# Patient Record
Sex: FEMALE | Race: WHITE | Employment: OTHER | URBAN - METROPOLITAN AREA
[De-identification: names, ages, dates, MRNs, and addresses within clinical notes are randomized per-mention and may not be internally consistent; named-entity substitution may affect disease eponyms.]

---

## 2017-02-22 ENCOUNTER — HOSPITAL ENCOUNTER (OUTPATIENT)
Dept: GENERAL RADIOLOGY | Age: 72
Discharge: HOME | End: 2017-02-22
Payer: MEDICARE

## 2017-02-22 DIAGNOSIS — R31.9 HEMATURIA: ICD-10-CM

## 2017-02-22 DIAGNOSIS — C67.9 MALIGNANT NEOPLASM OF OTHER SPECIFIED SITES OF BLADDER: ICD-10-CM

## 2017-02-22 PROCEDURE — 71020 XR CHEST PA LAT: CPT

## 2017-03-26 ENCOUNTER — HOSPITAL ENCOUNTER (EMERGENCY)
Age: 72
Discharge: HOME | End: 2017-03-26
Attending: EMERGENCY MEDICINE | Admitting: EMERGENCY MEDICINE
Payer: MEDICARE

## 2017-03-26 VITALS
TEMPERATURE: 97.5 F | RESPIRATION RATE: 18 BRPM | DIASTOLIC BLOOD PRESSURE: 70 MMHG | SYSTOLIC BLOOD PRESSURE: 137 MMHG | OXYGEN SATURATION: 97 % | HEART RATE: 95 BPM

## 2017-03-26 DIAGNOSIS — R51.9 NONINTRACTABLE HEADACHE, UNSPECIFIED CHRONICITY PATTERN, UNSPECIFIED HEADACHE TYPE: ICD-10-CM

## 2017-03-26 DIAGNOSIS — J06.9 ACUTE UPPER RESPIRATORY INFECTION: Primary | ICD-10-CM

## 2017-03-26 PROCEDURE — 74011250636 HC RX REV CODE- 250/636: Performed by: EMERGENCY MEDICINE

## 2017-03-26 PROCEDURE — 99282 EMERGENCY DEPT VISIT SF MDM: CPT

## 2017-03-26 PROCEDURE — 96374 THER/PROPH/DIAG INJ IV PUSH: CPT

## 2017-03-26 PROCEDURE — 74011000250 HC RX REV CODE- 250: Performed by: EMERGENCY MEDICINE

## 2017-03-26 PROCEDURE — 96375 TX/PRO/DX INJ NEW DRUG ADDON: CPT

## 2017-03-26 PROCEDURE — 96361 HYDRATE IV INFUSION ADD-ON: CPT

## 2017-03-26 RX ORDER — KETOROLAC TROMETHAMINE 30 MG/ML
15 INJECTION, SOLUTION INTRAMUSCULAR; INTRAVENOUS
Status: COMPLETED | OUTPATIENT
Start: 2017-03-26 | End: 2017-03-26

## 2017-03-26 RX ORDER — IBUPROFEN 600 MG/1
600 TABLET ORAL
Qty: 20 TAB | Refills: 0 | Status: SHIPPED | OUTPATIENT
Start: 2017-03-26 | End: 2018-04-18

## 2017-03-26 RX ORDER — LOSARTAN POTASSIUM AND HYDROCHLOROTHIAZIDE 12.5; 5 MG/1; MG/1
1 TABLET ORAL EVERY EVENING
COMMUNITY
End: 2019-09-18 | Stop reason: SDUPTHER

## 2017-03-26 RX ORDER — PROCHLORPERAZINE MALEATE 10 MG
10 TABLET ORAL
Qty: 12 TAB | Refills: 0 | Status: SHIPPED | OUTPATIENT
Start: 2017-03-26 | End: 2017-04-02

## 2017-03-26 RX ORDER — TRAMADOL HYDROCHLORIDE 50 MG/1
50 TABLET ORAL
COMMUNITY
End: 2018-04-18

## 2017-03-26 RX ORDER — DIPHENHYDRAMINE HYDROCHLORIDE 50 MG/ML
12.5 INJECTION, SOLUTION INTRAMUSCULAR; INTRAVENOUS
Status: COMPLETED | OUTPATIENT
Start: 2017-03-26 | End: 2017-03-26

## 2017-03-26 RX ADMIN — SODIUM CHLORIDE 1000 ML: 900 INJECTION, SOLUTION INTRAVENOUS at 09:55

## 2017-03-26 RX ADMIN — DIPHENHYDRAMINE HYDROCHLORIDE 12.5 MG: 50 INJECTION, SOLUTION INTRAMUSCULAR; INTRAVENOUS at 09:56

## 2017-03-26 RX ADMIN — KETOROLAC TROMETHAMINE 15 MG: 30 INJECTION, SOLUTION INTRAMUSCULAR at 10:00

## 2017-03-26 RX ADMIN — SODIUM CHLORIDE 5 MG: 9 INJECTION INTRAMUSCULAR; INTRAVENOUS; SUBCUTANEOUS at 10:03

## 2017-03-26 NOTE — ED PROVIDER NOTES
HPI Comments: 73-year-old white female presents to the emergency department with cough, muscle aches, headache. Patient states she's had a cough and nasal congestion for the last 24 hours. She was having muscle aches in her shoulders yesterday. She also is having nasal congestion and a mild cough yesterday. Starting last night patient was having a headache. The headache is mostly frontal. The headache is dull in nature. No radiation of the headache. Patient states her head is hurting this morning. The headache is mostly frontal. The headache does not radiate. Nothing makes the headache better or worse. Patient denies photophobia or phonophobia. Patient had a temperature last night of 100.4°F. Pt has aches in her shoulders bilaterally this morning. No fever this morning. Patient denies any previous history of migraines. She reports getting headaches periodically. She took Tylenol 650 mg about 3 hours ago. This helped somewhat with the headache. Patient denies weakness or numbness in arms or legs. No chest pain or shortness of breath. No tobacco or alcohol use. The history is provided by the patient. Past Medical History:   Diagnosis Date    Arthritis     Cancer [facility])     bladder    Endocrine disease     hyperthyroid    Gastrointestinal disorder     Hypertension     Other ill-defined conditions(799.89)     hyperthyroid       Past Surgical History:   Procedure Laterality Date    HX GYN      HX UROLOGICAL      stoma since 78         History reviewed. No pertinent family history. Social History     Social History    Marital status:      Spouse name: N/A    Number of children: N/A    Years of education: N/A     Occupational History    Not on file.      Social History Main Topics    Smoking status: Former Smoker     Quit date: 7/3/1999    Smokeless tobacco: Never Used    Alcohol use No    Drug use: No    Sexual activity: Not on file     Other Topics Concern    Not on file     Social History Narrative         ALLERGIES: Sulfa (sulfonamide antibiotics)    Review of Systems   Constitutional: Negative for fever. HENT: Positive for congestion. Negative for facial swelling and nosebleeds. Eyes: Negative for pain. Respiratory: Positive for cough. Negative for chest tightness and shortness of breath. Cardiovascular: Negative for chest pain and leg swelling. Gastrointestinal: Negative for abdominal pain and vomiting. Endocrine: Negative for polyuria. Genitourinary: Negative for difficulty urinating and flank pain. Musculoskeletal: Positive for myalgias. Negative for arthralgias and back pain. Skin: Negative for color change. Allergic/Immunologic: Negative for immunocompromised state. Neurological: Positive for headaches. Negative for dizziness, speech difficulty, weakness and numbness. Hematological: Does not bruise/bleed easily. Psychiatric/Behavioral: Negative for agitation. All other systems reviewed and are negative. Vitals:    03/26/17 0920   BP: 147/67   Pulse: 95   Resp: 18   Temp: 97.5 °F (36.4 °C)   SpO2: 95%            Physical Exam   Constitutional: She is oriented to person, place, and time. She appears well-developed and well-nourished. HENT:   Head: Normocephalic and atraumatic. Right Ear: External ear normal.   Left Ear: External ear normal.   Nose: Nose normal.   Mouth/Throat: Oropharynx is clear and moist.   Eyes: EOM are normal. Pupils are equal, round, and reactive to light. No scleral icterus. Neck: Normal range of motion. Neck supple. No JVD present. No tracheal deviation present. No thyromegaly present. Cardiovascular: Normal rate, regular rhythm, normal heart sounds and intact distal pulses. Exam reveals no friction rub. No murmur heard. Pulmonary/Chest: Effort normal and breath sounds normal. No stridor. No respiratory distress. She has no wheezes. She has no rales. She exhibits no tenderness. Abdominal: Soft.  Bowel sounds are normal. She exhibits no distension. There is no tenderness. There is no rebound and no guarding. Musculoskeletal: Normal range of motion. She exhibits no edema or tenderness. Lymphadenopathy:     She has no cervical adenopathy. Neurological: She is alert and oriented to person, place, and time. She has normal reflexes. No cranial nerve deficit. Coordination normal.   Cranial nerves 2-12 are intact. Strength 5/5 and normal in biceps, triceps and hand  bilaterally. Strength 5/5 in plantar and dorsi flexion of feet bilaterally. Normal sensation in arms and legs bilaterally to light touch. Normal finger to nose bilaterally. Skin: Skin is warm and dry. No rash noted. No erythema. Psychiatric: She has a normal mood and affect. Her behavior is normal. Judgment and thought content normal.   Nursing note and vitals reviewed. MDM  Number of Diagnoses or Management Options  Diagnosis management comments: Patient looks well and nontoxic. Neurologic exam is normal. We'll give her IV fluids and headache medications. We'll reassess when testing is back. Amount and/or Complexity of Data Reviewed  Decide to obtain previous medical records or to obtain history from someone other than the patient: yes  Obtain history from someone other than the patient: yes  Review and summarize past medical records: yes  Independent visualization of images, tracings, or specimens: yes    Risk of Complications, Morbidity, and/or Mortality  Presenting problems: moderate  Diagnostic procedures: moderate  Management options: moderate      ED Course       Procedures    10:56 AM  Pt reports she's feeling better. Headache is gone. Will start her on Motrin and Compazine over the next few days. PCP follow up recommended    Good return precautions given to patient. Close follow up with PCP recommended. Patient and/or family voices understanding of this plan.  Discharge instructions were explained by me and all concerns were addressed.

## 2017-03-26 NOTE — DISCHARGE INSTRUCTIONS
Headache: Care Instructions  Your Care Instructions    Headaches have many possible causes. Most headaches aren't a sign of a more serious problem, and they will get better on their own. Home treatment may help you feel better faster. The doctor has checked you carefully, but problems can develop later. If you notice any problems or new symptoms, get medical treatment right away. Follow-up care is a key part of your treatment and safety. Be sure to make and go to all appointments, and call your doctor if you are having problems. It's also a good idea to know your test results and keep a list of the medicines you take. How can you care for yourself at home? · Do not drive if you have taken a prescription pain medicine. · Rest in a quiet, dark room until your headache is gone. Close your eyes and try to relax or go to sleep. Don't watch TV or read. · Put a cold, moist cloth or cold pack on the painful area for 10 to 20 minutes at a time. Put a thin cloth between the cold pack and your skin. · Use a warm, moist towel or a heating pad set on low to relax tight shoulder and neck muscles. · Have someone gently massage your neck and shoulders. · Take pain medicines exactly as directed. ¨ If the doctor gave you a prescription medicine for pain, take it as prescribed. ¨ If you are not taking a prescription pain medicine, ask your doctor if you can take an over-the-counter medicine. · Be careful not to take pain medicine more often than the instructions allow, because you may get worse or more frequent headaches when the medicine wears off. · Do not ignore new symptoms that occur with a headache, such as a fever, weakness or numbness, vision changes, or confusion. These may be signs of a more serious problem. To prevent headaches  · Keep a headache diary so you can figure out what triggers your headaches. Avoiding triggers may help you prevent headaches.  Record when each headache began, how long it lasted, and what the pain was like (throbbing, aching, stabbing, or dull). Write down any other symptoms you had with the headache, such as nausea, flashing lights or dark spots, or sensitivity to bright light or loud noise. Note if the headache occurred near your period. List anything that might have triggered the headache, such as certain foods (chocolate, cheese, wine) or odors, smoke, bright light, stress, or lack of sleep. · Find healthy ways to deal with stress. Headaches are most common during or right after stressful times. Take time to relax before and after you do something that has caused a headache in the past.  · Try to keep your muscles relaxed by keeping good posture. Check your jaw, face, neck, and shoulder muscles for tension, and try relaxing them. When sitting at a desk, change positions often, and stretch for 30 seconds each hour. · Get plenty of sleep and exercise. · Eat regularly and well. Long periods without food can trigger a headache. · Treat yourself to a massage. Some people find that regular massages are very helpful in relieving tension. · Limit caffeine by not drinking too much coffee, tea, or soda. But don't quit caffeine suddenly, because that can also give you headaches. · Reduce eyestrain from computers by blinking frequently and looking away from the computer screen every so often. Make sure you have proper eyewear and that your monitor is set up properly, about an arm's length away. · Seek help if you have depression or anxiety. Your headaches may be linked to these conditions. Treatment can both prevent headaches and help with symptoms of anxiety or depression. When should you call for help? Call 911 anytime you think you may need emergency care. For example, call if:  · You have signs of a stroke. These may include:  ¨ Sudden numbness, paralysis, or weakness in your face, arm, or leg, especially on only one side of your body. ¨ Sudden vision changes.   ¨ Sudden trouble speaking. ¨ Sudden confusion or trouble understanding simple statements. ¨ Sudden problems with walking or balance. ¨ A sudden, severe headache that is different from past headaches. Call your doctor now or seek immediate medical care if:  · You have a new or worse headache. · Your headache gets much worse. Where can you learn more? Go to http://kris-lópez.info/. Enter M271 in the search box to learn more about \"Headache: Care Instructions. \"  Current as of: February 19, 2016  Content Version: 11.1  © 8606-4416 [employer]. Care instructions adapted under license by [employer] (which disclaims liability or warranty for this information). If you have questions about a medical condition or this instruction, always ask your healthcare professional. [address] any warranty or liability for your use of this information. Upper Respiratory Infection (Cold): Care Instructions  Your Care Instructions    An upper respiratory infection, or URI, is an infection of the nose, sinuses, or throat. URIs are spread by coughs, sneezes, and direct contact. The common cold is the most frequent kind of URI. The flu and sinus infections are other kinds of URIs. Almost all URIs are caused by viruses. Antibiotics won't cure them. But you can treat most infections with home care. This may include drinking lots of fluids and taking over-the-counter pain medicine. You will probably feel better in 4 to 10 days. The doctor has checked you carefully, but problems can develop later. If you notice any problems or new symptoms, get medical treatment right away. Follow-up care is a key part of your treatment and safety. Be sure to make and go to all appointments, and call your doctor if you are having problems. It's also a good idea to know your test results and keep a list of the medicines you take. How can you care for yourself at home?   · To prevent dehydration, drink plenty of fluids, enough so that your urine is light yellow or clear like water. Choose water and other caffeine-free clear liquids until you feel better. If you have kidney, heart, or liver disease and have to limit fluids, talk with your doctor before you increase the amount of fluids you drink. · Take an over-the-counter pain medicine, such as acetaminophen (Tylenol), ibuprofen (Advil, Motrin), or naproxen (Aleve). Read and follow all instructions on the label. · Before you use cough and cold medicines, check the label. These medicines may not be safe for young children or for people with certain health problems. · Be careful when taking over-the-counter cold or flu medicines and Tylenol at the same time. Many of these medicines have acetaminophen, which is Tylenol. Read the labels to make sure that you are not taking more than the recommended dose. Too much acetaminophen (Tylenol) can be harmful. · Get plenty of rest.  · Do not smoke or allow others to smoke around you. If you need help quitting, talk to your doctor about stop-smoking programs and medicines. These can increase your chances of quitting for good. When should you call for help? Call 911 anytime you think you may need emergency care. For example, call if:  · You have severe trouble breathing. Call your doctor now or seek immediate medical care if:  · You seem to be getting much sicker. · You have new or worse trouble breathing. · You have a new or higher fever. · You have a new rash. Watch closely for changes in your health, and be sure to contact your doctor if:  · You have a new symptom, such as a sore throat, an earache, or sinus pain. · You cough more deeply or more often, especially if you notice more mucus or a change in the color of your mucus. · You do not get better as expected. Where can you learn more? Go to http://kris-lópez.info/.   Enter M007 in the search box to learn more about \"Upper Respiratory Infection (Cold): Care Instructions. \"  Current as of: June 30, 2016  Content Version: 11.1  © 2384-0863 [employer]. Care instructions adapted under license by [employer] (which disclaims liability or warranty for this information). If you have questions about a medical condition or this instruction, always ask your healthcare professional. [address] any warranty or liability for your use of this information.

## 2017-03-26 NOTE — ED TRIAGE NOTES
Pt ambulates to treatment area she states that yesterday she began with a cough in the morning and then last night her entire head began to hurt. She had a low grade fever of 100.4 so this morning she took some Tylenol that helped with the fever and a little with the headache.   She feels nauseated intermittently but has not vomited

## 2017-08-16 ENCOUNTER — APPOINTMENT (OUTPATIENT)
Dept: CT IMAGING | Age: 72
End: 2017-08-16
Attending: PHYSICIAN ASSISTANT
Payer: MEDICARE

## 2017-08-16 ENCOUNTER — HOSPITAL ENCOUNTER (EMERGENCY)
Age: 72
Discharge: HOME | End: 2017-08-17
Attending: EMERGENCY MEDICINE
Payer: MEDICARE

## 2017-08-16 DIAGNOSIS — N39.0 URINARY TRACT INFECTION WITH HEMATURIA, SITE UNSPECIFIED: Primary | ICD-10-CM

## 2017-08-16 DIAGNOSIS — R31.9 URINARY TRACT INFECTION WITH HEMATURIA, SITE UNSPECIFIED: Primary | ICD-10-CM

## 2017-08-16 LAB
ALBUMIN SERPL-MCNC: 3.7 G/DL (ref 3.5–5)
ALBUMIN/GLOB SERPL: 0.9 {RATIO} (ref 1.1–2.2)
ALP SERPL-CCNC: 67 U/L (ref 45–117)
ALT SERPL-CCNC: 23 U/L (ref 12–78)
ANION GAP SERPL CALC-SCNC: 6 MMOL/L (ref 5–15)
APPEARANCE UR: ABNORMAL
AST SERPL-CCNC: 25 U/L (ref 15–37)
BACTERIA URNS QL MICRO: ABNORMAL /HPF
BASOPHILS # BLD: 0 K/UL (ref 0–0.1)
BASOPHILS NFR BLD: 0 % (ref 0–1)
BILIRUB SERPL-MCNC: 0.5 MG/DL (ref 0.2–1)
BILIRUB UR QL: NEGATIVE
BUN SERPL-MCNC: 23 MG/DL (ref 6–20)
BUN/CREAT SERPL: 18 (ref 12–20)
CALCIUM SERPL-MCNC: 8.5 MG/DL (ref 8.5–10.1)
CHLORIDE SERPL-SCNC: 104 MMOL/L (ref 97–108)
CO2 SERPL-SCNC: 28 MMOL/L (ref 21–32)
COLOR UR: ABNORMAL
CREAT SERPL-MCNC: 1.29 MG/DL (ref 0.55–1.02)
EOSINOPHIL # BLD: 0 K/UL (ref 0–0.4)
EOSINOPHIL NFR BLD: 2 % (ref 0–7)
EPITH CASTS URNS QL MICRO: ABNORMAL /LPF
ERYTHROCYTE [DISTWIDTH] IN BLOOD BY AUTOMATED COUNT: 13.7 % (ref 11.5–14.5)
GLOBULIN SER CALC-MCNC: 3.9 G/DL (ref 2–4)
GLUCOSE SERPL-MCNC: 103 MG/DL (ref 65–100)
GLUCOSE UR STRIP.AUTO-MCNC: NEGATIVE MG/DL
HCT VFR BLD AUTO: 31 % (ref 35–47)
HGB BLD-MCNC: 10.2 G/DL (ref 11.5–16)
HGB UR QL STRIP: ABNORMAL
INR PPP: 1.1 (ref 0.9–1.1)
KETONES UR QL STRIP.AUTO: NEGATIVE MG/DL
LEUKOCYTE ESTERASE UR QL STRIP.AUTO: ABNORMAL
LYMPHOCYTES # BLD: 0.9 K/UL (ref 0.8–3.5)
LYMPHOCYTES NFR BLD: 39 % (ref 12–49)
MCH RBC QN AUTO: 30 PG (ref 26–34)
MCHC RBC AUTO-ENTMCNC: 32.9 G/DL (ref 30–36.5)
MCV RBC AUTO: 91.2 FL (ref 80–99)
MONOCYTES # BLD: 0.1 K/UL (ref 0–1)
MONOCYTES NFR BLD: 6 % (ref 5–13)
NEUTS SEG # BLD: 1.2 K/UL (ref 1.8–8)
NEUTS SEG NFR BLD: 53 % (ref 32–75)
NITRITE UR QL STRIP.AUTO: POSITIVE
PH UR STRIP: 7 [PH] (ref 5–8)
PLATELET # BLD AUTO: 99 K/UL (ref 150–400)
POTASSIUM SERPL-SCNC: 3.5 MMOL/L (ref 3.5–5.1)
PROT SERPL-MCNC: 7.6 G/DL (ref 6.4–8.2)
PROT UR STRIP-MCNC: >300 MG/DL
PROTHROMBIN TIME: 11.3 SEC (ref 9–11.1)
RBC # BLD AUTO: 3.4 M/UL (ref 3.8–5.2)
RBC #/AREA URNS HPF: >100 /HPF (ref 0–5)
SODIUM SERPL-SCNC: 138 MMOL/L (ref 136–145)
SP GR UR REFRACTOMETRY: 1.02 (ref 1–1.03)
UROBILINOGEN UR QL STRIP.AUTO: 0.2 EU/DL (ref 0.2–1)
WBC # BLD AUTO: 2.3 K/UL (ref 3.6–11)
WBC URNS QL MICRO: ABNORMAL /HPF (ref 0–4)

## 2017-08-16 PROCEDURE — 36415 COLL VENOUS BLD VENIPUNCTURE: CPT | Performed by: PHYSICIAN ASSISTANT

## 2017-08-16 PROCEDURE — 87086 URINE CULTURE/COLONY COUNT: CPT | Performed by: PHYSICIAN ASSISTANT

## 2017-08-16 PROCEDURE — 85025 COMPLETE CBC W/AUTO DIFF WBC: CPT | Performed by: PHYSICIAN ASSISTANT

## 2017-08-16 PROCEDURE — 74176 CT ABD & PELVIS W/O CONTRAST: CPT

## 2017-08-16 PROCEDURE — 81001 URINALYSIS AUTO W/SCOPE: CPT | Performed by: PHYSICIAN ASSISTANT

## 2017-08-16 PROCEDURE — 99284 EMERGENCY DEPT VISIT MOD MDM: CPT

## 2017-08-16 PROCEDURE — 87186 SC STD MICRODIL/AGAR DIL: CPT | Performed by: PHYSICIAN ASSISTANT

## 2017-08-16 PROCEDURE — 87077 CULTURE AEROBIC IDENTIFY: CPT | Performed by: PHYSICIAN ASSISTANT

## 2017-08-16 PROCEDURE — 80053 COMPREHEN METABOLIC PANEL: CPT | Performed by: PHYSICIAN ASSISTANT

## 2017-08-16 PROCEDURE — 85610 PROTHROMBIN TIME: CPT | Performed by: PHYSICIAN ASSISTANT

## 2017-08-16 PROCEDURE — 96365 THER/PROPH/DIAG IV INF INIT: CPT

## 2017-08-17 VITALS
SYSTOLIC BLOOD PRESSURE: 149 MMHG | OXYGEN SATURATION: 100 % | HEART RATE: 87 BPM | HEIGHT: 65 IN | RESPIRATION RATE: 18 BRPM | DIASTOLIC BLOOD PRESSURE: 67 MMHG | TEMPERATURE: 98.4 F | BODY MASS INDEX: 34.64 KG/M2 | WEIGHT: 207.89 LBS

## 2017-08-17 PROCEDURE — 74011250636 HC RX REV CODE- 250/636: Performed by: PHYSICIAN ASSISTANT

## 2017-08-17 PROCEDURE — 74011000258 HC RX REV CODE- 258: Performed by: PHYSICIAN ASSISTANT

## 2017-08-17 RX ORDER — CEPHALEXIN 500 MG/1
500 CAPSULE ORAL 3 TIMES DAILY
Qty: 21 CAP | Refills: 0 | Status: SHIPPED | OUTPATIENT
Start: 2017-08-17 | End: 2017-08-17

## 2017-08-17 RX ORDER — CEFUROXIME AXETIL 500 MG/1
500 TABLET ORAL 2 TIMES DAILY
Qty: 18 TAB | Refills: 0 | Status: SHIPPED | OUTPATIENT
Start: 2017-08-17 | End: 2017-08-26

## 2017-08-17 RX ADMIN — CEFTRIAXONE SODIUM 1 G: 1 INJECTION, POWDER, FOR SOLUTION INTRAMUSCULAR; INTRAVENOUS at 00:50

## 2017-08-17 NOTE — ED PROVIDER NOTES
HPI Comments: Tsering Mireles is a 74 y.o. female with PMH significant for urostomy due to bladder CA / cystectomy in 1999 presents to emergency room ambulatory c/o hematuria from ostomy site which began this evening at 9pm. She also notes generalized \"not feeling well\" the past few days with associated chills, but denies fever, diarrhea, vomiting, bowel changes. Hx of hematuria x2 other times in our system, 2013 and 2011 and most recent 2 urine cx's grew e.coli which was pansensitive. She denies being admitted in the past for hematuria / UTI's. Also c/o dull BL low back pain and has hx of kidney stones \"years ago\". PCP: Arsh Box MD  Urology: Dr. Majo Lemons    Surgical hx- stoma since 1999, hysterectomy, cystectomy  Social hx- former smoker, no ETOH    The patient has no other complaints at this time. Patient is a 74 y.o. female presenting with hematuria. The history is provided by the patient. Blood in Urine    Associated symptoms include hematuria and back pain (BL). Pertinent negatives include no chills, no nausea, no vomiting, no frequency, no flank pain and no abdominal pain. Past Medical History:   Diagnosis Date    Arthritis     Cancer [facility])     bladder    Endocrine disease     hyperthyroid    Gastrointestinal disorder     Hypertension     Other ill-defined conditions(799.89)     hyperthyroid       Past Surgical History:   Procedure Laterality Date    HX GYN      HX UROLOGICAL      stoma since 78         No family history on file. Social History     Social History    Marital status:      Spouse name: N/A    Number of children: N/A    Years of education: N/A     Occupational History    Not on file.      Social History Main Topics    Smoking status: Former Smoker     Quit date: 7/3/1999    Smokeless tobacco: Never Used    Alcohol use No    Drug use: No    Sexual activity: Not on file     Other Topics Concern    Not on file     Social History Narrative ALLERGIES: Sulfa (sulfonamide antibiotics)    Review of Systems   Constitutional: Negative. Negative for activity change, chills, fatigue and unexpected weight change. Respiratory: Negative for cough, chest tightness, shortness of breath and wheezing. Cardiovascular: Negative. Negative for chest pain and palpitations. Gastrointestinal: Negative. Negative for abdominal pain, diarrhea, nausea and vomiting. Genitourinary: Positive for hematuria. Negative for dysuria, flank pain and frequency. Musculoskeletal: Positive for back pain (BL). Negative for arthralgias, neck pain and neck stiffness. Skin: Negative. Negative for color change and rash. Neurological: Negative. Negative for dizziness, numbness and headaches. Psychiatric/Behavioral: Negative. Negative for confusion. All other systems reviewed and are negative. Vitals:    08/16/17 2221   BP: 180/79   Pulse: 87   Resp: 18   Temp: 98.4 °F (36.9 °C)   SpO2: 97%   Weight: 94.3 kg (207 lb 14.3 oz)   Height: 5' 5\" (1.651 m)            Physical Exam   Constitutional: She is oriented to person, place, and time. She appears well-developed and well-nourished. She is active. Non-toxic appearance. No distress. HENT:   Head: Normocephalic and atraumatic. Eyes: Conjunctivae are normal. Pupils are equal, round, and reactive to light. Right eye exhibits no discharge. Left eye exhibits no discharge. Neck: Normal range of motion and full passive range of motion without pain. Neck supple. No tracheal tenderness present. Cardiovascular: Normal rate, regular rhythm, normal heart sounds, intact distal pulses and normal pulses. Exam reveals no gallop and no friction rub. No murmur heard. Pulmonary/Chest: Effort normal and breath sounds normal. No respiratory distress. She has no wheezes. She has no rales. She exhibits no tenderness. Abdominal: Soft. Bowel sounds are normal. She exhibits no distension. There is no tenderness.  There is no rebound and no guarding. Urostomy to RLQ with dark red urine; no clots. Mild TTP around ostomy without peritoneal signs; large midline surgical scar with soft reducible ventral wall hernia to the left of the midline surgical scar near umbilicus. Musculoskeletal: Normal range of motion. She exhibits no edema or tenderness. Neurological: She is alert and oriented to person, place, and time. She has normal strength. No cranial nerve deficit or sensory deficit. Coordination normal.   Skin: Skin is warm, dry and intact. No abrasion and no rash noted. She is not diaphoretic. No erythema. Psychiatric: She has a normal mood and affect. Her speech is normal and behavior is normal. Cognition and memory are normal.   Nursing note and vitals reviewed. MDM  Number of Diagnoses or Management Options  Urinary tract infection with hematuria, site unspecified:   Diagnosis management comments:   Ddx: UTI, kidney stone, electrolyte abnormality       Amount and/or Complexity of Data Reviewed  Clinical lab tests: ordered and reviewed  Review and summarize past medical records: yes    Patient Progress  Patient progress: stable    ED Course       Procedures      I discussed patient's PMH, exam findings as well as careplan with the ER attending who agrees with care plan. Dr. Jet Horn also saw and examined patient and agrees with care plan. Also recommends stone study with CT abd/pelvis w/o contrast.  Jenna Willis PA-C         12:16 AM  I have reviewed the additional findings with the patient and encouraged them to follow-up with a primary care provider for appropriate outpatient evaluation and treatment. I have encouraged them to see the official results in [facility]\" or to retrieve the specifics of their results from medical records. The patient states that they understand that there were additional findings and that they agree to follow-up as recommended.     Discussed lab/imaging results with Dr. Jet Horn who recommends to give a dose of Rocephin 1 g IV now and d/c with Keflex and urology f/u in 1-2 days. Patient requests \"something other than Keflex\" just stating it caused diarrhea she believes, \"years ago\". Discussed with Dr. Dillan Conley who recommends Ceftin BID. Leigh Zelaya PA-C      DISCHARGE NOTE:  12:16 AM  The patient's results have been reviewed with them and/or available family. Patient and/or family verbally conveyed their understanding and agreement of the patient's signs, symptoms, diagnosis, treatment and prognosis and additionally agree to follow up as recommended in the discharge instructions or to return to the Emergency Room should their condition change prior to their follow-up appointment. The patient/family verbally agrees with the care-plan and verbally conveys that all of their questions have been answered. The discharge instructions have also been provided to the patient and/or family with some educational information regarding the patient's diagnosis as well a list of reasons why the patient would want to return to the ER prior to their follow-up appointment, should their condition change. Plan:  1. F/U with urology in 1-2 days  2. Rx Ceftin ; Rocephin given in ER  3.  Return precautions discussed and advised to return to ER if worse

## 2017-08-17 NOTE — DISCHARGE INSTRUCTIONS
We hope that we have addressed all of your medical concerns. The examination and treatment you received in the Emergency Department were for an emergent problem and were not intended as complete care. It is important that you follow up with your healthcare provider(s) for ongoing care. If your symptoms worsen or do not improve as expected, and you are unable to reach your usual health care provider(s), you should return to the Emergency Department. Today's healthcare is undergoing tremendous change, and patient satisfaction surveys are one of the many tools to assess the quality of medical care. You may receive a survey from the [employer] regarding your experience in the Emergency Department. I hope that your experience has been completely positive, particularly the medical care that I provided. As such, please participate in the survey; anything less than excellent does not meet my expectations or intentions. [facility] and [address] [place] [address] participate in nationally recognized quality of care measures. If your blood pressure is greater than 120/80, as reported below, we urge that you seek medical care to address the potential of high blood pressure, commonly known as hypertension. Hypertension can be hereditary or can be caused by certain medical conditions, pain, stress, or \"white coat syndrome. \"       Please make an appointment with your health care provider(s) for follow up of your Emergency Department visit. VITALS:   Patient Vitals for the past 8 hrs:   Temp Pulse Resp BP SpO2   08/16/17 2221 98.4 °F (36.9 °C) 87 18 180/79 97 %          Thank you for allowing us to provide you with medical care today. We realize that you have many choices for your emergency care needs. Please choose us in the future for any continued health care needs.       Regards,           Juan Souza PA-C    [facility], [facility]. Office: [phone]            Recent Results (from the past 24 hour(s))   CBC WITH AUTOMATED DIFF    Collection Time: 08/16/17 10:59 PM   Result Value Ref Range    WBC 2.3 (L) 3.6 - 11.0 K/uL    RBC 3.40 (L) 3.80 - 5.20 M/uL    HGB 10.2 (L) 11.5 - 16.0 g/dL    HCT 31.0 (L) 35.0 - 47.0 %    MCV 91.2 80.0 - 99.0 FL    MCH 30.0 26.0 - 34.0 PG    MCHC 32.9 30.0 - 36.5 g/dL    RDW 13.7 11.5 - 14.5 %    PLATELET 99 (L) 375 - 400 K/uL    NEUTROPHILS 53 32 - 75 %    LYMPHOCYTES 39 12 - 49 %    MONOCYTES 6 5 - 13 %    EOSINOPHILS 2 0 - 7 %    BASOPHILS 0 0 - 1 %    ABS. NEUTROPHILS 1.2 (L) 1.8 - 8.0 K/UL    ABS. LYMPHOCYTES 0.9 0.8 - 3.5 K/UL    ABS. MONOCYTES 0.1 0.0 - 1.0 K/UL    ABS. EOSINOPHILS 0.0 0.0 - 0.4 K/UL    ABS. BASOPHILS 0.0 0.0 - 0.1 K/UL   METABOLIC PANEL, COMPREHENSIVE    Collection Time: 08/16/17 10:59 PM   Result Value Ref Range    Sodium 138 136 - 145 mmol/L    Potassium 3.5 3.5 - 5.1 mmol/L    Chloride 104 97 - 108 mmol/L    CO2 28 21 - 32 mmol/L    Anion gap 6 5 - 15 mmol/L    Glucose 103 (H) 65 - 100 mg/dL    BUN 23 (H) 6 - 20 MG/DL    Creatinine 1.29 (H) 0.55 - 1.02 MG/DL    BUN/Creatinine ratio 18 12 - 20      GFR est AA 49 (L) >60 ml/min/1.73m2    GFR est non-AA 40 (L) >60 ml/min/1.73m2    Calcium 8.5 8.5 - 10.1 MG/DL    Bilirubin, total 0.5 0.2 - 1.0 MG/DL    ALT (SGPT) 23 12 - 78 U/L    AST (SGOT) 25 15 - 37 U/L    Alk.  phosphatase 67 45 - 117 U/L    Protein, total 7.6 6.4 - 8.2 g/dL    Albumin 3.7 3.5 - 5.0 g/dL    Globulin 3.9 2.0 - 4.0 g/dL    A-G Ratio 0.9 (L) 1.1 - 2.2     URINALYSIS W/MICROSCOPIC    Collection Time: 08/16/17 10:59 PM   Result Value Ref Range    Color RED      Appearance OPAQUE (A) CLEAR      Specific gravity 1.020 1.003 - 1.030      pH (UA) 7.0 5.0 - 8.0      Protein >300 (A) NEG mg/dL    Glucose NEGATIVE  NEG mg/dL    Ketone NEGATIVE  NEG mg/dL    Bilirubin NEGATIVE  NEG      Blood LARGE (A) NEG      Urobilinogen 0.2 0.2 - 1.0 EU/dL    Nitrites POSITIVE (A) NEG Leukocyte Esterase TRACE (A) NEG      WBC 5-10 0 - 4 /hpf    RBC >100 (H) 0 - 5 /hpf    Epithelial cells FEW FEW /lpf    Bacteria 1+ (A) NEG /hpf   PROTHROMBIN TIME + INR    Collection Time: 08/16/17 10:59 PM   Result Value Ref Range    INR 1.1 0.9 - 1.1      Prothrombin time 11.3 (H) 9.0 - 11.1 sec       Ct Abd Pelv Wo Cont    Result Date: 8/16/2017  EXAM:  CT ABD PELV WO CONT INDICATION: hematuria, flank pain, hx of kidney stones; has urostomy COMPARISON: 2013 CONTRAST:  None. TECHNIQUE: Thin axial images were obtained through the abdomen and pelvis. Coronal and sagittal reconstructions were generated. Oral contrast was not administered. CT dose reduction was achieved through use of a standardized protocol tailored for this examination and automatic exposure control for dose modulation. The absence of intravenous contrast material reduces the sensitivity for evaluation of the solid parenchymal organs of the abdomen. FINDINGS: Liver is nodular compatible with cirrhosis. The spleen is normal in size. The gallbladder is not distended. The a pancreas does appear unremarkable. Ostomy seen in the right lower quadrant. There is a small supraumbilical abdominal wall hernia containing nondistended colon. There is no bowel wall thickening or obstruction. Prior cystectomy. There is no free air or free fluid. No definite adenopathy in the abdomen or pelvis. Large left renal cyst unchanged. There is no intrarenal calcification or obstruction. IMPRESSION: No acute findings. Prior surgery. Blood in the Urine: Care Instructions  Your Care Instructions  Blood in the urine, or hematuria, may make the urine look red, brown, or pink. There may be blood every time you urinate or just from time to time. You cannot always see blood in the urine, but it will show up in a urine test.  Blood in the urine may be serious. It should always be checked by a doctor.  Your doctor may recommend more tests, including an X-ray, a CT scan, or a cystoscopy (which lets a doctor look inside the urethra and bladder). Blood in the urine can be a sign of another problem. Common causes are bladder infections and kidney stones. An injury to your groin or your genital area can also cause bleeding in the urinary tract. Very hard exercise--such as running a marathon--can cause blood in the urine. Blood in the urine can also be a sign of kidney disease or cancer in the bladder or kidney. Many cases of blood in the urine are caused by a harmless condition that runs in families. This is called benign familial hematuria. It does not need any treatment. Sometimes your urine may look red or brown even though it does not contain blood. For example, not getting enough fluids (dehydration), taking certain medicines, or having a liver problem can change the color of your urine. Eating foods such as beets, rhubarb, or blackberries or foods with red food coloring can make your urine look red or pink. Follow-up care is a key part of your treatment and safety. Be sure to make and go to all appointments, and call your doctor if you are having problems. It's also a good idea to know your test results and keep a list of the medicines you take. When should you call for help? Call your doctor now or seek immediate medical care if:  · You have symptoms of a urinary infection. For example:  ¨ You have pus in your urine. ¨ You have pain in your back just below your rib cage. This is called flank pain. ¨ You have a fever, chills, or body aches. ¨ It hurts to urinate. ¨ You have groin or belly pain. · You have more blood in your urine. Watch closely for changes in your health, and be sure to contact your doctor if:  · You have new urination problems. · You do not get better as expected. Where can you learn more? Go to http://kris-lópez.info/. Enter O001 in the search box to learn more about \"Blood in the Urine: Care Instructions. \"  Current as of: March 20, 2017  Content Version: 11.3  © 6694-6721 [employer]. Care instructions adapted under license by [employer] (which disclaims liability or warranty for this information). If you have questions about a medical condition or this instruction, always ask your healthcare professional. [address] any warranty or liability for your use of this information. Urinary Tract Infection in Women: Care Instructions  Your Care Instructions    A urinary tract infection, or UTI, is a general term for an infection anywhere between the kidneys and the urethra (where urine comes out). Most UTIs are bladder infections. They often cause pain or burning when you urinate. UTIs are caused by bacteria and can be cured with antibiotics. Be sure to complete your treatment so that the infection goes away. Follow-up care is a key part of your treatment and safety. Be sure to make and go to all appointments, and call your doctor if you are having problems. It's also a good idea to know your test results and keep a list of the medicines you take. How can you care for yourself at home? · Take your antibiotics as directed. Do not stop taking them just because you feel better. You need to take the full course of antibiotics. · Drink extra water and other fluids for the next day or two. This may help wash out the bacteria that are causing the infection. (If you have kidney, heart, or liver disease and have to limit fluids, talk with your doctor before you increase your fluid intake.)  · Avoid drinks that are carbonated or have caffeine. They can irritate the bladder. · Urinate often. Try to empty your bladder each time. · To relieve pain, take a hot bath or lay a heating pad set on low over your lower belly or genital area. Never go to sleep with a heating pad in place. To prevent UTIs  · Drink plenty of water each day.  This helps you urinate often, which clears bacteria from your system. (If you have kidney, heart, or liver disease and have to limit fluids, talk with your doctor before you increase your fluid intake.)  · Urinate when you need to. · Urinate right after you have sex. · Change sanitary pads often. · Avoid douches, bubble baths, feminine hygiene sprays, and other feminine hygiene products that have deodorants. · After going to the bathroom, wipe from front to back. When should you call for help? Call your doctor now or seek immediate medical care if:  · Symptoms such as fever, chills, nausea, or vomiting get worse or appear for the first time. · You have new pain in your back just below your rib cage. This is called flank pain. · There is new blood or pus in your urine. · You have any problems with your antibiotic medicine. Watch closely for changes in your health, and be sure to contact your doctor if:  · You are not getting better after taking an antibiotic for 2 days. · Your symptoms go away but then come back. Where can you learn more? Go to http://kris-lópez.info/. Enter C687 in the search box to learn more about \"Urinary Tract Infection in Women: Care Instructions. \"  Current as of: November 28, 2016  Content Version: 11.3  © 5710-8918 [employer]. Care instructions adapted under license by [employer] (which disclaims liability or warranty for this information). If you have questions about a medical condition or this instruction, always ask your healthcare professional. [place] [address] any warranty or liability for your use of this information.

## 2017-08-19 LAB
BACTERIA SPEC CULT: ABNORMAL
BACTERIA SPEC CULT: ABNORMAL
CC UR VC: ABNORMAL
SERVICE CMNT-IMP: ABNORMAL

## 2018-04-18 ENCOUNTER — HOSPITAL ENCOUNTER (INPATIENT)
Age: 73
LOS: 4 days | Discharge: HOME HEALTH | DRG: 872 | End: 2018-04-22
Attending: EMERGENCY MEDICINE | Admitting: INTERNAL MEDICINE
Payer: MEDICARE

## 2018-04-18 ENCOUNTER — APPOINTMENT (OUTPATIENT)
Dept: GENERAL RADIOLOGY | Age: 73
DRG: 872 | End: 2018-04-18
Attending: INTERNAL MEDICINE
Payer: MEDICARE

## 2018-04-18 DIAGNOSIS — E53.8 VITAMIN B12 DEFICIENCY: ICD-10-CM

## 2018-04-18 DIAGNOSIS — N30.01 ACUTE CYSTITIS WITH HEMATURIA: ICD-10-CM

## 2018-04-18 DIAGNOSIS — D61.818 PANCYTOPENIA (HCC): ICD-10-CM

## 2018-04-18 DIAGNOSIS — A41.9 SEPSIS, DUE TO UNSPECIFIED ORGANISM: Primary | ICD-10-CM

## 2018-04-18 DIAGNOSIS — D63.8 ANEMIA OF CHRONIC DISEASE: ICD-10-CM

## 2018-04-18 DIAGNOSIS — E03.9 ACQUIRED HYPOTHYROIDISM: ICD-10-CM

## 2018-04-18 DIAGNOSIS — C67.9 MALIGNANT NEOPLASM OF URINARY BLADDER, UNSPECIFIED SITE (HCC): ICD-10-CM

## 2018-04-18 DIAGNOSIS — N28.9 ACUTE RENAL INSUFFICIENCY: ICD-10-CM

## 2018-04-18 PROBLEM — N17.9 ARF (ACUTE RENAL FAILURE) (HCC): Status: ACTIVE | Noted: 2018-04-18

## 2018-04-18 PROBLEM — E87.20 LACTIC ACIDOSIS: Status: ACTIVE | Noted: 2018-04-18

## 2018-04-18 PROBLEM — E87.1 HYPONATREMIA: Status: ACTIVE | Noted: 2018-04-18

## 2018-04-18 PROBLEM — D64.9 ANEMIA: Status: ACTIVE | Noted: 2018-04-18

## 2018-04-18 PROBLEM — I10 HTN (HYPERTENSION): Status: ACTIVE | Noted: 2018-04-18

## 2018-04-18 PROBLEM — E87.6 HYPOKALEMIA: Status: ACTIVE | Noted: 2018-04-18

## 2018-04-18 PROBLEM — K21.9 GERD (GASTROESOPHAGEAL REFLUX DISEASE): Status: ACTIVE | Noted: 2018-04-18

## 2018-04-18 PROBLEM — R73.9 HYPERGLYCEMIA: Status: ACTIVE | Noted: 2018-04-18

## 2018-04-18 LAB
ALBUMIN SERPL-MCNC: 3.5 G/DL (ref 3.5–5)
ALBUMIN/GLOB SERPL: 0.8 {RATIO} (ref 1.1–2.2)
ALP SERPL-CCNC: 72 U/L (ref 45–117)
ALT SERPL-CCNC: 23 U/L (ref 12–78)
AMORPH CRY URNS QL MICRO: ABNORMAL
ANION GAP SERPL CALC-SCNC: 17 MMOL/L (ref 5–15)
APPEARANCE UR: ABNORMAL
AST SERPL-CCNC: 27 U/L (ref 15–37)
ATRIAL RATE: 99 BPM
B PERT DNA SPEC QL NAA+PROBE: NOT DETECTED
BACTERIA URNS QL MICRO: ABNORMAL /HPF
BASOPHILS # BLD: 0 K/UL (ref 0–0.1)
BASOPHILS NFR BLD: 0 % (ref 0–1)
BILIRUB SERPL-MCNC: 0.8 MG/DL (ref 0.2–1)
BILIRUB UR QL: NEGATIVE
BUN SERPL-MCNC: 34 MG/DL (ref 6–20)
BUN/CREAT SERPL: 17 (ref 12–20)
C PNEUM DNA SPEC QL NAA+PROBE: NOT DETECTED
CALCIUM SERPL-MCNC: 9.1 MG/DL (ref 8.5–10.1)
CALCULATED P AXIS, ECG09: 54 DEGREES
CALCULATED R AXIS, ECG10: 3 DEGREES
CALCULATED T AXIS, ECG11: 24 DEGREES
CHLORIDE SERPL-SCNC: 98 MMOL/L (ref 97–108)
CO2 SERPL-SCNC: 18 MMOL/L (ref 21–32)
COLOR UR: ABNORMAL
CREAT SERPL-MCNC: 1.97 MG/DL (ref 0.55–1.02)
DIAGNOSIS, 93000: NORMAL
DIFFERENTIAL METHOD BLD: ABNORMAL
EOSINOPHIL # BLD: 0 K/UL (ref 0–0.4)
EOSINOPHIL NFR BLD: 0 % (ref 0–7)
EPITH CASTS URNS QL MICRO: ABNORMAL /LPF
ERYTHROCYTE [DISTWIDTH] IN BLOOD BY AUTOMATED COUNT: 13.3 % (ref 11.5–14.5)
EST. AVERAGE GLUCOSE BLD GHB EST-MCNC: 100 MG/DL
FLUAV H1 2009 PAND RNA SPEC QL NAA+PROBE: NOT DETECTED
FLUAV H1 RNA SPEC QL NAA+PROBE: NOT DETECTED
FLUAV H3 RNA SPEC QL NAA+PROBE: NOT DETECTED
FLUAV SUBTYP SPEC NAA+PROBE: NOT DETECTED
FLUBV RNA SPEC QL NAA+PROBE: NOT DETECTED
GLOBULIN SER CALC-MCNC: 4.5 G/DL (ref 2–4)
GLUCOSE SERPL-MCNC: 155 MG/DL (ref 65–100)
GLUCOSE UR STRIP.AUTO-MCNC: NEGATIVE MG/DL
HADV DNA SPEC QL NAA+PROBE: NOT DETECTED
HBA1C MFR BLD: 5.1 % (ref 4.2–6.3)
HCOV 229E RNA SPEC QL NAA+PROBE: NOT DETECTED
HCOV HKU1 RNA SPEC QL NAA+PROBE: NOT DETECTED
HCOV NL63 RNA SPEC QL NAA+PROBE: NOT DETECTED
HCOV OC43 RNA SPEC QL NAA+PROBE: NOT DETECTED
HCT VFR BLD AUTO: 28.7 % (ref 35–47)
HGB BLD-MCNC: 9.4 G/DL (ref 11.5–16)
HGB UR QL STRIP: ABNORMAL
HMPV RNA SPEC QL NAA+PROBE: NOT DETECTED
HPIV1 RNA SPEC QL NAA+PROBE: NOT DETECTED
HPIV2 RNA SPEC QL NAA+PROBE: NOT DETECTED
HPIV3 RNA SPEC QL NAA+PROBE: NOT DETECTED
HPIV4 RNA SPEC QL NAA+PROBE: NOT DETECTED
IMM GRANULOCYTES # BLD: 0.1 K/UL (ref 0–0.04)
IMM GRANULOCYTES NFR BLD AUTO: 1 % (ref 0–0.5)
KETONES UR QL STRIP.AUTO: NEGATIVE MG/DL
LACTATE SERPL-SCNC: 1.5 MMOL/L (ref 0.4–2)
LACTATE SERPL-SCNC: 1.9 MMOL/L (ref 0.4–2)
LACTATE SERPL-SCNC: 2.3 MMOL/L (ref 0.4–2)
LACTATE SERPL-SCNC: 4.5 MMOL/L (ref 0.4–2)
LEUKOCYTE ESTERASE UR QL STRIP.AUTO: ABNORMAL
LYMPHOCYTES # BLD: 0.2 K/UL (ref 0.8–3.5)
LYMPHOCYTES NFR BLD: 2 % (ref 12–49)
M PNEUMO DNA SPEC QL NAA+PROBE: NOT DETECTED
MCH RBC QN AUTO: 29.3 PG (ref 26–34)
MCHC RBC AUTO-ENTMCNC: 32.8 G/DL (ref 30–36.5)
MCV RBC AUTO: 89.4 FL (ref 80–99)
MONOCYTES # BLD: 0.4 K/UL (ref 0–1)
MONOCYTES NFR BLD: 4 % (ref 5–13)
NEUTS SEG # BLD: 8.9 K/UL (ref 1.8–8)
NEUTS SEG NFR BLD: 93 % (ref 32–75)
NITRITE UR QL STRIP.AUTO: NEGATIVE
NRBC # BLD: 0 K/UL (ref 0–0.01)
NRBC BLD-RTO: 0 PER 100 WBC
P-R INTERVAL, ECG05: 134 MS
PH UR STRIP: 8.5 [PH] (ref 5–8)
PLATELET # BLD AUTO: 150 K/UL (ref 150–400)
PMV BLD AUTO: 11.9 FL (ref 8.9–12.9)
POTASSIUM SERPL-SCNC: 4.2 MMOL/L (ref 3.5–5.1)
PROT SERPL-MCNC: 8 G/DL (ref 6.4–8.2)
PROT UR STRIP-MCNC: 100 MG/DL
Q-T INTERVAL, ECG07: 384 MS
QRS DURATION, ECG06: 92 MS
QTC CALCULATION (BEZET), ECG08: 492 MS
RBC # BLD AUTO: 3.21 M/UL (ref 3.8–5.2)
RBC #/AREA URNS HPF: ABNORMAL /HPF (ref 0–5)
RBC MORPH BLD: ABNORMAL
RSV RNA SPEC QL NAA+PROBE: NOT DETECTED
RV+EV RNA SPEC QL NAA+PROBE: NOT DETECTED
SODIUM SERPL-SCNC: 133 MMOL/L (ref 136–145)
SP GR UR REFRACTOMETRY: 1.02 (ref 1–1.03)
TRI-PHOS CRY URNS QL MICRO: ABNORMAL
UROBILINOGEN UR QL STRIP.AUTO: 0.2 EU/DL (ref 0.2–1)
VENTRICULAR RATE, ECG03: 99 BPM
WBC # BLD AUTO: 9.6 K/UL (ref 3.6–11)
WBC MORPH BLD: ABNORMAL
WBC URNS QL MICRO: ABNORMAL /HPF (ref 0–4)

## 2018-04-18 PROCEDURE — 36415 COLL VENOUS BLD VENIPUNCTURE: CPT | Performed by: EMERGENCY MEDICINE

## 2018-04-18 PROCEDURE — 87040 BLOOD CULTURE FOR BACTERIA: CPT | Performed by: EMERGENCY MEDICINE

## 2018-04-18 PROCEDURE — 99285 EMERGENCY DEPT VISIT HI MDM: CPT

## 2018-04-18 PROCEDURE — 80053 COMPREHEN METABOLIC PANEL: CPT | Performed by: EMERGENCY MEDICINE

## 2018-04-18 PROCEDURE — 87086 URINE CULTURE/COLONY COUNT: CPT | Performed by: EMERGENCY MEDICINE

## 2018-04-18 PROCEDURE — 93005 ELECTROCARDIOGRAM TRACING: CPT

## 2018-04-18 PROCEDURE — 85025 COMPLETE CBC W/AUTO DIFF WBC: CPT | Performed by: EMERGENCY MEDICINE

## 2018-04-18 PROCEDURE — 74011250636 HC RX REV CODE- 250/636: Performed by: INTERNAL MEDICINE

## 2018-04-18 PROCEDURE — 81001 URINALYSIS AUTO W/SCOPE: CPT | Performed by: EMERGENCY MEDICINE

## 2018-04-18 PROCEDURE — 83605 ASSAY OF LACTIC ACID: CPT | Performed by: EMERGENCY MEDICINE

## 2018-04-18 PROCEDURE — 74011000258 HC RX REV CODE- 258: Performed by: EMERGENCY MEDICINE

## 2018-04-18 PROCEDURE — 83036 HEMOGLOBIN GLYCOSYLATED A1C: CPT | Performed by: INTERNAL MEDICINE

## 2018-04-18 PROCEDURE — 74011250637 HC RX REV CODE- 250/637: Performed by: INTERNAL MEDICINE

## 2018-04-18 PROCEDURE — 77030010545

## 2018-04-18 PROCEDURE — 87633 RESP VIRUS 12-25 TARGETS: CPT | Performed by: INTERNAL MEDICINE

## 2018-04-18 PROCEDURE — 96375 TX/PRO/DX INJ NEW DRUG ADDON: CPT

## 2018-04-18 PROCEDURE — 65660000000 HC RM CCU STEPDOWN

## 2018-04-18 PROCEDURE — 83605 ASSAY OF LACTIC ACID: CPT | Performed by: INTERNAL MEDICINE

## 2018-04-18 PROCEDURE — 96365 THER/PROPH/DIAG IV INF INIT: CPT

## 2018-04-18 PROCEDURE — 74011000258 HC RX REV CODE- 258: Performed by: INTERNAL MEDICINE

## 2018-04-18 PROCEDURE — 74011250636 HC RX REV CODE- 250/636: Performed by: EMERGENCY MEDICINE

## 2018-04-18 PROCEDURE — 71045 X-RAY EXAM CHEST 1 VIEW: CPT

## 2018-04-18 RX ORDER — LEVOTHYROXINE SODIUM 75 UG/1
75 TABLET ORAL
Status: DISCONTINUED | OUTPATIENT
Start: 2018-04-19 | End: 2018-04-22 | Stop reason: HOSPADM

## 2018-04-18 RX ORDER — SODIUM CHLORIDE 0.9 % (FLUSH) 0.9 %
5-10 SYRINGE (ML) INJECTION EVERY 8 HOURS
Status: DISCONTINUED | OUTPATIENT
Start: 2018-04-18 | End: 2018-04-22 | Stop reason: HOSPADM

## 2018-04-18 RX ORDER — PANTOPRAZOLE SODIUM 40 MG/1
40 TABLET, DELAYED RELEASE ORAL
Status: DISCONTINUED | OUTPATIENT
Start: 2018-04-19 | End: 2018-04-22 | Stop reason: HOSPADM

## 2018-04-18 RX ORDER — LEVOTHYROXINE SODIUM 75 UG/1
75 TABLET ORAL
COMMUNITY
End: 2019-09-18 | Stop reason: SDUPTHER

## 2018-04-18 RX ORDER — MORPHINE SULFATE 4 MG/ML
2 INJECTION INTRAVENOUS
Status: DISCONTINUED | OUTPATIENT
Start: 2018-04-18 | End: 2018-04-22 | Stop reason: HOSPADM

## 2018-04-18 RX ORDER — HYDROCODONE BITARTRATE AND ACETAMINOPHEN 5; 325 MG/1; MG/1
1 TABLET ORAL
COMMUNITY
End: 2019-01-18 | Stop reason: ALTCHOICE

## 2018-04-18 RX ORDER — ACETAMINOPHEN 325 MG/1
650 TABLET ORAL
Status: DISCONTINUED | OUTPATIENT
Start: 2018-04-18 | End: 2018-04-22 | Stop reason: HOSPADM

## 2018-04-18 RX ORDER — KETOROLAC TROMETHAMINE 30 MG/ML
15 INJECTION, SOLUTION INTRAMUSCULAR; INTRAVENOUS ONCE
Status: COMPLETED | OUTPATIENT
Start: 2018-04-18 | End: 2018-04-18

## 2018-04-18 RX ORDER — HEPARIN SODIUM 5000 [USP'U]/ML
5000 INJECTION, SOLUTION INTRAVENOUS; SUBCUTANEOUS EVERY 8 HOURS
Status: DISCONTINUED | OUTPATIENT
Start: 2018-04-18 | End: 2018-04-19

## 2018-04-18 RX ORDER — OMEPRAZOLE 20 MG/1
20 CAPSULE, DELAYED RELEASE ORAL DAILY
Status: DISCONTINUED | OUTPATIENT
Start: 2018-04-18 | End: 2018-04-18 | Stop reason: CLARIF

## 2018-04-18 RX ORDER — SULFAMETHOXAZOLE AND TRIMETHOPRIM 800; 160 MG/1; MG/1
1 TABLET ORAL 2 TIMES DAILY
COMMUNITY
End: 2018-04-22

## 2018-04-18 RX ORDER — SODIUM CHLORIDE 0.9 % (FLUSH) 0.9 %
5-10 SYRINGE (ML) INJECTION AS NEEDED
Status: DISCONTINUED | OUTPATIENT
Start: 2018-04-18 | End: 2018-04-22 | Stop reason: HOSPADM

## 2018-04-18 RX ORDER — ONDANSETRON 2 MG/ML
4 INJECTION INTRAMUSCULAR; INTRAVENOUS ONCE
Status: COMPLETED | OUTPATIENT
Start: 2018-04-18 | End: 2018-04-18

## 2018-04-18 RX ORDER — HYDROCODONE BITARTRATE AND ACETAMINOPHEN 5; 325 MG/1; MG/1
1 TABLET ORAL
Status: DISCONTINUED | OUTPATIENT
Start: 2018-04-18 | End: 2018-04-22 | Stop reason: HOSPADM

## 2018-04-18 RX ORDER — SODIUM CHLORIDE 9 MG/ML
75 INJECTION, SOLUTION INTRAVENOUS CONTINUOUS
Status: DISCONTINUED | OUTPATIENT
Start: 2018-04-18 | End: 2018-04-21

## 2018-04-18 RX ADMIN — CEFTRIAXONE SODIUM 1 G: 1 INJECTION, POWDER, FOR SOLUTION INTRAMUSCULAR; INTRAVENOUS at 06:53

## 2018-04-18 RX ADMIN — ACETAMINOPHEN 650 MG: 325 TABLET ORAL at 14:01

## 2018-04-18 RX ADMIN — PIPERACILLIN SODIUM AND TAZOBACTAM SODIUM 3.38 G: 3; .375 INJECTION, POWDER, LYOPHILIZED, FOR SOLUTION INTRAVENOUS at 16:40

## 2018-04-18 RX ADMIN — SODIUM CHLORIDE 1000 ML: 900 INJECTION, SOLUTION INTRAVENOUS at 06:42

## 2018-04-18 RX ADMIN — Medication 10 ML: at 21:59

## 2018-04-18 RX ADMIN — HEPARIN SODIUM 5000 UNITS: 5000 INJECTION, SOLUTION INTRAVENOUS; SUBCUTANEOUS at 18:56

## 2018-04-18 RX ADMIN — PIPERACILLIN SODIUM AND TAZOBACTAM SODIUM 3.38 G: 3; .375 INJECTION, POWDER, LYOPHILIZED, FOR SOLUTION INTRAVENOUS at 23:19

## 2018-04-18 RX ADMIN — ACETAMINOPHEN 650 MG: 325 TABLET ORAL at 20:29

## 2018-04-18 RX ADMIN — HYDROCODONE BITARTRATE AND ACETAMINOPHEN 1 TABLET: 5; 325 TABLET ORAL at 23:19

## 2018-04-18 RX ADMIN — SODIUM CHLORIDE 125 ML/HR: 900 INJECTION, SOLUTION INTRAVENOUS at 18:22

## 2018-04-18 RX ADMIN — HEPARIN SODIUM 5000 UNITS: 5000 INJECTION, SOLUTION INTRAVENOUS; SUBCUTANEOUS at 10:19

## 2018-04-18 RX ADMIN — Medication 10 ML: at 09:21

## 2018-04-18 RX ADMIN — SODIUM CHLORIDE 1000 ML: 900 INJECTION, SOLUTION INTRAVENOUS at 07:25

## 2018-04-18 RX ADMIN — SODIUM CHLORIDE 100 ML/HR: 900 INJECTION, SOLUTION INTRAVENOUS at 09:21

## 2018-04-18 RX ADMIN — SODIUM CHLORIDE 1000 ML: 900 INJECTION, SOLUTION INTRAVENOUS at 07:47

## 2018-04-18 RX ADMIN — ONDANSETRON 4 MG: 2 INJECTION INTRAMUSCULAR; INTRAVENOUS at 06:36

## 2018-04-18 RX ADMIN — HYDROCODONE BITARTRATE AND ACETAMINOPHEN 1 TABLET: 5; 325 TABLET ORAL at 15:49

## 2018-04-18 RX ADMIN — KETOROLAC TROMETHAMINE 15 MG: 30 INJECTION, SOLUTION INTRAMUSCULAR at 06:38

## 2018-04-18 RX ADMIN — Medication 10 ML: at 14:01

## 2018-04-18 RX ADMIN — MORPHINE SULFATE 2 MG: 4 INJECTION INTRAVENOUS at 14:01

## 2018-04-18 NOTE — H&P
[address]  [phone]    Admission History and Physical      NAME:  Erickson Gomez   :   1945   MRN:  [id number]     PCP:  Teresita Ndiaye MD     Date/Time:  2018         Subjective:     CHIEF COMPLAINT: nausea, vomiting and fever     HISTORY OF PRESENT ILLNESS:     Ms. Pauly Du is a 74 y.o.  female who is admitted with sepsis. Ms. Pauly Du with PMH of hypothyroidism, bladder cancer, GERD, HTN presented to ER c/o nausea, vomiting and fever. Recently, pt was seen by her urologist for UTI and initially started on macrobid and later changed to bactrim. Pt stated that she took only one dose of bactrim and started to vomit. She has recurrent UTI. Past Medical History:   Diagnosis Date    Arthritis     Cancer [facility])     bladder    Endocrine disease     hyperthyroid    Gastrointestinal disorder     Hypertension     Other ill-defined conditions     hyperthyroid        Past Surgical History:   Procedure Laterality Date    HX GYN      HX UROLOGICAL      stoma since 78       Social History   Substance Use Topics    Smoking status: Former Smoker     Quit date: 7/3/1999    Smokeless tobacco: Never Used    Alcohol use No        No family history on file. father: heart disease     Allergies   Allergen Reactions    Ciprofloxacin Other (comments)     Joint aches    Diclofenac Nausea Only    Sulfa (Sulfonamide Antibiotics) Nausea Only        Prior to Admission medications    Medication Sig Start Date End Date Taking? Authorizing Provider   levothyroxine (SYNTHROID) 75 mcg tablet Take 75 mcg by mouth Daily (before breakfast). Yes Historical Provider   HYDROcodone-acetaminophen (NORCO) 5-325 mg per tablet Take 1 Tab by mouth every six (6) hours as needed for Pain. Yes Historical Provider   trimethoprim-sulfamethoxazole (BACTRIM DS) 160-800 mg per tablet Take 1 Tab by mouth two (2) times a day.    Yes Historical Provider   losartan-hydroCHLOROthiazide (HYZAAR) 50-12.5 mg per tablet Take 1 Tab by mouth every evening. Yes Phys Other, MD   omeprazole (PRILOSEC) 20 mg capsule Take 20 mg by mouth daily. Yes Phys Other, MD         Review of Systems:  (bold if positive, if negative)    Gen:  fever,Eyes:  ENT:  CVS:  Pulm:  GI:    :    MS:  Skin:  Psych:  Endo:    Hem:  Renal:    Neuro:            Objective:      VITALS:    Vital signs reviewed; most recent are:    Visit Vitals    /48 (BP 1 Location: Left arm, BP Patient Position: At rest)    Pulse 97    Temp 98.6 °F (37 °C)    Resp 14    Ht 5' 5\" (1.651 m)    Wt 94.8 kg (209 lb)    SpO2 96%    BMI 34.78 kg/m2     SpO2 Readings from Last 6 Encounters:   04/18/18 96%   08/17/17 100%   03/26/17 97%   03/17/13 98%   03/16/13 97%   01/03/12 97%        No intake or output data in the 24 hours ending 04/18/18 0809         Exam:     Physical Exam:    Gen:  Well-developed, well-nourished, in no acute distress  HEENT:  Pink conjunctivae, PERRL, hearing intact to voice, moist mucous membranes  Neck:  Supple, without masses, thyroid non-tender  Resp:  No accessory muscle use, clear breath sounds without wheezes rales or rhonchi  Card:  No murmurs, normal S1, S2 without thrills, bruits or peripheral edema  Abd:  Soft, non-tender, non-distended, normoactive bowel sounds are present, no palpable organomegaly and no detectable hernias.  supapubic catheter in place   Lymph:  No cervical or inguinal adenopathy  Musc:  No cyanosis or clubbing  Skin:  No rashes or ulcers, skin turgor is good  Neuro:  Cranial nerves are grossly intact, no focal motor weakness, follows commands appropriately  Psych:  Good insight, oriented to person, place and time, alert       Labs:    Recent Labs      04/18/18   0620   WBC  9.6   HGB  9.4*   HCT  28.7*   PLT  150     Recent Labs      04/18/18   0620   NA  133*   K  4.2   CL  98   CO2  18*   GLU  155*   BUN  34*   CREA  1.97*   CA  9.1   ALB  3.5 TBILI  0.8   SGOT  27   ALT  23     Lab Results   Component Value Date/Time    Glucose (POC) 129 (H) 01/03/2012 10:01 AM     No results for input(s): PH, PCO2, PO2, HCO3, FIO2 in the last 72 hours. No results for input(s): INR in the last 72 hours. No lab exists for component: INREXT    Telemetry reviewed:   normal sinus rhythm       Assessment/Plan:    1. Sepsis ([facility] (4/18/2018). Likely sources is UTI. Check CXR, respiratory panel, BC. Sepsis protocol was initiated in ER. continue ceftriaxone BID and follow cultures. Urology consult. 2.  Complicated UTI. S/p suprapubic catheter. Pt was ABx prior to this admission. 3.   HTN (hypertension) (4/18/2018). Hold home BP meds due to sepsis. 4.  Dehydration/ Hyponatremia (4/18/2018), mild. likely secondary to vomiting and poor PO intake. Continue IVF     5. ARF (acute renal failure) ([facility] (4/18/2018). Secondary to vol,umed depletion due to vomiting and AIN due to bactrim. Continue IVF and if worsening will consult nephrology     6. Hyperglycemia (4/18/2018). No Hx of DM. Check A1C    7. Lactic acidosis (4/18/2018). Likely secondary to dehydration/ sepsis. Continue to monitor serial lactic acid level. Continue IVF and ABx. 8.  Anemia (4/18/2018), mild. Monitor     9. Acquired hypothyroidism (4/18/2018). Continue synthroid     10. GERD (gastroesophageal reflux disease) (4/18/2018). continue PPI    11. [facility] (4/18/2018). S/p suprapubic catheter.  urology consult          Previous medical records reviewed     Risk of deterioration: high      Total time spent with patient: [address] discussed with: Patient, Family, Nursing Staff and >50% of time spent in counseling and coordination of care    Discussed:  Care Plan    Prophylaxis:  Hep SQ    Probable Disposition:  Home w/Family           ___________________________________________________    Attending Physician: Roro Lord MD

## 2018-04-18 NOTE — ED NOTES
Bedside and Verbal shift change report given to Keaton Oshea (oncoming nurse) by Rosalie Oates RN (offgoing nurse). Report included the following information SBAR, Kardex, ED Summary, Procedure Summary, Intake/Output, MAR, Recent Results and Med Rec Status.

## 2018-04-18 NOTE — PROGRESS NOTES
[id number] TRANSFER - IN REPORT:    Verbal report received from Adilene Rees (name) on Fabby Mcgee  being received from ED (unit) for routine progression of care      Report consisted of patients Situation, Background, Assessment and   Recommendations(SBAR). Information from the following report(s) SBAR, ED Summary, Intake/Output, MAR, Accordion, Recent Results and Cardiac Rhythm NSR was reviewed with the receiving nurse. Opportunity for questions and clarification was provided. Assessment completed upon patients arrival to unit and care assumed. 1305 Pt rang call bell c/o chills, temp checked, oral 99.6. Will continue to monitor. 1347 Temp rechecked as pt still having chills despite extra blankets, oral 100.2. Pt c/o back pain 9/10 and HR elevated, sinus tach, 110-120. Called Dr. Dandre Gaines and orders obtained for Tylenol and Morphine, both given. 1436 Temp rechecked, oral 100.8. Family at bedside, will continue to monitor. 1531 Temp rechecked, oral 101.4, pt rates back pain 7/10. Called Dr. Dandre Gaines and obtained orders for antibiotic changes and IV fluid increased rate. Increased IVF and gave Norco (already ordered). Family at bedside. 1 Pt states she believes she has broken fever because \"I sweated so much,\" pain rated 4/10. Temp oral 100.0. Zosyn running. Family at bedside. 1745 Temp 99.8 oral. Family at bedside. 1900 Bedside and Verbal shift change report given to Machelle Vinson (oncoming nurse) by Abraham Rodriges (offgoing nurse). Report included the following information SBAR, Intake/Output, MAR, Recent Results and Cardiac Rhythm NSR, ST <120 when febrile.

## 2018-04-18 NOTE — CONSULTS
Urology Consult    Subjective:     Date of Consultation:  April 18, 2018    Referring Physician: Noemy Sharpe    Reason for Consultation:  UTI    History of Present Illness:   73 yo F h/o rad cystectomy & HYS with ileal conduit diversion in 1999 for UCa. Recently seen in office for foul smelling urine concerning for UTI. Was started on macrobid and switched to Bactrim but had n/v after taking PO, also with fever. Went to ED, appeared hypovolemic with tachycardia, WBC elevated, admitted for IV fluid and abx. Urine cultures from our office on 4/11/18 showed:    #1 ORGANISM: Gram Negative Organism - Confluent CFU    Organism:  #1  ______________________________________________________________________________  Augmentin:   I  Bactrim:   S  Cefdinir:   R  Ceftin:   R  Ciprofloxacin:  S  Doxycycline:  R  Gentamicin:   S  Levaquin:   S  Macrobid:   R  Monurol:   R  Rocephin:   R  Trimethoprim:  S    Past Medical History:   Diagnosis Date    Arthritis     Cancer ([address]     bladder    Endocrine disease     hyperthyroid    Gastrointestinal disorder     Hypertension     Other ill-defined conditions(799.89)     hyperthyroid      Past Surgical History:   Procedure Laterality Date    HX GYN      HX UROLOGICAL      stoma since 78      Family History   Problem Relation Age of Onset    Heart Disease Father       Social History   Substance Use Topics    Smoking status: Former Smoker     Quit date: 7/3/1999    Smokeless tobacco: Never Used    Alcohol use No     Allergies   Allergen Reactions    Ciprofloxacin Other (comments)     Joint aches    Diclofenac Nausea Only    Sulfa (Sulfonamide Antibiotics) Nausea Only      Prior to Admission medications    Medication Sig Start Date End Date Taking? Authorizing Provider   levothyroxine (SYNTHROID) 75 mcg tablet Take 75 mcg by mouth Daily (before breakfast).    Yes Historical Provider   HYDROcodone-acetaminophen (NORCO) 5-325 mg per tablet Take 1 Tab by mouth every six (6) hours as needed for Pain. Yes Historical Provider   trimethoprim-sulfamethoxazole (BACTRIM DS) 160-800 mg per tablet Take 1 Tab by mouth two (2) times a day. Yes Historical Provider   losartan-hydroCHLOROthiazide (HYZAAR) 50-12.5 mg per tablet Take 1 Tab by mouth every evening. Yes Kerri Reeves MD   omeprazole (PRILOSEC) 20 mg capsule Take 20 mg by mouth daily. Yes Kerri Reeves MD         Review of Systems:  A comprehensive review of systems was negative except for that written in the HPI.     Objective:     Patient Vitals for the past 8 hrs:   BP Temp Pulse Resp SpO2 Height Weight   18 1323 - 99.7 °F (37.6 °C) - - - - -   18 1305 - 99.6 °F (37.6 °C) - - - - -   18 1109 102/51 98.5 °F (36.9 °C) 98 18 - - -   18 0910 - - 96 - - - -   18 0854 95/53 98.4 °F (36.9 °C) 100 18 97 % - -   18 0834 110/47 - 100 19 94 % - -   18 0749 108/48 98.6 °F (37 °C) 97 14 96 % - -   18 0748 108/48 - 93 23 97 % - -   18 0730 106/45 - 100 18 96 % - -   18 0715 98/40 - (!) 103 12 97 % - -   18 0711 - - - - 96 % - -   18 0609 95/53 (!) 100.9 °F (38.3 °C) (!) 119 18 96 % 5' 5\" (1.651 m) 94.8 kg (209 lb)     Temp (24hrs), Av.3 °F (37.4 °C), Min:98.4 °F (36.9 °C), Max:100.9 °F (38.3 °C)      Intake and Output:        Physical Exam:  A&O, NAD  NCAT, EOMI  Sym chest rise  Mild tach to 100, reg rhythm  abd nondistended  Normal affect  HARTLEY    Recent Results (from the past 12 hour(s))   METABOLIC PANEL, COMPREHENSIVE    Collection Time: 18  6:20 AM   Result Value Ref Range    Sodium 133 (L) 136 - 145 mmol/L    Potassium 4.2 3.5 - 5.1 mmol/L    Chloride 98 97 - 108 mmol/L    CO2 18 (L) 21 - 32 mmol/L    Anion gap 17 (H) 5 - 15 mmol/L    Glucose 155 (H) 65 - 100 mg/dL    BUN 34 (H) 6 - 20 MG/DL    Creatinine 1.97 (H) 0.55 - 1.02 MG/DL    BUN/Creatinine ratio 17 12 - 20      GFR est AA 30 (L) >60 ml/min/1.73m2    GFR est non-AA 25 (L) >60 ml/min/1.73m2    Calcium 9.1 8.5 - 10.1 MG/DL    Bilirubin, total 0.8 0.2 - 1.0 MG/DL    ALT (SGPT) 23 12 - 78 U/L    AST (SGOT) 27 15 - 37 U/L    Alk. phosphatase 72 45 - 117 U/L    Protein, total 8.0 6.4 - 8.2 g/dL    Albumin 3.5 3.5 - 5.0 g/dL    Globulin 4.5 (H) 2.0 - 4.0 g/dL    A-G Ratio 0.8 (L) 1.1 - 2.2     CBC WITH AUTOMATED DIFF    Collection Time: 04/18/18  6:20 AM   Result Value Ref Range    WBC 9.6 3.6 - 11.0 K/uL    RBC 3.21 (L) 3.80 - 5.20 M/uL    HGB 9.4 (L) 11.5 - 16.0 g/dL    HCT 28.7 (L) 35.0 - 47.0 %    MCV 89.4 80.0 - 99.0 FL    MCH 29.3 26.0 - 34.0 PG    MCHC 32.8 30.0 - 36.5 g/dL    RDW 13.3 11.5 - 14.5 %    PLATELET 636 672 - 698 K/uL    MPV 11.9 8.9 - 12.9 FL    NRBC 0.0 0  WBC    ABSOLUTE NRBC 0.00 0.00 - 0.01 K/uL    NEUTROPHILS 93 (H) 32 - 75 %    LYMPHOCYTES 2 (L) 12 - 49 %    MONOCYTES 4 (L) 5 - 13 %    EOSINOPHILS 0 0 - 7 %    BASOPHILS 0 0 - 1 %    IMMATURE GRANULOCYTES 1 (H) 0.0 - 0.5 %    ABS. NEUTROPHILS 8.9 (H) 1.8 - 8.0 K/UL    ABS. LYMPHOCYTES 0.2 (L) 0.8 - 3.5 K/UL    ABS. MONOCYTES 0.4 0.0 - 1.0 K/UL    ABS. EOSINOPHILS 0.0 0.0 - 0.4 K/UL    ABS. BASOPHILS 0.0 0.0 - 0.1 K/UL    ABS. IMM.  GRANS. 0.1 (H) 0.00 - 0.04 K/UL    DF AUTOMATED      RBC COMMENTS ANISOCYTOSIS  1+        WBC COMMENTS TOXIC GRANULATION     LACTIC ACID    Collection Time: 04/18/18  6:20 AM   Result Value Ref Range    Lactic acid 4.5 (HH) 0.4 - 2.0 MMOL/L   HEMOGLOBIN A1C WITH EAG    Collection Time: 04/18/18  6:20 AM   Result Value Ref Range    Hemoglobin A1c 5.1 4.2 - 6.3 %    Est. average glucose 100 mg/dL   URINALYSIS W/ RFLX MICROSCOPIC    Collection Time: 04/18/18  6:58 AM   Result Value Ref Range    Color DARK YELLOW      Appearance CLOUDY (A) CLEAR      Specific gravity 1.020 1.003 - 1.030      pH (UA) 8.5 (H) 5.0 - 8.0      Protein 100 (A) NEG mg/dL    Glucose NEGATIVE  NEG mg/dL    Ketone NEGATIVE  NEG mg/dL    Bilirubin NEGATIVE  NEG      Blood SMALL (A) NEG      Urobilinogen 0.2 0.2 - 1.0 EU/dL    Nitrites NEGATIVE  NEG Leukocyte Esterase TRACE (A) NEG      WBC 5-10 0 - 4 /hpf    RBC 0-5 0 - 5 /hpf    Epithelial cells FEW FEW /lpf    Bacteria 3+ (A) NEG /hpf    Amorphous Crystals 1+ (A) NEG    Triple Phosphate crystals 1+ (A) NEG   LACTIC ACID    Collection Time: 04/18/18  8:06 AM   Result Value Ref Range    Lactic acid 1.9 0.4 - 2.0 MMOL/L   RESPIRATORY PANEL,PCR,NASOPHARYNGEAL    Collection Time: 04/18/18  8:06 AM   Result Value Ref Range    Adenovirus NOT DETECTED NOTD      Coronavirus 229E NOT DETECTED NOTD      Coronavirus HKU1 NOT DETECTED NOTD      Coronavirus CVNL63 NOT DETECTED NOTD      Coronavirus OC43 NOT DETECTED NOTD      Metapneumovirus NOT DETECTED NOTD      Rhinovirus and Enterovirus NOT DETECTED NOTD      Influenza A NOT DETECTED NOTD      Influenza A, subtype H1 NOT DETECTED NOTD      Influenza A, subtype H3 NOT DETECTED NOTD      INFLUENZA A H1N1 PCR NOT DETECTED NOTD      Influenza B NOT DETECTED NOTD      Parainfluenza 1 NOT DETECTED NOTD      Parainfluenza 2 NOT DETECTED NOTD      Parainfluenza 3 NOT DETECTED NOTD      Parainfluenza virus 4 NOT DETECTED NOTD      RSV by PCR NOT DETECTED NOTD      Bordetella pertussis - PCR NOT DETECTED NOTD      Chlamydophila pneumoniae DNA, QL, PCR NOT DETECTED NOTD      Mycoplasma pneumoniae DNA, QL, PCR NOT DETECTED NOTD     EKG, 12 LEAD, INITIAL    Collection Time: 04/18/18  8:11 AM   Result Value Ref Range    Ventricular Rate 99 BPM    Atrial Rate 99 BPM    P-R Interval 134 ms    QRS Duration 92 ms    Q-T Interval 384 ms    QTC Calculation (Bezet) 492 ms    Calculated P Axis 54 degrees    Calculated R Axis 3 degrees    Calculated T Axis 24 degrees    Diagnosis       Normal sinus rhythm  Prolonged QT  Abnormal ECG  When compared with ECG of 17-MAR-2013 19:43,  No significant change was found  Confirmed by Gloria Fabian MD, CHANCE ([id number]) on 4/18/2018 12:44:17 PM     LACTIC ACID    Collection Time: 04/18/18 10:58 AM   Result Value Ref Range    Lactic acid 2.3 (HH) 0.4 - 2.0 MMOL/L         Assessment:     Active Problems:    Sepsis ([facility] (4/18/2018)      HTN (hypertension) (4/18/2018)      Hyponatremia (4/18/2018)      ARF (acute renal failure) ([facility] (4/18/2018)      Hyperglycemia (4/18/2018)      Lactic acidosis (4/18/2018)      Anemia (4/18/2018)      Acquired hypothyroidism (4/18/2018)      GERD (gastroesophageal reflux disease) (4/18/2018)      [facility] (4/18/2018)            Plan:     Cont hydration, would d/w ID about current antibiotic selection as Zosyn is currently running and based on office culture listed above, this may not cover. Fawn Muse would be possible option but would defer to them or nephrology 2/2 compromised renal function. Quinolones may also be option (she had not wanted to take these 2/2 fear of tendonitis). Tailor based on cultures taken on admission. No acute  intervention needed. Will schedule outpatient f/u,  sign off for now, contact with questions.     Signed By: Carleen Kemp MD                         April 18, 2018

## 2018-04-18 NOTE — ED TRIAGE NOTES
Pt c/o nausea, vomiting since taking Bactrim at 9:30 yesterday morning for UTI. Pt had hip replacement 3/16. Pt states she has had fever of 102 earlier this morning.

## 2018-04-18 NOTE — ED NOTES
Verbal report given to Chemo Arciniega (name) on [facility] . Report consisted of patient's Situation, Background, Assessment and Recommendations (SBAR)    Information from the following report(s)  SBAR, ED Summary, MAR and Recent Results was reviewed with the receiving nurse. Opportunity for questions and clarification was provided.     Last Filed Values:  Temp: (!) 100.9 °F (38.3 °C) (04/18/18 0609)  Pulse (Heart Rate): (!) 103 (04/18/18 0715)  Resp Rate: 12 (04/18/18 0715)  O2 Sat (%): 97 % (04/18/18 0715)  BP: 98/40 (04/18/18 0715)  MAP (Monitor): (!) 55 (04/18/18 0715)  MAP (Calculated): 67 (04/18/18 0609)  Level of Consciousness: Alert (04/18/18 0609)      Lab Results   Component Value Date/Time    WBC 9.6 04/18/2018 06:20 AM    Lactic acid 4.5 (HH) 04/18/2018 06:20 AM       Repeat LA:  Time Due 1020    Blood Cultures Drawn:  yes    Fluid Resuscitation:  Total needed 2850, Status infusing    All Antibiotics Started:  yes    VS x 2 post-fluid resuscitation:   no    Vasopressor Infusion:  no    Provider Reassessment needed and notified:  no     Additional Interventions/Comments:

## 2018-04-18 NOTE — ED PROVIDER NOTES
HPI Comments: 57-year-old female with past mental history significant for bladder cancer status post urostomy, hypertension, recent left hip replacement presents with complaints of one day fever and foul-smelling urine. Patient reports diagnosed with urinary tract infection one week ago but started Bactrim yesterday only after not wanting to take Levaquin after reading medication insert. Patient with history of sulfa allergy. Started vomiting after taking Bactrim. Reports fever Tmax 102 earlier today. Denies chest pain, shortness of breath. Complains of extreme thirst. Also reports left hip started hurting today. Former smoker  Denies drug and alcohol use  Primary care physician-Van  Urologist-Brown   Orthopedist    The history is provided by the patient. Past Medical History:   Diagnosis Date    Arthritis     Cancer [facility])     bladder    Endocrine disease     hyperthyroid    Gastrointestinal disorder     Hypertension     Other ill-defined conditions     hyperthyroid       Past Surgical History:   Procedure Laterality Date    HX GYN      HX UROLOGICAL      stoma since 78         No family history on file. Social History     Social History    Marital status:      Spouse name: N/A    Number of children: N/A    Years of education: N/A     Occupational History    Not on file. Social History Main Topics    Smoking status: Former Smoker     Quit date: 7/3/1999    Smokeless tobacco: Never Used    Alcohol use No    Drug use: No    Sexual activity: Not on file     Other Topics Concern    Not on file     Social History Narrative         ALLERGIES: Sulfa (sulfonamide antibiotics)    Review of Systems   Constitutional: Positive for activity change, chills, fatigue and fever. HENT: Negative for congestion, nosebleeds and rhinorrhea. Eyes: Negative for pain and redness. Respiratory: Negative for cough and shortness of breath.     Cardiovascular: Negative for chest pain and palpitations. Gastrointestinal: Negative for abdominal pain, nausea and vomiting. Genitourinary: Positive for hematuria. Negative for dysuria, frequency, vaginal bleeding and vaginal pain. Musculoskeletal: Positive for myalgias. Skin: Negative for rash and wound. Neurological: Negative for seizures, syncope and weakness. Hematological: Does not bruise/bleed easily. Psychiatric/Behavioral: Negative for agitation, confusion, dysphoric mood and suicidal ideas. The patient is not nervous/anxious. Vitals:    04/18/18 0609   BP: 95/53   Pulse: (!) 119   Resp: 18   Temp: (!) 100.9 °F (38.3 °C)   SpO2: 96%   Weight: 94.8 kg (209 lb)   Height: 5' 5\" (1.651 m)            Physical Exam   Constitutional: She is oriented to person, place, and time. She appears well-developed and well-nourished. HENT:   Head: Normocephalic and atraumatic. Dry mm   Eyes: EOM are normal. Pupils are equal, round, and reactive to light. Neck: Normal range of motion. Neck supple. No tracheal deviation present. Cardiovascular: Normal rate, regular rhythm, normal heart sounds and intact distal pulses. Pulmonary/Chest: Effort normal and breath sounds normal. No stridor. No respiratory distress. She has no wheezes. She has no rales. She exhibits no tenderness. Abdominal: Soft. Bowel sounds are normal. She exhibits no distension. There is no tenderness. There is no rebound. Urostomy draining dark yellow urine   Musculoskeletal: Normal range of motion. She exhibits no edema or tenderness. Left hip incision c/d/i, well healing   Neurological: She is alert and oriented to person, place, and time. No cranial nerve deficit. Skin: Skin is warm and dry. No rash noted. No pallor. Psychiatric: She has a normal mood and affect. Nursing note and vitals reviewed.        MDM  Number of Diagnoses or Management Options  Acute cystitis with hematuria:   Acute renal insufficiency:   Sepsis, due to unspecified organism [facility]): Diagnosis management comments:   75-year-old female presents with fever for failed outpatient treatment of urinary tract infection. Patient has urostomy secondary to bladder cancer 19 years ago. Patient with history of multiple urinary tract infections. Patient is febrile, dry mucous membranes, tachycardic. Plan-fever control, IV fluid hydration, CBC/CMP/UA/lactate/blood culture/urine cultures, admit for IV antibiotics. Labs remarkable for lactate 4.5, creatinine 1.97       Amount and/or Complexity of Data Reviewed  Clinical lab tests: ordered and reviewed  Tests in the radiology section of CPT®: ordered and reviewed    Critical Care  Total time providing critical care: 30-74 minutes (Total critical care time spent exclusive of procedures:  70  )    Patient Progress  Patient progress: stable        ED Course       Procedures    7:21 AM  Carlin Severe, MD spoke with Dr. Ashley Benavides, Consult for Hospitalist. Discussed available diagnostic tests and clinical findings. He/She is in agreement with care plans as outlined. He/she will see and admit patient.

## 2018-04-18 NOTE — PROGRESS NOTES
[facility]: MED RECONCILIATION    Comments/Recommendations:      Patient is awake, alert, oriented, and knowledgeable about home medications   Pharmacy student reviewed prescription refill history available with Rx[employer]   Patient reports having allergies to Ciprofloxacin (joint ache), and Diclofenac (Nausea)   Patient's daughter was present to help with med rec   Patient's daughter reports patient was initially prescribed Metronidazole 250mg (no doses reported taken) by Dr. Milagros Schaffer at [facility]. Dr. Milagros Schaffer called and told patient not to take Metronidazole and prescribed Levofloxacin 500mg. Patient read package insert and did not want to take due to the side effects mentioned. Was then prescribed Bactrim DS and first and only dose reported was on 4/17.  Patient reports having hip surgery on 3/16 at [place] [address]. Dr. Ken Samuel prescribed Xarelto 10mg for a one month and was told to stop after.  Losartan-HCTZ is renally cleared. Pt CrCl 28.1 mL/min. Consider removing HCTZ but leaving Losartan    Bactrim DS is renally cleared. Pt CrCl 28.1 mL/min. Consider administering 50% of recommended dose    Medications added:     · Hydrocodone-APAP 5-325mg tablets  · Bactrim DS tablets    Medications removed:    · Ibuprofen 600mg tablets  · Tramadol 50mg tablets    Medications adjusted:    · None      Allergies: Sulfa (sulfonamide antibiotics)    Prior to Admission Medications:     Prior to Admission Medications   Prescriptions Last Dose Informant Patient Reported? Taking? HYDROcodone-acetaminophen (NORCO) 5-325 mg per tablet 4/17/2018 at PM Self Yes Yes   Sig: Take 1 Tab by mouth every six (6) hours as needed for Pain.   levothyroxine (SYNTHROID) 75 mcg tablet 4/17/2018 at AM Self Yes Yes   Sig: Take 75 mcg by mouth Daily (before breakfast). losartan-hydroCHLOROthiazide (HYZAAR) 50-12.5 mg per tablet 4/17/2018 at PM Self Yes Yes   Sig: Take 1 Tab by mouth every evening.    omeprazole (PRILOSEC) 20 mg capsule 4/17/2018 at AM Self Yes Yes   Sig: Take 20 mg by mouth daily. trimethoprim-sulfamethoxazole (BACTRIM DS) 160-800 mg per tablet 4/17/2018 at AM Self Yes Yes   Sig: Take 1 Tab by mouth two (2) times a day.       Facility-Administered Medications: None      Thank you,    [address] Boucher Student

## 2018-04-18 NOTE — Clinical Note
Status[de-identified] Inpatient [101] Type of Bed: Stepdown [17] Inpatient Hospitalization Certified Necessary for the Following Reasons: 3. Patient receiving treatment that can only be provided in an inpatient setting (further clarification in H&P documentation) Admitting [facility]: [facility] [[id number]] Admitting Physician: Ivett Hayward Attending Physician: Ivett Hayward Estimated Length of Stay: 5-7 Midnights Discharge Plan[de-identified] Home with Office Follow-up

## 2018-04-18 NOTE — IP AVS SNAPSHOT
[address] [place] [address] 
[phone] Patient: Georgia Mota MRN: [id number] PXF:[id number] A check dhaval indicates which time of day the medication should be taken. My Medications START taking these medications Instructions Each Dose to Equal  
 Morning Noon Evening Bedtime L. acidoph & paracasei- S therm- Bifido 8 billion cell Cap cap Commonly known as:  ALEXEI-Q/RISAQUAD Your last dose was: Your next dose is: Take 1 Cap by mouth daily. 1 Cap  
    
   
   
   
  
 levoFLOXacin 500 mg tablet Commonly known as:  Allisonfernando Medina Your last dose was: Your next dose is: Take 1 Tab by mouth daily. 500 mg CONTINUE taking these medications Instructions Each Dose to Equal  
 Morning Noon Evening Bedtime HYDROcodone-acetaminophen 5-325 mg per tablet Commonly known as:  Miriam Warner Your last dose was: Your next dose is: Take 1 Tab by mouth every six (6) hours as needed for Pain. 1 Tab  
    
   
   
   
  
 levothyroxine 75 mcg tablet Commonly known as:  SYNTHROID Your last dose was: Your next dose is: Take 75 mcg by mouth Daily (before breakfast). 75 mcg  
    
   
   
   
  
 losartan-hydroCHLOROthiazide 50-12.5 mg per tablet Commonly known as:  HYZAAR Your last dose was: Your next dose is: Take 1 Tab by mouth every evening. 1 Tab PriLOSEC 20 mg capsule Generic drug:  omeprazole Your last dose was: Your next dose is: Take 20 mg by mouth daily. 20 mg  
    
   
   
   
  
  
STOP taking these medications BACTRIM -800 mg per tablet Generic drug:  trimethoprim-sulfamethoxazole Where to Get Your Medications Information on where to get these meds will be given to you by the nurse or doctor. ! Ask your nurse or doctor about these medications L. acidoph & paracasei- S therm- Bifido 8 billion cell Cap cap  
 levoFLOXacin 500 mg tablet

## 2018-04-18 NOTE — PROGRESS NOTES
4/18/2018  10:52 AM  EMR reviewed, CM met w/ Pt and DTR [place] (C) [phone] to begin d/c planning. Charted demographics verified, Pt lives w/ DTR in 1 story home w/ 4 entry steps, Pt is alone during daytime when DTR works. PTA independent w/  ADL's  But had Lt hip replacement 3/16 and has required some assistance since which her DTR provides. Pt drives, however her DTR transports to [facility] and will transport at d/c. PCP Rachel Kline MD; Rx prefers [facility]. Pt has not had HH, currrently attending outpatient PT at [facility] in [place]. Current DME: RW, SP cane and CPaP through [address]. Current plan is d/c to home when stable w/ family assistance. CM discussed [facility] referral, she agrees referral sent in [employer]. If no d/c services indicated consider H2H/Sepsis visit. CM will follow and assist for d/c needs.   Mia Emery

## 2018-04-18 NOTE — PROGRESS NOTES
Nutrition Assessment:    RECOMMENDATIONS/INTERVENTION(S):   Continue Regular diet while appetite is low -    Monitor PO intakes, BG, weight. Add Ensure Enlive for Strawberry flavor- change to Glucerna if BG < 160 mg/dL. ASSESSMENT:   4/18: 74 yr old female admitted for n/v, fever- sepsis. MST noted. Pt states she was 210 lbs prior to hip surgery ~1 month ago. Currently 209 lbs. Poor appetite since surgery. Pt currently on Regular diet. Monitor BG. Nauseated, no vomiting. Last BM 4/16. Pt agreeable to Ensure- strawberry flavor only. Monitor BG for need to change to Glucerna or Ensure HP. GFR 25. Na 133. Intakes 25%. Same as at home- eating poorly for last month. No edema noted. Surgical wound from hip replacement. SUBJECTIVE/OBJECTIVE:   Diet Order: Regular  % Eaten:  Patient Vitals for the past 168 hrs:   % Diet Eaten   04/18/18 0930 25 %     Pertinent Medications: [x] Reviewed    Past Medical History:   Diagnosis Date    Arthritis     Cancer ([facility] [address]     bladder    Endocrine disease     hyperthyroid    Gastrointestinal disorder     Hypertension     Other ill-defined conditions(799.89)     hyperthyroid        Chemistries:  Lab Results   Component Value Date/Time    Sodium 133 (L) 04/18/2018 06:20 AM    Potassium 4.2 04/18/2018 06:20 AM    Chloride 98 04/18/2018 06:20 AM    CO2 18 (L) 04/18/2018 06:20 AM    Anion gap 17 (H) 04/18/2018 06:20 AM    Glucose 155 (H) 04/18/2018 06:20 AM    BUN 34 (H) 04/18/2018 06:20 AM    Creatinine 1.97 (H) 04/18/2018 06:20 AM    BUN/Creatinine ratio 17 04/18/2018 06:20 AM    GFR est AA 30 (L) 04/18/2018 06:20 AM    GFR est non-AA 25 (L) 04/18/2018 06:20 AM    Calcium 9.1 04/18/2018 06:20 AM    AST (SGOT) 27 04/18/2018 06:20 AM    Alk.  phosphatase 72 04/18/2018 06:20 AM    Protein, total 8.0 04/18/2018 06:20 AM    Albumin 3.5 04/18/2018 06:20 AM    Globulin 4.5 (H) 04/18/2018 06:20 AM    A-G Ratio 0.8 (L) 04/18/2018 06:20 AM    ALT (SGPT) 23 04/18/2018 06:20 AM Anthropometrics: Height: 5' 5\" (165.1 cm) Weight: 94.8 kg (209 lb)    IBW (%IBW):   ( ) UBW (%UBW):   (  %)    BMI: Body mass index is 34.78 kg/(m^2). This BMI is indicative of:     [] Underweight    [] Normal    [] Overweight    [x]  Obesity    []  Extreme Obesity (BMI>40)    Estimated Nutrition Needs (Based on): 1877 Kcals/day (RKU(5993A9.1)) , 95 g (104g/day(1.0-1.1g/kg)) Protein  Carbohydrate: At Least 130 g/day  Fluids: 1900 mL/day (1mL/kg rounded to 50 mL)    Last BM: 4/16   [x]Active     []Hyperactive  []Hypoactive       [] Absent   BS  Skin:    [x] Intact   [] Incision  [] Breakdown   [] DTI   [] Tears/Excoriation/Abrasion  []Edema [] Other:    Wt Readings from Last 30 Encounters:   04/18/18 94.8 kg (209 lb)   08/16/17 94.3 kg (207 lb 14.3 oz)   03/17/13 99.8 kg (220 lb)   03/15/13 99.8 kg (220 lb)   01/03/12 102.1 kg (225 lb)      NUTRITION DIAGNOSES:   Problem:  Inadequate energy intake     Etiology: related to decreased ability to consume sufficient energy     Signs/Symptoms: as evidenced by PO poor PTA, low appetite,       NUTRITION INTERVENTIONS:  Meals/Snacks: General/healthful diet   Supplements: Commercial supplement              GOAL:   Pt will consume >50% of meals and ONS within 3-5 days    Cultural, Orthodoxy, or Ethnic Dietary Needs: None     LEARNING NEEDS (Diet, Food/Nutrient-Drug Interaction):    [x] None Identified   [] Identified and Education Provided/Documented   [] Identified and Pt declined/was not appropriate      [x] Interdisciplinary Care Plan Reviewed/Documented    [x] Discharge Needs:    TBD   [] No Nutrition Related Discharge Needs    NUTRITION RISK:   Pt Is At Nutrition Risk  [x]     No Nutrition Risk Identified  []       PT SEEN FOR:    []  MD Consult: []Calorie Count      []Diabetic Diet Education        []Diet Education     []Electrolyte Management     []General Nutrition Management and Supplements     []Management of Tube Feeding     []TPN Recommendations    [x]  RN Referral:  [x]MST score >=2     []Enteral/Parenteral Nutrition PTA     []Pregnant: Gestational DM or Multigestation                 [] Pressure Ulcer      []  Low BMI      []  Length of Stay       [] Dysphagia Diet     [] Ventilator      [] Follow-Up        Previous Recommendations:   [] Implemented          [] Not Implemented          [x] Not Applicable    Previous Goal:   [] Met              [] Progressing Towards Goal              [] Not Progressing Towards Goal   [x] Not Applicable              Tejas Patiño, [address] [place] [address]  Pager: [phone]  Office: [phone]

## 2018-04-18 NOTE — IP AVS SNAPSHOT
[address] [place] [address] 
[phone] Patient: Keny Hill MRN: [id number] EDW:[birth year]10 About your hospitalization You were admitted on:  April 18, 2018 You last received care in the:  [facility] 5M1 MED SURG 1 You were discharged on:  April 22, 2018 Why you were hospitalized Your primary diagnosis was:  Not on File Your diagnoses also included:  Sepsis (Hcc), Htn (Hypertension), Hyponatremia, Arf (Acute Renal Failure) (Hcc), Hyperglycemia, Lactic Acidosis, Anemia, Acquired Hypothyroidism, Gerd (Gastroesophageal Reflux Disease), Bladder Cancer (Hcc), Uti (Urinary Tract Infection) Follow-up Information Follow up With Details Comments Contact Info Shelby Miller MD Go on 5/4/2018 appt at 1 pm; follow up with hematolgist  [address] [place] [address], [facility] [address] [place] [address] 
[phone] Lyndsey Monk MD   [address] 
[facility] [address] [place] [address] 
[phone] [facility] 227  One time nursing visit at home for hospital follow up.  [address] [place] [address] 
[phone] Your Scheduled Appointments Friday May 04, 2018  1:00 PM EDT New Patient with Shelby Miller MD  
[facility] at [address] [place] [address], [address] [place] [address]  
[phone] Discharge Orders None A check dhaval indicates which time of day the medication should be taken. My Medications START taking these medications Instructions Each Dose to Equal  
 Morning Noon Evening Bedtime L. acidoph & paracasei- S therm- Bifido 8 billion cell Cap cap Commonly known as:  ALEXEI-Q/RISAQUAD Your last dose was: Your next dose is: Take 1 Cap by mouth daily. 1 Cap  
    
   
   
   
  
 levoFLOXacin 500 mg tablet Commonly known as:  Matthew Barreto  
   
 Your last dose was: Your next dose is: Take 1 Tab by mouth daily. 500 mg CONTINUE taking these medications Instructions Each Dose to Equal  
 Morning Noon Evening Bedtime HYDROcodone-acetaminophen 5-325 mg per tablet Commonly known as:  Marcin Thomas Your last dose was: Your next dose is: Take 1 Tab by mouth every six (6) hours as needed for Pain. 1 Tab  
    
   
   
   
  
 levothyroxine 75 mcg tablet Commonly known as:  SYNTHROID Your last dose was: Your next dose is: Take 75 mcg by mouth Daily (before breakfast). 75 mcg  
    
   
   
   
  
 losartan-hydroCHLOROthiazide 50-12.5 mg per tablet Commonly known as:  HYZAAR Your last dose was: Your next dose is: Take 1 Tab by mouth every evening. 1 Tab PriLOSEC 20 mg capsule Generic drug:  omeprazole Your last dose was: Your next dose is: Take 20 mg by mouth daily. 20 mg  
    
   
   
   
  
  
STOP taking these medications BACTRIM -800 mg per tablet Generic drug:  trimethoprim-sulfamethoxazole Where to Get Your Medications Information on where to get these meds will be given to you by the nurse or doctor. ! Ask your nurse or doctor about these medications L. acidoph & paracasei- S therm- Bifido 8 billion cell Cap cap  
 levoFLOXacin 500 mg tablet Opioid Education Prescription Opioids: What You Need to Know: 
 
Prescription opioids can be used to help relieve moderate-to-severe pain and are often prescribed following a surgery or injury, or for certain health conditions. These medications can be an important part of treatment but also come with serious risks. Opioids are strong pain medicines.  Examples include hydrocodone, oxycodone, fentanyl, and morphine. Heroin is an example of an illegal opioid. It is important to work with your health care provider to make sure you are getting the safest, most effective care. WHAT ARE THE RISKS AND SIDE EFFECTS OF OPIOID USE? Prescription opioids carry serious risks of addiction and overdose, especially with prolonged use. An opioid overdose, often marked by slow breathing, can cause sudden death. The use of prescription opioids can have a number of side effects as well, even when taken as directed. · Tolerance-meaning you might need to take more of a medication for the same pain relief · Physical dependence-meaning you have symptoms of withdrawal when the medication is stopped. Withdrawal symptoms can include nausea, sweating, chills, diarrhea, stomach cramps, and muscle aches. Withdrawal can last up to several weeks, depending on which drug you took and how long you took it. · Increased sensitivity to pain · Constipation · Nausea, vomiting, and dry mouth · Sleepiness and dizziness · Confusion · Depression · Low levels of testosterone that can result in lower sex drive, energy, and strength · Itching and sweating RISKS ARE GREATER WITH:      
· History of drug misuse, substance use disorder, or overdose · Mental health conditions (such as depression or anxiety) · Sleep apnea · Older age (72 years or older) · Pregnancy Avoid alcohol while taking prescription opioids. Also, unless specifically advised by your health care provider, medications to avoid include: · Benzodiazepines (such as Xanax or Valium) · Muscle relaxants (such as Soma or Flexeril) · Hypnotics (such as Ambien or Lunesta) · Other prescription opioids KNOW YOUR OPTIONS Talk to your health care provider about ways to manage your pain that don't involve prescription opioids. Some of these options may actually work better and have fewer risks and side effects. Options may include: · Pain relievers such as acetaminophen, ibuprofen, and naproxen · Some medications that are also used for depression or seizures · Physical therapy and exercise · Counseling to help patients learn how to cope better with triggers of pain and stress. · Application of heat or cold compress · Massage therapy · Relaxation techniques Be Informed Make sure you know the name of your medication, how much and how often to take it, and its potential risks & side effects. IF YOU ARE PRESCRIBED OPIOIDS FOR PAIN: 
· Never take opioids in greater amounts or more often than prescribed. Remember the goal is not to be pain-free but to manage your pain at a tolerable level. · Follow up with your primary care provider to: · Work together to create a plan on how to manage your pain. · Talk about ways to help manage your pain that don't involve prescription opioids. · Talk about any and all concerns and side effects. · Help prevent misuse and abuse. · Never sell or share prescription opioids · Help prevent misuse and abuse. · Store prescription opioids in a secure place and out of reach of others (this may include visitors, children, friends, and family). · Safely dispose of unused/unwanted prescription opioids: Find your community drug take-back program or your pharmacy mail-back program, or flush them down the toilet, following guidance from the Food and Drug Administration (www.fda.gov/Drugs/ResourcesForYou). · Visit www.cdc.gov/drugoverdose to learn about the risks of opioid abuse and overdose. · If you believe you may be struggling with addiction, tell your health care provider and ask for guidance or call [address] [place], [place] [address] at 3-871-600-WDBD. Discharge Instructions ACUTE DIAGNOSES: 
Sepsis ([facility] [address] Sepsis ([facility] [address] CHRONIC MEDICAL DIAGNOSES: 
Problem List as of 4/22/2018  Date Reviewed: 4/18/2018 Codes Class Noted - Resolved UTI (urinary tract infection) ICD-10-CM: N39.0 ICD-9-CM: 599.0  4/22/2018 - Present HTN (hypertension) ICD-10-CM: I10 
ICD-9-CM: 401.9  4/18/2018 - Present Hypokalemia ICD-10-CM: E87.6 ICD-9-CM: 276.8  4/18/2018 - Present Anemia ICD-10-CM: D64.9 ICD-9-CM: 285.9  4/18/2018 - Present Acquired hypothyroidism ICD-10-CM: E03.9 ICD-9-CM: 244.9  4/18/2018 - Present GERD (gastroesophageal reflux disease) ICD-10-CM: K21.9 ICD-9-CM: 530.81  4/18/2018 - Present Bladder cancer [facility]) ICD-10-CM: C67.9 ICD-9-CM: 188.9  4/18/2018 - Present RESOLVED: Sepsis ([address] ICD-10-CM: A41.9 ICD-9-CM: 038.9, 995.91  4/18/2018 - 4/22/2018 RESOLVED: Hyponatremia ICD-10-CM: E87.1 ICD-9-CM: 276.1  4/18/2018 - 4/22/2018 RESOLVED: ARF (acute renal failure) (HCC) ICD-10-CM: N17.9 ICD-9-CM: 584.9  4/18/2018 - 4/22/2018 RESOLVED: Hyperglycemia ICD-10-CM: R73.9 ICD-9-CM: 790.29  4/18/2018 - 4/22/2018 RESOLVED: Lactic acidosis ICD-10-CM: E87.2 ICD-9-CM: 276.2  4/18/2018 - 4/22/2018 DISCHARGE MEDICATIONS:  
  
 
 
· It is important that you take the medication exactly as they are prescribed. · Keep your medication in the bottles provided by the pharmacist and keep a list of the medication names, dosages, and times to be taken in your wallet. · Do not take other medications without consulting your doctor. DIET:  Regular Diet ACTIVITY: Activity as tolerated ADDITIONAL INFORMATION: If you experience any of the following symptoms then please call your primary care physician or return to the emergency room if you cannot get hold of your doctor: Fever, chills, nausea, vomiting, diarrhea, change in mentation, falling, bleeding, shortness of breath. FOLLOW UP CARE: 
Dr. Tamara Kelsey MD  you are to call and set up an appointment to see them in 2 weeks. Follow-up with urology in 2 weeks Information obtained by : 
I understand that if any problems occur once I am at home I am to contact my physician. I understand and acknowledge receipt of the instructions indicated above. Physician's or R.N.'s Signature                                                                  Date/Time Patient or Representative Signature                                                          Date/Time [employer]inkerc[employer] Announcement We are excited to announce that we are making your provider's discharge notes available to you in Tinkercad. You will see these notes when they are completed and signed by the physician that discharged you from your recent hospital stay. If you have any questions or concerns about any information you see in Tinkercad, please call the Health Information Department where you were seen or reach out to your Primary Care Provider for more information about your plan of care. Introducing [facility] SERVICES! [employer] introduces [employer]inkercad patient portal. Now you can access parts of your medical record, email your doctor's office, and request medication refills online. 1. In your internet browser, go to [email or web address] 2. Click on the First Time User? Click Here link in the Sign In box. You will see the New Member Sign Up page. 3. Enter your Tinkercad Access Code exactly as it appears below. You will not need to use this code after youve completed the sign-up process. If you do not sign up before the expiration date, you must request a new code. · Tinkercad Access Code: [email or web address] Expires: 7/17/2018  6:05 AM 
 
 4. Enter the last four digits of your Social Security Number (xxxx) and Date of Birth (mm/dd/yyyy) as indicated and click Submit. You will be taken to the next sign-up page. 5. Create a Open Me ID. This will be your Open Me login ID and cannot be changed, so think of one that is secure and easy to remember. 6. Create a Open Me password. You can change your password at any time. 7. Enter your Password Reset Question and Answer. This can be used at a later time if you forget your password. 8. Enter your e-mail address. You will receive e-mail notification when new information is available in [address] 9. Click Sign Up. You can now view and download portions of your medical record. 10. Click the Download Summary menu link to download a portable copy of your medical information. If you have questions, please visit the Frequently Asked Questions section of the Open Me website. Remember, Open Me is NOT to be used for urgent needs. For medical emergencies, dial 911. Now available from your iPhone and Android! Introducing Arsalan Banks As a [facility] patient, I wanted to make you aware of our electronic visit tool called Araslan Banks. [facility] 24/7 allows you to connect within minutes with a medical provider 24 hours a day, seven days a week via a mobile device or tablet or logging into a secure website from your computer. You can access Arsalan Enriquejaceyfin from anywhere in the [place]. A virtual visit might be right for you when you have a simple condition and feel like you just dont want to get out of bed, or cant get away from work for an appointment, when your regular [facility] provider is not available (evenings, weekends or holidays), or when youre out of town and need minor care.   Electronic visits cost only $49 and if the Arsalan Enriquerashad provider determines a prescription is needed to treat your condition, one can be electronically transmitted to a nearby pharmacy*. Please take a moment to enroll today if you have not already done so. The enrollment process is free and takes just a few minutes. To enroll, please download the [employer] 24/7 vishnu to your tablet or phone, or visit [email or web address] to enroll on your computer. And, as an [address] [place] [address] patient with a [employer] account, the results of your visits will be scanned into your electronic medical record and your primary care provider will be able to view the scanned results. We urge you to continue to see your regular [employer] provider for your ongoing medical care. And while your primary care provider may not be the one available when you seek a Bahu virtual visit, the peace of mind you get from getting a real diagnosis real time can be priceless. For more information on Bahu, view our Frequently Asked Questions (FAQs) at [email or web address]. Sincerely, 
 
Dayami Albert MD 
Chief Medical Officer [address] [place] [address] *:  certain medications cannot be prescribed via Bahu Unresulted Labs-Please follow up with your PCP about these lab tests Order Current Status CULTURE, URINE In process CULTURE, BLOOD, PAIRED Preliminary result Providers Seen During Your Hospitalization Provider Specialty Primary office phone Lyubov Baltazar MD Emergency Medicine [phone] Milla Villagomez MD Hospitalist [phone] Your Primary Care Physician (PCP) Primary Care Physician Office Phone Office Fax Eda Moralez, [address] Mckenzie Escoto Jr [facility] [phone] You are allergic to the following Allergen Reactions Bactrim (Sulfamethoprim) Nausea and Vomiting Ciprofloxacin Other (comments) Joint aches Diclofenac Nausea Only Sulfa (Sulfonamide Antibiotics) Nausea Only Recent Documentation Height Weight BMI OB Status Smoking Status 1.651 m 94 kg 34.49 kg/m2 Hysterectomy Former Smoker Emergency Contacts Name Discharge Info Relation Home Work Mobile Ida Gonzalez DISCHARGE CAREGIVER [3] Child [2]   [phone] Patient Belongings The following personal items are in your possession at time of discharge: 
  Dental Appliances: Uppers, Lowers, With patient  Visual Aid: Glasses, With patient      Home Medications: None   Jewelry: None  Clothing: At bedside    Other Valuables: None Please provide this summary of care documentation to your next provider. Signatures-by signing, you are acknowledging that this After Visit Summary has been reviewed with you and you have received a copy. Patient Signature:  ____________________________________________________________ Date:  ____________________________________________________________  
  
Larri Hazard Provider Signature:  ____________________________________________________________ Date:  ____________________________________________________________

## 2018-04-18 NOTE — PROGRESS NOTES
Primary Nurse Braden Prince RN and Onur Irwin RN performed a dual skin assessment on this patient Impairment noted- see wound doc flow sheet  Vidal score is 20

## 2018-04-18 NOTE — ED NOTES
Verbal report given to Duke Rod on Kamilla Brigido being transferred to  room 323 for routine progression of care    Report consisted of patient's Situation, Background, Assessment and Recommendations (SBAR)    Information from the following report(s)  SBAR, Kardex, ED Summary, Procedure Summary, Intake/Output, MAR, Recent Results and Med Rec Status was reviewed with the receiving nurse. Opportunity for questions and clarification was provided. Patient transported with:  Monitor  Registered Nurse    Last Filed Values:  Temp: 98.6 °F (37 °C) (04/18/18 0749)  Pulse (Heart Rate): 97 (04/18/18 0749)  Resp Rate: 14 (04/18/18 0749)  O2 Sat (%): 96 % (04/18/18 0749)  BP: 108/48 (04/18/18 0749)  MAP (Monitor): (!) 61 (04/18/18 0748)  MAP (Calculated): 68 (04/18/18 0749)  Level of Consciousness: Alert (04/18/18 0749)      Lab Results   Component Value Date/Time    WBC 9.6 04/18/2018 06:20 AM    Lactic acid 4.5 (HH) 04/18/2018 06:20 AM       Repeat LA:  Time Due 10:20    Blood Cultures Drawn:  yes    Fluid Resuscitation:  Total needed 2850, Status infusing    All Antibiotics Started:  yes, Dose Due none    VS x 2 post-fluid resuscitation:   no    Vasopressor Infusion:  no none at this time. Provider Reassessment needed and notified:  yes , Due ASAP    Additional Interventions/Comments:  None at this time.

## 2018-04-19 LAB
ANION GAP SERPL CALC-SCNC: 13 MMOL/L (ref 5–15)
BUN SERPL-MCNC: 29 MG/DL (ref 6–20)
BUN/CREAT SERPL: 18 (ref 12–20)
CALCIUM SERPL-MCNC: 7.6 MG/DL (ref 8.5–10.1)
CHLORIDE SERPL-SCNC: 106 MMOL/L (ref 97–108)
CO2 SERPL-SCNC: 18 MMOL/L (ref 21–32)
CREAT SERPL-MCNC: 1.6 MG/DL (ref 0.55–1.02)
CREAT UR-MCNC: 61.07 MG/DL
ERYTHROCYTE [DISTWIDTH] IN BLOOD BY AUTOMATED COUNT: 13.2 % (ref 11.5–14.5)
GLUCOSE SERPL-MCNC: 106 MG/DL (ref 65–100)
HCT VFR BLD AUTO: 24 % (ref 35–47)
HGB BLD-MCNC: 7.7 G/DL (ref 11.5–16)
LACTATE SERPL-SCNC: 1.1 MMOL/L (ref 0.4–2)
MCH RBC QN AUTO: 29.8 PG (ref 26–34)
MCHC RBC AUTO-ENTMCNC: 32.1 G/DL (ref 30–36.5)
MCV RBC AUTO: 93 FL (ref 80–99)
NRBC # BLD: 0 K/UL (ref 0–0.01)
NRBC BLD-RTO: 0 PER 100 WBC
PLATELET # BLD AUTO: 74 K/UL (ref 150–400)
PMV BLD AUTO: 11.5 FL (ref 8.9–12.9)
POTASSIUM SERPL-SCNC: 4 MMOL/L (ref 3.5–5.1)
PROT UR-MCNC: 47 MG/DL (ref 0–11.9)
RBC # BLD AUTO: 2.58 M/UL (ref 3.8–5.2)
SODIUM SERPL-SCNC: 137 MMOL/L (ref 136–145)
WBC # BLD AUTO: 2.9 K/UL (ref 3.6–11)

## 2018-04-19 PROCEDURE — 80048 BASIC METABOLIC PNL TOTAL CA: CPT | Performed by: INTERNAL MEDICINE

## 2018-04-19 PROCEDURE — 97161 PT EVAL LOW COMPLEX 20 MIN: CPT

## 2018-04-19 PROCEDURE — 97530 THERAPEUTIC ACTIVITIES: CPT

## 2018-04-19 PROCEDURE — 84156 ASSAY OF PROTEIN URINE: CPT | Performed by: INTERNAL MEDICINE

## 2018-04-19 PROCEDURE — 83605 ASSAY OF LACTIC ACID: CPT | Performed by: INTERNAL MEDICINE

## 2018-04-19 PROCEDURE — 82570 ASSAY OF URINE CREATININE: CPT | Performed by: INTERNAL MEDICINE

## 2018-04-19 PROCEDURE — 77030032490 HC SLV COMPR SCD KNE COVD -B

## 2018-04-19 PROCEDURE — 74011250637 HC RX REV CODE- 250/637: Performed by: INTERNAL MEDICINE

## 2018-04-19 PROCEDURE — 74011250636 HC RX REV CODE- 250/636: Performed by: INTERNAL MEDICINE

## 2018-04-19 PROCEDURE — 65660000000 HC RM CCU STEPDOWN

## 2018-04-19 PROCEDURE — 74011250637 HC RX REV CODE- 250/637: Performed by: NURSE PRACTITIONER

## 2018-04-19 PROCEDURE — 85027 COMPLETE CBC AUTOMATED: CPT | Performed by: INTERNAL MEDICINE

## 2018-04-19 PROCEDURE — 36415 COLL VENOUS BLD VENIPUNCTURE: CPT | Performed by: INTERNAL MEDICINE

## 2018-04-19 RX ORDER — SODIUM BICARBONATE 650 MG/1
1300 TABLET ORAL 2 TIMES DAILY
Status: DISCONTINUED | OUTPATIENT
Start: 2018-04-19 | End: 2018-04-22 | Stop reason: HOSPADM

## 2018-04-19 RX ORDER — POLYETHYLENE GLYCOL 3350 17 G/17G
17 POWDER, FOR SOLUTION ORAL
Status: DISCONTINUED | OUTPATIENT
Start: 2018-04-19 | End: 2018-04-22 | Stop reason: HOSPADM

## 2018-04-19 RX ORDER — ONDANSETRON 2 MG/ML
4 INJECTION INTRAMUSCULAR; INTRAVENOUS
Status: DISCONTINUED | OUTPATIENT
Start: 2018-04-19 | End: 2018-04-22 | Stop reason: HOSPADM

## 2018-04-19 RX ORDER — LEVOFLOXACIN 5 MG/ML
750 INJECTION, SOLUTION INTRAVENOUS
Status: DISCONTINUED | OUTPATIENT
Start: 2018-04-19 | End: 2018-04-21

## 2018-04-19 RX ORDER — AMOXICILLIN 250 MG
1 CAPSULE ORAL
Status: DISCONTINUED | OUTPATIENT
Start: 2018-04-19 | End: 2018-04-22 | Stop reason: HOSPADM

## 2018-04-19 RX ADMIN — ACETAMINOPHEN 650 MG: 325 TABLET ORAL at 03:41

## 2018-04-19 RX ADMIN — HYDROCODONE BITARTRATE AND ACETAMINOPHEN 1 TABLET: 5; 325 TABLET ORAL at 16:59

## 2018-04-19 RX ADMIN — STANDARDIZED SENNA CONCENTRATE AND DOCUSATE SODIUM 1 TABLET: 8.6; 5 TABLET, FILM COATED ORAL at 21:29

## 2018-04-19 RX ADMIN — ACETAMINOPHEN 650 MG: 325 TABLET ORAL at 23:26

## 2018-04-19 RX ADMIN — Medication 10 ML: at 05:32

## 2018-04-19 RX ADMIN — Medication 10 ML: at 15:24

## 2018-04-19 RX ADMIN — SODIUM CHLORIDE 100 ML/HR: 900 INJECTION, SOLUTION INTRAVENOUS at 12:39

## 2018-04-19 RX ADMIN — LEVOTHYROXINE SODIUM 75 MCG: 75 TABLET ORAL at 08:03

## 2018-04-19 RX ADMIN — SODIUM BICARBONATE 1300 MG: 650 TABLET ORAL at 18:21

## 2018-04-19 RX ADMIN — LEVOFLOXACIN 750 MG: 5 INJECTION, SOLUTION INTRAVENOUS at 08:05

## 2018-04-19 RX ADMIN — SODIUM CHLORIDE 125 ML/HR: 900 INJECTION, SOLUTION INTRAVENOUS at 02:08

## 2018-04-19 RX ADMIN — PANTOPRAZOLE SODIUM 40 MG: 40 TABLET, DELAYED RELEASE ORAL at 08:04

## 2018-04-19 RX ADMIN — ONDANSETRON 4 MG: 2 INJECTION INTRAMUSCULAR; INTRAVENOUS at 10:37

## 2018-04-19 RX ADMIN — SODIUM CHLORIDE 100 ML/HR: 900 INJECTION, SOLUTION INTRAVENOUS at 21:31

## 2018-04-19 RX ADMIN — ONDANSETRON 4 MG: 2 INJECTION INTRAMUSCULAR; INTRAVENOUS at 15:23

## 2018-04-19 RX ADMIN — Medication 10 ML: at 21:28

## 2018-04-19 RX ADMIN — HEPARIN SODIUM 5000 UNITS: 5000 INJECTION, SOLUTION INTRAVENOUS; SUBCUTANEOUS at 02:08

## 2018-04-19 RX ADMIN — SODIUM BICARBONATE 1300 MG: 650 TABLET ORAL at 12:17

## 2018-04-19 NOTE — CONSULTS
[address]  MR#: [id number]  : 1945  ACCOUNT #: [de-identified]   DATE OF SERVICE: 2018    REASON FOR CONSULTATION:  Acute kidney injury. Thank you for allowing me to see patient, a 15-year-old white female who was admitted with a sepsis-like syndrome. She also had an acute kidney injury with an increase in serum creatinine from 1.29 to 1.97. She has a history of a radical cystectomy done in . She has an ileal conduit with a right ileostomy. She had recently been treated for a urinary tract infection. She was started on Bactrim and Macrobid. Patient is ALLERGIC TO BACTRIM, but took the medication regardless. She had somewhat of a reaction and became worse and was subsequently admitted to the hospital.  She was given IV fluids. Her serum creatinine started to drop very nicely. She was also a bit hypotensive. However, she is treated with Hyzaar as an outpatient. PAST MEDICAL HISTORY:  Remarkable for hypertension, hypothyroidism and recent urinary tract infection, of course the bladder cancer as mentioned before. MEDICATIONS:  Prior to admission were Synthroid, Norco, Bactrim-DS 1 dose, Hyzaar and Prilosec. PAST SURGICAL HISTORY:  She is status post a GYN procedure and of course the bladder removal and development of ileal conduit. FAMILY HISTORY:  Negative for kidney disease. SOCIAL HISTORY:  She used to smoke, stopped in . No alcohol use. ALLERGIES:  SULFA/BACTRIM, DICLOFENAC AND CIPRO. REVIEW OF SYSTEMS:  Now negative for shortness of breath or chest pain. She had a left hip replacement done in March and she has left hip pain, but she had been walking prior to admission. She just notices prior to admission she was getting weaker and weaker. Appetite was poor. No nausea, vomiting. No abdominal pain. No blood in the stool. No blood in the urine.   There was perhaps maybe a decrease in urinary output. Patient did not volunteer, but her granddaughter said that she has had lower blood pressures over the last several days prior to admission. There is no lower extremity edema. The remainder of the review of systems is negative. CURRENT IN-HOUSE MEDICATIONS:  Include Levaquin 750 mg every 48 hours, levothyroxine 75 mcg daily, Protonix 40 mg a day. She is getting normal saline 100 mL an hour. PHYSICAL EXAMINATION:  GENERAL:  She is a well-developed, well-nourished 75-year-old white female in no acute distress. VITAL SIGNS:  Blood pressure is 124/53 with a pulse of 91, O2 sat of 96% on room air. HEENT:  Shows normocephalic, atraumatic cranium. Conjunctivae and sclerae are clear. Extraocular muscles are intact. NECK:  Supple. There is no JVD. LUNGS:  Clear. HEART:  Shows a regular rhythm without an S3 gallop. There is no rub. ABDOMEN:  Soft. She has a right ileostomy in place. It is holding clear urine. The belly is soft. There is some fullness in the left infraumbilical area, but no pain associated with palpation. EXTREMITIES:  Show no edema. LABORATORY DATA:  Show a sodium of 133, potassium 4.2, chloride of 98, CO2 of 18, BUN and creatinine are 34 and 1.97. These are labs done yesterday. Labs done today show a drop in the BUN and creatinine to 29 and 1.60. Sodium is 137, potassium 4.0, chloride 106, CO2 of 18. Lactic acid is 1.1. On admission, she had a lactic acid of 4.5. Hematocrit 24% with a hemoglobin of 7.7 and a white count of 2.9, platelet count was down to 74,000. She had significant left shift on admission. Blood cultures on admission are negative. Urine cultures are also showing no growth so far, but note is made she was treated with antibiotics prior to admission. Chest x-ray on admission showed no acute abnormality. CT scan of the abdomen and pelvis on admission showed no acute findings. Spleen was normal.  Liver is nodular, compatible with cirrhosis. Pancreas is unremarkable. Ostomy is seen in the right lower quadrant. A small supraumbilical abdominal wall hernia containing nondistended colon. No bowel wall thickening or obstruction, no free air. Prior cystectomy is noted. Large left renal cyst.    ASSESSMENT:  1. Acute kidney injury related to sepsis syndrome. 2.  Sepsis syndrome on admission, which has resolved. 3.  Bladder cancer with cystectomy done in 1999.  4.  Hypotension related to her sepsis syndrome, but also which may be related to volume depletion. 5.  Metabolic acidosis. PLAN:    1. She is getting normal saline now. Her creatinine is coming down. I expect it to go back to her baseline. 2.  She may have some underlying chronic kidney disease and there may be a degree of reflux nephropathy. 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 However, what is also important is that she has an ileal conduit, she will be prone to volume depletion with chloride loss. She will also be prone to metabolic acidosis. She may benefit from having chronic sodium bicarbonate administration. She also may be helped by having no salt restriction, which is counterintuitive in patients with hypertension. 3.  Sodium bicarbonate. 4.  Continue IV fluids. 5.  I do not think any further workup is needed except for a urine protein-creatinine ratio. Thank you for allowing me to see this patient.       Alden Hancock MD       [facility] [place] [facility] / Cherri Suazo  D: 04/19/2018 11:42     T: 04/19/2018 12:38  JOB #: [id number]

## 2018-04-19 NOTE — PROGRESS NOTES
[address] [address] Pharmacy Dosing Services: Antimicrobial Stewardship Daily Doc    Consult for antibiotic dosing of Levofloxacin by Dr. Shay Urena  Indication:  UTI  Day of Therapy 1    Non-Kinetic Antimicrobial Dosing Regimen:   Current Regimen:  Levofloxacin 750mg IV q 48 hrs      Other Antimicrobial   (not dosed by pharmacist) None   Cultures 4/18: Blood- NGTD- pending  4/18: Urine- pending    No detection of viruses   Serum Creatinine Lab Results   Component Value Date/Time    Creatinine 1.60 (H) 04/19/2018 01:20 AM    Creatinine (POC) 1.0 01/03/2012 10:01 AM         Creatinine Clearance Estimated Creatinine Clearance: 34.6 mL/min (based on Cr of 1.6). Temp Temp: 98.9 °F (37.2 °C)       WBC Lab Results   Component Value Date/Time    WBC 2.9 (L) 04/19/2018 01:20 AM        H/H Lab Results   Component Value Date/Time    HGB 7.7 (L) 04/19/2018 01:20 AM        Platelets    Lab Results   Component Value Date/Time    PLATELET 74 (L) 55/07/4524 01:20 AM            Thank you,  Rm Latham, Pharm. D.

## 2018-04-19 NOTE — PROGRESS NOTES
Problem: Falls - Risk of  Goal: *Absence of Falls  Document Lucretia Fall Risk and appropriate interventions in the flowsheet.    Outcome: Progressing Towards Goal  Fall Risk Interventions:  Mobility Interventions: Assess mobility with egress test, Bed/chair exit alarm, Communicate number of staff needed for ambulation/transfer, OT consult for ADLs, Patient to call before getting OOB, PT Consult for mobility concerns, PT Consult for assist device competence, Strengthening exercises (ROM-active/passive), Utilize walker, cane, or other assitive device         Medication Interventions: Assess postural VS orthostatic hypotension, Bed/chair exit alarm, Evaluate medications/consider consulting pharmacy, Patient to call before getting OOB, Teach patient to arise slowly    Elimination Interventions: Bed/chair exit alarm, Call light in reach, Elevated toilet seat, Patient to call for help with toileting needs, Toilet paper/wipes in reach, Toileting schedule/hourly rounds    History of Falls Interventions: Bed/chair exit alarm, Consult care management for discharge planning, Door open when patient unattended, Evaluate medications/consider consulting pharmacy, Investigate reason for fall, Room close to nurse's station

## 2018-04-19 NOTE — CONSULTS
[facility] [place] at [address], [address] Tish Diallo Shy: [phone]  F: [phone]      Reason for Visit:   Claudy Mcgrath is a 74 y.o. female who is seen in consultation at the request of Dr. Lora Hoang  for evaluation of pancytopenia. Ariela Mooney History of Present Illness:   Ms. Lon Barbosa is a 73 y/o female with HTN, remote h/o bladder cancer who presented with c/o nausea and vomiting since taking Bactrim for UTI as well as fever 102, found to be pancytopenic. She has a h/o of bladder cancer (1999) and underwent radical cystectomy with ileal conduit. She was evaluated in the urology office for UTI and was started on macrobid, later switched to Bactrim. This medication seemed to cause n/v. Upon questioning, Ms. Lon Barbosa denies any previous h/o anemia except when pregnant - at which time she used to take iron. She notes occasional blood in her urine but otherwise denies any other signs of bleeding. Since admission, she has continued to spike fevers, but last fever late night on 4/18. She denies abdom cramping, but states she is passing gas, feels upper abdominal discomfort and still feels nauseated. She is sipping on gingerale and water. Granddaughters at bedside.      Past Medical History:   Diagnosis Date    Arthritis     Cancer [facility])     bladder    Endocrine disease     hyperthyroid    Gastrointestinal disorder     Hypertension     Other ill-defined conditions(819.89)     hyperthyroid      Past Surgical History:   Procedure Laterality Date    HX GYN      HX UROLOGICAL      stoma since 78      Social History   Substance Use Topics    Smoking status: Former Smoker     Quit date: 7/3/1999    Smokeless tobacco: Never Used    Alcohol use No      Family History   Problem Relation Age of Onset    Heart Disease Father      Current Facility-Administered Medications   Medication Dose Route Frequency    levoFLOXacin (LEVAQUIN) 750 mg in D5W IVPB  750 mg IntraVENous Q48H    0.9% sodium chloride infusion  100 mL/hr IntraVENous CONTINUOUS    HYDROcodone-acetaminophen (NORCO) 5-325 mg per tablet 1 Tab  1 Tab Oral Q6H PRN    levothyroxine (SYNTHROID) tablet 75 mcg  75 mcg Oral ACB    sodium chloride (NS) flush 5-10 mL  5-10 mL IntraVENous Q8H    sodium chloride (NS) flush 5-10 mL  5-10 mL IntraVENous PRN    pantoprazole (PROTONIX) tablet 40 mg  40 mg Oral ACB    acetaminophen (TYLENOL) tablet 650 mg  650 mg Oral Q6H PRN    morphine 2 mg  2 mg IntraVENous Q4H PRN      Allergies   Allergen Reactions    Ciprofloxacin Other (comments)     Joint aches    Diclofenac Nausea Only    Sulfa (Sulfonamide Antibiotics) Nausea Only        Review of Systems: A complete review of systems was obtained, negative except as described above. Physical Exam:     Visit Vitals    /63 (BP 1 Location: Right arm, BP Patient Position: At rest)    Pulse 89    Temp 98.5 °F (36.9 °C)    Resp 22    Ht 5' 5\" (1.651 m)    Wt 95.1 kg (209 lb 10.5 oz)    SpO2 97%    BMI 34.89 kg/m2     ECOG PS: 2  General: No distress  Eyes: PERRLA, anicteric sclerae  HENT: Atraumatic with normal appearance of ears and nose; OP clear  Neck: Supple; no thyromegaly   Lymphatic: No cervical, supraclavicular, or axillary adenopathy  Respiratory: CTAB, normal respiratory effort  CV: Normal rate, regular rhythm, no murmurs, no peripheral edema  GI: Soft, nontender, nondistended, no masses, no hepatomegaly, no splenomegaly. Low pitched bowel sounds.  urostomy noted  MS: . Digits without clubbing or cyanosis. Skin: No rashes, ecchymoses, or petechiae. Normal temperature, turgor, and texture. Neuro/Psych: Alert, oriented, appropriate affect, normal judgment/insight      Results:     Lab Results   Component Value Date/Time    WBC 2.9 (L) 04/19/2018 01:20 AM    HGB 7.7 (L) 04/19/2018 01:20 AM    HCT 24.0 (L) 04/19/2018 01:20 AM    PLATELET 74 (L) 17/02/8890 01:20 AM    MCV 93.0 04/19/2018 01:20 AM    ABS. NEUTROPHILS 8.9 (H) 04/18/2018 06:20 AM    Hemoglobin (POC) 11.9 01/03/2012 10:01 AM    Hematocrit (POC) 35 01/03/2012 10:01 AM     Lab Results   Component Value Date/Time    Sodium 137 04/19/2018 01:20 AM    Potassium 4.0 04/19/2018 01:20 AM    Chloride 106 04/19/2018 01:20 AM    CO2 18 (L) 04/19/2018 01:20 AM    Glucose 106 (H) 04/19/2018 01:20 AM    BUN 29 (H) 04/19/2018 01:20 AM    Creatinine 1.60 (H) 04/19/2018 01:20 AM    GFR est AA 38 (L) 04/19/2018 01:20 AM    GFR est non-AA 31 (L) 04/19/2018 01:20 AM    Calcium 7.6 (L) 04/19/2018 01:20 AM    Sodium (POC) 143 01/03/2012 10:01 AM    Potassium (POC) 4.1 01/03/2012 10:01 AM    Chloride (POC) 105 01/03/2012 10:01 AM    Glucose (POC) 129 (H) 01/03/2012 10:01 AM    BUN (POC) 11 01/03/2012 10:01 AM    Creatinine (POC) 1.0 01/03/2012 10:01 AM    Calcium, ionized (POC) 1.14 01/03/2012 10:01 AM     Lab Results   Component Value Date/Time    Bilirubin, total 0.8 04/18/2018 06:20 AM    ALT (SGPT) 23 04/18/2018 06:20 AM    AST (SGOT) 27 04/18/2018 06:20 AM    Alk. phosphatase 72 04/18/2018 06:20 AM    Protein, total 8.0 04/18/2018 06:20 AM    Albumin 3.5 04/18/2018 06:20 AM    Globulin 4.5 (H) 04/18/2018 06:20 AM     Lab Results   Component Value Date/Time    Lipase 243 03/15/2013 10:30 PM     Lab Results   Component Value Date/Time    INR 1.1 08/16/2017 10:59 PM    aPTT 25.6 03/15/2013 10:30 PM     4/18/2018 XR CHEST  IMPRESSION: No acute abnormality    8/2017 abd/pelvis CT:     FINDINGS:   Liver is nodular compatible with cirrhosis. The spleen is normal in size. The  gallbladder is not distended. The a pancreas does appear unremarkable. Ostomy  seen in the right lower quadrant. There is a small supraumbilical abdominal wall  hernia containing nondistended colon. There is no bowel wall thickening or  obstruction. Prior cystectomy. There is no free air or free fluid. No definite  adenopathy in the abdomen or pelvis. Large left renal cyst unchanged.  There is  no intrarenal calcification or obstruction. IMPRESSION: No acute findings. Prior surgery. Assessment and Recommendations:   75 y/o female with hx of HTN, hypothyroidism, GERD, bladder cancer s/p urostomy,  L hip replacement (3/16) and recent dx of UTI admitted with nausea, vomiting and fever. 1. Pancytopenia: Likely 2/2 infection and sepsis. Review of prior counts show fluctuation and lows in the past as well. Will investigate further with iron profile, ferritin, retic, VitB12, folate. The drop in PLT count from 150 to 74 could be from recent medications or from the infection. She also has possible cirrhosis based on CT scan in 8/2017, but no risk factors for cirrhosis (no diabetes or alcohol use). -- Iron profile, ferritin, retic, B12, folate  -- Check TSH  -- Continue to monitor with daily CBC.  -- She will likely need to follow up as outpatient once acute infection has been treated to ensure counts are improving. 2. UTI / Sepsis: h/o of recurrent UTIs. CXR without acute findings. Blood and urine cultures negative thus far. Respiratory viral panel negative. Took a dose of Bactrim, but is allergic to this. Now on Levaquin. Lactate improving. Last fever late night on 4/18.   -- On Levaquin    3. ARF: Secondary to dehydration from vomiting.   -- Continue on IVFs   -- Nephrology consulted    5. Hx of bladder cancer (1999): S/p ileal conduit/ urostomy; Follows with Dr Magdalena Montiel as out patient  -- Urology consulted and following    6. S/p Left hip replacement   -- PT/ OT to eval    Patient seen in conjunction with Layo Lockhart NP.     Signed By: Bradley Loco MD

## 2018-04-19 NOTE — CONSULTS
[facility]NEY    Consult  note dictated, # [id number] . See Transcription tab if not in Consult section.     LEATHA resolving  S/P radical cystectomy 1999 with ileal conduit  Metabolic acidosis    Plan:    Continue IVF but can start to titrate down  NaHCO3 to regimen (cont on d/c)  On d/c hold with HCTZ unless there is edema or resistant hypertension    Carin Estrada MD  [phone]

## 2018-04-19 NOTE — PROGRESS NOTES
[facility] [address]  [address] [facility], [facility], [address] [facility]  [phone]      Medical Progress Note      NAME: Sal Kline   :  1945  MRM:  [id number]    Date/Time: 2018  7:11 AM       Assessment and Plan:   1. Sepsis ([address] (2018). Likely sources is UTI. check BC. Sepsis protocol was initiated in ER. continue ABx and consult ID. Urology consult appreciated     2. Complicated UTI. S/p suprapubic catheter. UC from urology office showed gram negative organism which is multi drug resistant. Pt was resistant to take levaquin due to fear of tendonitis, but now she agrees. Will switch to levaquin and consult ID. Follow UC here.      3. HTN (hypertension) (2018). Hold home BP meds due to sepsis.      4.  Dehydration/ Hyponatremia (2018), mild. likely secondary to vomiting and poor PO intake. Continue IVF      5. ARF (acute renal failure) ([address] (2018). Secondary to volume depletion due to vomiting and ? AIN due to ABx. Continue IVF and consult nephrology      6. Hyperglycemia (2018). No Hx of DM. Check A1C     7.  Lactic acidosis (2018). Likely secondary to dehydration/ sepsis. Resolved. Continue IVF and ABx.      8. Acquired hypothyroidism (2018). Continue synthroid      9. GERD (gastroesophageal reflux disease) (2018). continue PPI     10. Bladder cancer ([address] (2018). S/p suprapubic catheter. urology consult     11. Pancytopenia. This chronic back to . According to pt she doesn't know about it and not seen by hematology. Will consult here           Subjective:     Chief Complaint:  Follow up of pt who was admitted with sepsis due to UTI. Feels feverish, nausea    ROS:  (bold if positive, if negative)    Fever/chills  Tolerating PT  Tolerating Diet        Objective:     Last 24hrs VS reviewed since prior progress note.  Most recent are:    Visit Vitals    /58 (BP 1 Location: Right arm, BP Patient Position: At rest;Hip tilt, left)    Pulse 90    Temp 98.9 °F (37.2 °C)    Resp 24    Ht 5' 5\" (1.651 m)    Wt 95.1 kg (209 lb 10.5 oz)    SpO2 95%    BMI 34.89 kg/m2     SpO2 Readings from Last 6 Encounters:   04/19/18 95%   08/17/17 100%   03/26/17 97%   03/17/13 98%   03/16/13 97%   01/03/12 97%          Intake/Output Summary (Last 24 hours) at 04/19/18 0711  Last data filed at 04/19/18 0541   Gross per 24 hour   Intake          3483.67 ml   Output             1500 ml   Net          1983.67 ml        Physical Exam:    Gen:  Well-developed, well-nourished, in no acute distress  HEENT:  Pink conjunctivae, PERRL, hearing intact to voice, moist mucous membranes  Neck:  Supple, without masses, thyroid non-tender  Resp:  No accessory muscle use, clear breath sounds without wheezes rales or rhonchi  Card:  No murmurs, normal S1, S2 without thrills, bruits or peripheral edema  Abd:  Soft, non-tender, non-distended, normoactive bowel sounds are present, no palpable organomegaly and no detectable hernias. Supra pubic catheter in place.    Lymph:  No cervical or inguinal adenopathy  Musc:  No cyanosis or clubbing  Skin:  No rashes or ulcers, skin turgor is good  Neuro:  Cranial nerves are grossly intact, no focal motor weakness, follows commands appropriately  Psych:  Good insight, oriented to person, place and time, alert  __________________________________________________________________  Medications Reviewed: (see below)  Medications:     Current Facility-Administered Medications   Medication Dose Route Frequency    0.9% sodium chloride infusion  125 mL/hr IntraVENous CONTINUOUS    HYDROcodone-acetaminophen (NORCO) 5-325 mg per tablet 1 Tab  1 Tab Oral Q6H PRN    levothyroxine (SYNTHROID) tablet 75 mcg  75 mcg Oral ACB    sodium chloride (NS) flush 5-10 mL  5-10 mL IntraVENous Q8H    sodium chloride (NS) flush 5-10 mL  5-10 mL IntraVENous PRN    heparin (porcine) injection 5,000 Units  5,000 Units SubCUTAneous Q8H    pantoprazole (PROTONIX) tablet 40 mg  40 mg Oral ACB    acetaminophen (TYLENOL) tablet 650 mg  650 mg Oral Q6H PRN    morphine 2 mg  2 mg IntraVENous Q4H PRN    piperacillin-tazobactam (ZOSYN) 3.375 g in 0.9% sodium chloride (MBP/ADV) 100 mL  3.375 g IntraVENous Q8H        Lab Data Reviewed: (see below)  Lab Review:     Recent Labs      04/19/18   0120  04/18/18   0620   WBC  2.9*  9.6   HGB  7.7*  9.4*   HCT  24.0*  28.7*   PLT  74*  150     Recent Labs      04/19/18   0120  04/18/18   0620   NA  137  133*   K  4.0  4.2   CL  106  98   CO2  18*  18*   GLU  106*  155*   BUN  29*  34*   CREA  1.60*  1.97*   CA  7.6*  9.1   ALB   --   3.5   TBILI   --   0.8   SGOT   --   27   ALT   --   23     Lab Results   Component Value Date/Time    Glucose (POC) 129 (H) 01/03/2012 10:01 AM     No results for input(s): PH, PCO2, PO2, HCO3, FIO2 in the last 72 hours. No results for input(s): INR in the last 72 hours.     No lab exists for component: INREXT  All Micro Results     Procedure Component Value Units Date/Time    RESPIRATORY PANEL,PCR,NASOPHARYNGEAL [[id number]] Collected:  04/18/18 0806    Order Status:  Completed Specimen:  Nasopharyngeal Updated:  04/18/18 1247     Adenovirus NOT DETECTED        Coronavirus 229E NOT DETECTED        Coronavirus HKU1 NOT DETECTED        Coronavirus CVNL63 NOT DETECTED        Coronavirus OC43 NOT DETECTED        Metapneumovirus NOT DETECTED        Rhinovirus and Enterovirus NOT DETECTED        Influenza A NOT DETECTED        Influenza A, subtype H1 NOT DETECTED        Influenza A, subtype H3 NOT DETECTED        INFLUENZA A H1N1 PCR NOT DETECTED        Influenza B NOT DETECTED        Parainfluenza 1 NOT DETECTED        Parainfluenza 2 NOT DETECTED        Parainfluenza 3 NOT DETECTED        Parainfluenza virus 4 NOT DETECTED        RSV by PCR NOT DETECTED        Bordetella pertussis - PCR NOT DETECTED        Chlamydophila pneumoniae DNA, QL, PCR NOT DETECTED        Mycoplasma pneumoniae DNA, QL, PCR NOT DETECTED       CULTURE, URINE [[id number]] Collected:  04/18/18 0658    Order Status:  Completed Specimen:  Urine from Clean catch Updated:  04/18/18 1037    CULTURE, BLOOD, PAIRED [[id number]] Collected:  04/18/18 0620    Order Status:  Completed Specimen:  Blood Updated:  04/18/18 0716          I have reviewed notes of prior 24hr. Other pertinent lab:       Total time spent with patient: [address] discussed with: Patient, Nursing Staff and >50% of time spent in counseling and coordination of care    Discussed:  Care Plan    Prophylaxis:  SCD's    Disposition:  Home w/Family           ___________________________________________________    Attending Physician: Khalif Nolan MD

## 2018-04-19 NOTE — ROUTINE PROCESS
Bedside and Verbal shift change report given to Bozena Vigil RN/BELLA Rhoades (oncoming nurse) by Suri Elizalde RN (offgoing nurse). Report included the following information SBAR, Kardex, Intake/Output, MAR, Accordion, Recent Results, Cardiac Rhythm SR/ST and Alarm Parameters .

## 2018-04-19 NOTE — PROGRESS NOTES
Bedside and Verbal shift change report given to True Ma (oncoming nurse) by Manpreet Meneses (offgoing nurse). Report included the following information SBAR, Intake/Output, Recent Results and Med Rec Status. 1025: PT reports feeling nauseated. Called Dr. Santy Barakat about med orders. Bedside and Verbal shift change report given to Alfredo Campbell (oncoming nurse) by [address] (offgoing nurse). Report included the following information SBAR, Intake/Output and Accordion.

## 2018-04-19 NOTE — CONSULTS
[address] [facility]  MR#: [id number]  : 1945  ACCOUNT #: [de-identified]   DATE OF SERVICE: 2018    REQUESTING PHYSICIAN:  Dr. Lisa Salazar:  Urinary tract infection. HISTORY OF PRESENT ILLNESS:  This 75-year-old female with history of hypothyroidism, hypertension and an ileal conduit due to remote history of bladder cancer who was admitted with the aforementioned complaint. The patient was seen by her urologist last week and diagnosed with a urinary tract infection. She was started on Bactrim, but she was only able to tolerate 1 dose due to nausea and vomiting. She was given a prescription for Macrobid, which she did not fill due to progressive nausea, vomiting and fever. In the emergency department, she is found to be tachycardic with an elevated white count. She was admitted for further evaluation and treatment. She was started on ceftriaxone, but continued to spike fevers. Urine cultures returned from the urologist's office, which revealed gram-negative munir resistant to ceftriaxone. Antibiotics have been changed to levofloxacin. The Infectious Disease Service has been asked to assist with antibiotic management. PAST MEDICAL HISTORY:  Bladder cancer requiring ileal conduit, hypothyroidism, hypertension. SOCIAL HISTORY:  No alcohol, tobacco or illicit drug use. ALLERGIES:  CIPROFLOXACIN CAUSES joint aches. SULFA CAUSES nausea. FAMILY HISTORY:  Father had heart disease. OUTPATIENT MEDICATIONS:  Please see the body of chart for details. Patient did take a single dose of Bactrim prior to admission. REVIEW OF SYSTEMS:  As per HPI. Remainder of 12 system review of systems unremarkable. LABORATORY DATA:  White blood cell count is 2900, platelet count 37,800. Creatinine was 1.97 at the time of admission, it was 1.6 today. Urine culture is pending.     PHYSICAL EXAMINATION:  VITAL SIGNS:  Temperature 98 degrees Fahrenheit, maximum temperature is 101.4, heart rate 91 beats per minute, blood pressure 124/53, oxygen saturation 96% on room air. GENERAL:  Alert, no acute distress. HEENT:  Normocephalic, atraumatic. Mucous membranes moist.  HEART:  Regular rate and rhythm. No murmurs, gallops or rubs. PULMONARY:  Clear to auscultation anteriorly. ABDOMEN:  Soft, nontender, nondistended. :  There is an ileal conduit in place. Urine is straw colored. No erythema or pain around the ileal conduit site. EXTREMITIES:  No clubbing, cyanosis or edema. SKIN:  No rashes. ASSESSMENT AND PLAN:  1.  Gram-negative munir. Urinary tract infection in the setting of ileal conduit. Urine culture taken in the outpatient setting by Urology reveals an unidentified gram-negative munir, which is resistant to cephalosporin antibiotics, as well as Macrobid and Monurol. It is sensitive to trimethoprim sulfamethoxazole and quinolone class of antibiotics. The patient was reluctant to take levofloxacin due to concern of tendinitis. This was discussed at length with the patient by multiple providers and she is now agreeable to a short course of levofloxacin. We will plan a total 7 day course of therapy. If she continues to spike fevers, she will need CT scan of the abdomen and pelvis. 2.  Acute kidney injury. This is improving. Continue to follow. 3.  History of ciprofloxacin and Bactrim intolerance. 4.  Pancytopenia. This is chronic. Thank you for allowing me to participate in the care of this patient.       DO ROHINI Fischer / MANN  D: 04/19/2018 12:03     T: 04/19/2018 12:42  JOB #: [id number]

## 2018-04-19 NOTE — PROGRESS NOTES
Problem: Mobility Impaired (Adult and Pediatric)  Goal: *Acute Goals and Plan of Care (Insert Text)  Physical Therapy Goals  Initiated 4/19/2018  1. Patient will move from supine to sit and sit to supine  in bed with supervision/set-up within 7 day(s). 2.  Patient will transfer from bed to chair and chair to bed with supervision/set-up using the least restrictive device within 7 day(s). 3.  Patient will perform sit to stand with modified independence within 7 day(s). 4.  Patient will ambulate with supervision/set-up for 150 feet with the least restrictive device within 7 day(s). 5.  Patient will ascend/descend 4 stairs with 1 handrail(s) with supervision/set-up within 7 day(s). physical Therapy EVALUATION  Patient: Stephany Reyez (74 y.o. female)  Date: 4/19/2018  Primary Diagnosis: Sepsis ([facility] [address]  Sepsis ([facility] [address]        Precautions: Fall       ASSESSMENT :  Based on the objective data described below, the patient presents with Pain limiting function, decreased activity tolerance, generalized weakness and presents at an increased risk for falls. She was admitted to the acute setting in the presence of sepsis from a UTI and had a recent hospitalization at another facility for a L ANKUR mid march of this year. Pt states that her initial symptom of current illness was pain in the L hip, this has remained a persistent problem over the past week, limiting her mobility. Today, she required Min to Mod assist for bed mobility but CGA only for transfers and short distance ambulation with rolling walker. Overall participation in therapy evaluation was limited by L hip pain. Prior to onset of symptoms, pt states that she was ambulating at mod I level with rolling walker vs cane and ascending/descending stairs into and out of her home with supervision only. She has been attending OP PT and progressing well up until this week.  Acute PT will continue to follow patient during her acute stay to progress mobility as able with hope that mobility status and pain control will improve as medical status improves and that she will able to return home with family and resume OP PT, however, at this time, pt is not safe to discharge home from a mobility standpoint and will need additional therapy prior. .    Patient will benefit from skilled intervention to address the above impairments. Patients rehabilitation potential is considered to be Good  Factors which may influence rehabilitation potential include:   [x]         None noted  []         Mental ability/status  []         Medical condition  []         Home/family situation and support systems  []         Safety awareness  []         Pain tolerance/management  []         Other:      PLAN :  Recommendations and Planned Interventions:  [x]           Bed Mobility Training             []    Neuromuscular Re-Education  [x]           Transfer Training                   []    Orthotic/Prosthetic Training  [x]           Gait Training                         []    Modalities  [x]           Therapeutic Exercises           []    Edema Management/Control  [x]           Therapeutic Activities            []    Patient and Family Training/Education  []           Other (comment):    Frequency/Duration: Patient will be followed by physical therapy  5 times a week to address goals. Discharge Recommendations: Rehab vs HH PT vs OP PT pending progress   Further Equipment Recommendations for Discharge: All DME needs met     SUBJECTIVE:   Patient stated This pain, what is causing this new pain? Del Board    OBJECTIVE DATA SUMMARY:   HISTORY:    Past Medical History:   Diagnosis Date    Arthritis     Cancer ([facility] [address]     bladder    Endocrine disease     hyperthyroid    Gastrointestinal disorder     Hypertension     Other ill-defined conditions(799.89)     hyperthyroid     Past Surgical History:   Procedure Laterality Date    HX GYN      HX UROLOGICAL      stoma since 78     Prior Level of Function/Home Situation: See above  Personal factors and/or comorbidities impacting plan of care:     Home Situation  Home Environment: Private residence  # Steps to Enter: 3  Rails to Enter: Yes  One/Two Story Residence: One story  Living Alone: No  Support Systems: Child(felipe), Family member(s)  Patient Expects to be Discharged to[de-identified] Private residence  Current DME Used/Available at Home: Cane, straight, CPAP, Walker, rolling    EXAMINATION/PRESENTATION/DECISION MAKING:   Critical Behavior:  Neurologic State: Alert  Orientation Level: Oriented X4  Cognition: Appropriate decision making, Appropriate safety awareness     Hearing:   Auditory  Auditory Impairment: Hard of hearing, bilateral  Skin:  Intact, Stoma on abdomen  Edema: None noted  Range Of Motion:      L hip ROM limited in all planes by pain                    Strength:     Left hip strength decreased, otherwise WFL                    Tone & Sensation:       Intact light touch sensation to BLE                                Functional Mobility:  Bed Mobility:     Supine to Sit: Moderate assistance        Transfers:  Sit to Stand: Contact guard assistance  Stand to Sit: Contact guard assistance  Stand Pivot Transfers: Contact guard assistance                    Balance:   Sitting: Intact  Standing: Impaired  Standing - Static: Fair  Standing - Dynamic : Fair  Ambulation/Gait Training:  Distance (ft): 8 Feet (ft)  Assistive Device: Walker, rolling  Ambulation - Level of Assistance: Contact guard assistance     Gait Description (WDL): Exceptions to WDL           Base of Support: Widened  Stance: Left decreased  Speed/Kenzie: Slow                                       Functional Measure:  Barthel Index:    Bathin  Bladder: 0  Bowels: 10  Groomin  Dressin  Feeding: 10  Mobility: 0  Stairs: 5  Toilet Use: 5  Transfer (Bed to Chair and Back): 10  Total: 55       Barthel and G-code impairment scale:  Percentage of impairment CH  0% CI  1-19% CJ  20-39% CK  40-59% CL  60-79% CM  80-99% CN  100%   Barthel Score 0-100 100 99-80 79-60 59-40 20-39 1-19   0   Barthel Score 0-20 20 17-19 13-16 9-12 5-8 1-4 0      The Barthel ADL Index: Guidelines  1. The index should be used as a record of what a patient does, not as a record of what a patient could do. 2. The main aim is to establish degree of independence from any help, physical or verbal, however minor and for whatever reason. 3. The need for supervision renders the patient not independent. 4. A patient's performance should be established using the best available evidence. Asking the patient, friends/relatives and nurses are the usual sources, but direct observation and common sense are also important. However direct testing is not needed. 5. Usually the patient's performance over the preceding 24-48 hours is important, but occasionally longer periods will be relevant. 6. Middle categories imply that the patient supplies over 50 per cent of the effort. 7. Use of aids to be independent is allowed. Alejandra Appl., Barthel, D.W. (1437). Functional evaluation: the Barthel Index. [address] [facility] (14)2. Yara Deleon kelly DIDIER Dodge, Annia Aguillon., Keri Valdez., [place], [address] [place] [address] (1999). Measuring the change indisability after inpatient rehabilitation; comparison of the responsiveness of the Barthel Index and Functional [place] Measure. Journal of Neurology, Neurosurgery, and Psychiatry, 66(4), 327-455. Leonor Alcantar, N.J.A, EDWIN Valero, & April Dyer MRAHEL. (2004.) Assessment of post-stroke quality of life in cost-effectiveness studies: The usefulness of the Barthel Index and the EuroQoL-5D. Quality of Life Research, 13, 242-06       G codes: In compliance with CMSs Claims Based Outcome Reporting, the following G-code set was chosen for this patient based on their primary functional limitation being treated:     The outcome measure chosen to determine the severity of the functional limitation was the Bathel with a score of 55/100 which was correlated with the impairment scale. ? Mobility - Walking and Moving Around:     - CURRENT STATUS: CK - 40%-59% impaired, limited or restricted    - GOAL STATUS: CJ - 20%-39% impaired, limited or restricted    - D/C STATUS:  ---------------To be determined---------------      Physical Therapy Evaluation Charge Determination   History Examination Presentation Decision-Making   MEDIUM  Complexity : 1-2 comorbidities / personal factors will impact the outcome/ POC  LOW Complexity : 1-2 Standardized tests and measures addressing body structure, function, activity limitation and / or participation in recreation  LOW Complexity : Stable, uncomplicated  LOW Complexity : FOTO score of       Based on the above components, the patient evaluation is determined to be of the following complexity level: LOW     Pain:  Pain Scale 1: Numeric (0 - 10)  Pain Intensity 1: 0  Pain Location 1: Back  Pain Orientation 1: Lower  Pain Description 1: Aching  Pain Intervention(s) 1: Medication (see MAR); Repositioned  Activity Tolerance: Tolerated session fair, limited by pain and fatigue with minimal activity  Please refer to the flowsheet for vital signs taken during this treatment. After treatment:   [x]         Patient left in no apparent distress sitting up in chair  []         Patient left in no apparent distress in bed  [x]         Call bell left within reach  [x]         Nursing notified  []         Caregiver present  [x]         Bed alarm activated    COMMUNICATION/EDUCATION:   The patients plan of care was discussed with: Registered Nurse. [x]         Fall prevention education was provided and the patient/caregiver indicated understanding. []         Patient/family have participated as able in goal setting and plan of care. []         Patient/family agree to work toward stated goals and plan of care.   []         Patient understands intent and goals of therapy, but is neutral about his/her participation. []         Patient is unable to participate in goal setting and plan of care.     Thank you for this referral.  Lesia Alberts PT, DPT   Time Calculation: 33 mins

## 2018-04-20 LAB
ANION GAP SERPL CALC-SCNC: 7 MMOL/L (ref 5–15)
BACTERIA SPEC CULT: ABNORMAL
BACTERIA SPEC CULT: ABNORMAL
BASOPHILS # BLD: 0 K/UL (ref 0–0.1)
BASOPHILS NFR BLD: 0 % (ref 0–1)
BUN SERPL-MCNC: 18 MG/DL (ref 6–20)
BUN/CREAT SERPL: 16 (ref 12–20)
CALCIUM SERPL-MCNC: 8.3 MG/DL (ref 8.5–10.1)
CHLORIDE SERPL-SCNC: 108 MMOL/L (ref 97–108)
CO2 SERPL-SCNC: 23 MMOL/L (ref 21–32)
CREAT SERPL-MCNC: 1.15 MG/DL (ref 0.55–1.02)
CREAT UR-MCNC: 46.4 MG/DL
DIFFERENTIAL METHOD BLD: ABNORMAL
EOSINOPHIL # BLD: 0.1 K/UL (ref 0–0.4)
EOSINOPHIL NFR BLD: 4 % (ref 0–7)
ERYTHROCYTE [DISTWIDTH] IN BLOOD BY AUTOMATED COUNT: 13.4 % (ref 11.5–14.5)
FERRITIN SERPL-MCNC: 198 NG/ML (ref 8–252)
FOLATE SERPL-MCNC: 7 NG/ML (ref 5–21)
GLUCOSE SERPL-MCNC: 93 MG/DL (ref 65–100)
HCT VFR BLD AUTO: 24.4 % (ref 35–47)
HGB BLD-MCNC: 7.7 G/DL (ref 11.5–16)
IMM GRANULOCYTES # BLD: 0 K/UL (ref 0–0.04)
IMM GRANULOCYTES NFR BLD AUTO: 1 % (ref 0–0.5)
IRON SATN MFR SERPL: 13 % (ref 20–50)
IRON SERPL-MCNC: 24 UG/DL (ref 35–150)
LYMPHOCYTES # BLD: 0.3 K/UL (ref 0.8–3.5)
LYMPHOCYTES NFR BLD: 18 % (ref 12–49)
MCH RBC QN AUTO: 29.5 PG (ref 26–34)
MCHC RBC AUTO-ENTMCNC: 31.6 G/DL (ref 30–36.5)
MCV RBC AUTO: 93.5 FL (ref 80–99)
MONOCYTES # BLD: 0.1 K/UL (ref 0–1)
MONOCYTES NFR BLD: 6 % (ref 5–13)
NEUTS SEG # BLD: 1.3 K/UL (ref 1.8–8)
NEUTS SEG NFR BLD: 72 % (ref 32–75)
NRBC # BLD: 0 K/UL (ref 0–0.01)
NRBC BLD-RTO: 0 PER 100 WBC
PLATELET # BLD AUTO: 89 K/UL (ref 150–400)
PMV BLD AUTO: 11.5 FL (ref 8.9–12.9)
POTASSIUM SERPL-SCNC: 3.8 MMOL/L (ref 3.5–5.1)
PROT UR-MCNC: 32 MG/DL (ref 0–11.9)
PROT/CREAT UR-RTO: 0.7
RBC # BLD AUTO: 2.61 M/UL (ref 3.8–5.2)
RETICS # AUTO: 0.02 M/UL (ref 0.02–0.08)
RETICS/RBC NFR AUTO: 0.8 % (ref 0.7–2.1)
SERVICE CMNT-IMP: ABNORMAL
SODIUM SERPL-SCNC: 138 MMOL/L (ref 136–145)
TIBC SERPL-MCNC: 180 UG/DL (ref 250–450)
TSH SERPL DL<=0.05 MIU/L-ACNC: 1.28 UIU/ML (ref 0.36–3.74)
VIT B12 SERPL-MCNC: 131 PG/ML (ref 193–986)
WBC # BLD AUTO: 1.8 K/UL (ref 3.6–11)

## 2018-04-20 PROCEDURE — 36415 COLL VENOUS BLD VENIPUNCTURE: CPT | Performed by: INTERNAL MEDICINE

## 2018-04-20 PROCEDURE — 84443 ASSAY THYROID STIM HORMONE: CPT | Performed by: INTERNAL MEDICINE

## 2018-04-20 PROCEDURE — 85027 COMPLETE CBC AUTOMATED: CPT | Performed by: NURSE PRACTITIONER

## 2018-04-20 PROCEDURE — 74011250637 HC RX REV CODE- 250/637: Performed by: INTERNAL MEDICINE

## 2018-04-20 PROCEDURE — 74011250636 HC RX REV CODE- 250/636: Performed by: NURSE PRACTITIONER

## 2018-04-20 PROCEDURE — 85045 AUTOMATED RETICULOCYTE COUNT: CPT | Performed by: NURSE PRACTITIONER

## 2018-04-20 PROCEDURE — 65660000000 HC RM CCU STEPDOWN

## 2018-04-20 PROCEDURE — 82607 VITAMIN B-12: CPT | Performed by: NURSE PRACTITIONER

## 2018-04-20 PROCEDURE — 80048 BASIC METABOLIC PNL TOTAL CA: CPT | Performed by: INTERNAL MEDICINE

## 2018-04-20 PROCEDURE — 74011250636 HC RX REV CODE- 250/636: Performed by: INTERNAL MEDICINE

## 2018-04-20 PROCEDURE — 82746 ASSAY OF FOLIC ACID SERUM: CPT | Performed by: NURSE PRACTITIONER

## 2018-04-20 PROCEDURE — 83540 ASSAY OF IRON: CPT | Performed by: NURSE PRACTITIONER

## 2018-04-20 PROCEDURE — 82728 ASSAY OF FERRITIN: CPT | Performed by: NURSE PRACTITIONER

## 2018-04-20 PROCEDURE — 77030038269 HC DRN EXT URIN PURWCK BARD -A

## 2018-04-20 PROCEDURE — 84156 ASSAY OF PROTEIN URINE: CPT | Performed by: INTERNAL MEDICINE

## 2018-04-20 RX ORDER — CYANOCOBALAMIN 1000 UG/ML
1000 INJECTION, SOLUTION INTRAMUSCULAR; SUBCUTANEOUS
Status: DISCONTINUED | OUTPATIENT
Start: 2018-05-03 | End: 2018-04-22 | Stop reason: HOSPADM

## 2018-04-20 RX ORDER — CYANOCOBALAMIN 1000 UG/ML
1000 INJECTION, SOLUTION INTRAMUSCULAR; SUBCUTANEOUS DAILY
Status: DISCONTINUED | OUTPATIENT
Start: 2018-04-20 | End: 2018-04-22 | Stop reason: HOSPADM

## 2018-04-20 RX ORDER — CYANOCOBALAMIN 1000 UG/ML
1000 INJECTION, SOLUTION INTRAMUSCULAR; SUBCUTANEOUS
Status: DISCONTINUED | OUTPATIENT
Start: 2018-06-21 | End: 2018-04-22 | Stop reason: HOSPADM

## 2018-04-20 RX ADMIN — Medication 10 ML: at 20:54

## 2018-04-20 RX ADMIN — SODIUM BICARBONATE 1300 MG: 650 TABLET ORAL at 17:07

## 2018-04-20 RX ADMIN — SODIUM CHLORIDE 75 ML/HR: 900 INJECTION, SOLUTION INTRAVENOUS at 07:28

## 2018-04-20 RX ADMIN — SODIUM CHLORIDE 75 ML/HR: 900 INJECTION, SOLUTION INTRAVENOUS at 20:52

## 2018-04-20 RX ADMIN — Medication 10 ML: at 05:52

## 2018-04-20 RX ADMIN — CYANOCOBALAMIN 1000 MCG: 1000 INJECTION, SOLUTION INTRAMUSCULAR at 09:00

## 2018-04-20 RX ADMIN — LEVOTHYROXINE SODIUM 75 MCG: 75 TABLET ORAL at 06:35

## 2018-04-20 RX ADMIN — Medication 10 ML: at 17:09

## 2018-04-20 RX ADMIN — PANTOPRAZOLE SODIUM 40 MG: 40 TABLET, DELAYED RELEASE ORAL at 06:35

## 2018-04-20 RX ADMIN — SODIUM BICARBONATE 1300 MG: 650 TABLET ORAL at 09:00

## 2018-04-20 NOTE — PROGRESS NOTES
Bedside and Verbal shift change report given to Beau Hernandez (oncoming nurse) by Malcom Johnson (offgoing nurse). Report included the following information SBAR, Intake/Output, Accordion, Recent Results and Cardiac Rhythm NSR.

## 2018-04-20 NOTE — PROGRESS NOTES
Jos[facility]qu[facility] [address], [place], [address] [facility]  [phone]      Medical Progress Note      NAME: Michelle Schaffer   :  1945  MRM:  [id number]    Date/Time: 2018  7:11 AM       Assessment and Plan:   1. Sepsis ([address] (2018). Likely sources is UTI. check BC. Sepsis protocol was initiated in ER. continue ABx and consult ID. Urology consult appreciated     2. Complicated UTI. S/p suprapubic catheter. UC from urology office showed gram negative organism which is multi drug resistant. On levaquin. ID evaluation appreciated. Follow UC here.      3. HTN (hypertension) (2018). Hold home BP meds due to sepsis.      4.  Dehydration/ Hyponatremia (2018), mild. likely secondary to vomiting and poor PO intake. Better.      5. ARF (acute renal failure) ([facility] [address] (2018). Secondary to volume depletion due to vomiting. Improving with IVF. Nephrology evaluation appreciated     6. Hyperglycemia (2018). No Hx of DM.       7.  Lactic acidosis (2018). Likely secondary to dehydration/ sepsis. Resolved. Continue IVF and ABx.      8. Acquired hypothyroidism (2018). Continue synthroid. TSH is normal.       9. GERD (gastroesophageal reflux disease) (2018). continue PPI     10. Bladder cancer ([facility] [address] (2018). S/p suprapubic catheter. urology consult     11. Pancytopenia. This is chronic back to . Aprecaited hematology evaluation. Checking iron panel. Needs outpatinet FU. Subjective:     Chief Complaint:  Follow up of pt who was admitted with sepsis due to UTI. Feels better today     ROS:  (bold if positive, if negative)    Fever/chills  Tolerating PT  Tolerating Diet        Objective:     Last 24hrs VS reviewed since prior progress note.  Most recent are:    Visit Vitals    /69 (BP 1 Location: Right arm, BP Patient Position: At rest)    Pulse 81    Temp 98 °F (36.7 °C)    Resp 20    Ht 5' 5\" (1.651 m)    Wt 95.5 kg (210 lb 9.6 oz)    SpO2 96%    BMI 35.05 kg/m2     SpO2 Readings from Last 6 Encounters:   04/20/18 96%   08/17/17 100%   03/26/17 97%   03/17/13 98%   03/16/13 97%   01/03/12 97%            Intake/Output Summary (Last 24 hours) at 04/20/18 0700  Last data filed at 04/20/18 0630   Gross per 24 hour   Intake             3447 ml   Output             2677 ml   Net              770 ml        Physical Exam:    Gen:  Well-developed, well-nourished, in no acute distress  HEENT:  Pink conjunctivae, PERRL, hearing intact to voice, moist mucous membranes  Neck:  Supple, without masses, thyroid non-tender  Resp:  No accessory muscle use, clear breath sounds without wheezes rales or rhonchi  Card:  No murmurs, normal S1, S2 without thrills, bruits or peripheral edema  Abd:  Soft, non-tender, non-distended, normoactive bowel sounds are present, no palpable organomegaly and no detectable hernias. Supra pubic catheter in place.    Lymph:  No cervical or inguinal adenopathy  Musc:  No cyanosis or clubbing  Skin:  No rashes or ulcers, skin turgor is good  Neuro:  Cranial nerves are grossly intact, no focal motor weakness, follows commands appropriately  Psych:  Good insight, oriented to person, place and time, alert  __________________________________________________________________  Medications Reviewed: (see below)  Medications:     Current Facility-Administered Medications   Medication Dose Route Frequency    levoFLOXacin (LEVAQUIN) 750 mg in D5W IVPB  750 mg IntraVENous Q48H    ondansetron (ZOFRAN) injection 4 mg  4 mg IntraVENous Q4H PRN    sodium bicarbonate tablet 1,300 mg  1,300 mg Oral BID    senna-docusate (PERICOLACE) 8.6-50 mg per tablet 1 Tab  1 Tab Oral QHS    polyethylene glycol (MIRALAX) packet 17 g  17 g Oral DAILY PRN    0.9% sodium chloride infusion  100 mL/hr IntraVENous CONTINUOUS    HYDROcodone-acetaminophen (NORCO) 5-325 mg per tablet 1 Tab  1 Tab Oral Q6H PRN    levothyroxine (SYNTHROID) tablet 75 mcg  75 mcg Oral ACB    sodium chloride (NS) flush 5-10 mL  5-10 mL IntraVENous Q8H    sodium chloride (NS) flush 5-10 mL  5-10 mL IntraVENous PRN    pantoprazole (PROTONIX) tablet 40 mg  40 mg Oral ACB    acetaminophen (TYLENOL) tablet 650 mg  650 mg Oral Q6H PRN    morphine 2 mg  2 mg IntraVENous Q4H PRN        Lab Data Reviewed: (see below)  Lab Review:     Recent Labs      04/20/18 0242 04/19/18   0120 04/18/18   0620   WBC  1.8*  2.9*  9.6   HGB  7.7*  7.7*  9.4*   HCT  24.4*  24.0*  28.7*   PLT  89*  74*  150     Recent Labs      04/20/18 0242 04/19/18   0120 04/18/18   0620   NA  138  137  133*   K  3.8  4.0  4.2   CL  108  106  98   CO2  23  18*  18*   GLU  93  106*  155*   BUN  18  29*  34*   CREA  1.15*  1.60*  1.97*   CA  8.3*  7.6*  9.1   ALB   --    --   3.5   TBILI   --    --   0.8   SGOT   --    --   27   ALT   --    --   23     Lab Results   Component Value Date/Time    Glucose (POC) 129 (H) 01/03/2012 10:01 AM     No results for input(s): PH, PCO2, PO2, HCO3, FIO2 in the last 72 hours. No results for input(s): INR in the last 72 hours.     No lab exists for component: Tere Muta  All Micro Results     Procedure Component Value Units Date/Time    CULTURE, URINE [[id number]] Collected:  04/18/18 0658    Order Status:  Completed Specimen:  Urine from Clean catch Updated:  04/19/18 1020     Special Requests: NO SPECIAL REQUESTS        Culture result:         CULTURE IN PROGRESS,FURTHER UPDATES TO FOLLOW    CULTURE, BLOOD, PAIRED [[id number]] Collected:  04/18/18 7221    Order Status:  Completed Specimen:  Blood Updated:  04/19/18 0714     Special Requests: NO SPECIAL REQUESTS        Culture result: NO GROWTH AFTER 23 HOURS       RESPIRATORY PANEL,PCR,NASOPHARYNGEAL [[id number]] Collected:  04/18/18 0806    Order Status:  Completed Specimen:  Nasopharyngeal Updated:  04/18/18 1247     Adenovirus NOT DETECTED        Coronavirus 229E NOT DETECTED        Coronavirus HKU1 NOT DETECTED Coronavirus CVNL63 NOT DETECTED        Coronavirus OC43 NOT DETECTED        Metapneumovirus NOT DETECTED        Rhinovirus and Enterovirus NOT DETECTED        Influenza A NOT DETECTED        Influenza A, subtype H1 NOT DETECTED        Influenza A, subtype H3 NOT DETECTED        INFLUENZA A H1N1 PCR NOT DETECTED        Influenza B NOT DETECTED        Parainfluenza 1 NOT DETECTED        Parainfluenza 2 NOT DETECTED        Parainfluenza 3 NOT DETECTED        Parainfluenza virus 4 NOT DETECTED        RSV by PCR NOT DETECTED        Bordetella pertussis - PCR NOT DETECTED        Chlamydophila pneumoniae DNA, QL, PCR NOT DETECTED        Mycoplasma pneumoniae DNA, QL, PCR NOT DETECTED             I have reviewed notes of prior 24hr. Other pertinent lab:       Total time spent with patient: [address] discussed with: Patient, Nursing Staff and >50% of time spent in counseling and coordination of care    Discussed:  Care Plan    Prophylaxis:  SCD's    Disposition:  Home w/Family           ___________________________________________________    Attending Physician: Akbar Avila MD

## 2018-04-20 NOTE — PROGRESS NOTES
Renal Progress Note    NAME:  Marino Shock   :   1945   MRN:   [id number]     Date/Time:  2018 3:05 PM      Assessment:   1. Acute Kidney Injury --> resolving well  2. Sepsis Syndrome       Plan:   1. Her GFR has improved. 2. Not much to add at this juncture. Will notify the RNA team (Dr. Minal mckinney) . She did not mention this to Dr. Guillermo Newell yesterday. She stated she \" just forgot \". Subjective:           Had nausea overnight. This was addressed. There was no history of vomiting. There was no history of chest pain.      Review of Systems:  Y  N       Y  N  []         []          Fever/chills                                               []         []          Chest Pain  []         []          Cough                                                       []         []          Diarrhea   []         []          Sputum                                                     []         []          Constipation  []         []          SOB/ALEXANDER                                                []         []          Nausea/Vomit  []         []          Abd Pain                                                    []         []          Tolerating PT  []         []          Dysuria                                                      []         []          Tolerating Diet     []        Unable to obtain  ROS due to  []        mental status change  []        sedated   []        intubated    Medications reviewed:  Current Facility-Administered Medications   Medication Dose Route Frequency    cyanocobalamin (VITAMIN B12) injection 1,000 mcg  1,000 mcg IntraMUSCular DAILY    [START ON 2018] cyanocobalamin (VITAMIN B12) injection 1,000 mcg  1,000 mcg IntraMUSCular Q30D    [START ON 5/3/2018] cyanocobalamin (VITAMIN B12) injection 1,000 mcg  1,000 mcg IntraMUSCular Q7D    levoFLOXacin (LEVAQUIN) 750 mg in D5W IVPB  750 mg IntraVENous Q48H    ondansetron (ZOFRAN) injection 4 mg  4 mg IntraVENous Q4H PRN    sodium bicarbonate tablet 1,300 mg  1,300 mg Oral BID    senna-docusate (PERICOLACE) 8.6-50 mg per tablet 1 Tab  1 Tab Oral QHS    polyethylene glycol (MIRALAX) packet 17 g  17 g Oral DAILY PRN    0.9% sodium chloride infusion  75 mL/hr IntraVENous CONTINUOUS    HYDROcodone-acetaminophen (NORCO) 5-325 mg per tablet 1 Tab  1 Tab Oral Q6H PRN    levothyroxine (SYNTHROID) tablet 75 mcg  75 mcg Oral ACB    sodium chloride (NS) flush 5-10 mL  5-10 mL IntraVENous Q8H    sodium chloride (NS) flush 5-10 mL  5-10 mL IntraVENous PRN    pantoprazole (PROTONIX) tablet 40 mg  40 mg Oral ACB    acetaminophen (TYLENOL) tablet 650 mg  650 mg Oral Q6H PRN    morphine 2 mg  2 mg IntraVENous Q4H PRN        Objective:   Vitals:  Visit Vitals    /61 (BP 1 Location: Right arm, BP Patient Position: Sitting)    Pulse 97    Temp 97.9 °F (36.6 °C)    Resp 18    Ht 5' 5\" (1.651 m)    Wt 95.5 kg (210 lb 9.6 oz)    SpO2 97%    BMI 35.05 kg/m2     Temp (24hrs), Av.3 °F (36.8 °C), Min:97.6 °F (36.4 °C), Max:99.3 °F (37.4 °C)      O2 Device: Room air    Last 24hr Input/Output:    Intake/Output Summary (Last 24 hours) at 18 1505  Last data filed at 18 0900   Gross per 24 hour   Intake             3087 ml   Output             2152 ml   Net              935 ml        PHYSICAL EXAM:        Seen in Room 323 this afternoon. General:    Comfortable. Head:   Normocephalic. Eyes:   No icterus. Lungs:   Clear to auscultation , No Wheezes , rhonchi or rales. Heart:   No S3  Gallop. Abdomen: Bowel sounds - present . Extremities: SCDs      Psych:  Not anxious or agitated. Neurologic: Alert and oriented .             []        Telemetry Reviewed     []        NSR []        PAC/PVCs   []        Afib  []        Paced   []        NSVT   []        Heredia []        NGT  []        Intubated on vent    Lab Data Reviewed:    Recent Results (from the past 24 hour(s))   METABOLIC PANEL, BASIC    Collection Time: 04/20/18  2:42 AM   Result Value Ref Range    Sodium 138 136 - 145 mmol/L    Potassium 3.8 3.5 - 5.1 mmol/L    Chloride 108 97 - 108 mmol/L    CO2 23 21 - 32 mmol/L    Anion gap 7 5 - 15 mmol/L    Glucose 93 65 - 100 mg/dL    BUN 18 6 - 20 MG/DL    Creatinine 1.15 (H) 0.55 - 1.02 MG/DL    BUN/Creatinine ratio 16 12 - 20      GFR est AA 56 (L) >60 ml/min/1.73m2    GFR est non-AA 46 (L) >60 ml/min/1.73m2    Calcium 8.3 (L) 8.5 - 10.1 MG/DL   FERRITIN    Collection Time: 04/20/18  2:42 AM   Result Value Ref Range    Ferritin 198 8 - 252 NG/ML   IRON PROFILE    Collection Time: 04/20/18  2:42 AM   Result Value Ref Range    Iron 24 (L) 35 - 150 ug/dL    TIBC 180 (L) 250 - 450 ug/dL    Iron % saturation 13 (L) 20 - 50 %   VITAMIN B12    Collection Time: 04/20/18  2:42 AM   Result Value Ref Range    Vitamin B12 131 (L) 193 - 986 pg/mL   FOLATE    Collection Time: 04/20/18  2:42 AM   Result Value Ref Range    Folate 7.0 5.0 - 21.0 ng/mL   RETICULOCYTE COUNT    Collection Time: 04/20/18  2:42 AM   Result Value Ref Range    Reticulocyte count 0.8 0.7 - 2.1 %    Absolute Retic Cnt. 0.0198 0.0164 - 0.0776 M/ul   TSH 3RD GENERATION    Collection Time: 04/20/18  2:42 AM   Result Value Ref Range    TSH 1.28 0.36 - 3.74 uIU/mL   CBC W/O DIFF    Collection Time: 04/20/18  2:42 AM   Result Value Ref Range    WBC 1.8 (L) 3.6 - 11.0 K/uL    RBC 2.61 (L) 3.80 - 5.20 M/uL    HGB 7.7 (L) 11.5 - 16.0 g/dL    HCT 24.4 (L) 35.0 - 47.0 %    MCV 93.5 80.0 - 99.0 FL    MCH 29.5 26.0 - 34.0 PG    MCHC 31.6 30.0 - 36.5 g/dL    RDW 13.4 11.5 - 14.5 %    PLATELET 89 (L) 603 - 400 K/uL    MPV 11.5 8.9 - 12.9 FL    NRBC 0.0 0  WBC    ABSOLUTE NRBC 0.00 0.00 - 0.01 K/uL   DIFFERENTIAL, AUTO    Collection Time: 04/20/18  2:42 AM   Result Value Ref Range    NEUTROPHILS 72 32 - 75 %    LYMPHOCYTES 18 12 - 49 %    MONOCYTES 6 5 - 13 %    EOSINOPHILS 4 0 - 7 %    BASOPHILS 0 0 - 1 %    IMMATURE GRANULOCYTES 1 (H) 0.0 - 0.5 %    ABS. NEUTROPHILS 1.3 (L) 1.8 - 8.0 K/UL    ABS. LYMPHOCYTES 0.3 (L) 0.8 - 3.5 K/UL    ABS. MONOCYTES 0.1 0.0 - 1.0 K/UL    ABS. EOSINOPHILS 0.1 0.0 - 0.4 K/UL    ABS. BASOPHILS 0.0 0.0 - 0.1 K/UL    ABS. IMM. GRANS. 0.0 0.00 - 0.04 K/UL    DF AUTOMATED     PROTEIN/CREATININE RATIO, URINE    Collection Time: 04/20/18  9:35 AM   Result Value Ref Range    Protein, urine random 32 (H) 0.0 - 11.9 mg/dL    Creatinine, urine 46.40 mg/dL    Protein/Creat.  urine Ratio 0.7         Total time spent with patient:  []        15   []        25   []        35   []         __ minutes    []        Critical Care Provided        Care Plan discussed with:            [x]        Patient   []        Family    []        Care Manager   []        Consultant/Specialist :      []          >50% of visit spent in counseling and coordination of care   (Discussed []        CODE status,  []        Care Plan, []        D/C Planning)    ___________________________________________________    Attending Physician: Aurea Gregory MD

## 2018-04-20 NOTE — ROUTINE PROCESS
Bedside and Verbal shift change report given to Joy Sheppard RN (oncoming nurse) by Elsy Durant RN (offgoing nurse). Report included the following information SBAR, Kardex, Intake/Output, MAR, Accordion, Recent Results, Cardiac Rhythm SR and Alarm Parameters .

## 2018-04-20 NOTE — PROGRESS NOTES
[facility] [place] at [place] [address] [address] includes the [facility] [address] [facility]  [place] Resides: [phone]  F: [phone]      Reason for Visit:   Sheeba Rayo is a 74 y.o. female who is seen in consultation at the request of Dr. Karen Panchal  for evaluation of pancytopenia. Janis Bess History of Present Illness:   Ms. Anton Pozo is a 75 y/o female with HTN, remote h/o bladder cancer who presented with c/o nausea and vomiting since taking Bactrim for UTI as well as fever 102, found to be pancytopenic. She has a h/o of bladder cancer (1999) and underwent radical cystectomy with ileal conduit. She was evaluated in the urology office for UTI and was started on macrobid, later switched to Bactrim. This medication seemed to cause n/v. Upon questioning, Ms. Anton Pozo denies any previous h/o anemia except when pregnant - at which time she used to take iron. She notes occasional blood in her urine but otherwise denies any other signs of bleeding. Since admission, she has continued to spike fevers, but last fever late night on 4/18. She denies abdom cramping, but states she is passing gas, feels upper abdominal discomfort and still feels nauseated. She is sipping on gingerale and water. Granddaughters at bedside. Interval History:   States feeling better this am; able to eat breakfast. Feels like she might be able to have a BM this am; requesting some warm prune juice. Denies any N/V. States unaware of ever being told she Vit B12 def; has had a decrease in her energy since prior to her hip surgery. No family at bedside.        Current Facility-Administered Medications   Medication Dose Route Frequency    cyanocobalamin (VITAMIN B12) injection 1,000 mcg  1,000 mcg IntraMUSCular DAILY    [START ON 6/21/2018] cyanocobalamin (VITAMIN B12) injection 1,000 mcg  1,000 mcg IntraMUSCular Q30D    [START ON 5/3/2018] cyanocobalamin (VITAMIN B12) injection 1,000 mcg  1,000 mcg IntraMUSCular Q7D    levoFLOXacin (LEVAQUIN) 750 mg in D5W IVPB  750 mg IntraVENous Q48H    ondansetron (ZOFRAN) injection 4 mg  4 mg IntraVENous Q4H PRN    sodium bicarbonate tablet 1,300 mg  1,300 mg Oral BID    senna-docusate (PERICOLACE) 8.6-50 mg per tablet 1 Tab  1 Tab Oral QHS    polyethylene glycol (MIRALAX) packet 17 g  17 g Oral DAILY PRN    0.9% sodium chloride infusion  75 mL/hr IntraVENous CONTINUOUS    HYDROcodone-acetaminophen (NORCO) 5-325 mg per tablet 1 Tab  1 Tab Oral Q6H PRN    levothyroxine (SYNTHROID) tablet 75 mcg  75 mcg Oral ACB    sodium chloride (NS) flush 5-10 mL  5-10 mL IntraVENous Q8H    sodium chloride (NS) flush 5-10 mL  5-10 mL IntraVENous PRN    pantoprazole (PROTONIX) tablet 40 mg  40 mg Oral ACB    acetaminophen (TYLENOL) tablet 650 mg  650 mg Oral Q6H PRN    morphine 2 mg  2 mg IntraVENous Q4H PRN      Allergies   Allergen Reactions    Ciprofloxacin Other (comments)     Joint aches    Diclofenac Nausea Only    Sulfa (Sulfonamide Antibiotics) Nausea Only        Review of Systems: A complete review of systems was obtained, negative except as described above. Physical Exam:     Visit Vitals    BP (!) 144/96 (BP 1 Location: Right arm, BP Patient Position: At rest)    Pulse 83    Temp 97.9 °F (36.6 °C)    Resp 20    Ht 5' 5\" (1.651 m)    Wt 95.5 kg (210 lb 9.6 oz)    SpO2 98%    BMI 35.05 kg/m2     ECOG PS: 2  General: No distress  Eyes: PERRLA, anicteric sclerae  HENT: Atraumatic with normal appearance of ears and nose; OP clear  Neck: Supple; no thyromegaly   Respiratory: CTAB, normal respiratory effort  CV: Normal rate, regular rhythm, no murmurs, no peripheral edema  GI: Soft, nontender, nondistended, no masses, no hepatomegaly, no splenomegaly. Low pitched bowel sounds.  urostomy noted  MS: . Digits without clubbing or cyanosis. Skin: No rashes, ecchymoses, or petechiae. Normal temperature, turgor, and texture.   Neuro/Psych: Alert, oriented, appropriate affect, normal judgment/insight      Results:     Lab Results   Component Value Date/Time    WBC 1.8 (L) 04/20/2018 02:42 AM    HGB 7.7 (L) 04/20/2018 02:42 AM    HCT 24.4 (L) 04/20/2018 02:42 AM    PLATELET 89 (L) 22/65/4008 02:42 AM    MCV 93.5 04/20/2018 02:42 AM    ABS. NEUTROPHILS 1.3 (L) 04/20/2018 02:42 AM    Hemoglobin (POC) 11.9 01/03/2012 10:01 AM    Hematocrit (POC) 35 01/03/2012 10:01 AM     Lab Results   Component Value Date/Time    Sodium 138 04/20/2018 02:42 AM    Potassium 3.8 04/20/2018 02:42 AM    Chloride 108 04/20/2018 02:42 AM    CO2 23 04/20/2018 02:42 AM    Glucose 93 04/20/2018 02:42 AM    BUN 18 04/20/2018 02:42 AM    Creatinine 1.15 (H) 04/20/2018 02:42 AM    GFR est AA 56 (L) 04/20/2018 02:42 AM    GFR est non-AA 46 (L) 04/20/2018 02:42 AM    Calcium 8.3 (L) 04/20/2018 02:42 AM    Sodium (POC) 143 01/03/2012 10:01 AM    Potassium (POC) 4.1 01/03/2012 10:01 AM    Chloride (POC) 105 01/03/2012 10:01 AM    Glucose (POC) 129 (H) 01/03/2012 10:01 AM    BUN (POC) 11 01/03/2012 10:01 AM    Creatinine (POC) 1.0 01/03/2012 10:01 AM    Calcium, ionized (POC) 1.14 01/03/2012 10:01 AM     Lab Results   Component Value Date/Time    Bilirubin, total 0.8 04/18/2018 06:20 AM    ALT (SGPT) 23 04/18/2018 06:20 AM    AST (SGOT) 27 04/18/2018 06:20 AM    Alk.  phosphatase 72 04/18/2018 06:20 AM    Protein, total 8.0 04/18/2018 06:20 AM    Albumin 3.5 04/18/2018 06:20 AM    Globulin 4.5 (H) 04/18/2018 06:20 AM     Lab Results   Component Value Date/Time    Reticulocyte count 0.8 04/20/2018 02:42 AM    Iron % saturation 13 (L) 04/20/2018 02:42 AM    TIBC 180 (L) 04/20/2018 02:42 AM    Ferritin 198 04/20/2018 02:42 AM    Vitamin B12 131 (L) 04/20/2018 02:42 AM    Folate 7.0 04/20/2018 02:42 AM    TSH 1.28 04/20/2018 02:42 AM    Lipase 243 03/15/2013 10:30 PM     Lab Results   Component Value Date/Time    INR 1.1 08/16/2017 10:59 PM    aPTT 25.6 03/15/2013 10:30 PM     4/18/2018 XR CHEST  IMPRESSION: No acute abnormality    8/2017 abd/pelvis CT:     FINDINGS:   Liver is nodular compatible with cirrhosis. The spleen is normal in size. The  gallbladder is not distended. The a pancreas does appear unremarkable. Ostomy  seen in the right lower quadrant. There is a small supraumbilical abdominal wall  hernia containing nondistended colon. There is no bowel wall thickening or  obstruction. Prior cystectomy. There is no free air or free fluid. No definite  adenopathy in the abdomen or pelvis. Large left renal cyst unchanged. There is  no intrarenal calcification or obstruction. IMPRESSION: No acute findings. Prior surgery. Assessment and Recommendations:   75 y/o female with hx of HTN, hypothyroidism, GERD, bladder cancer s/p urostomy,  L hip replacement (3/16) and recent dx of UTI admitted with nausea, vomiting and fever. 1. Pancytopenia: Likely 2/2 infection and sepsis. Review of prior counts show fluctuation and lows in the past as well. Lab workup here reveals Vitamin B12 def and anemia of chronic disease. Retic count shows an inappropriate response which can be related to Vit B12 def. TSH normal.  The drop in PLT count from 150 to 74 could be from recent medications or from the infection. She also has possible cirrhosis based on CT scan in 8/2017, but no risk factors for cirrhosis (no diabetes or alcohol use). -- Will initiate Vit B 12 injections for repletion. (Daily for 7 days, weekly for 4 weeks, then monthly). -- Continue to monitor with daily CBC.  -- She will need follow up as outpatient once acute infection has been treated to ensure counts are improving. We will sign off, but please do not hesitate to call with questions/concerns. Follow up in 2 weeks - appt will be scheduled by us. 2. UTI / Sepsis: h/o of recurrent UTIs. CXR without acute findings. Blood culture negative so far and urine cultures pending. Respiratory viral panel negative. Took a dose of Bactrim, but is allergic to this.  Now on Levaquin. Lactate improving. Last fever late night on 4/18.   -- On Levaquin    3. ARF: Secondary to dehydration from vomiting. Improved this am  -- Continue on IVFs   -- Nephrology consulted    5. Hx of bladder cancer (1999): S/p ileal conduit/ urostomy; Follows with Dr Chaitanya Orozco as out patient  -- Urology consulted and following    6. S/p Left hip replacement   -- PT following    Patient seen in conjunction with Manjula Miller NP.     Signed By: Shelby Miller MD

## 2018-04-20 NOTE — PROGRESS NOTES
Physical Therapy    4660 Two attempts to work with patient. First attempt pt declined, reporting she had been up to chair for several hours and recently returned to bed and had a visitor. Second attempt 1.5 hours later, pt declined stating \"not feeling up to it\" PT will follow up next treatment day. Abel Zelaya Mantle

## 2018-04-20 NOTE — PROGRESS NOTES
SHIFT CHANGE:  7:35 AM Report received from Magaly Singer RN. SBAR, Kardex, Procedure Summary, Intake/Output, MAR, Recent Results, Med Rec Status and Cardiac Rhythm NSR were discussed.       SHIFT SUMMARY:        END OF SHIFT REPORT:

## 2018-04-21 LAB
ANION GAP SERPL CALC-SCNC: 11 MMOL/L (ref 5–15)
APPEARANCE UR: CLEAR
BACTERIA URNS QL MICRO: NEGATIVE /HPF
BASOPHILS # BLD: 0 K/UL (ref 0–0.1)
BASOPHILS NFR BLD: 1 % (ref 0–1)
BILIRUB UR QL: NEGATIVE
BUN SERPL-MCNC: 14 MG/DL (ref 6–20)
BUN/CREAT SERPL: 14 (ref 12–20)
CALCIUM SERPL-MCNC: 8.5 MG/DL (ref 8.5–10.1)
CHLORIDE SERPL-SCNC: 109 MMOL/L (ref 97–108)
CO2 SERPL-SCNC: 21 MMOL/L (ref 21–32)
COLOR UR: ABNORMAL
CREAT SERPL-MCNC: 0.99 MG/DL (ref 0.55–1.02)
DIFFERENTIAL METHOD BLD: ABNORMAL
EOSINOPHIL # BLD: 0 K/UL (ref 0–0.4)
EOSINOPHIL NFR BLD: 2 % (ref 0–7)
EPITH CASTS URNS QL MICRO: ABNORMAL /LPF
ERYTHROCYTE [DISTWIDTH] IN BLOOD BY AUTOMATED COUNT: 13.2 % (ref 11.5–14.5)
GLUCOSE BLD STRIP.AUTO-MCNC: 118 MG/DL (ref 65–100)
GLUCOSE SERPL-MCNC: 109 MG/DL (ref 65–100)
GLUCOSE UR STRIP.AUTO-MCNC: NEGATIVE MG/DL
HCT VFR BLD AUTO: 24.4 % (ref 35–47)
HGB BLD-MCNC: 7.7 G/DL (ref 11.5–16)
HGB UR QL STRIP: ABNORMAL
IMM GRANULOCYTES # BLD: 0 K/UL
IMM GRANULOCYTES NFR BLD AUTO: 0 %
KETONES UR QL STRIP.AUTO: NEGATIVE MG/DL
LEUKOCYTE ESTERASE UR QL STRIP.AUTO: NEGATIVE
LYMPHOCYTES # BLD: 0.8 K/UL (ref 0.8–3.5)
LYMPHOCYTES NFR BLD: 44 % (ref 12–49)
MCH RBC QN AUTO: 29.1 PG (ref 26–34)
MCHC RBC AUTO-ENTMCNC: 31.6 G/DL (ref 30–36.5)
MCV RBC AUTO: 92.1 FL (ref 80–99)
MONOCYTES # BLD: 0.1 K/UL (ref 0–1)
MONOCYTES NFR BLD: 3 % (ref 5–13)
NEUTS SEG # BLD: 0.9 K/UL (ref 1.8–8)
NEUTS SEG NFR BLD: 50 % (ref 32–75)
NITRITE UR QL STRIP.AUTO: NEGATIVE
NRBC # BLD: 0 K/UL (ref 0–0.01)
NRBC BLD-RTO: 0 PER 100 WBC
PH UR STRIP: 6.5 [PH] (ref 5–8)
PLATELET # BLD AUTO: 96 K/UL (ref 150–400)
PMV BLD AUTO: 10.9 FL (ref 8.9–12.9)
POTASSIUM SERPL-SCNC: 3.5 MMOL/L (ref 3.5–5.1)
PROT UR STRIP-MCNC: ABNORMAL MG/DL
RBC # BLD AUTO: 2.65 M/UL (ref 3.8–5.2)
RBC #/AREA URNS HPF: ABNORMAL /HPF (ref 0–5)
RBC MORPH BLD: ABNORMAL
SERVICE CMNT-IMP: ABNORMAL
SODIUM SERPL-SCNC: 141 MMOL/L (ref 136–145)
SP GR UR REFRACTOMETRY: 1.02 (ref 1–1.03)
UA: UC IF INDICATED,UAUC: ABNORMAL
UROBILINOGEN UR QL STRIP.AUTO: 0.2 EU/DL (ref 0.2–1)
WBC # BLD AUTO: 1.8 K/UL (ref 3.6–11)
WBC URNS QL MICRO: ABNORMAL /HPF (ref 0–4)

## 2018-04-21 PROCEDURE — 85025 COMPLETE CBC W/AUTO DIFF WBC: CPT | Performed by: INTERNAL MEDICINE

## 2018-04-21 PROCEDURE — 74011250637 HC RX REV CODE- 250/637: Performed by: INTERNAL MEDICINE

## 2018-04-21 PROCEDURE — 74011250636 HC RX REV CODE- 250/636: Performed by: INTERNAL MEDICINE

## 2018-04-21 PROCEDURE — 65270000029 HC RM PRIVATE

## 2018-04-21 PROCEDURE — 87086 URINE CULTURE/COLONY COUNT: CPT | Performed by: INTERNAL MEDICINE

## 2018-04-21 PROCEDURE — 81001 URINALYSIS AUTO W/SCOPE: CPT | Performed by: INTERNAL MEDICINE

## 2018-04-21 PROCEDURE — 36415 COLL VENOUS BLD VENIPUNCTURE: CPT | Performed by: INTERNAL MEDICINE

## 2018-04-21 PROCEDURE — 82962 GLUCOSE BLOOD TEST: CPT

## 2018-04-21 PROCEDURE — 74011250636 HC RX REV CODE- 250/636: Performed by: NURSE PRACTITIONER

## 2018-04-21 PROCEDURE — 80048 BASIC METABOLIC PNL TOTAL CA: CPT | Performed by: INTERNAL MEDICINE

## 2018-04-21 RX ORDER — LEVOFLOXACIN 5 MG/ML
750 INJECTION, SOLUTION INTRAVENOUS EVERY 24 HOURS
Status: DISCONTINUED | OUTPATIENT
Start: 2018-04-22 | End: 2018-04-22 | Stop reason: HOSPADM

## 2018-04-21 RX ADMIN — SODIUM BICARBONATE 1300 MG: 650 TABLET ORAL at 08:56

## 2018-04-21 RX ADMIN — Medication 10 ML: at 15:18

## 2018-04-21 RX ADMIN — SODIUM BICARBONATE 1300 MG: 650 TABLET ORAL at 18:21

## 2018-04-21 RX ADMIN — Medication 1 CAPSULE: at 08:56

## 2018-04-21 RX ADMIN — LEVOTHYROXINE SODIUM 75 MCG: 75 TABLET ORAL at 10:39

## 2018-04-21 RX ADMIN — LEVOFLOXACIN 750 MG: 5 INJECTION, SOLUTION INTRAVENOUS at 08:57

## 2018-04-21 RX ADMIN — CYANOCOBALAMIN 1000 MCG: 1000 INJECTION, SOLUTION INTRAMUSCULAR at 08:57

## 2018-04-21 RX ADMIN — Medication 10 ML: at 04:15

## 2018-04-21 RX ADMIN — Medication 10 ML: at 21:50

## 2018-04-21 RX ADMIN — PANTOPRAZOLE SODIUM 40 MG: 40 TABLET, DELAYED RELEASE ORAL at 08:57

## 2018-04-21 NOTE — PROGRESS NOTES
TRANSFER - IN REPORT:    Verbal report received from Maxime Velásquez (name) on Sarah Burns  being received from [facility] (unit) for routine progression of care      Report consisted of patients Situation, Background, Assessment and   Recommendations(SBAR). Information from the following report(s) SBAR, Intake/Output, MAR, Accordion and Recent Results was reviewed with the receiving nurse. Opportunity for questions and clarification was provided. Assessment completed upon patients arrival to unit and care assumed.          Primary Nurse Rachael Alarcon and Micha Clarke RN performed a dual skin assessment on this patient Impairment noted- see wound doc flow sheet  Vidal score is 19

## 2018-04-21 NOTE — PROGRESS NOTES
Bedside and Verbal shift change report given to Jane (oncoming nurse) by Vedia Apley (offgoing nurse). Report included the following information SBAR, Kardex, ED Summary, MAR, Accordion and Recent Results.

## 2018-04-21 NOTE — PROGRESS NOTES
[facility] 79  [address] [facility], [address] [place] [address]  [phone]      Medical Progress Note      NAME: Georgia Mota   :  1945  MRM:  [id number]    Date/Time: 2018  7:11 AM       Assessment and Plan:   1. Sepsis ([address] (2018). Likely sources is UTI. BC is negative so far. Continue ABx. ID and urology consult appreciated     2. Complicated UTI. S/p suprapubic catheter. UC from urology office showed gram negative organism which is multi drug resistant. On levaquin per UC. ID evaluation appreciated. UC here is contaminated.      3. HTN (hypertension) (2018). Hold home BP meds due to sepsis.      4.  Dehydration/ Hyponatremia (2018), mild. likely secondary to vomiting and poor PO intake. Better.      5. ARF (acute renal failure) ([address] (2018). Secondary to volume depletion due to vomiting. Resolved. Nephrology evaluation appreciated     6. Hyperglycemia (2018). No Hx of DM.       7.  Lactic acidosis (2018). Likely secondary to dehydration/ sepsis. Resolved. Continue ABx.      8. Acquired hypothyroidism (2018). Continue synthroid. TSH is normal.       9. GERD (gastroesophageal reflux disease) (2018). continue PPI     10. Bladder cancer ([address] (2018). S/p suprapubic catheter. urology consult     11. Pancytopenia. This is chronic back to 2009. Aprecaited hematology evaluation. Needs outpatinet FU. 12.  Vit B12 deficiency. Getting B12.     13.  Diarrhea. Not febrile. Start probiotics. Subjective:     Chief Complaint:  Follow up of pt who was admitted with sepsis due to UTI. Diarrhea     ROS:  (bold if positive, if negative)    Fever/chills  Tolerating PT  Tolerating Diet        Objective:     Last 24hrs VS reviewed since prior progress note.  Most recent are:    Visit Vitals    /70    Pulse 84    Temp 98.6 °F (37 °C)    Resp 27    Ht 5' 5\" (1.651 m)    Wt 94.5 kg (208 lb 6.4 oz)    SpO2 96%    BMI 34.68 kg/m2     SpO2 Readings from Last 6 Encounters:   04/21/18 96%   08/17/17 100%   03/26/17 97%   03/17/13 98%   03/16/13 97%   01/03/12 97%            Intake/Output Summary (Last 24 hours) at 04/21/18 6500  Last data filed at 04/21/18 0400   Gross per 24 hour   Intake             2295 ml   Output             2300 ml   Net               -5 ml        Physical Exam:    Gen:  Well-developed, well-nourished, in no acute distress  HEENT:  Pink conjunctivae, PERRL, hearing intact to voice, moist mucous membranes  Neck:  Supple, without masses, thyroid non-tender  Resp:  No accessory muscle use, clear breath sounds without wheezes rales or rhonchi  Card:  No murmurs, normal S1, S2 without thrills, bruits or peripheral edema  Abd:  Soft, non-tender, non-distended, normoactive bowel sounds are present, no palpable organomegaly and no detectable hernias. Supra pubic catheter in place.    Lymph:  No cervical or inguinal adenopathy  Musc:  No cyanosis or clubbing  Skin:  No rashes or ulcers, skin turgor is good  Neuro:  Cranial nerves are grossly intact, no focal motor weakness, follows commands appropriately  Psych:  Good insight, oriented to person, place and time, alert  __________________________________________________________________  Medications Reviewed: (see below)  Medications:     Current Facility-Administered Medications   Medication Dose Route Frequency    cyanocobalamin (VITAMIN B12) injection 1,000 mcg  1,000 mcg IntraMUSCular DAILY    [START ON 6/21/2018] cyanocobalamin (VITAMIN B12) injection 1,000 mcg  1,000 mcg IntraMUSCular Q30D    [START ON 5/3/2018] cyanocobalamin (VITAMIN B12) injection 1,000 mcg  1,000 mcg IntraMUSCular Q7D    levoFLOXacin (LEVAQUIN) 750 mg in D5W IVPB  750 mg IntraVENous Q48H    ondansetron (ZOFRAN) injection 4 mg  4 mg IntraVENous Q4H PRN    sodium bicarbonate tablet 1,300 mg  1,300 mg Oral BID    senna-docusate (PERICOLACE) 8.6-50 mg per tablet 1 Tab  1 Tab Oral QHS    polyethylene glycol (MIRALAX) packet 17 g  17 g Oral DAILY PRN    0.9% sodium chloride infusion  75 mL/hr IntraVENous CONTINUOUS    HYDROcodone-acetaminophen (NORCO) 5-325 mg per tablet 1 Tab  1 Tab Oral Q6H PRN    levothyroxine (SYNTHROID) tablet 75 mcg  75 mcg Oral ACB    sodium chloride (NS) flush 5-10 mL  5-10 mL IntraVENous Q8H    sodium chloride (NS) flush 5-10 mL  5-10 mL IntraVENous PRN    pantoprazole (PROTONIX) tablet 40 mg  40 mg Oral ACB    acetaminophen (TYLENOL) tablet 650 mg  650 mg Oral Q6H PRN    morphine 2 mg  2 mg IntraVENous Q4H PRN        Lab Data Reviewed: (see below)  Lab Review:     Recent Labs      04/21/18 0407 04/20/18 0242 04/19/18   0120   WBC  1.8*  1.8*  2.9*   HGB  7.7*  7.7*  7.7*   HCT  24.4*  24.4*  24.0*   PLT  96*  89*  74*     Recent Labs      04/21/18 0407 04/20/18   0242  04/19/18   0120   NA  141  138  137   K  3.5  3.8  4.0   CL  109*  108  106   CO2  21  23  18*   GLU  109*  93  106*   BUN  14  18  29*   CREA  0.99  1.15*  1.60*   CA  8.5  8.3*  7.6*     Lab Results   Component Value Date/Time    Glucose (POC) 129 (H) 01/03/2012 10:01 AM     No results for input(s): PH, PCO2, PO2, HCO3, FIO2 in the last 72 hours. No results for input(s): INR in the last 72 hours. No lab exists for component: INREXT, INREXT  All Micro Results     Procedure Component Value Units Date/Time    CULTURE, URINE [[id number]]  (Abnormal) Collected:  04/18/18 0658    Order Status:  Completed Specimen:  Urine from Clean catch Updated:  04/20/18 1344     Special Requests: NO SPECIAL REQUESTS        Culture result:         >2 ORGANISMS - CONTAMINATED SPECIMEN.  SUGGEST RECOLLECTION      INCLUDING AT LEAST 3  GRAM NEGATIVE RODS AND GRAM POSITIVE COCCI (A)    CULTURE, BLOOD, PAIRED [[id number]] Collected:  04/18/18 0620    Order Status:  Completed Specimen:  Blood Updated:  04/20/18 0714     Special Requests: NO SPECIAL REQUESTS        Culture result: NO GROWTH 2 DAYS       RESPIRATORY Renan Jauregui [[id number]] Collected:  04/18/18 0806    Order Status:  Completed Specimen:  Nasopharyngeal Updated:  04/18/18 1247     Adenovirus NOT DETECTED        Coronavirus 229E NOT DETECTED        Coronavirus HKU1 NOT DETECTED        Coronavirus CVNL63 NOT DETECTED        Coronavirus OC43 NOT DETECTED        Metapneumovirus NOT DETECTED        Rhinovirus and Enterovirus NOT DETECTED        Influenza A NOT DETECTED        Influenza A, subtype H1 NOT DETECTED        Influenza A, subtype H3 NOT DETECTED        INFLUENZA A H1N1 PCR NOT DETECTED        Influenza B NOT DETECTED        Parainfluenza 1 NOT DETECTED        Parainfluenza 2 NOT DETECTED        Parainfluenza 3 NOT DETECTED        Parainfluenza virus 4 NOT DETECTED        RSV by PCR NOT DETECTED        Bordetella pertussis - PCR NOT DETECTED        Chlamydophila pneumoniae DNA, QL, PCR NOT DETECTED        Mycoplasma pneumoniae DNA, QL, PCR NOT DETECTED             I have reviewed notes of prior 24hr. Other pertinent lab:       Total time spent with patient: [address] discussed with: Patient, Nursing Staff and >50% of time spent in counseling and coordination of care    Discussed:  Care Plan    Prophylaxis:  SCD's    Disposition:  Home w/Family           ___________________________________________________    Attending Physician: Zay Lopez MD

## 2018-04-21 NOTE — PROGRESS NOTES
Bedside and Verbal shift change report given to Zechariah Kidd (oncoming nurse) by Hoda Calloway (offgoing nurse). Report included the following information SBAR, Kardex, ED Summary, Intake/Output, MAR, Accordion, Recent Results, Med Rec Status and Cardiac Rhythm NSR.

## 2018-04-21 NOTE — PROGRESS NOTES
Will defer further Renal Care to the [facility] Team (Dr. Heena Bullard , Dr. Joanna Arango, et al ).

## 2018-04-21 NOTE — PROGRESS NOTES
SHIFT CHANGE:  7:56 AM Report received from Rani Bingham RN. SBAR were discussed. SHIFT SUMMARY:  8:45 AM  Patient up to the bathroom with one loose stool. Assisted to the chair. Patient uses a walker with ambulation. END OF SHIFT REPORT:  3:36 PM TRANSFER - OUT REPORT:    Verbal report given to BELLA Castro(name) on Tessie Brass  being transferred to 5th floor(unit) for routine progression of care       Report consisted of patients Situation, Background, Assessment and   Recommendations(SBAR). Information from the following report(s) SBAR, Kardex, Procedure Summary, Intake/Output, MAR, Recent Results, Med Rec Status and Cardiac Rhythm NSR was reviewed with the receiving nurse. Lines:   Peripheral IV 04/18/18 Left Hand (Active)   Site Assessment Clean, dry, & intact 4/21/2018  7:37 AM   Phlebitis Assessment 0 4/21/2018  7:37 AM   Infiltration Assessment 0 4/21/2018  7:37 AM   Dressing Status Clean, dry, & intact 4/21/2018  7:37 AM   Dressing Type Tape;Transparent 4/21/2018  7:37 AM   Hub Color/Line Status Pink; Infusing 4/21/2018  7:37 AM   Action Taken Open ports on tubing capped 4/21/2018  7:37 AM   Alcohol Cap Used Yes 4/21/2018  7:37 AM       Peripheral IV 04/18/18 Right Antecubital (Active)   Site Assessment Clean, dry, & intact 4/21/2018  7:37 AM   Phlebitis Assessment 0 4/21/2018  7:37 AM   Infiltration Assessment 0 4/21/2018  7:37 AM   Dressing Status Clean, dry, & intact 4/21/2018  7:37 AM   Dressing Type Tape;Transparent 4/21/2018  7:37 AM   Hub Color/Line Status Pink;Capped 4/21/2018  7:37 AM   Action Taken Open ports on tubing capped 4/21/2018  7:37 AM   Alcohol Cap Used Yes 4/21/2018  7:37 AM        Opportunity for questions and clarification was provided.       Patient transported with:   [employer]

## 2018-04-22 ENCOUNTER — HOME HEALTH ADMISSION (OUTPATIENT)
Dept: HOME HEALTH SERVICES | Facility: HOME HEALTH | Age: 73
End: 2018-04-22

## 2018-04-22 VITALS
OXYGEN SATURATION: 98 % | WEIGHT: 207.23 LBS | SYSTOLIC BLOOD PRESSURE: 150 MMHG | HEIGHT: 65 IN | DIASTOLIC BLOOD PRESSURE: 81 MMHG | TEMPERATURE: 98.1 F | RESPIRATION RATE: 18 BRPM | BODY MASS INDEX: 34.53 KG/M2 | HEART RATE: 83 BPM

## 2018-04-22 PROBLEM — N39.0 UTI (URINARY TRACT INFECTION): Status: ACTIVE | Noted: 2018-04-22

## 2018-04-22 PROBLEM — E87.1 HYPONATREMIA: Status: RESOLVED | Noted: 2018-04-18 | Resolved: 2018-04-22

## 2018-04-22 PROBLEM — E87.20 LACTIC ACIDOSIS: Status: RESOLVED | Noted: 2018-04-18 | Resolved: 2018-04-22

## 2018-04-22 PROBLEM — A41.9 SEPSIS (HCC): Status: RESOLVED | Noted: 2018-04-18 | Resolved: 2018-04-22

## 2018-04-22 PROBLEM — N17.9 ARF (ACUTE RENAL FAILURE) (HCC): Status: RESOLVED | Noted: 2018-04-18 | Resolved: 2018-04-22

## 2018-04-22 PROBLEM — R73.9 HYPERGLYCEMIA: Status: RESOLVED | Noted: 2018-04-18 | Resolved: 2018-04-22

## 2018-04-22 LAB
BACTERIA SPEC CULT: NORMAL
BASOPHILS # BLD: 0 K/UL (ref 0–0.1)
BASOPHILS NFR BLD: 1 % (ref 0–1)
CC UR VC: NORMAL
DIFFERENTIAL METHOD BLD: ABNORMAL
EOSINOPHIL # BLD: 0.1 K/UL (ref 0–0.4)
EOSINOPHIL NFR BLD: 5 % (ref 0–7)
ERYTHROCYTE [DISTWIDTH] IN BLOOD BY AUTOMATED COUNT: 13.2 % (ref 11.5–14.5)
GLUCOSE BLD STRIP.AUTO-MCNC: 104 MG/DL (ref 65–100)
HCT VFR BLD AUTO: 25.7 % (ref 35–47)
HGB BLD-MCNC: 8.3 G/DL (ref 11.5–16)
IMM GRANULOCYTES # BLD: 0 K/UL (ref 0–0.04)
IMM GRANULOCYTES NFR BLD AUTO: 0 % (ref 0–0.5)
LYMPHOCYTES # BLD: 0.7 K/UL (ref 0.8–3.5)
LYMPHOCYTES NFR BLD: 35 % (ref 12–49)
MCH RBC QN AUTO: 29.4 PG (ref 26–34)
MCHC RBC AUTO-ENTMCNC: 32.3 G/DL (ref 30–36.5)
MCV RBC AUTO: 91.1 FL (ref 80–99)
MONOCYTES # BLD: 0.1 K/UL (ref 0–1)
MONOCYTES NFR BLD: 7 % (ref 5–13)
NEUTS SEG # BLD: 1.2 K/UL (ref 1.8–8)
NEUTS SEG NFR BLD: 52 % (ref 32–75)
NRBC # BLD: 0 K/UL (ref 0–0.01)
NRBC BLD-RTO: 0 PER 100 WBC
PLATELET # BLD AUTO: 104 K/UL (ref 150–400)
PMV BLD AUTO: 11 FL (ref 8.9–12.9)
RBC # BLD AUTO: 2.82 M/UL (ref 3.8–5.2)
RBC MORPH BLD: ABNORMAL
SERVICE CMNT-IMP: ABNORMAL
SERVICE CMNT-IMP: NORMAL
WBC # BLD AUTO: 2.1 K/UL (ref 3.6–11)

## 2018-04-22 PROCEDURE — 74011250637 HC RX REV CODE- 250/637: Performed by: INTERNAL MEDICINE

## 2018-04-22 PROCEDURE — 85025 COMPLETE CBC W/AUTO DIFF WBC: CPT | Performed by: INTERNAL MEDICINE

## 2018-04-22 PROCEDURE — 74011250636 HC RX REV CODE- 250/636: Performed by: NURSE PRACTITIONER

## 2018-04-22 PROCEDURE — 82962 GLUCOSE BLOOD TEST: CPT

## 2018-04-22 PROCEDURE — 36415 COLL VENOUS BLD VENIPUNCTURE: CPT | Performed by: INTERNAL MEDICINE

## 2018-04-22 PROCEDURE — 74011250636 HC RX REV CODE- 250/636: Performed by: INTERNAL MEDICINE

## 2018-04-22 RX ORDER — CYANOCOBALAMIN 1000 UG/ML
1000 INJECTION, SOLUTION INTRAMUSCULAR; SUBCUTANEOUS DAILY
Qty: 1 VIAL | Refills: 0 | Status: SHIPPED | OUTPATIENT
Start: 2018-04-23 | End: 2018-04-26

## 2018-04-22 RX ORDER — CYANOCOBALAMIN 1000 UG/ML
1000 INJECTION, SOLUTION INTRAMUSCULAR; SUBCUTANEOUS
Qty: 1 VIAL | Refills: 0 | Status: SHIPPED
Start: 2018-05-03 | End: 2018-04-22

## 2018-04-22 RX ORDER — LEVOFLOXACIN 500 MG/1
500 TABLET, FILM COATED ORAL DAILY
Qty: 5 TAB | Refills: 0 | Status: SHIPPED | OUTPATIENT
Start: 2018-04-22 | End: 2018-05-04

## 2018-04-22 RX ORDER — CYANOCOBALAMIN 1000 UG/ML
1000 INJECTION, SOLUTION INTRAMUSCULAR; SUBCUTANEOUS
Qty: 1 VIAL | Refills: 0 | Status: SHIPPED | OUTPATIENT
Start: 2018-05-03 | End: 2018-05-25

## 2018-04-22 RX ADMIN — PANTOPRAZOLE SODIUM 40 MG: 40 TABLET, DELAYED RELEASE ORAL at 06:35

## 2018-04-22 RX ADMIN — LEVOFLOXACIN 750 MG: 5 INJECTION, SOLUTION INTRAVENOUS at 09:19

## 2018-04-22 RX ADMIN — SODIUM BICARBONATE 1300 MG: 650 TABLET ORAL at 09:19

## 2018-04-22 RX ADMIN — CYANOCOBALAMIN 1000 MCG: 1000 INJECTION, SOLUTION INTRAMUSCULAR at 09:19

## 2018-04-22 RX ADMIN — Medication 1 CAPSULE: at 09:19

## 2018-04-22 RX ADMIN — LEVOTHYROXINE SODIUM 75 MCG: 75 TABLET ORAL at 06:35

## 2018-04-22 NOTE — ROUTINE PROCESS
Bedside and Verbal shift change report given to BELLA Hamlin (oncoming nurse) by BELLA Lara (offgoing nurse). Report included the following information SBAR, Kardex, ED Summary, Intake/Output, MAR, Accordion, Recent Results and Med Rec Status.

## 2018-04-22 NOTE — DISCHARGE INSTRUCTIONS
ACUTE DIAGNOSES:  Sepsis ([facility]  Sepsis [facility])    CHRONIC MEDICAL DIAGNOSES:  Problem List as of 4/22/2018  Date Reviewed: 4/18/2018          Codes Class Noted - Resolved    UTI (urinary tract infection) ICD-10-CM: N39.0  ICD-9-CM: 599.0  4/22/2018 - Present        HTN (hypertension) ICD-10-CM: I10  ICD-9-CM: 401.9  4/18/2018 - Present        Hypokalemia ICD-10-CM: E87.6  ICD-9-CM: 276.8  4/18/2018 - Present        Anemia ICD-10-CM: D64.9  ICD-9-CM: 285.9  4/18/2018 - Present        Acquired hypothyroidism ICD-10-CM: E03.9  ICD-9-CM: 244.9  4/18/2018 - Present        GERD (gastroesophageal reflux disease) ICD-10-CM: K21.9  ICD-9-CM: 530.81  4/18/2018 - Present        Bladder cancer ([facility] ICD-10-CM: C67.9  ICD-9-CM: 188.9  4/18/2018 - Present        RESOLVED: Sepsis ([facility] ICD-10-CM: A41.9  ICD-9-CM: 038.9, 995.91  4/18/2018 - 4/22/2018        RESOLVED: Hyponatremia ICD-10-CM: E87.1  ICD-9-CM: 276.1  4/18/2018 - 4/22/2018        RESOLVED: ARF (acute renal failure) ([facility] ICD-10-CM: N17.9  ICD-9-CM: 584.9  4/18/2018 - 4/22/2018        RESOLVED: Hyperglycemia ICD-10-CM: R73.9  ICD-9-CM: 790.29  4/18/2018 - 4/22/2018        RESOLVED: Lactic acidosis ICD-10-CM: E57.2  ICD-9-CM: 276.2  4/18/2018 - 4/22/2018              DISCHARGE MEDICATIONS:          · It is important that you take the medication exactly as they are prescribed. · Keep your medication in the bottles provided by the pharmacist and keep a list of the medication names, dosages, and times to be taken in your wallet. · Do not take other medications without consulting your doctor. DIET:  Regular Diet    ACTIVITY: Activity as tolerated    ADDITIONAL INFORMATION: If you experience any of the following symptoms then please call your primary care physician or return to the emergency room if you cannot get hold of your doctor: Fever, chills, nausea, vomiting, diarrhea, change in mentation, falling, bleeding, shortness of breath.     FOLLOW UP CARE:  Dr. Fabián Pike Terrance Monzon MD  you are to call and set up an appointment to see them in 2 weeks. Follow-up with urology in 2 weeks    Follow-up with hematology in 4 weeks       Information obtained by :  I understand that if any problems occur once I am at home I am to contact my physician. I understand and acknowledge receipt of the instructions indicated above.                                                                                                                                            Physician's or R.N.'s Signature                                                                  Date/Time                                                                                                                                              Patient or Representative Signature                                                          Date/Time

## 2018-04-22 NOTE — PROGRESS NOTES
Home Care Face to Face Encounter    Patients Name: Milton White    YOB: 1945    Primary Diagnosis: sepsis    Date of Face to Face:   04/22/18                                  Face to Face Encounter findings are related to primary reason for home care:   yes. 1. I certify that the patient needs intermittent care as follows: skilled nursing care:  skilled observation/assessment, patient education and brice catheter care    2. I certify that this patient is homebound, that is: 1) patient requires the use of a cane device, special transportation, or assistance of another to leave the home; or 2) patient's condition makes leaving the home medically contraindicated; and 3) patient has a normal inability to leave the home and leaving the home requires considerable and taxing effort. Patient may leave the home for infrequent and short duration for medical reasons, and occasional absences for non-medical reasons. Homebound status is due to the following functional limitations: Patient's ambulation limited secondary to severe pain and requires the use of an assistive device and the assistance of a caregiver for safe completion. Patient with strength and ROM deficits limiting ambulation endurance requiring the use of an assistive device and the assistance of a caregiver. Patient deemed temporarily homebound secondary to increased risk for infection when leaving home and going out into the community. 3. I certify that this patient is under my care and that I, or a nurse practitioner or [phone], or clinical nurse specialist, or certified nurse midwife, working with me, had a Face-to-Face Encounter that meets the physician Face-to-Face Encounter requirements.   The following are the clinical findings from the [address] [place] [address] encounter that support the need for skilled services and is a summary of the encounter:      See discharge summary      Inga Klein MD  4/22/2018

## 2018-04-22 NOTE — PROGRESS NOTES
Problem: Falls - Risk of  Goal: *Absence of Falls  Document Lucretia Fall Risk and appropriate interventions in the flowsheet.    Outcome: Progressing Towards Goal  Fall Risk Interventions:  Mobility Interventions: Bed/chair exit alarm, Patient to call before getting OOB, Utilize walker, cane, or other assitive device, Utilize gait belt for transfers/ambulation         Medication Interventions: Bed/chair exit alarm, Patient to call before getting OOB, Teach patient to arise slowly, Utilize gait belt for transfers/ambulation    Elimination Interventions: Bed/chair exit alarm, Call light in reach, Patient to call for help with toileting needs    History of Falls Interventions: Door open when patient unattended, Room close to nurse's station

## 2018-04-22 NOTE — ROUTINE PROCESS
Discharge instructions reviewed with patient, patient received a copy and signed our paper copy that was put into the chart. IVs removed. Patient given prescriptions and wheeled down by volunteer with no additional questions.

## 2018-04-22 NOTE — PROGRESS NOTES
4/22/2018 9:35 AM Called and spoke with  At [facility] [place], notify of referral and pt discharging home today. [address] visit accepted. 4/22/2018 9:20 AM Order for [address] requested from Dr. Sekou Paul for sepsis. Planning for pt to discharge home today, [facility] was sent [address] referral. No further discharge needs identified.  Jason Steinberg, BSW

## 2018-04-22 NOTE — DISCHARGE SUMMARY
Hospitalist Discharge Summary     Patient ID:    Milton White  [id number]  74 y.o.  1945    Admit date: 4/18/2018    Discharge date and time: 4/22/2018    Admission Diagnoses: Sepsis ([facility]  Sepsis ([facility]    Chronic Diagnoses:    Problem List as of 4/22/2018  Date Reviewed: 4/18/2018          Codes Class Noted - Resolved    UTI (urinary tract infection) ICD-10-CM: N39.0  ICD-9-CM: 599.0  4/22/2018 - Present        HTN (hypertension) ICD-10-CM: I10  ICD-9-CM: 401.9  4/18/2018 - Present        Hypokalemia ICD-10-CM: E87.6  ICD-9-CM: 276.8  4/18/2018 - Present        Anemia ICD-10-CM: D64.9  ICD-9-CM: 285.9  4/18/2018 - Present        Acquired hypothyroidism ICD-10-CM: E03.9  ICD-9-CM: 244.9  4/18/2018 - Present        GERD (gastroesophageal reflux disease) ICD-10-CM: K21.9  ICD-9-CM: 530.81  4/18/2018 - Present        Bladder cancer ([facility] ICD-10-CM: C67.9  ICD-9-CM: 188.9  4/18/2018 - Present        RESOLVED: Sepsis ([facility] ICD-10-CM: A41.9  ICD-9-CM: 038.9, 995.91  4/18/2018 - 4/22/2018        RESOLVED: Hyponatremia ICD-10-CM: E87.1  ICD-9-CM: 276.1  4/18/2018 - 4/22/2018        RESOLVED: ARF (acute renal failure) ([facility] ICD-10-CM: N17.9  ICD-9-CM: 584.9  4/18/2018 - 4/22/2018        RESOLVED: Hyperglycemia ICD-10-CM: R73.9  ICD-9-CM: 790.29  4/18/2018 - 4/22/2018        RESOLVED: Lactic acidosis ICD-10-CM: E87.2  ICD-9-CM: 276.2  4/18/2018 - 4/22/2018              Discharge Medications:   Current Discharge Medication List      START taking these medications    Details   L. acidoph & paracasei- S therm- Bifido (ALEXEI-Q/RISAQUAD) 8 billion cell cap cap Take 1 Cap by mouth daily. Qty: 7 Cap, Refills: 0      levoFLOXacin (LEVAQUIN) 500 mg tablet Take 1 Tab by mouth daily. Qty: 5 Tab, Refills: 0         CONTINUE these medications which have NOT CHANGED    Details   levothyroxine (SYNTHROID) 75 mcg tablet Take 75 mcg by mouth Daily (before breakfast).       HYDROcodone-acetaminophen (NORCO) 5-325 mg per tablet Take 1 Tab by mouth every six (6) hours as needed for Pain.      losartan-hydroCHLOROthiazide (HYZAAR) 50-12.5 mg per tablet Take 1 Tab by mouth every evening. omeprazole (PRILOSEC) 20 mg capsule Take 20 mg by mouth daily. STOP taking these medications       trimethoprim-sulfamethoxazole (BACTRIM DS) 160-800 mg per tablet Comments:   Reason for Stopping: Follow up Care:    1. Bart Hurley MD in 1-2 weeks  2. Diet:  Cardiac Diet    Disposition:  Home. Advanced Directive:    Discharge Exam:  See today's note. CONSULTATIONS: ID    Significant Diagnostic Studies:   Recent Labs      04/22/18   0345  04/21/18   0407   WBC  2.1*  1.8*   HGB  8.3*  7.7*   HCT  25.7*  24.4*   PLT  104*  96*     Recent Labs      04/21/18   0407  04/20/18   0242   NA  141  138   K  3.5  3.8   CL  109*  108   CO2  21  23   BUN  14  18   CREA  0.99  1.15*   GLU  109*  93   CA  8.5  8.3*     No results for input(s): SGOT, GPT, ALT, AP, TBIL, TBILI, TP, ALB, GLOB, GGT, AML, LPSE in the last 72 hours. No lab exists for component: AMYP, HLPSE  No results for input(s): INR, PTP, APTT in the last 72 hours. No lab exists for component: INREXT   Recent Labs      04/20/18   0242   TIBC  180*   PSAT  13*   FERR  198      No results for input(s): PH, PCO2, PO2 in the last 72 hours. No results for input(s): CPK, CKMB in the last 72 hours. No lab exists for component: TROPONINI  Lab Results   Component Value Date/Time    Glucose (POC) 118 (H) 04/21/2018 09:39 PM    Glucose (POC) 129 (H) 01/03/2012 10:01 AM             HOSPITAL COURSE & TREATMENT RENDERED:   1. Sepsis ([address] (4/18/2018). Likely sources is UTI. BC is negative so far. Continue ABx. ID and urology consult appreciated      2.  Complicated UTI. S/p suprapubic catheter. UC from urology office showed gram negative organism which is multi drug resistant. On levaquin per UC. ID evaluation appreciated. UC here is contaminated.  Will continue levaquin for 5 more days      3.   HTN (hypertension) (4/18/2018). Hold home BP meds due to sepsis.       4.  Dehydration/ Hyponatremia (4/18/2018), mild. likely secondary to vomiting and poor PO intake. Better.       5.  ARF (acute renal failure) ([facility] (4/18/2018). Secondary to volume depletion due to vomiting. Resolved. Nephrology evaluation appreciated      6.  Hyperglycemia (4/18/2018). No Hx of DM.        7.  Lactic acidosis (4/18/2018). Likely secondary to dehydration/ sepsis. Resolved. Continue ABx.       8.  Acquired hypothyroidism (4/18/2018). Continue synthroid. TSH is normal.        9.  GERD (gastroesophageal reflux disease) (4/18/2018). continue PPI      10.   Bladder cancer ([facility] (4/18/2018). S/p suprapubic catheter. urology consult      11. Pancytopenia. This is chronic back to 2009. Aprecaited hematology evaluation. Needs outpatinet FU.       12. Vit B12 deficiency. Getting B12.      13. Diarrhea. Not febrile. Start probiotics.  Resolved     Discharged in improved condition           Signed:  Lydia Coker MD  4/22/2018  8:33 AM

## 2018-04-22 NOTE — PROGRESS NOTES
[facility]  [address] [facility], [address] [place] [address]  [phone]      Medical Progress Note      NAME: Sesar Hill   :  1945  MRM:  [id number]    Date/Time: 2018  7:11 AM       Assessment and Plan:   1. Sepsis ([address] (2018). Likely sources is UTI. BC is negative so far. Continue ABx. ID and urology consult appreciated     2. Complicated UTI. S/p suprapubic catheter. UC from urology office showed gram negative organism which is multi drug resistant. On levaquin per UC. ID evaluation appreciated. UC here is contaminated.      3. HTN (hypertension) (2018). Hold home BP meds due to sepsis.      4.  Dehydration/ Hyponatremia (2018), mild. likely secondary to vomiting and poor PO intake. Better.      5. ARF (acute renal failure) ([address] (2018). Secondary to volume depletion due to vomiting. Resolved. Nephrology evaluation appreciated     6. Hyperglycemia (2018). No Hx of DM.       7.  Lactic acidosis (2018). Likely secondary to dehydration/ sepsis. Resolved. Continue ABx.      8. Acquired hypothyroidism (2018). Continue synthroid. TSH is normal.       9. GERD (gastroesophageal reflux disease) (2018). continue PPI     10. Bladder cancer ([address] (2018). S/p suprapubic catheter. urology consult     11. Pancytopenia. This is chronic back to 2009. Aprecaited hematology evaluation. Needs outpatinet FU. 12.  Vit B12 deficiency. Getting B12.     13.  Diarrhea. Not febrile. Start probiotics. Resolved           Subjective:     Chief Complaint:  Follow up of pt who was admitted with sepsis due to UTI. Diarrhea     ROS:  (bold if positive, if negative)    Fever/chills  Tolerating PT  Tolerating Diet        Objective:     Last 24hrs VS reviewed since prior progress note.  Most recent are:    Visit Vitals    /77 (BP 1 Location: Right arm, BP Patient Position: At rest)    Pulse 79    Temp 98 °F (36.7 °C)    Resp 20    Ht 5' 5\" (1.651 m)    Wt 94 kg (207 lb 3.7 oz)    SpO2 97%    BMI 34.49 kg/m2     SpO2 Readings from Last 6 Encounters:   04/22/18 97%   08/17/17 100%   03/26/17 97%   03/17/13 98%   03/16/13 97%   01/03/12 97%            Intake/Output Summary (Last 24 hours) at 04/22/18 0824  Last data filed at 04/21/18 0915   Gross per 24 hour   Intake              240 ml   Output              525 ml   Net             -285 ml        Physical Exam:    Gen:  Well-developed, well-nourished, in no acute distress  HEENT:  Pink conjunctivae, PERRL, hearing intact to voice, moist mucous membranes  Neck:  Supple, without masses, thyroid non-tender  Resp:  No accessory muscle use, clear breath sounds without wheezes rales or rhonchi  Card:  No murmurs, normal S1, S2 without thrills, bruits or peripheral edema  Abd:  Soft, non-tender, non-distended, normoactive bowel sounds are present, no palpable organomegaly and no detectable hernias. Supra pubic catheter in place.    Lymph:  No cervical or inguinal adenopathy  Musc:  No cyanosis or clubbing  Skin:  No rashes or ulcers, skin turgor is good  Neuro:  Cranial nerves are grossly intact, no focal motor weakness, follows commands appropriately  Psych:  Good insight, oriented to person, place and time, alert  __________________________________________________________________  Medications Reviewed: (see below)  Medications:     Current Facility-Administered Medications   Medication Dose Route Frequency    lactobac ac& pc-s.therm-b.anim (ALEXEI Q/RISAQUAD)  1 Cap Oral DAILY    levoFLOXacin (LEVAQUIN) 750 mg in D5W IVPB  750 mg IntraVENous Q24H    cyanocobalamin (VITAMIN B12) injection 1,000 mcg  1,000 mcg IntraMUSCular DAILY    [START ON 6/21/2018] cyanocobalamin (VITAMIN B12) injection 1,000 mcg  1,000 mcg IntraMUSCular Q30D    [START ON 5/3/2018] cyanocobalamin (VITAMIN B12) injection 1,000 mcg  1,000 mcg IntraMUSCular Q7D    ondansetron (ZOFRAN) injection 4 mg  4 mg IntraVENous Q4H PRN    sodium bicarbonate tablet 1,300 mg  1,300 mg Oral BID    senna-docusate (PERICOLACE) 8.6-50 mg per tablet 1 Tab  1 Tab Oral QHS    polyethylene glycol (MIRALAX) packet 17 g  17 g Oral DAILY PRN    HYDROcodone-acetaminophen (NORCO) 5-325 mg per tablet 1 Tab  1 Tab Oral Q6H PRN    levothyroxine (SYNTHROID) tablet 75 mcg  75 mcg Oral ACB    sodium chloride (NS) flush 5-10 mL  5-10 mL IntraVENous Q8H    sodium chloride (NS) flush 5-10 mL  5-10 mL IntraVENous PRN    pantoprazole (PROTONIX) tablet 40 mg  40 mg Oral ACB    acetaminophen (TYLENOL) tablet 650 mg  650 mg Oral Q6H PRN    morphine 2 mg  2 mg IntraVENous Q4H PRN        Lab Data Reviewed: (see below)  Lab Review:     Recent Labs      04/22/18   0345  04/21/18   0407  04/20/18   0242   WBC  2.1*  1.8*  1.8*   HGB  8.3*  7.7*  7.7*   HCT  25.7*  24.4*  24.4*   PLT  104*  96*  89*     Recent Labs      04/21/18   0407  04/20/18   0242   NA  141  138   K  3.5  3.8   CL  109*  108   CO2  21  23   GLU  109*  93   BUN  14  18   CREA  0.99  1.15*   CA  8.5  8.3*     Lab Results   Component Value Date/Time    Glucose (POC) 118 (H) 04/21/2018 09:39 PM    Glucose (POC) 129 (H) 01/03/2012 10:01 AM     No results for input(s): PH, PCO2, PO2, HCO3, FIO2 in the last 72 hours. No results for input(s): INR in the last 72 hours.     No lab exists for component: Bryna Canavan  All Micro Results     Procedure Component Value Units Date/Time    CULTURE, BLOOD, PAIRED [[id number]] Collected:  04/18/18 6961    Order Status:  Completed Specimen:  Blood Updated:  04/22/18 0766     Special Requests: NO SPECIAL REQUESTS        Culture result: NO GROWTH 4 DAYS       CULTURE, URINE [[id number]] Collected:  04/21/18 1042    Order Status:  Completed Updated:  04/21/18 1541    CULTURE, URINE [[id number]]  (Abnormal) Collected:  04/18/18 0658    Order Status:  Completed Specimen:  Urine from Clean catch Updated:  04/20/18 1348     Special Requests: NO SPECIAL REQUESTS        Culture result:         >2 ORGANISMS - CONTAMINATED SPECIMEN. SUGGEST RECOLLECTION      INCLUDING AT LEAST 3  GRAM NEGATIVE RODS AND GRAM POSITIVE COCCI (A)    RESPIRATORY PANEL,PCR,NASOPHARYNGEAL [[id number]] Collected:  04/18/18 0806    Order Status:  Completed Specimen:  Nasopharyngeal Updated:  04/18/18 1247     Adenovirus NOT DETECTED        Coronavirus 229E NOT DETECTED        Coronavirus HKU1 NOT DETECTED        Coronavirus CVNL63 NOT DETECTED        Coronavirus OC43 NOT DETECTED        Metapneumovirus NOT DETECTED        Rhinovirus and Enterovirus NOT DETECTED        Influenza A NOT DETECTED        Influenza A, subtype H1 NOT DETECTED        Influenza A, subtype H3 NOT DETECTED        INFLUENZA A H1N1 PCR NOT DETECTED        Influenza B NOT DETECTED        Parainfluenza 1 NOT DETECTED        Parainfluenza 2 NOT DETECTED        Parainfluenza 3 NOT DETECTED        Parainfluenza virus 4 NOT DETECTED        RSV by PCR NOT DETECTED        Bordetella pertussis - PCR NOT DETECTED        Chlamydophila pneumoniae DNA, QL, PCR NOT DETECTED        Mycoplasma pneumoniae DNA, QL, PCR NOT DETECTED             I have reviewed notes of prior 24hr. Other pertinent lab:       Total time spent with patient: [address] discussed with: Patient, Nursing Staff and >50% of time spent in counseling and coordination of care    Discussed:  Care Plan    Prophylaxis:  SCD's    Disposition:  Home w/Family           ___________________________________________________    Attending Physician: Anny Mari MD

## 2018-04-23 LAB
BACTERIA SPEC CULT: NORMAL
SERVICE CMNT-IMP: NORMAL

## 2018-04-24 ENCOUNTER — HOME CARE VISIT (OUTPATIENT)
Dept: SCHEDULING | Facility: HOME HEALTH | Age: 73
End: 2018-04-24

## 2018-04-24 PROCEDURE — G0299 HHS/HOSPICE OF RN EA 15 MIN: HCPCS

## 2018-05-04 ENCOUNTER — HOSPITAL ENCOUNTER (OUTPATIENT)
Dept: INFUSION THERAPY | Age: 73
Discharge: HOME | End: 2018-05-04
Payer: MEDICARE

## 2018-05-04 ENCOUNTER — OFFICE VISIT (OUTPATIENT)
Dept: ONCOLOGY | Age: 73
End: 2018-05-04

## 2018-05-04 VITALS
RESPIRATION RATE: 18 BRPM | HEART RATE: 99 BPM | SYSTOLIC BLOOD PRESSURE: 100 MMHG | OXYGEN SATURATION: 99 % | TEMPERATURE: 97.6 F | DIASTOLIC BLOOD PRESSURE: 44 MMHG

## 2018-05-04 VITALS
HEART RATE: 99 BPM | DIASTOLIC BLOOD PRESSURE: 44 MMHG | TEMPERATURE: 97.6 F | SYSTOLIC BLOOD PRESSURE: 100 MMHG | BODY MASS INDEX: 33.02 KG/M2 | WEIGHT: 198.2 LBS | RESPIRATION RATE: 18 BRPM | OXYGEN SATURATION: 99 % | HEIGHT: 65 IN

## 2018-05-04 DIAGNOSIS — D51.8 VITAMIN B12 DEFICIENCY (DIETARY) ANEMIA: ICD-10-CM

## 2018-05-04 DIAGNOSIS — D61.818 PANCYTOPENIA (HCC): Primary | ICD-10-CM

## 2018-05-04 DIAGNOSIS — Z96.642 STATUS POST LEFT HIP REPLACEMENT: ICD-10-CM

## 2018-05-04 LAB
BASOPHILS # BLD: 0 K/UL (ref 0–0.1)
BASOPHILS NFR BLD: 0 % (ref 0–1)
DIFFERENTIAL METHOD BLD: ABNORMAL
EOSINOPHIL # BLD: 0 K/UL (ref 0–0.4)
EOSINOPHIL NFR BLD: 1 % (ref 0–7)
ERYTHROCYTE [DISTWIDTH] IN BLOOD BY AUTOMATED COUNT: 13.7 % (ref 11.5–14.5)
HCT VFR BLD AUTO: 31.2 % (ref 35–47)
HGB BLD-MCNC: 10 G/DL (ref 11.5–16)
IMM GRANULOCYTES # BLD: 0 K/UL (ref 0–0.04)
IMM GRANULOCYTES NFR BLD AUTO: 0 % (ref 0–0.5)
LYMPHOCYTES # BLD: 0.9 K/UL (ref 0.8–3.5)
LYMPHOCYTES NFR BLD: 18 % (ref 12–49)
MCH RBC QN AUTO: 29 PG (ref 26–34)
MCHC RBC AUTO-ENTMCNC: 32.1 G/DL (ref 30–36.5)
MCV RBC AUTO: 90.4 FL (ref 80–99)
MONOCYTES # BLD: 0.4 K/UL (ref 0–1)
MONOCYTES NFR BLD: 8 % (ref 5–13)
NEUTS SEG # BLD: 3.6 K/UL (ref 1.8–8)
NEUTS SEG NFR BLD: 73 % (ref 32–75)
NRBC # BLD: 0 K/UL (ref 0–0.01)
NRBC BLD-RTO: 0 PER 100 WBC
PLATELET # BLD AUTO: 151 K/UL (ref 150–400)
PMV BLD AUTO: 10.7 FL (ref 8.9–12.9)
RBC # BLD AUTO: 3.45 M/UL (ref 3.8–5.2)
WBC # BLD AUTO: 5 K/UL (ref 3.6–11)

## 2018-05-04 PROCEDURE — 36415 COLL VENOUS BLD VENIPUNCTURE: CPT | Performed by: INTERNAL MEDICINE

## 2018-05-04 PROCEDURE — 85025 COMPLETE CBC W/AUTO DIFF WBC: CPT | Performed by: INTERNAL MEDICINE

## 2018-05-04 NOTE — PROGRESS NOTES
Your WBC and PLT counts have improved to the normal range, and your hemoglobin has improved from 8.3 to 10, which is great. I will see you again in 3 months.

## 2018-05-04 NOTE — PROGRESS NOTES
[address]  Medical Oncology at [facility]  [phone]    Hematology / Oncology Progress Note      History of Present Illness:   Ms. Michaelle Lockett is a 75 y/o female with HTN, remote h/o bladder cancer who comes in for follow up of pancytopenia. She was recently hospitalized after p/w nausea and vomiting since taking Bactrim for UTI as well as fever 102, found to be pancytopenic. She has a h/o of bladder cancer (1999) and underwent radical cystectomy with ileal conduit. She was evaluated in the urology office for UTI and was started on macrobid, later switched to Bactrim. This medication seemed to cause n/v. Upon questioning, Ms. Michaelle Lockett denies any previous h/o anemia except when pregnant - at which time she used to take iron. She notes occasional blood in her urine but otherwise denies any other signs of bleeding. She was switched to Levaquin, tolerated this well with eventual resolution of fevers. Workup of pancytopenia revealed low VitB12 level, and patient was started on VitB12 injections. She completed daily injections x 7 (in and out of the hospital), and she is currently on weekly injections. She states she is having significant pain in her left hip. She recently underwent left hip replacement in March 2018, and she is following closely with orthopedics.  She does state that her energy level has improved after discharge from the hospital.    Past Medical History:   Diagnosis Date    Arthritis     Cancer [facility])     bladder    Endocrine disease     hyperthyroid    Gastrointestinal disorder     Hypertension     Other ill-defined conditions(199.89)     hyperthyroid      Past Surgical History:   Procedure Laterality Date    HX GYN      HX UROLOGICAL      stoma since 78      Social History   Substance Use Topics    Smoking status: Former Smoker     Quit date: 7/3/1999    Smokeless tobacco: Never Used    Alcohol use No      Family History   Problem Relation Age of Onset    Heart Disease Father Current Outpatient Prescriptions   Medication Sig    L. acidoph & paracasei- S therm- Bifido (ALEXEI-Q/RISAQUAD) 8 billion cell cap cap Take 1 Cap by mouth daily.  cyanocobalamin (VITAMIN B12) 1,000 mcg/mL injection 1 mL by IntraMUSCular route every seven (7) days for 4 doses.  levothyroxine (SYNTHROID) 75 mcg tablet Take 75 mcg by mouth Daily (before breakfast).  losartan-hydroCHLOROthiazide (HYZAAR) 50-12.5 mg per tablet Take 1 Tab by mouth every evening.  omeprazole (PRILOSEC) 20 mg capsule Take 20 mg by mouth daily.  HYDROcodone-acetaminophen (NORCO) 5-325 mg per tablet Take 1 Tab by mouth every six (6) hours as needed for Pain. No current facility-administered medications for this visit. Allergies   Allergen Reactions    Bactrim [Sulfamethoprim] Nausea and Vomiting    Ciprofloxacin Other (comments)     Joint aches    Diclofenac Nausea Only    Sulfa (Sulfonamide Antibiotics) Nausea Only        Review of Systems: A complete review of systems was obtained, negative except as described above. Physical Exam:     Visit Vitals    /44    Pulse 99    Temp 97.6 °F (36.4 °C) (Oral)    Resp 18    Ht 5' 5\" (1.651 m)    Wt 198 lb 3.2 oz (89.9 kg)    SpO2 99%    BMI 32.98 kg/m2     ECOG PS: 2  General: Well developed, no acute distress, sitting in wheelchair, appears uncomfortable - 2/2 left hip pain  Eyes: PERRLA, EOMI, anicteric sclerae  HENT: Atraumatic, OP clear, TMs intact without erythema  Neck: Supple  Lymphatic: No cervical, supraclavicular, axillary or inguinal adenopathy  Respiratory: CTAB, normal respiratory effort  CV: Normal rate, regular rhythm, no murmurs, no peripheral edema  GI: Soft, nontender, nondistended, no masses, no hepatomegaly, no splenomegaly  MS: Digits without clubbing or cyanosis. Skin: No rashes, ecchymoses, or petechiae. Normal temperature, turgor, and texture. Neuro/Psych: Alert, oriented. 5/5 strength in all 4 extremities.  Appropriate affect, normal judgment/insight. Results:     Lab Results   Component Value Date/Time    WBC 5.0 2018 02:16 PM    HGB 10.0 (L) 2018 02:16 PM    HCT 31.2 (L) 2018 02:16 PM    PLATELET 542 9408 02:16 PM    MCV 90.4 2018 02:16 PM    ABS. NEUTROPHILS 3.6 2018 02:16 PM    Hemoglobin (POC) 11.9 2012 10:01 AM    Hematocrit (POC) 35 2012 10:01 AM     Lab Results   Component Value Date/Time    Sodium 141 2018 04:07 AM    Potassium 3.5 2018 04:07 AM    Chloride 109 (H) 2018 04:07 AM    CO2 21 2018 04:07 AM    Glucose 109 (H) 2018 04:07 AM    BUN 14 2018 04:07 AM    Creatinine 0.99 2018 04:07 AM    GFR est AA >60 2018 04:07 AM    GFR est non-AA 55 (L) 2018 04:07 AM    Calcium 8.5 2018 04:07 AM    Sodium (POC) 143 2012 10:01 AM    Potassium (POC) 4.1 2012 10:01 AM    Chloride (POC) 105 2012 10:01 AM    Glucose (POC) 104 (H) 2018 11:18 AM    BUN (POC) 11 2012 10:01 AM    Creatinine (POC) 1.0 2012 10:01 AM    Calcium, ionized (POC) 1.14 2012 10:01 AM     Lab Results   Component Value Date/Time    Bilirubin, total 0.8 2018 06:20 AM    ALT (SGPT) 23 2018 06:20 AM    AST (SGOT) 27 2018 06:20 AM    Alk. phosphatase 72 2018 06:20 AM    Protein, total 8.0 2018 06:20 AM    Albumin 3.5 2018 06:20 AM    Globulin 4.5 (H) 2018 06:20 AM     Lab Results   Component Value Date/Time    Iron 24 (L) 2018 02:42 AM    TIBC 180 (L) 2018 02:42 AM    Iron % saturation 13 (L) 2018 02:42 AM    Ferritin 198 2018 02:42 AM           Imagin2018 XR CHEST  IMPRESSION: No acute abnormality     2017 abd/pelvis CT:      FINDINGS:   Liver is nodular compatible with cirrhosis. The spleen is normal in size. The  gallbladder is not distended. The a pancreas does appear unremarkable. Ostomy  seen in the right lower quadrant.  There is a small supraumbilical abdominal wall  hernia containing nondistended colon. There is no bowel wall thickening or  obstruction. Prior cystectomy. There is no free air or free fluid. No definite  adenopathy in the abdomen or pelvis. Large left renal cyst unchanged. There is  no intrarenal calcification or obstruction. IMPRESSION: No acute findings. Prior surgery.     Assessment and Recommendations:   75 y/o female with hx of HTN, hypothyroidism, GERD, bladder cancer s/p urostomy,  L hip replacement (3/16) and recent dx of UTI admitted with nausea, vomiting and fever.     1. Pancytopenia: Likely 2/2 Vitamin B12 deficiency and exacerbated by recent infection/sepsis. Review of prior counts show fluctuation and lows in the past as well. Lab workup in hospital revealed Vitamin B12 def and anemia of chronic disease. Retic count showed an inappropriate response which can be related to Vit B12 def. TSH normal. The drop in PLT count from 150 to 74 could be from recent medications or from the infection. She also has possible cirrhosis based on CT scan in 8/2017, but no risk factors for cirrhosis (no diabetes or alcohol use). -- Continue Vit B12 injections for repletion, currently on weekly injections. (Daily for 7 days, weekly for 4 weeks, then monthly). -- Repeated CBC today. Since counts are improving, I recommend returning in 3 months for repeat CBC.      2. UTI / Sepsis: h/o of recurrent UTIs. Completed Levaquin     3. H/o LEATHA: Secondary to dehydration from vomiting. Improved with IVF hydration.     4. Hx of bladder cancer (1999): S/p ileal conduit/ urostomy; Follows with Dr Mamie Cuevas as out patient  -- Urology consulted and following     5. S/p Left hip replacement: With significant left hip pain currently. She is following closely with orthopedics and sees them again next week. -- f/u with orthopedics. I appreciate the opportunity to participate in Ms. Yudi Gonzalez's care.     Signed By: Sari Ovalles MD     May 4, 2018

## 2018-05-04 NOTE — PROGRESS NOTES
Patient arrived at 26 as a add on for routine lab work. Patient was alert, feeling well, and had no concerns for phlebotomist. Patient was drawn peripherally from [facility] with no complications. Used vitals from MD office.

## 2018-06-18 ENCOUNTER — HOSPITAL ENCOUNTER (OUTPATIENT)
Dept: NUCLEAR MEDICINE | Age: 73
Discharge: HOME | End: 2018-06-18
Attending: UROLOGY
Payer: MEDICARE

## 2018-06-18 DIAGNOSIS — N13.30 HYDRONEPHROSIS: ICD-10-CM

## 2018-06-18 PROCEDURE — 78708 K FLOW/FUNCT IMAGE W/DRUG: CPT

## 2018-06-18 PROCEDURE — 74011250636 HC RX REV CODE- 250/636: Performed by: UROLOGY

## 2018-06-18 RX ORDER — FUROSEMIDE 10 MG/ML
20 INJECTION INTRAMUSCULAR; INTRAVENOUS ONCE
Status: COMPLETED | OUTPATIENT
Start: 2018-06-18 | End: 2018-06-18

## 2018-06-18 RX ADMIN — FUROSEMIDE 20 MG: 10 INJECTION, SOLUTION INTRAVENOUS at 09:00

## 2018-08-01 ENCOUNTER — HOSPITAL ENCOUNTER (OUTPATIENT)
Dept: LAB | Age: 73
Discharge: HOME | End: 2018-08-01
Payer: MEDICARE

## 2018-08-01 PROCEDURE — 85025 COMPLETE CBC W/AUTO DIFF WBC: CPT

## 2018-08-01 PROCEDURE — 36415 COLL VENOUS BLD VENIPUNCTURE: CPT

## 2018-08-02 LAB
BASOPHILS # BLD AUTO: 0 X10E3/UL (ref 0–0.2)
BASOPHILS NFR BLD AUTO: 1 %
EOSINOPHIL # BLD AUTO: 0 X10E3/UL (ref 0–0.4)
EOSINOPHIL NFR BLD AUTO: 1 %
ERYTHROCYTE [DISTWIDTH] IN BLOOD BY AUTOMATED COUNT: 15.6 % (ref 12.3–15.4)
HCT VFR BLD AUTO: 30.3 % (ref 34–46.6)
HGB BLD-MCNC: 9.9 G/DL (ref 11.1–15.9)
IMM GRANULOCYTES # BLD: 0 X10E3/UL (ref 0–0.1)
IMM GRANULOCYTES NFR BLD: 0 %
LYMPHOCYTES # BLD AUTO: 0.8 X10E3/UL (ref 0.7–3.1)
LYMPHOCYTES NFR BLD AUTO: 27 %
MCH RBC QN AUTO: 28.6 PG (ref 26.6–33)
MCHC RBC AUTO-ENTMCNC: 32.7 G/DL (ref 31.5–35.7)
MCV RBC AUTO: 88 FL (ref 79–97)
MONOCYTES # BLD AUTO: 0.2 X10E3/UL (ref 0.1–0.9)
MONOCYTES NFR BLD AUTO: 8 %
NEUTROPHILS # BLD AUTO: 1.9 X10E3/UL (ref 1.4–7)
NEUTROPHILS NFR BLD AUTO: 63 %
PLATELET # BLD AUTO: 132 X10E3/UL (ref 150–379)
RBC # BLD AUTO: 3.46 X10E6/UL (ref 3.77–5.28)
WBC # BLD AUTO: 3.1 X10E3/UL (ref 3.4–10.8)

## 2018-08-03 ENCOUNTER — OFFICE VISIT (OUTPATIENT)
Dept: ONCOLOGY | Age: 73
End: 2018-08-03

## 2018-08-03 VITALS
TEMPERATURE: 97.6 F | HEART RATE: 91 BPM | SYSTOLIC BLOOD PRESSURE: 135 MMHG | BODY MASS INDEX: 32.82 KG/M2 | RESPIRATION RATE: 16 BRPM | HEIGHT: 65 IN | DIASTOLIC BLOOD PRESSURE: 60 MMHG | WEIGHT: 197 LBS | OXYGEN SATURATION: 98 %

## 2018-08-03 DIAGNOSIS — Z96.642 STATUS POST LEFT HIP REPLACEMENT: ICD-10-CM

## 2018-08-03 DIAGNOSIS — D51.8 VITAMIN B12 DEFICIENCY (DIETARY) ANEMIA: ICD-10-CM

## 2018-08-03 DIAGNOSIS — D61.818 PANCYTOPENIA (HCC): Primary | ICD-10-CM

## 2018-08-03 NOTE — PROGRESS NOTES
Chris Cage is a 74 y.o. female  No chief complaint on file. Visit Vitals    /60    Pulse 91    Temp 97.6 °F (36.4 °C) (Oral)    Resp 16    Ht 5' 5\" (1.651 m)    Wt 197 lb (89.4 kg)    SpO2 98%    BMI 32.78 kg/m2     1. Have you been to the ER, urgent care clinic since your last visit? Hospitalized since your last visit?no    2. Have you seen or consulted any other health care providers outside of the [address] [place] [address] since your last visit? Include any pap smears or colon screening.  No      Minerva Adame LPN

## 2018-08-03 NOTE — PROGRESS NOTES
[address]  Medical Oncology at [address] [place] [address]  [phone]    Hematology / Oncology Progress Note      History of Present Illness:   Ms. Olaf Barton is a 73 y/o female with HTN, remote h/o bladder cancer who comes in for follow up of pancytopenia. She was recently hospitalized after p/w nausea and vomiting since taking Bactrim for UTI as well as fever 102, found to be pancytopenic. She has a h/o of bladder cancer (1999) and underwent radical cystectomy with ileal conduit. She was evaluated in the urology office for UTI and was started on macrobid, later switched to Bactrim. This medication seemed to cause n/v. Upon questioning, Ms. Olaf Barton denies any previous h/o anemia except when pregnant - at which time she used to take iron. She notes occasional blood in her urine but otherwise denies any other signs of bleeding. She was switched to Levaquin, tolerated this well with eventual resolution of fevers. Workup of pancytopenia revealed low VitB12 level, and patient was started on VitB12 injections. She completed daily injections x 7 (in and out of the hospital), and she is currently on weekly injections. She states she is having significant pain in her left hip. She recently underwent left hip replacement in March 2018, and she is following closely with orthopedics. She does state that her energy level has improved after discharge from the hospital.    Patient states she was told she doesn't need the B12 injections and stopped taking the injections approx 1 month ago. She continues to feel fatigued.  She states she is seeing Urology as well for recurrent UTI, but never heard back about recent urine test.     Past Medical History:   Diagnosis Date    Arthritis     Cancer ([facility] [address]     bladder    Endocrine disease     hyperthyroid    Gastrointestinal disorder     GERD (gastroesophageal reflux disease)     Hypertension     Other ill-defined conditions(259.32)     hyperthyroid      Past Surgical History: Procedure Laterality Date    HX GYN      HX HIP REPLACEMENT Left     HX UROLOGICAL      stoma since 78      Social History   Substance Use Topics    Smoking status: Former Smoker     Packs/day: 1.00     Quit date: 7/3/1999    Smokeless tobacco: Never Used    Alcohol use No      Family History   Problem Relation Age of Onset    Heart Disease Father     Cancer Father      Lung     Current Outpatient Prescriptions   Medication Sig    L. acidoph & paracasei- S therm- Bifido (ALEXEI-Q/RISAQUAD) 8 billion cell cap cap Take 1 Cap by mouth daily.  levothyroxine (SYNTHROID) 75 mcg tablet Take 75 mcg by mouth Daily (before breakfast).  losartan-hydroCHLOROthiazide (HYZAAR) 50-12.5 mg per tablet Take 1 Tab by mouth every evening.  omeprazole (PRILOSEC) 20 mg capsule Take 20 mg by mouth daily.  HYDROcodone-acetaminophen (NORCO) 5-325 mg per tablet Take 1 Tab by mouth every six (6) hours as needed for Pain. No current facility-administered medications for this visit. Allergies   Allergen Reactions    Bactrim [Sulfamethoprim] Nausea and Vomiting    Ciprofloxacin Other (comments)     Joint aches    Diclofenac Nausea Only    Sulfa (Sulfonamide Antibiotics) Nausea Only        Review of Systems: A complete review of systems was obtained, negative except as described above. Physical Exam:     Visit Vitals    /60    Pulse 91    Temp 97.6 °F (36.4 °C) (Oral)    Resp 16    Ht 5' 5\" (1.651 m)    Wt 197 lb (89.4 kg)    SpO2 98%    BMI 32.78 kg/m2     ECOG PS: 2  General: Well developed, no acute distress, walking with a cane.    Eyes: PERRLA, EOMI, anicteric sclerae  HENT: Atraumatic, OP clear, TMs intact without erythema  Neck: Supple  Lymphatic: No cervical, supraclavicular, axillary or inguinal adenopathy  Respiratory: CTAB, normal respiratory effort  CV: Normal rate, regular rhythm, no murmurs, no peripheral edema  GI: Soft, nontender, nondistended, no masses, no hepatomegaly, no splenomegaly  MS: Digits without clubbing or cyanosis. Skin: No rashes, ecchymoses, or petechiae. Normal temperature, turgor, and texture. Neuro/Psych: Alert, oriented. 5/5 strength in all 4 extremities. Appropriate affect, normal judgment/insight. Results:     Lab Results   Component Value Date/Time    WBC 3.1 (L) 08/01/2018 08:54 AM    HGB 9.9 (L) 08/01/2018 08:54 AM    HCT 30.3 (L) 08/01/2018 08:54 AM    PLATELET 524 (L) 81/31/3063 08:54 AM    MCV 88 08/01/2018 08:54 AM    ABS. NEUTROPHILS 1.9 08/01/2018 08:54 AM    Hemoglobin (POC) 11.9 01/03/2012 10:01 AM    Hematocrit (POC) 35 01/03/2012 10:01 AM     Lab Results   Component Value Date/Time    Sodium 141 04/21/2018 04:07 AM    Potassium 3.5 04/21/2018 04:07 AM    Chloride 109 (H) 04/21/2018 04:07 AM    CO2 21 04/21/2018 04:07 AM    Glucose 109 (H) 04/21/2018 04:07 AM    BUN 14 04/21/2018 04:07 AM    Creatinine 0.99 04/21/2018 04:07 AM    GFR est AA >60 04/21/2018 04:07 AM    GFR est non-AA 55 (L) 04/21/2018 04:07 AM    Calcium 8.5 04/21/2018 04:07 AM    Sodium (POC) 143 01/03/2012 10:01 AM    Potassium (POC) 4.1 01/03/2012 10:01 AM    Chloride (POC) 105 01/03/2012 10:01 AM    Glucose (POC) 104 (H) 04/22/2018 11:18 AM    BUN (POC) 11 01/03/2012 10:01 AM    Creatinine (POC) 1.0 01/03/2012 10:01 AM    Calcium, ionized (POC) 1.14 01/03/2012 10:01 AM     Lab Results   Component Value Date/Time    Bilirubin, total 0.8 04/18/2018 06:20 AM    ALT (SGPT) 23 04/18/2018 06:20 AM    AST (SGOT) 27 04/18/2018 06:20 AM    Alk.  phosphatase 72 04/18/2018 06:20 AM    Protein, total 8.0 04/18/2018 06:20 AM    Albumin 3.5 04/18/2018 06:20 AM    Globulin 4.5 (H) 04/18/2018 06:20 AM     Lab Results   Component Value Date/Time    Iron 24 (L) 04/20/2018 02:42 AM    TIBC 180 (L) 04/20/2018 02:42 AM    Iron % saturation 13 (L) 04/20/2018 02:42 AM    Ferritin 198 04/20/2018 02:42 AM     Lab Results   Component Value Date/Time    Vitamin B12 131 (L) 04/20/2018 02:42 AM    Folate 7.0 2018 02:42 AM         Imagin2018 XR CHEST  IMPRESSION: No acute abnormality     2017 abd/pelvis CT:      FINDINGS:   Liver is nodular compatible with cirrhosis. The spleen is normal in size. The  gallbladder is not distended. The a pancreas does appear unremarkable. Ostomy  seen in the right lower quadrant. There is a small supraumbilical abdominal wall  hernia containing nondistended colon. There is no bowel wall thickening or  obstruction. Prior cystectomy. There is no free air or free fluid. No definite  adenopathy in the abdomen or pelvis. Large left renal cyst unchanged. There is  no intrarenal calcification or obstruction. IMPRESSION: No acute findings. Prior surgery.     Assessment and Recommendations:   73 y/o female with hx of HTN, hypothyroidism, GERD, bladder cancer s/p urostomy,  L hip replacement (3/16) and recent dx of UTI admitted with nausea, vomiting and fever.     1. Pancytopenia: Likely 2/2 Vitamin B12 deficiency and exacerbated by recent infection/sepsis. Review of prior counts show fluctuation and lows in the past as well. Lab workup in hospital revealed Vitamin B12 def and anemia of chronic disease. Retic count showed an inappropriate response which can be related to Vit B12 def. TSH normal. The drop in PLT count from 150 to 74 could be from recent medications or from the infection. She also has possible cirrhosis based on CT scan in 2017, but no risk factors for cirrhosis (no diabetes or alcohol use). -- Restart Vit B12 injections for repletion. Advised weekly x 4 weeks, then monthly. I recommend continuing VitB12 injections indefinitely. -- Return in 2 months with repeat CBC as well as CMP, SPEP, VitB12 level. If counts remain low despite repleting B12, will need to evaluate further with bone marrow biopsy.      2. Recurrent UTI: h/o of recurrent UTIs. Completed Levaquin in the past.     3. Fatigue: Likely related to moderate anemia.  Asked that she continue/restart her VitB12 injections.     4. Hx of bladder cancer (1999): S/p ileal conduit/ urostomy; Follows with Dr Roswell Bumpers as out patient  -- Urology consulted and following     5. S/p Left hip replacement: Surgery was in 3/2018. Improvement in left hip pain. She is following closely with orthopedics. I appreciate the opportunity to participate in Ms. Chey Gonzalez's care.     Signed By: Estelita Bashir MD     August 3, 2018

## 2018-08-03 NOTE — PATIENT INSTRUCTIONS
Thrombocytopenia: Care Instructions  Your Care Instructions    Thrombocytopenia is a low number of platelets in the blood. Platelets are the cells that help blood clot. If you don't have enough of them, your blood cannot clot well. So it is harder to stop bleeding. You may have low platelets because your bone marrow does not make them. Or your body's defenses (immune system) may destroy them. Having an enlarged spleen can also reduce the number of platelets in your blood. This is because they can get trapped in the enlarged spleen. Some diseases or medicines may also cause low platelets. But platelets may go back to normal levels if the disease is treated or the medicine is stopped. You may not need treatment if your problem is mild. If you do need treatment, you may have platelets added to your blood. Or you may get medicine to stop the loss of platelets or help your body make them. Follow-up care is a key part of your treatment and safety. Be sure to make and go to all appointments, and call your doctor if you are having problems. It's also a good idea to know your test results and keep a list of the medicines you take. How can you care for yourself at home? · Be safe with medicines. Take your medicines exactly as prescribed. Call your doctor if you think you are having a problem with your medicine. · Do not take aspirin or anti-inflammatory medicines unless your doctor says it is okay. Examples are ibuprofen (Advil, Motrin) and naproxen (Aleve). They may increase the risk of bleeding. · Avoid contact sports or activities that could cause you to fall. When should you call for help? Call 911 anytime you think you may need emergency care. For example, call if:    · You passed out (lost consciousness).     · You have signs of severe bleeding, which includes:  ¨ You have a severe headache that is different from past headaches. ¨ You vomit blood or what looks like coffee grounds.   ¨ Your stools are maroon or very bloody.    Call your doctor now or seek immediate medical care if:    · You are dizzy or lightheaded, or you feel like you may faint.     · You have abnormal bleeding, such as:  ¨ Your stools are black and look like tar, or they have streaks of blood. ¨ You have blood in your urine. ¨ You have joint pain. ¨ You have bruises or blood spots under your skin.    Watch closely for changes in your health, and be sure to contact your doctor if:    · You do not get better as expected. Where can you learn more? Go to http://kris-lópez.info/. Enter A818 in the search box to learn more about \"Thrombocytopenia: Care Instructions. \"  Current as of: October 9, 2017  Content Version: 11.7  © 3222-5995 [employer]. Care instructions adapted under license by [employer] (which disclaims liability or warranty for this information). If you have questions about a medical condition or this instruction, always ask your healthcare professional. [address] any warranty or liability for your use of this information.

## 2018-09-11 ENCOUNTER — HOSPITAL ENCOUNTER (OUTPATIENT)
Dept: LAB | Age: 73
Discharge: HOME | End: 2018-09-11
Payer: MEDICARE

## 2018-09-11 PROCEDURE — 82607 VITAMIN B-12: CPT

## 2018-09-11 PROCEDURE — 36415 COLL VENOUS BLD VENIPUNCTURE: CPT

## 2018-09-11 PROCEDURE — 85025 COMPLETE CBC W/AUTO DIFF WBC: CPT

## 2018-09-11 PROCEDURE — 80053 COMPREHEN METABOLIC PANEL: CPT

## 2018-09-11 PROCEDURE — 84165 PROTEIN E-PHORESIS SERUM: CPT

## 2018-09-13 LAB
ALBUMIN SERPL ELPH-MCNC: 4 G/DL (ref 2.9–4.4)
ALBUMIN SERPL-MCNC: 4.4 G/DL (ref 3.5–4.8)
ALBUMIN/GLOB SERPL: 1.2 {RATIO} (ref 0.7–1.7)
ALBUMIN/GLOB SERPL: 1.4 {RATIO} (ref 1.2–2.2)
ALP SERPL-CCNC: 61 IU/L (ref 39–117)
ALPHA1 GLOB SERPL ELPH-MCNC: 0.2 G/DL (ref 0–0.4)
ALPHA2 GLOB SERPL ELPH-MCNC: 0.6 G/DL (ref 0.4–1)
ALT SERPL-CCNC: 15 IU/L (ref 0–32)
AST SERPL-CCNC: 23 IU/L (ref 0–40)
B-GLOBULIN SERPL ELPH-MCNC: 1.1 G/DL (ref 0.7–1.3)
BASOPHILS # BLD AUTO: 0 X10E3/UL (ref 0–0.2)
BASOPHILS NFR BLD AUTO: 0 %
BILIRUB SERPL-MCNC: 0.4 MG/DL (ref 0–1.2)
BUN SERPL-MCNC: 25 MG/DL (ref 8–27)
BUN/CREAT SERPL: 22 (ref 12–28)
CALCIUM SERPL-MCNC: 10 MG/DL (ref 8.7–10.3)
CHLORIDE SERPL-SCNC: 106 MMOL/L (ref 96–106)
CO2 SERPL-SCNC: 23 MMOL/L (ref 20–29)
CREAT SERPL-MCNC: 1.15 MG/DL (ref 0.57–1)
EOSINOPHIL # BLD AUTO: 0.1 X10E3/UL (ref 0–0.4)
EOSINOPHIL NFR BLD AUTO: 2 %
ERYTHROCYTE [DISTWIDTH] IN BLOOD BY AUTOMATED COUNT: 14.6 % (ref 12.3–15.4)
GAMMA GLOB SERPL ELPH-MCNC: 1.6 G/DL (ref 0.4–1.8)
GLOBULIN SER CALC-MCNC: 3.1 G/DL (ref 1.5–4.5)
GLOBULIN SER-MCNC: 3.5 G/DL (ref 2.2–3.9)
GLUCOSE SERPL-MCNC: 102 MG/DL (ref 65–99)
HCT VFR BLD AUTO: 31.2 % (ref 34–46.6)
HGB BLD-MCNC: 10 G/DL (ref 11.1–15.9)
IGA SERPL-MCNC: 441 MG/DL (ref 64–422)
IGG SERPL-MCNC: 1528 MG/DL (ref 700–1600)
IGM SERPL-MCNC: 43 MG/DL (ref 26–217)
IMM GRANULOCYTES # BLD: 0 X10E3/UL (ref 0–0.1)
IMM GRANULOCYTES NFR BLD: 0 %
INTERPRETATION SERPL IEP-IMP: ABNORMAL
KAPPA LC FREE SER-MCNC: 33.9 MG/L (ref 3.3–19.4)
KAPPA LC FREE/LAMBDA FREE SER: 1.14 {RATIO} (ref 0.26–1.65)
LAMBDA LC FREE SERPL-MCNC: 29.7 MG/L (ref 5.7–26.3)
LYMPHOCYTES # BLD AUTO: 0.8 X10E3/UL (ref 0.7–3.1)
LYMPHOCYTES NFR BLD AUTO: 31 %
M PROTEIN SERPL ELPH-MCNC: ABNORMAL G/DL
MCH RBC QN AUTO: 28.8 PG (ref 26.6–33)
MCHC RBC AUTO-ENTMCNC: 32.1 G/DL (ref 31.5–35.7)
MCV RBC AUTO: 90 FL (ref 79–97)
MONOCYTES # BLD AUTO: 0.1 X10E3/UL (ref 0.1–0.9)
MONOCYTES NFR BLD AUTO: 6 %
NEUTROPHILS # BLD AUTO: 1.6 X10E3/UL (ref 1.4–7)
NEUTROPHILS NFR BLD AUTO: 61 %
PLATELET # BLD AUTO: 114 X10E3/UL (ref 150–379)
PLEASE NOTE:, 149534: ABNORMAL
POTASSIUM SERPL-SCNC: 4.2 MMOL/L (ref 3.5–5.2)
PROT SERPL-MCNC: 7.5 G/DL (ref 6–8.5)
RBC # BLD AUTO: 3.47 X10E6/UL (ref 3.77–5.28)
SODIUM SERPL-SCNC: 141 MMOL/L (ref 134–144)
VIT B12 SERPL-MCNC: 835 PG/ML (ref 232–1245)
WBC # BLD AUTO: 2.6 X10E3/UL (ref 3.4–10.8)

## 2018-09-27 ENCOUNTER — OFFICE VISIT (OUTPATIENT)
Dept: ONCOLOGY | Age: 73
End: 2018-09-27

## 2018-09-27 VITALS
OXYGEN SATURATION: 98 % | SYSTOLIC BLOOD PRESSURE: 125 MMHG | DIASTOLIC BLOOD PRESSURE: 56 MMHG | HEIGHT: 65 IN | HEART RATE: 86 BPM | BODY MASS INDEX: 32.72 KG/M2 | WEIGHT: 196.4 LBS | TEMPERATURE: 96.7 F

## 2018-09-27 DIAGNOSIS — Z85.51 HISTORY OF BLADDER CANCER: ICD-10-CM

## 2018-09-27 DIAGNOSIS — D61.818 PANCYTOPENIA (HCC): Primary | ICD-10-CM

## 2018-09-27 DIAGNOSIS — N39.0 RECURRENT UTI: ICD-10-CM

## 2018-09-27 DIAGNOSIS — E53.8 VITAMIN B12 DEFICIENCY: ICD-10-CM

## 2018-09-27 RX ORDER — SYRINGE, DISPOSABLE, 3 ML
1 SYRINGE, EMPTY DISPOSABLE MISCELLANEOUS
Qty: 12 SYRINGE | Refills: 3 | Status: SHIPPED | OUTPATIENT
Start: 2018-09-27 | End: 2018-10-01 | Stop reason: SDUPTHER

## 2018-09-27 RX ORDER — MELOXICAM 15 MG/1
15 TABLET ORAL
COMMUNITY
Start: 2017-11-05 | End: 2019-09-18 | Stop reason: SINTOL

## 2018-09-27 RX ORDER — CYANOCOBALAMIN 1000 UG/ML
INJECTION, SOLUTION INTRAMUSCULAR; SUBCUTANEOUS
Refills: 3 | COMMUNITY
Start: 2018-07-02 | End: 2018-10-18 | Stop reason: SDUPTHER

## 2018-09-27 RX ORDER — CYANOCOBALAMIN 1000 UG/ML
1000 INJECTION, SOLUTION INTRAMUSCULAR; SUBCUTANEOUS
Qty: 12 VIAL | Refills: 3 | Status: SHIPPED | OUTPATIENT
Start: 2018-09-27 | End: 2019-11-29 | Stop reason: SDUPTHER

## 2018-09-27 NOTE — PROGRESS NOTES
[employer]  Medical Oncology at [address] [place] [address]  [phone]    Hematology / Oncology Progress Note      History of Present Illness:   Ms. Leda Prieto is a 75 y/o female with HTN, remote h/o bladder cancer who comes in for follow up of pancytopenia. She was recently hospitalized after p/w nausea and vomiting since taking Bactrim for UTI as well as fever 102, found to be pancytopenic. She has a h/o of bladder cancer (1999) and underwent radical cystectomy with ileal conduit. She was evaluated in the urology office for UTI and was started on macrobid, later switched to Bactrim. This medication seemed to cause n/v. Upon questioning, Ms. Leda Prieto denies any previous h/o anemia except when pregnant - at which time she used to take iron. She notes occasional blood in her urine but otherwise denies any other signs of bleeding. She was switched to Levaquin, tolerated this well with eventual resolution of fevers. Workup of pancytopenia revealed low VitB12 level, and patient was started on VitB12 injections. She completed daily injections x 7 (in and out of the hospital), and she is currently on weekly injections. She states she is having significant pain in her left hip. She recently underwent left hip replacement in March 2018, and she is following closely with orthopedics. She does state that her energy level has improved after discharge from the hospital.    Patient had stopped the taking the B12 injections 2 months ago, then she restarted based on my instructions. She continues to feel fatigued despite restarting the B12 injections. She follows with Urology for recurrent UTI. She did have a 24 hr virus last month as well.     Past Medical History:   Diagnosis Date    Arthritis     Cancer [facility])     bladder    Endocrine disease     hyperthyroid    Gastrointestinal disorder     GERD (gastroesophageal reflux disease)     Hypertension     Other ill-defined conditions(459.95)     hyperthyroid      Past Surgical History:   Procedure Laterality Date    HX GYN      HX HIP REPLACEMENT Left     HX UROLOGICAL      stoma since 78      Social History   Substance Use Topics    Smoking status: Former Smoker     Packs/day: 1.00     Quit date: 7/3/1999    Smokeless tobacco: Never Used    Alcohol use No      Family History   Problem Relation Age of Onset    Heart Disease Father     Cancer Father      Lung     Current Outpatient Prescriptions   Medication Sig    cyanocobalamin (VITAMIN B12) 1,000 mcg/mL injection INJECT 1 ML ONCE WEEKLY    meloxicam (MOBIC) 15 mg tablet Take 15 mg by mouth.  L. acidoph & paracasei- S therm- Bifido (ALEXEI-Q/RISAQUAD) 8 billion cell cap cap Take 1 Cap by mouth daily.  levothyroxine (SYNTHROID) 75 mcg tablet Take 75 mcg by mouth Daily (before breakfast).  losartan-hydroCHLOROthiazide (HYZAAR) 50-12.5 mg per tablet Take 1 Tab by mouth every evening.  omeprazole (PRILOSEC) 20 mg capsule Take 20 mg by mouth daily.  HYDROcodone-acetaminophen (NORCO) 5-325 mg per tablet Take 1 Tab by mouth every six (6) hours as needed for Pain. No current facility-administered medications for this visit. Allergies   Allergen Reactions    Bactrim [Sulfamethoprim] Nausea and Vomiting    Ciprofloxacin Other (comments)     Joint aches    Diclofenac Nausea Only    Sulfa (Sulfonamide Antibiotics) Nausea Only        Review of Systems: A complete review of systems was obtained, negative except as described above. Physical Exam:     Visit Vitals    /56 (BP 1 Location: Left arm, BP Patient Position: Sitting)    Pulse 86    Temp 96.7 °F (35.9 °C) (Oral)    Ht 5' 5\" (1.651 m)    Wt 196 lb 6.4 oz (89.1 kg)    SpO2 98%    BMI 32.68 kg/m2     ECOG PS: 2  General: Well developed, no acute distress, walking with a cane.    Eyes: PERRLA, EOMI, anicteric sclerae  HENT: Atraumatic, OP clear, TMs intact without erythema  Neck: Supple  Lymphatic: No cervical, supraclavicular, axillary or inguinal adenopathy  Respiratory: CTAB, normal respiratory effort  CV: Normal rate, regular rhythm, no murmurs, no peripheral edema  GI: Soft, nontender, nondistended, no masses, no hepatomegaly, no splenomegaly  MS: Digits without clubbing or cyanosis. Skin: No rashes, ecchymoses, or petechiae. Normal temperature, turgor, and texture. Neuro/Psych: Alert, oriented. 5/5 strength in all 4 extremities. Appropriate affect, normal judgment/insight. Results:     Lab Results   Component Value Date/Time    WBC 2.6 (L) 09/11/2018 09:07 AM    HGB 10.0 (L) 09/11/2018 09:07 AM    HCT 31.2 (L) 09/11/2018 09:07 AM    PLATELET 240 (L) 30/11/2970 09:07 AM    MCV 90 09/11/2018 09:07 AM    ABS. NEUTROPHILS 1.6 09/11/2018 09:07 AM    Hemoglobin (POC) 11.9 01/03/2012 10:01 AM    Hematocrit (POC) 35 01/03/2012 10:01 AM     Lab Results   Component Value Date/Time    Sodium 141 09/11/2018 09:07 AM    Potassium 4.2 09/11/2018 09:07 AM    Chloride 106 09/11/2018 09:07 AM    CO2 23 09/11/2018 09:07 AM    Glucose 102 (H) 09/11/2018 09:07 AM    BUN 25 09/11/2018 09:07 AM    Creatinine 1.15 (H) 09/11/2018 09:07 AM    GFR est AA 54 (L) 09/11/2018 09:07 AM    GFR est non-AA 47 (L) 09/11/2018 09:07 AM    Calcium 10.0 09/11/2018 09:07 AM    Sodium (POC) 143 01/03/2012 10:01 AM    Potassium (POC) 4.1 01/03/2012 10:01 AM    Chloride (POC) 105 01/03/2012 10:01 AM    Glucose (POC) 104 (H) 04/22/2018 11:18 AM    BUN (POC) 11 01/03/2012 10:01 AM    Creatinine (POC) 1.0 01/03/2012 10:01 AM    Calcium, ionized (POC) 1.14 01/03/2012 10:01 AM     Lab Results   Component Value Date/Time    Bilirubin, total 0.4 09/11/2018 09:07 AM    ALT (SGPT) 15 09/11/2018 09:07 AM    AST (SGOT) 23 09/11/2018 09:07 AM    Alk.  phosphatase 61 09/11/2018 09:07 AM    Protein, total 7.5 09/11/2018 09:07 AM    Albumin 4.4 09/11/2018 09:07 AM    Globulin 4.5 (H) 04/18/2018 06:20 AM     Lab Results   Component Value Date/Time    Iron 24 (L) 04/20/2018 02:42 AM    TIBC 180 (L) 2018 02:42 AM    Iron % saturation 13 (L) 2018 02:42 AM    Ferritin 198 2018 02:42 AM     Lab Results   Component Value Date/Time    Vitamin B12 835 2018 09:07 AM    Folate 7.0 2018 02:42 AM         Imagin2018 XR CHEST  IMPRESSION: No acute abnormality     2017 abd/pelvis CT:      FINDINGS:   Liver is nodular compatible with cirrhosis. The spleen is normal in size. The  gallbladder is not distended. The a pancreas does appear unremarkable. Ostomy  seen in the right lower quadrant. There is a small supraumbilical abdominal wall  hernia containing nondistended colon. There is no bowel wall thickening or  obstruction. Prior cystectomy. There is no free air or free fluid. No definite  adenopathy in the abdomen or pelvis. Large left renal cyst unchanged. There is  no intrarenal calcification or obstruction. IMPRESSION: No acute findings. Prior surgery.     Assessment and Recommendations:   73 y/o female with hx of HTN, hypothyroidism, GERD, bladder cancer s/p urostomy,  L hip replacement (3/16) and hospital admission in 2018 for UTI admitted with nausea, vomiting and fever.     1. Pancytopenia: Likely 2/2 recent infection/sepsis, but worsening rather than improving. Review of prior counts show fluctuation and lows in the past as well. Lab workup in hospital revealed Vitamin B12 def and anemia of chronic disease. Retic count showed an inappropriate response which can be related to Vit B12 def. TSH normal. The drop in PLT count from 150 to 74 could be from recent medications or from the infection. She also has possible cirrhosis based on CT scan in 2017, but no risk factors for cirrhosis (no diabetes or alcohol use). -- Given the declining counts in all 3 cell lines, I recommend bone marrow biopsy  -- Return in 2-3 weeks to review results    2.  Vitamin B12 deficiency: Now with normal levels after starting on VitB12 injections approx 2018.  -- Continue Vitamin B12 IM 1000mcg monthly - sending script     3. Recurrent UTI: h/o of recurrent UTIs. Completed Levaquin in the past.     4. Fatigue: Likely related to moderate anemia. 5. Hx of bladder cancer (1999): S/p ileal conduit/ urostomy; Follows with Dr Michelle iMchele as out patient  -- Urology following.     5. S/p Left hip replacement: Surgery was in 3/2018. Improvement in left hip pain. She is following closely with orthopedics. I appreciate the opportunity to participate in Ms. Bk Gonzalez's care.     Signed By: Rian Cheney MD     September 27, 2018

## 2018-10-01 DIAGNOSIS — E53.8 VITAMIN B12 DEFICIENCY: ICD-10-CM

## 2018-10-01 RX ORDER — SYRINGE, DISPOSABLE, 3 ML
SYRINGE, EMPTY DISPOSABLE MISCELLANEOUS
Qty: 12 SYRINGE | Refills: 3 | Status: SHIPPED | OUTPATIENT
Start: 2018-10-01 | End: 2020-01-17 | Stop reason: SDUPTHER

## 2018-10-08 ENCOUNTER — HOSPITAL ENCOUNTER (OUTPATIENT)
Dept: CT IMAGING | Age: 73
Discharge: HOME | End: 2018-10-08
Attending: NURSE PRACTITIONER
Payer: MEDICARE

## 2018-10-08 VITALS
HEART RATE: 83 BPM | SYSTOLIC BLOOD PRESSURE: 146 MMHG | OXYGEN SATURATION: 99 % | RESPIRATION RATE: 13 BRPM | DIASTOLIC BLOOD PRESSURE: 83 MMHG

## 2018-10-08 DIAGNOSIS — D61.818 PANCYTOPENIA (HCC): ICD-10-CM

## 2018-10-08 LAB
BASOPHILS # BLD: 0 K/UL (ref 0–0.1)
BASOPHILS NFR BLD: 1 % (ref 0–1)
DIFFERENTIAL METHOD BLD: ABNORMAL
EOSINOPHIL # BLD: 0.1 K/UL (ref 0–0.4)
EOSINOPHIL NFR BLD: 3 % (ref 0–7)
ERYTHROCYTE [DISTWIDTH] IN BLOOD BY AUTOMATED COUNT: 13.4 % (ref 11.5–14.5)
HCT VFR BLD AUTO: 30.9 % (ref 35–47)
HGB BLD-MCNC: 10.2 G/DL (ref 11.5–16)
IMM GRANULOCYTES # BLD: 0 K/UL (ref 0–0.04)
IMM GRANULOCYTES NFR BLD AUTO: 0 % (ref 0–0.5)
LYMPHOCYTES # BLD: 0.8 K/UL (ref 0.8–3.5)
LYMPHOCYTES NFR BLD: 30 % (ref 12–49)
MCH RBC QN AUTO: 30.3 PG (ref 26–34)
MCHC RBC AUTO-ENTMCNC: 33 G/DL (ref 30–36.5)
MCV RBC AUTO: 91.7 FL (ref 80–99)
MONOCYTES # BLD: 0.2 K/UL (ref 0–1)
MONOCYTES NFR BLD: 7 % (ref 5–13)
NEUTS SEG # BLD: 1.5 K/UL (ref 1.8–8)
NEUTS SEG NFR BLD: 59 % (ref 32–75)
NRBC # BLD: 0 K/UL (ref 0–0.01)
NRBC BLD-RTO: 0 PER 100 WBC
PLATELET # BLD AUTO: 112 K/UL (ref 150–400)
PMV BLD AUTO: 11.4 FL (ref 8.9–12.9)
RBC # BLD AUTO: 3.37 M/UL (ref 3.8–5.2)
RBC MORPH BLD: ABNORMAL
WBC # BLD AUTO: 2.6 K/UL (ref 3.6–11)

## 2018-10-08 PROCEDURE — 88364 INSITU HYBRIDIZATION (FISH): CPT

## 2018-10-08 PROCEDURE — 88185 FLOWCYTOMETRY/TC ADD-ON: CPT | Performed by: NURSE PRACTITIONER

## 2018-10-08 PROCEDURE — 85025 COMPLETE CBC W/AUTO DIFF WBC: CPT | Performed by: STUDENT IN AN ORGANIZED HEALTH CARE EDUCATION/TRAINING PROGRAM

## 2018-10-08 PROCEDURE — 77030003666 HC NDL SPINAL BD -A

## 2018-10-08 PROCEDURE — 88374 M/PHMTRC ALYS ISHQUANT/SEMIQ: CPT | Performed by: NURSE PRACTITIONER

## 2018-10-08 PROCEDURE — 88305 TISSUE EXAM BY PATHOLOGIST: CPT | Performed by: NURSE PRACTITIONER

## 2018-10-08 PROCEDURE — 88360 TUMOR IMMUNOHISTOCHEM/MANUAL: CPT

## 2018-10-08 PROCEDURE — 36415 COLL VENOUS BLD VENIPUNCTURE: CPT | Performed by: STUDENT IN AN ORGANIZED HEALTH CARE EDUCATION/TRAINING PROGRAM

## 2018-10-08 PROCEDURE — 88237 TISSUE CULTURE BONE MARROW: CPT | Performed by: NURSE PRACTITIONER

## 2018-10-08 PROCEDURE — 88342 IMHCHEM/IMCYTCHM 1ST ANTB: CPT | Performed by: NURSE PRACTITIONER

## 2018-10-08 PROCEDURE — 74011250636 HC RX REV CODE- 250/636: Performed by: STUDENT IN AN ORGANIZED HEALTH CARE EDUCATION/TRAINING PROGRAM

## 2018-10-08 PROCEDURE — 88264 CHROMOSOME ANALYSIS 20-25: CPT | Performed by: NURSE PRACTITIONER

## 2018-10-08 PROCEDURE — 88313 SPECIAL STAINS GROUP 2: CPT | Performed by: NURSE PRACTITIONER

## 2018-10-08 PROCEDURE — 88311 DECALCIFY TISSUE: CPT | Performed by: NURSE PRACTITIONER

## 2018-10-08 PROCEDURE — 77030028872 HC BN BIOP NDL ON CNTRL TY TELE -C

## 2018-10-08 PROCEDURE — 88365 INSITU HYBRIDIZATION (FISH): CPT | Performed by: NURSE PRACTITIONER

## 2018-10-08 PROCEDURE — 38221 DX BONE MARROW BIOPSIES: CPT

## 2018-10-08 PROCEDURE — 88365 INSITU HYBRIDIZATION (FISH): CPT

## 2018-10-08 PROCEDURE — 88341 IMHCHEM/IMCYTCHM EA ADD ANTB: CPT | Performed by: NURSE PRACTITIONER

## 2018-10-08 PROCEDURE — 77030014115

## 2018-10-08 PROCEDURE — 74011250636 HC RX REV CODE- 250/636: Performed by: RADIOLOGY

## 2018-10-08 PROCEDURE — 88184 FLOWCYTOMETRY/ TC 1 MARKER: CPT | Performed by: NURSE PRACTITIONER

## 2018-10-08 PROCEDURE — 99152 MOD SED SAME PHYS/QHP 5/>YRS: CPT

## 2018-10-08 RX ORDER — LIDOCAINE HYDROCHLORIDE 10 MG/ML
10 INJECTION, SOLUTION EPIDURAL; INFILTRATION; INTRACAUDAL; PERINEURAL
Status: COMPLETED | OUTPATIENT
Start: 2018-10-08 | End: 2018-10-08

## 2018-10-08 RX ORDER — MIDAZOLAM HYDROCHLORIDE 1 MG/ML
5 INJECTION, SOLUTION INTRAMUSCULAR; INTRAVENOUS
Status: DISCONTINUED | OUTPATIENT
Start: 2018-10-08 | End: 2018-10-08

## 2018-10-08 RX ORDER — FENTANYL CITRATE 50 UG/ML
100 INJECTION, SOLUTION INTRAMUSCULAR; INTRAVENOUS
Status: DISCONTINUED | OUTPATIENT
Start: 2018-10-08 | End: 2018-10-08

## 2018-10-08 RX ADMIN — LIDOCAINE HYDROCHLORIDE 10 ML: 10 INJECTION, SOLUTION EPIDURAL; INFILTRATION; INTRACAUDAL; PERINEURAL at 11:38

## 2018-10-08 RX ADMIN — FENTANYL CITRATE 25 MCG: 50 INJECTION, SOLUTION INTRAMUSCULAR; INTRAVENOUS at 10:48

## 2018-10-08 RX ADMIN — MIDAZOLAM 0.5 MG: 1 INJECTION INTRAMUSCULAR; INTRAVENOUS at 10:45

## 2018-10-08 RX ADMIN — FENTANYL CITRATE 25 MCG: 50 INJECTION, SOLUTION INTRAMUSCULAR; INTRAVENOUS at 10:37

## 2018-10-08 RX ADMIN — MIDAZOLAM 0.5 MG: 1 INJECTION INTRAMUSCULAR; INTRAVENOUS at 10:48

## 2018-10-08 RX ADMIN — MIDAZOLAM 1 MG: 1 INJECTION INTRAMUSCULAR; INTRAVENOUS at 10:37

## 2018-10-08 RX ADMIN — MIDAZOLAM 1 MG: 1 INJECTION INTRAMUSCULAR; INTRAVENOUS at 10:42

## 2018-10-08 RX ADMIN — FENTANYL CITRATE 25 MCG: 50 INJECTION, SOLUTION INTRAMUSCULAR; INTRAVENOUS at 10:42

## 2018-10-08 NOTE — PROGRESS NOTES
0455 - Pt brought back to [facility] for scheduled CT guided bone marrow biopsy. Pt alert and oriented x4. She is accompanied by her daughter, Lucas Pang. 9071 - Pt consent signed and allergies reviewed  0915 - Discharge instructions discussed with Pt. Pt able to verbalize understanding. Opportunity for questions/clarification. 3116 - 22 G PIV started in the R [facility]. Labs drawn and sent per order. 0721 - VS taken (see doc flow), S1S2, Bilateral BS CTA  6455 - Pt's daughter brought back to [facility] to be present during discussion with MD  [facility], MD in [facility] discussing procedure and sedation plan with Pt and daughter. 1010 - Pt taken to CT for procedure. CT advising they will be ready in 5-10 min. Procedure started at 1042 hours and ended at 1054 hours. Pt received a total of 3 mg of Versed and 75 mcg of Fentanyl over the course of the procedure. Pt tolerated the procedure well.  1102 - Pt back in [facility] following procedure. VS being monitored. Pt drowsy but without complaint. 1106 - Pt [facility] called and requested to notify Pt's daughter that procedure is completed and she will be in [facility] recovering for about an hour. 1134 - Pt sleeping on and off.  1153 - Pt pain 0/10. Pt drinking coffee. 1218 - Reviewed discharge instructions with daughter. Daughter able to verbalize understanding and opportunity for questions/clarification. Pt assisted with dressing by RN and daughter. Pt able to ambulate to BR and empty urostomy bag. Pt signed d/c instructions and given copy of AVS (earlier). Pt iv removed and pressure dressing applied. Procedure site c/d/i. Pain 0/10 and no complaints. Pt taken in wheel chair to vehicle/daughter.

## 2018-10-08 NOTE — IP AVS SNAPSHOT
Avinash Monk 
 
 
 [address] [place] [address] 
[phone] Patient: Caitlyn Pérez MRN: [id number] MLK:[birth year] A check dhaval indicates which time of day the medication should be taken. My Medications ASK your doctor about these medications Instructions Each Dose to Equal  
 Morning Noon Evening Bedtime * cyanocobalamin 1,000 mcg/mL injection Commonly known as:  VITAMIN B12 Your last dose was: Your next dose is: INJECT 1 ML ONCE WEEKLY * cyanocobalamin 1,000 mcg/mL injection Commonly known as:  VITAMIN B12 Your last dose was: Your next dose is:    
   
   
 1 mL by IntraMUSCular route every thirty (30) days. 1000 mcg HYDROcodone-acetaminophen 5-325 mg per tablet Commonly known as:  Pau Krishnan Your last dose was: Your next dose is: Take 1 Tab by mouth every six (6) hours as needed for Pain. 1 Tab  
    
   
   
   
  
 L. acidoph & paracasei- S therm- Bifido 8 billion cell Cap cap Commonly known as:  ALEXEI-Q/RISAQUAD Your last dose was: Your next dose is: Take 1 Cap by mouth daily. 1 Cap  
    
   
   
   
  
 levothyroxine 75 mcg tablet Commonly known as:  SYNTHROID Your last dose was: Your next dose is: Take 75 mcg by mouth Daily (before breakfast). 75 mcg  
    
   
   
   
  
 losartan-hydroCHLOROthiazide 50-12.5 mg per tablet Commonly known as:  HYZAAR Your last dose was: Your next dose is: Take 1 Tab by mouth every evening. 1 Tab MOBIC 15 mg tablet Generic drug:  meloxicam  
   
Your last dose was: Your next dose is: Take 15 mg by mouth. 15 mg  
    
   
   
   
  
 PriLOSEC 20 mg capsule Generic drug:  omeprazole Your last dose was: Your next dose is: Take 20 mg by mouth daily. 20 mg Safety Needles 25 x 5/8 \" Ndle Your last dose was: Your next dose is:    
   
   
 Use 1 syringe every month for your Vitamin B injections Syringe (Disposable) 3 mL Syrg Your last dose was: Your next dose is:    
   
   
 Use 1 syringe every month for your Vitamin B injections * Notice: This list has 2 medication(s) that are the same as other medications prescribed for you. Read the directions carefully, and ask your doctor or other care provider to review them with you.

## 2018-10-08 NOTE — IP AVS SNAPSHOT
[address] [place] [address] 
[phone] Patient: Corbin Saucedo MRN: [id number]:[id number] About your hospitalization You were admitted on:  October 8, 2018 You last received care in the:  [facility] RAD CT You were discharged on:  October 8, 2018 Why you were hospitalized Your primary diagnosis was:  Not on File Follow-up Information None Your Scheduled Appointments Thursday October 18, 2018  9:15 AM EDT Any with Calli Balbuena, NP [address] Ch[facility]vez at [facility] 301 [facility], [address] [place] [address] 1007 [facility]  
[phone] Discharge Orders None A check dhaval indicates which time of day the medication should be taken. My Medications ASK your doctor about these medications Instructions Each Dose to Equal  
 Morning Noon Evening Bedtime * cyanocobalamin 1,000 mcg/mL injection Commonly known as:  VITAMIN B12 Your last dose was: Your next dose is: INJECT 1 ML ONCE WEEKLY * cyanocobalamin 1,000 mcg/mL injection Commonly known as:  VITAMIN B12 Your last dose was: Your next dose is:    
   
   
 1 mL by IntraMUSCular route every thirty (30) days. 1000 mcg HYDROcodone-acetaminophen 5-325 mg per tablet Commonly known as:  [place] Your last dose was: Your next dose is: Take 1 Tab by mouth every six (6) hours as needed for Pain. 1 Tab  
    
   
   
   
  
 L. acidoph & paracasei- S therm- Bifido 8 billion cell Cap cap Commonly known as:  ALEXEI-Q/RISAQUAD Your last dose was: Your next dose is: Take 1 Cap by mouth daily. 1 Cap  
    
   
   
   
  
 levothyroxine 75 mcg tablet Commonly known as:  SYNTHROID Your last dose was: Your next dose is: Take 75 mcg by mouth Daily (before breakfast). 75 mcg  
    
   
   
   
  
 losartan-hydroCHLOROthiazide 50-12.5 mg per tablet Commonly known as:  HYZAAR Your last dose was: Your next dose is: Take 1 Tab by mouth every evening. 1 Tab MOBIC 15 mg tablet Generic drug:  meloxicam  
   
Your last dose was: Your next dose is: Take 15 mg by mouth. 15 mg  
    
   
   
   
  
 PriLOSEC 20 mg capsule Generic drug:  omeprazole Your last dose was: Your next dose is: Take 20 mg by mouth daily. 20 mg Safety Needles 25 x 5/8 \" Ndle Your last dose was: Your next dose is:    
   
   
 Use 1 syringe every month for your Vitamin B injections Syringe (Disposable) 3 mL Syrg Your last dose was: Your next dose is:    
   
   
 Use 1 syringe every month for your Vitamin B injections * Notice: This list has 2 medication(s) that are the same as other medications prescribed for you. Read the directions carefully, and ask your doctor or other care provider to review them with you. Opioid Education Prescription Opioids: What You Need to Know: 
 
Prescription opioids can be used to help relieve moderate-to-severe pain and are often prescribed following a surgery or injury, or for certain health conditions. These medications can be an important part of treatment but also come with serious risks. Opioids are strong pain medicines. Examples include hydrocodone, oxycodone, fentanyl, and morphine. Heroin is an example of an illegal opioid. It is important to work with your health care provider to make sure you are getting the safest, most effective care. WHAT ARE THE RISKS AND SIDE EFFECTS OF OPIOID USE?  
Prescription opioids carry serious risks of addiction and overdose, especially with prolonged use. An opioid overdose, often marked by slow breathing, can cause sudden death. The use of prescription opioids can have a number of side effects as well, even when taken as directed. · Tolerance-meaning you might need to take more of a medication for the same pain relief · Physical dependence-meaning you have symptoms of withdrawal when the medication is stopped. Withdrawal symptoms can include nausea, sweating, chills, diarrhea, stomach cramps, and muscle aches. Withdrawal can last up to several weeks, depending on which drug you took and how long you took it. · Increased sensitivity to pain · Constipation · Nausea, vomiting, and dry mouth · Sleepiness and dizziness · Confusion · Depression · Low levels of testosterone that can result in lower sex drive, energy, and strength · Itching and sweating RISKS ARE GREATER WITH:      
· History of drug misuse, substance use disorder, or overdose · Mental health conditions (such as depression or anxiety) · Sleep apnea · Older age (72 years or older) · Pregnancy Avoid alcohol while taking prescription opioids. Also, unless specifically advised by your health care provider, medications to avoid include: · Benzodiazepines (such as Xanax or Valium) · Muscle relaxants (such as Soma or Flexeril) · Hypnotics (such as Ambien or Lunesta) · Other prescription opioids KNOW YOUR OPTIONS Talk to your health care provider about ways to manage your pain that don't involve prescription opioids. Some of these options may actually work better and have fewer risks and side effects. Consult your physician before adding or stopping any medications, treatments, or physical activity. Options may include: 
· Pain relievers such as acetaminophen, ibuprofen, and naproxen · Some medications that are also used for depression or seizures · Physical therapy and exercise · Counseling to help patients learn how to cope better with triggers of pain and stress. · Application of heat or cold compress · Massage therapy · Relaxation techniques Be Informed Make sure you know the name of your medication, how much and how often to take it, and its potential risks & side effects. IF YOU ARE PRESCRIBED OPIOIDS FOR PAIN: 
· Never take opioids in greater amounts or more often than prescribed. Remember the goal is not to be pain-free but to manage your pain at a tolerable level. · Follow up with your primary care provider to: · Work together to create a plan on how to manage your pain. · Talk about ways to help manage your pain that don't involve prescription opioids. · Talk about any and all concerns and side effects. · Help prevent misuse and abuse. · Never sell or share prescription opioids · Help prevent misuse and abuse. · Store prescription opioids in a secure place and out of reach of others (this may include visitors, children, friends, and family). · Safely dispose of unused/unwanted prescription opioids: Find your community drug take-back program or your pharmacy mail-back program, or flush them down the toilet, following guidance from the Food and Drug Administration (www.fda.gov/Drugs/ResourcesForYou). · Visit www.cdc.gov/drugoverdose to learn about the risks of opioid abuse and overdose. · If you believe you may be struggling with addiction, tell your health care provider and ask for guidance or call [address] [place] [address] at [phone]. Discharge Instructions Sedation for a Medical Procedure: After Your Visit  Instructions. Fentanyl - 75 mcg Versed - 3 mg Sedatives are used to relax you for a procedure. You may or may not be awake during the procedure. Common side effects of sedation medications include: Drowsiness, dizziness, euphoria, sleepiness or confusion. I 
            Unsteady gait. Loss of fine muscle control and delayed reaction time. Visual disturbances, difficulty focusing and blurred vision. Impaired memory recall. Follow-up care is a key component for your health and safety. Be sure to make and go to all medical appointments. Call your doctor if you are having problems. It's also a good idea to keep a list of your allergies, medicines with doses and test results on hand Home care following your sedation procedure: You may experience some of these side effects or you may not be aware of subtle changes in your behavior or reaction time. Because you received these medications, we are giving you the following instructions: Activity For your safety, you should not drive until the medicine wears off and you can think clearly and react easily. Do not drive for 24 hours. Rest when you feel tired. Getting enough sleep will help you recover. Diet You can eat your normal diet. If your stomach is upset, try bland, low-fat foods like plain rice, broiled chicken, toast, and yogurt. Drink plenty of fluids unless your doctor advised you to limit your fluids. Do not consume alcoholic beverages for 24 hours. Instructions Do not make important personal, business, or legal decisions for 24 hours. Move slowly and carefully, do not make sudden position changes. Be alert for dizziness or lightheadedness and move accordingly. Have a responsible person assist you. Do not drive for 24 hours. Do not operate equipment for 24 hours. [employer] mowers, power tools, kitchen appliances, etc.) Discharge medications: 
Resume prior to test medications as prescribed by your personal physician. If you have any questions or concerns call Radiology RN at [phone] After hours call Radiology on-call at [phone] Call 911 any time you think you may need emergency care. For example:  
             Call if you passed out (lost consciousness). The medicine is not wearing off and you cannot think clearly. Watch closely for changes in your health, and be sure to contact your doctor if  you have any problems. Where can you learn more? Go to [employer].be Enter V573 in the search box to learn more about \"Sedation for a Medical Procedure: After Your Visit. \"   
 
  
Bone Marrow Aspiration and Biopsy: What to Expect at Home Your Recovery The biopsy site may feel sore for several days. It can help to walk, take pain medicine, and put ice packs on the site. You will probably be able to return to work and your usual activities the day after the procedure. Your doctor or nurse will call you with the results of your test. 
This care sheet gives you a general idea about how long it will take for you to recover. But each person recovers at a different pace. Follow the steps below to get better as quickly as possible. How can you care for yourself at home? Activity 
  · Rest when you feel tired. Getting enough sleep will help you recover.  
  · You may drive when you are no longer taking pain pills and can quickly move your foot from the gas pedal to the brake. You must also be able to sit comfortably for a long period of time, even if you do not plan to go far. You might get caught in traffic.  
  · Most people are able to return to work the day after the procedure. Medicines 
  · Your doctor will tell you if and when you can restart your medicines. He or she will also give you instructions about taking any new medicines.  
  · If you take blood thinners, such as warfarin (Coumadin), clopidogrel (Plavix), or aspirin, be sure to talk to your doctor. He or she will tell you if and when to start taking those medicines again.  Make sure that you understand exactly what your doctor wants you to do.  
  · Be safe with medicines. Take pain medicines exactly as directed. ¨ If the doctor gave you a prescription medicine for pain, take it as prescribed. ¨ If you are not taking a prescription pain medicine, take an over-the-counter medicine such as acetaminophen (Tylenol), ibuprofen (Advil, Motrin), or naproxen (Aleve). Read and follow all instructions on the label. ¨ Do not take two or more pain medicines at the same time unless the doctor told you to. Many pain medicines have acetaminophen, which is Tylenol. Too much acetaminophen (Tylenol) can be harmful.  
  · If you think your pain medicine is making you sick to your stomach: 
¨ Take your medicine after meals (unless your doctor has told you not to). ¨ Ask your doctor for a different pain medicine.  
  · If your doctor prescribed antibiotics, take them as directed. Do not stop taking them just because you feel better.  
 Ice 
  · Put ice or a cold pack on the biopsy site for 10 to 20 minutes at a time. Put a thin cloth between the ice and your skin. Follow-up care is a key part of your treatment and safety. Be sure to make and go to all appointments, and call your doctor if you are having problems. It's also a good idea to know your test results and keep a list of the medicines you take. When should you call for help? Call 911 anytime you think you may need emergency care. For example, call if: 
  · You passed out (lost consciousness).  
 Call your doctor now or seek immediate medical care if: 
  · You have signs of infection, such as: 
¨ Increased pain, swelling, warmth, or redness. ¨ Red streaks leading from the biopsy site. ¨ Pus draining from the biopsy site. ¨ Swollen lymph nodes in your neck, armpits, or groin. ¨ A fever.  
 Watch closely for any changes in your health, and be sure to contact your doctor if: 
  · You are not getting better as expected. Where can you learn more? Go to http://kris-lópez.info/. Enter E148 in the search box to learn more about \"Bone Marrow Aspiration and Biopsy: What to Expect at Home. \" Current as of: September 10, 2017 Content Version: 11.8 © 8765-4739 Healthwise, Incorporated. Care instructions adapted under license by [place] (which disclaims liability or warranty for this information). If you have questions about a medical condition or this instruction, always ask your healthcare professional. [address] any warranty or liability for your use of this information. Introducing [facility]! [facility] introduces [employer] patient portal. Now you can access parts of your medical record, email your doctor's office, and request medication refills online. 1. In your internet browser, go to [email or web address] 2. Click on the First Time User? Click Here link in the Sign In box. You will see the New Member Sign Up page. 3. Enter your Signix Access Code exactly as it appears below. You will not need to use this code after youve completed the sign-up process. If you do not sign up before the expiration date, you must request a new code. · Signix Access Code: [email or web address] Expires: 11/1/2018 11:19 AM 
 
4. Enter the last four digits of your Social Security Number (xxxx) and Date of Birth (mm/dd/yyyy) as indicated and click Submit. You will be taken to the next sign-up page. 5. Create a Signix ID. This will be your Signix login ID and cannot be changed, so think of one that is secure and easy to remember. 6. Create a Signix password. You can change your password at any time. 7. Enter your Password Reset Question and Answer. This can be used at a later time if you forget your password. 8. Enter your e-mail address. You will receive e-mail notification when new information is available in [address] 9. Click Sign Up. You can now view and download portions of your medical record. 10. Click the Download Summary menu link to download a portable copy of your medical information. If you have questions, please visit the Frequently Asked Questions section of the [employer] website. Remember, [employer] is NOT to be used for urgent needs. For medical emergencies, dial 911. Now available from your iPhone and Android! Introducing Arsalan Banks As a [employer] patient, I wanted to make you aware of our electronic visit tool called Arsalan Banks. [employer] 24/7 allows you to connect within minutes with a medical provider 24 hours a day, seven days a week via a mobile device or tablet or logging into a secure website from your computer. You can access Arsalan Banks from anywhere in the [place]. A virtual visit might be right for you when you have a simple condition and feel like you just dont want to get out of bed, or cant get away from work for an appointment, when your regular [employer] provider is not available (evenings, weekends or holidays), or when youre out of town and need minor care. Electronic visits cost only $49 and if the [employer] 24/7 provider determines a prescription is needed to treat your condition, one can be electronically transmitted to a nearby pharmacy*. Please take a moment to enroll today if you have not already done so. The enrollment process is free and takes just a few minutes. To enroll, please download the [employer] 24/7 vishnu to your tablet or phone, or visit [email or web address] to enroll on your computer. And, as an [address] [place] [address] patient with a [employer] account, the results of your visits will be scanned into your electronic medical record and your primary care provider will be able to view the scanned results.    
We urge you to continue to see your regular [facility] [employer] [facility] [employer] provider for your ongoing medical care. And while your primary care provider may not be the one available when you seek a Arsalan Nunezjaceyfin virtual visit, the peace of mind you get from getting a real diagnosis real time can be priceless. For more information on Arsalan Nunezjaceyfin, view our Frequently Asked Questions (FAQs) at [email or web address]. Sincerely, 
 
Arvell Najjar, MD 
Chief Medical Officer [employer] *:  certain medications cannot be prescribed via Arsalan Nunezrashad Providers Seen During Your Hospitalization Provider Specialty Primary office phone Eva Rodriguez NP Nurse Practitioner [phone] Your Primary Care Physician (PCP) Primary Care Physician Office Phone Office Fax Jerad Wesley, [address] Mckenzie Escoto [facility] [phone] You are allergic to the following Allergen Reactions Bactrim (Sulfamethoprim) Nausea and Vomiting Ciprofloxacin Other (comments) Joint aches Diclofenac Nausea Only Sulfa (Sulfonamide Antibiotics) Nausea Only Recent Documentation OB Status Smoking Status Hysterectomy Former Smoker Emergency Contacts Name Discharge Info Relation Home Work Mobile Dominga Gonzaleznda DISCHARGE CAREGIVER [3] Child [2] [phone] Patient Belongings The following personal items are in your possession at time of discharge: 
  Dental Appliances: Lowers, Uppers Please provide this summary of care documentation to your next provider. Signatures-by signing, you are acknowledging that this After Visit Summary has been reviewed with you and you have received a copy. Patient Signature:  ____________________________________________________________ Date:  ____________________________________________________________  
  
Annia Bone Provider Signature:  ____________________________________________________________ Date:  ____________________________________________________________

## 2018-10-08 NOTE — DISCHARGE INSTRUCTIONS
Sedation for a Medical Procedure: After Your Visit  Instructions. Fentanyl - 75 mcg  Versed - 3 mg    Sedatives are used to relax you for a procedure. You may or may not be awake during the procedure. Common side effects of sedation medications include:                   Drowsiness, dizziness, euphoria, sleepiness or confusion. I              Unsteady gait. Loss of fine muscle control and delayed reaction time. Visual disturbances, difficulty focusing and blurred vision. Impaired memory recall. Follow-up care is a key component for your health and safety. Be sure to make and go to all medical appointments. Call your doctor if you are having problems. It's also a good idea to keep a list of your allergies, medicines with doses and test results on hand    Home care following your sedation procedure: You may experience some of these side effects or you may not be aware of subtle changes in your behavior or reaction time. Because you received these medications, we are giving you the following instructions: Activity   For your safety, you should not drive until the medicine wears off and you can think clearly and react easily. Do not drive for 24 hours. Rest when you feel tired. Getting enough sleep will help you recover. Diet    You can eat your normal diet. If your stomach is upset, try bland, low-fat foods like plain rice, broiled chicken, toast, and yogurt. Drink plenty of fluids unless your doctor advised you to limit your fluids. Do not consume alcoholic beverages for 24 hours. Instructions  Do not make important personal, business, or legal decisions for 24 hours. Move slowly and carefully, do not make sudden position changes. Be alert for dizziness or lightheadedness and move accordingly. Have a responsible person assist you. Do not drive for 24 hours. Do not operate equipment for 24 hours. [place], power tools, kitchen appliances, etc.)    Discharge medications:  Resume prior to test medications as prescribed by your personal physician. If you have any questions or concerns call Radiology RN at [phone]  After hours call Radiology on-call at [phone]    Call [phone] any time you think you may need emergency care. For example:                Call if you passed out (lost consciousness). The medicine is not wearing off and you cannot think clearly. Watch closely for changes in your health, and be sure to contact your doctor if  you have any problems. Where can you learn more? Go to [employer]   Enter G817 in the search box to learn more about \"Sedation for a Medical Procedure: After Your Visit. \"         Bone Marrow Aspiration and Biopsy: What to Expect at Home  Your Recovery  The biopsy site may feel sore for several days. It can help to walk, take pain medicine, and put ice packs on the site. You will probably be able to return to work and your usual activities the day after the procedure. Your doctor or nurse will call you with the results of your test.  This care sheet gives you a general idea about how long it will take for you to recover. But each person recovers at a different pace. Follow the steps below to get better as quickly as possible. How can you care for yourself at home? Activity    · Rest when you feel tired. Getting enough sleep will help you recover.     · You may drive when you are no longer taking pain pills and can quickly move your foot from the gas pedal to the brake. You must also be able to sit comfortably for a long period of time, even if you do not plan to go far. You might get caught in traffic.     · Most people are able to return to work the day after the procedure. Medicines    · Your doctor will tell you if and when you can restart your medicines.  He or she will also give you instructions about taking any new medicines.     · If you take blood thinners, such as warfarin (Coumadin), clopidogrel (Plavix), or aspirin, be sure to talk to your doctor. He or she will tell you if and when to start taking those medicines again. Make sure that you understand exactly what your doctor wants you to do.     · Be safe with medicines. Take pain medicines exactly as directed. ¨ If the doctor gave you a prescription medicine for pain, take it as prescribed. ¨ If you are not taking a prescription pain medicine, take an over-the-counter medicine such as acetaminophen (Tylenol), ibuprofen (Advil, Motrin), or naproxen (Aleve). Read and follow all instructions on the label. ¨ Do not take two or more pain medicines at the same time unless the doctor told you to. Many pain medicines have acetaminophen, which is Tylenol. Too much acetaminophen (Tylenol) can be harmful.     · If you think your pain medicine is making you sick to your stomach:  ¨ Take your medicine after meals (unless your doctor has told you not to). ¨ Ask your doctor for a different pain medicine.     · If your doctor prescribed antibiotics, take them as directed. Do not stop taking them just because you feel better.    Ice    · Put ice or a cold pack on the biopsy site for 10 to 20 minutes at a time. Put a thin cloth between the ice and your skin. Follow-up care is a key part of your treatment and safety. Be sure to make and go to all appointments, and call your doctor if you are having problems. It's also a good idea to know your test results and keep a list of the medicines you take. When should you call for help? Call 911 anytime you think you may need emergency care. For example, call if:    · You passed out (lost consciousness).    Call your doctor now or seek immediate medical care if:    · You have signs of infection, such as:  ¨ Increased pain, swelling, warmth, or redness. ¨ Red streaks leading from the biopsy site.   ¨ Pus draining from the biopsy site. ¨ Swollen lymph nodes in your neck, armpits, or groin. ¨ A fever.    Watch closely for any changes in your health, and be sure to contact your doctor if:    · You are not getting better as expected. Where can you learn more? Go to http://kris-lópez.info/. Enter E148 in the search box to learn more about \"Bone Marrow Aspiration and Biopsy: What to Expect at Home. \"  Current as of: September 10, 2017  Content Version: 11.8  © 4128-4934 [employer]. Care instructions adapted under license by [employer] (which disclaims liability or warranty for this information). If you have questions about a medical condition or this instruction, always ask your healthcare professional. [address] any warranty or liability for your use of this information.

## 2018-10-18 ENCOUNTER — OFFICE VISIT (OUTPATIENT)
Dept: ONCOLOGY | Age: 73
End: 2018-10-18

## 2018-10-18 VITALS
HEIGHT: 65 IN | OXYGEN SATURATION: 99 % | DIASTOLIC BLOOD PRESSURE: 73 MMHG | BODY MASS INDEX: 32.79 KG/M2 | HEART RATE: 87 BPM | WEIGHT: 196.8 LBS | TEMPERATURE: 96.7 F | SYSTOLIC BLOOD PRESSURE: 175 MMHG

## 2018-10-18 DIAGNOSIS — D61.818 PANCYTOPENIA (HCC): Primary | ICD-10-CM

## 2018-10-18 DIAGNOSIS — D51.8 VITAMIN B12 DEFICIENCY (DIETARY) ANEMIA: ICD-10-CM

## 2018-10-18 DIAGNOSIS — N39.0 RECURRENT UTI: ICD-10-CM

## 2018-10-18 DIAGNOSIS — D63.8 ANEMIA OF CHRONIC DISEASE: ICD-10-CM

## 2018-10-18 NOTE — PROGRESS NOTES
[facility]  Medical Oncology at [address]  [phone]    Hematology / Oncology Progress Note      History of Present Illness:   Ms. Rob Egan is a 73 y/o female with HTN, remote h/o bladder cancer who comes in for follow up of pancytopenia. She was recently hospitalized after p/w nausea and vomiting since taking Bactrim for UTI as well as fever 102, found to be pancytopenic. She has a h/o of bladder cancer (1999) and underwent radical cystectomy with ileal conduit. She was evaluated in the urology office for UTI and was started on macrobid, later switched to Bactrim. This medication seemed to cause n/v. Upon questioning, Ms. Rob Egan denies any previous h/o anemia except when pregnant - at which time she used to take iron. She notes occasional blood in her urine but otherwise denies any other signs of bleeding. She was switched to Levaquin, tolerated this well with eventual resolution of fevers. Workup of pancytopenia revealed low VitB12 level, and patient was started on VitB12 injections. She completed daily injections x 7 (in and out of the hospital), and she is currently on weekly injections. She states she is having significant pain in her left hip. She recently underwent left hip replacement in March 2018, and she is following closely with orthopedics. She does state that her energy level has improved after discharge from the hospital.   Patient had briefly stopped the taking the B12 injections, then she restarted based on my instructions. She continues to feel fatigued despite restarting the B12 injections. She follows with Urology for recurrent UTI. She comes in today for review of bone marrow biopsy results.     Past Medical History:   Diagnosis Date    Arthritis     Cancer [facility])     bladder    Endocrine disease     hyperthyroid    Gastrointestinal disorder     GERD (gastroesophageal reflux disease)     Hypertension     Other ill-defined conditions(028.88)     hyperthyroid      Past Surgical History:   Procedure Laterality Date    HX GYN      HX HIP REPLACEMENT Left     HX UROLOGICAL      stoma since 78      Social History     Tobacco Use    Smoking status: Former Smoker     Packs/day: 1.00     Last attempt to quit: 7/3/1999     Years since quittin.3    Smokeless tobacco: Never Used   Substance Use Topics    Alcohol use: No      Family History   Problem Relation Age of Onset    Heart Disease Father     Cancer Father         Lung     Current Outpatient Medications   Medication Sig    meloxicam (MOBIC) 15 mg tablet Take 15 mg by mouth.  cyanocobalamin (VITAMIN B12) 1,000 mcg/mL injection 1 mL by IntraMUSCular route every thirty (30) days.  L. acidoph & paracasei- S therm- Bifido (ALEXEI-Q/RISAQUAD) 8 billion cell cap cap Take 1 Cap by mouth daily.  levothyroxine (SYNTHROID) 75 mcg tablet Take 75 mcg by mouth Daily (before breakfast).  losartan-hydroCHLOROthiazide (HYZAAR) 50-12.5 mg per tablet Take 1 Tab by mouth every evening.  omeprazole (PRILOSEC) 20 mg capsule Take 20 mg by mouth daily.  Syringe, Disposable, 3 mL syrg Use 1 syringe every month for your Vitamin B injections    Safety Jackson 25 x 5/8 \" ndle Use 1 syringe every month for your Vitamin B injections    HYDROcodone-acetaminophen (NORCO) 5-325 mg per tablet Take 1 Tab by mouth every six (6) hours as needed for Pain. No current facility-administered medications for this visit. Allergies   Allergen Reactions    Bactrim [Sulfamethoprim] Nausea and Vomiting    Ciprofloxacin Other (comments)     Joint aches    Diclofenac Nausea Only    Sulfa (Sulfonamide Antibiotics) Nausea Only        Review of Systems: A complete review of systems was obtained, negative except as described above.     Physical Exam:     Visit Vitals  /73 (BP 1 Location: Left arm, BP Patient Position: Sitting)   Pulse 87   Temp 96.7 °F (35.9 °C) (Oral)   Ht 5' 5\" (1.651 m)   Wt 196 lb 12.8 oz (89.3 kg)   SpO2 99%   BMI 32.75 kg/m²     ECOG PS: 2  General: Well developed, no acute distress, walking with a cane. Eyes: PERRLA, EOMI, anicteric sclerae  HENT: Atraumatic, OP clear, TMs intact without erythema  Neck: Supple  Lymphatic: No cervical, supraclavicular, axillary or inguinal adenopathy  Respiratory: CTAB, normal respiratory effort  CV: Normal rate, regular rhythm, no murmurs, no peripheral edema  GI: Soft, nontender, nondistended, no masses, no hepatomegaly, no splenomegaly  MS: Digits without clubbing or cyanosis. Skin: No rashes, ecchymoses, or petechiae. Normal temperature, turgor, and texture. Neuro/Psych: Alert, oriented. 5/5 strength in all 4 extremities. Appropriate affect, normal judgment/insight. Results:     Lab Results   Component Value Date/Time    WBC 2.6 (L) 10/08/2018 09:40 AM    HGB 10.2 (L) 10/08/2018 09:40 AM    HCT 30.9 (L) 10/08/2018 09:40 AM    PLATELET 782 (L) 55/23/7813 09:40 AM    MCV 91.7 10/08/2018 09:40 AM    ABS.  NEUTROPHILS 1.5 (L) 10/08/2018 09:40 AM    Hemoglobin (POC) 11.9 01/03/2012 10:01 AM    Hematocrit (POC) 35 01/03/2012 10:01 AM     Lab Results   Component Value Date/Time    Sodium 141 09/11/2018 09:07 AM    Potassium 4.2 09/11/2018 09:07 AM    Chloride 106 09/11/2018 09:07 AM    CO2 23 09/11/2018 09:07 AM    Glucose 102 (H) 09/11/2018 09:07 AM    BUN 25 09/11/2018 09:07 AM    Creatinine 1.15 (H) 09/11/2018 09:07 AM    GFR est AA 54 (L) 09/11/2018 09:07 AM    GFR est non-AA 47 (L) 09/11/2018 09:07 AM    Calcium 10.0 09/11/2018 09:07 AM    Sodium (POC) 143 01/03/2012 10:01 AM    Potassium (POC) 4.1 01/03/2012 10:01 AM    Chloride (POC) 105 01/03/2012 10:01 AM    Glucose (POC) 104 (H) 04/22/2018 11:18 AM    BUN (POC) 11 01/03/2012 10:01 AM    Creatinine (POC) 1.0 01/03/2012 10:01 AM    Calcium, ionized (POC) 1.14 01/03/2012 10:01 AM     Lab Results   Component Value Date/Time    Bilirubin, total 0.4 09/11/2018 09:07 AM    ALT (SGPT) 15 09/11/2018 09:07 AM    AST (SGOT) 23 09/11/2018 09:07 AM    Alk. phosphatase 61 2018 09:07 AM    Protein, total 7.5 2018 09:07 AM    Albumin 4.4 2018 09:07 AM    Globulin 4.5 (H) 2018 06:20 AM     Lab Results   Component Value Date/Time    Iron 24 (L) 2018 02:42 AM    TIBC 180 (L) 2018 02:42 AM    Iron % saturation 13 (L) 2018 02:42 AM    Ferritin 198 2018 02:42 AM     Lab Results   Component Value Date/Time    Vitamin B12 835 2018 09:07 AM    Folate 7.0 2018 02:42 AM         Imagin2018 XR CHEST  IMPRESSION: No acute abnormality     2017 abd/pelvis CT:      FINDINGS:   Liver is nodular compatible with cirrhosis. The spleen is normal in size. The  gallbladder is not distended. The a pancreas does appear unremarkable. Ostomy  seen in the right lower quadrant. There is a small supraumbilical abdominal wall  hernia containing nondistended colon. There is no bowel wall thickening or  obstruction. Prior cystectomy. There is no free air or free fluid. No definite  adenopathy in the abdomen or pelvis. Large left renal cyst unchanged. There is  no intrarenal calcification or obstruction. IMPRESSION: No acute findings. Prior surgery.     Assessment and Recommendations:   75 y/o female with hx of HTN, hypothyroidism, GERD, bladder cancer s/p urostomy,  L hip replacement (3/16) and hospital admission in 2018 for UTI admitted with nausea, vomiting and fever.     1. Pancytopenia: Likely 2/2 recent infection and will improve in the near future. Review of prior counts show fluctuation and lows in the past as well. Lab workup in hospital revealed Vitamin B12 def and anemia of chronic disease. Retic count showed an inappropriate response which can be related to Vit B12 def. TSH normal. The drop in PLT count from 150 to 74 could be from recent medications or from the infection. She also has possible cirrhosis based on CT scan in 2017, but no risk factors for cirrhosis (no diabetes or alcohol use).  Given the declining counts in all 3 cell lines, I recommended bone marrow biopsy - results are normal (normocellular marrow with trilineage hematopoiesis). -- Recheck UA and urinalysis - will call pt with results  -- Return 3 months with repeat CBC. 2. Vitamin B12 deficiency: Now with normal levels after starting on VitB12 injections approx 8/2018.  -- Continue Vitamin B12 IM 1000mcg monthly - sent script at last visit.     3. Recurrent UTI: h/o of recurrent UTIs. Completed Levaquin in the past.     4. Fatigue: Likely related to moderate anemia. 5. Hx of bladder cancer (1999): S/p ileal conduit/ urostomy; Follows with Dr Hiren Lewis as out patient  -- Urology following.     5. S/p Left hip replacement: Surgery was in 3/2018. Improvement in left hip pain. She is following closely with orthopedics. I appreciate the opportunity to participate in Ms. Thomas Gonzalez's care.     Signed By: Sherwin Vides MD     October 18, 2018

## 2018-11-04 LAB
APPEARANCE UR: CLEAR
BACTERIA UR CULT: NORMAL
BILIRUB UR QL STRIP: NEGATIVE
COLOR UR: YELLOW
GLUCOSE UR QL: NEGATIVE
HGB UR QL STRIP: NEGATIVE
KETONES UR QL STRIP: NEGATIVE
LEUKOCYTE ESTERASE UR QL STRIP: NEGATIVE
MICRO URNS: NORMAL
NITRITE UR QL STRIP: NEGATIVE
PH UR STRIP: 6.5 [PH] (ref 5–7.5)
PROT UR QL STRIP: NEGATIVE
SP GR UR: 1.01 (ref 1–1.03)
UROBILINOGEN UR STRIP-MCNC: 0.2 MG/DL (ref 0.2–1)

## 2018-11-05 NOTE — PROGRESS NOTES
Your urine culture shows greater than 2 organisms recovered, none predominant. This usually means that it is either not an infection or has contaminants. No antibiotics needed at this time. But please make sure you follow up with Urology as needed. If new symptoms or cloudy urine develops, please call us and we can order a repeat urine culture at that time.

## 2018-11-06 NOTE — PROGRESS NOTES
Left message via home number requesting that patient call me back regarding lab results. Provided office phone number as well.

## 2018-11-08 NOTE — PROGRESS NOTES
Called patient. Informed her, per Dr. Alyssa Crowder, that her urine culture shows greater than 2 organisms recovered, none predominant. Explained that this usually means that it is either not an infection or has contaminants. Advised no antibiotics needed at this time and that patient should follow up with her urologist as needed. Also informed patient that should she develop any new symptoms or if her urine becomes cloudy, to please call the office and we can order a repeat urine culture at that time. Patient verbalized understanding. No additional questions or concerns at this time.

## 2019-01-11 LAB
BASOPHILS # BLD AUTO: 0 X10E3/UL (ref 0–0.2)
BASOPHILS NFR BLD AUTO: 1 %
EOSINOPHIL # BLD AUTO: 0.1 X10E3/UL (ref 0–0.4)
EOSINOPHIL NFR BLD AUTO: 2 %
ERYTHROCYTE [DISTWIDTH] IN BLOOD BY AUTOMATED COUNT: 14.3 % (ref 12.3–15.4)
FERRITIN SERPL-MCNC: 75 NG/ML (ref 15–150)
HCT VFR BLD AUTO: 32.3 % (ref 34–46.6)
HGB BLD-MCNC: 10.8 G/DL (ref 11.1–15.9)
IMM GRANULOCYTES # BLD AUTO: 0 X10E3/UL (ref 0–0.1)
IMM GRANULOCYTES NFR BLD AUTO: 0 %
IRON SATN MFR SERPL: 19 % (ref 15–55)
IRON SERPL-MCNC: 59 UG/DL (ref 27–139)
LYMPHOCYTES # BLD AUTO: 0.8 X10E3/UL (ref 0.7–3.1)
LYMPHOCYTES NFR BLD AUTO: 29 %
MCH RBC QN AUTO: 30.3 PG (ref 26.6–33)
MCHC RBC AUTO-ENTMCNC: 33.4 G/DL (ref 31.5–35.7)
MCV RBC AUTO: 91 FL (ref 79–97)
MONOCYTES # BLD AUTO: 0.2 X10E3/UL (ref 0.1–0.9)
MONOCYTES NFR BLD AUTO: 7 %
NEUTROPHILS # BLD AUTO: 1.8 X10E3/UL (ref 1.4–7)
NEUTROPHILS NFR BLD AUTO: 61 %
PLATELET # BLD AUTO: 118 X10E3/UL (ref 150–379)
RBC # BLD AUTO: 3.57 X10E6/UL (ref 3.77–5.28)
TIBC SERPL-MCNC: 314 UG/DL (ref 250–450)
UIBC SERPL-MCNC: 255 UG/DL (ref 118–369)
WBC # BLD AUTO: 2.9 X10E3/UL (ref 3.4–10.8)

## 2019-01-14 NOTE — PROGRESS NOTES
[address]  Medical Oncology at [address] [place] [address]  [phone]    Hematology / Oncology Progress Note      History of Present Illness:   Ms. Castillo Kirby is a 75 y/o female with HTN, remote h/o bladder cancer who comes in for follow up of pancytopenia. She was recently hospitalized after p/w nausea and vomiting since taking Bactrim for UTI as well as fever 102, found to be pancytopenic. She has a h/o of bladder cancer (1999) and underwent radical cystectomy with ileal conduit. She was evaluated in the urology office for UTI and was started on macrobid, later switched to Bactrim. This medication seemed to cause n/v. Upon questioning, Ms. Castillo Kirby denies any previous h/o anemia except when pregnant - at which time she used to take iron. She notes occasional blood in her urine but otherwise denies any other signs of bleeding. She was switched to Levaquin, tolerated this well with eventual resolution of fevers. Workup of pancytopenia revealed low VitB12 level, and patient was started on VitB12 injections. She completed daily injections x 7 (in and out of the hospital), and she is currently on weekly injections. She states she is having significant pain in her left hip. She recently underwent left hip replacement in March 2018, and she is following closely with orthopedics. She does state that her energy level has improved after discharge from the hospital.   Patient had briefly stopped the taking the B12 injections, then she restarted based on my instructions. She continues to feel fatigued despite restarting the B12 injections. She follows with Urology for recurrent UTI. She comes in today for review of bone marrow biopsy results. Today, patient comes in for follow up. No recent infections or antibiotics.  She states she was warned about use of Meloxicam affecting her kidneys, but she states it helps her arthritis a great deal. She asks whether this medication could be suppressing her blood counts as well.    Past Medical History:   Diagnosis Date    Arthritis     Cancer ([facility] [address]     bladder    Endocrine disease     hyperthyroid    Gastrointestinal disorder     GERD (gastroesophageal reflux disease)     Hypertension     Other ill-defined conditions(799.89)     hyperthyroid      Past Surgical History:   Procedure Laterality Date    HX GYN      HX HIP REPLACEMENT Left     HX UROLOGICAL      stoma since 78      Social History     Tobacco Use    Smoking status: Former Smoker     Packs/day: 1.00     Last attempt to quit: 7/3/1999     Years since quittin.5    Smokeless tobacco: Never Used   Substance Use Topics    Alcohol use: No      Family History   Problem Relation Age of Onset    Heart Disease Father     Cancer Father         Lung     Current Outpatient Medications   Medication Sig    Syringe, Disposable, 3 mL syrg Use 1 syringe every month for your Vitamin B injections    Safety Spearfish 25 x 5/8 \" ndle Use 1 syringe every month for your Vitamin B injections    meloxicam (MOBIC) 15 mg tablet Take 15 mg by mouth.  cyanocobalamin (VITAMIN B12) 1,000 mcg/mL injection 1 mL by IntraMUSCular route every thirty (30) days.  L. acidoph & paracasei- S therm- Bifido (ALEXEI-Q/RISAQUAD) 8 billion cell cap cap Take 1 Cap by mouth daily.  levothyroxine (SYNTHROID) 75 mcg tablet Take 75 mcg by mouth Daily (before breakfast).  HYDROcodone-acetaminophen (NORCO) 5-325 mg per tablet Take 1 Tab by mouth every six (6) hours as needed for Pain.  losartan-hydroCHLOROthiazide (HYZAAR) 50-12.5 mg per tablet Take 1 Tab by mouth every evening.  omeprazole (PRILOSEC) 20 mg capsule Take 20 mg by mouth daily. No current facility-administered medications for this visit.        Allergies   Allergen Reactions    Bactrim [Sulfamethoprim] Nausea and Vomiting    Ciprofloxacin Other (comments)     Joint aches    Diclofenac Nausea Only    Sulfa (Sulfonamide Antibiotics) Nausea Only        Review of Systems: A complete review of systems was obtained, negative except as described above. Physical Exam:     Visit Vitals  /65 (BP 1 Location: Left arm, BP Patient Position: Sitting)   Pulse 82   Temp 97.4 °F (36.3 °C) (Oral)   Ht 5' 5\" (1.651 m)   Wt 199 lb (90.3 kg)   SpO2 99%   BMI 33.12 kg/m²     ECOG PS: 2  General: Well developed, no acute distress, walking with a cane. Eyes: PERRLA, EOMI, anicteric sclerae  HENT: Atraumatic, OP clear, TMs intact without erythema  Neck: Supple  Lymphatic: No cervical, supraclavicular, axillary or inguinal adenopathy  Respiratory: CTAB, normal respiratory effort  CV: Normal rate, regular rhythm, no murmurs, no peripheral edema  GI: Soft, nontender, nondistended, no masses, no hepatomegaly, no splenomegaly  MS: Digits without clubbing or cyanosis. Skin: No rashes, ecchymoses, or petechiae. Normal temperature, turgor, and texture. Neuro/Psych: Alert, oriented. 5/5 strength in all 4 extremities. Appropriate affect, normal judgment/insight. Results:     Lab Results   Component Value Date/Time    WBC 2.9 (L) 01/10/2019 08:20 AM    HGB 10.8 (L) 01/10/2019 08:20 AM    HCT 32.3 (L) 01/10/2019 08:20 AM    PLATELET 782 (L) 73/02/7600 08:20 AM    MCV 91 01/10/2019 08:20 AM    ABS.  NEUTROPHILS 1.8 01/10/2019 08:20 AM    Hemoglobin (POC) 11.9 01/03/2012 10:01 AM    Hematocrit (POC) 35 01/03/2012 10:01 AM     Lab Results   Component Value Date/Time    Sodium 141 09/11/2018 09:07 AM    Potassium 4.2 09/11/2018 09:07 AM    Chloride 106 09/11/2018 09:07 AM    CO2 23 09/11/2018 09:07 AM    Glucose 102 (H) 09/11/2018 09:07 AM    BUN 25 09/11/2018 09:07 AM    Creatinine 1.15 (H) 09/11/2018 09:07 AM    GFR est AA 54 (L) 09/11/2018 09:07 AM    GFR est non-AA 47 (L) 09/11/2018 09:07 AM    Calcium 10.0 09/11/2018 09:07 AM    Sodium (POC) 143 01/03/2012 10:01 AM    Potassium (POC) 4.1 01/03/2012 10:01 AM    Chloride (POC) 105 01/03/2012 10:01 AM    Glucose (POC) 104 (H) 04/22/2018 11:18 AM    BUN (POC) 11 2012 10:01 AM    Creatinine (POC) 1.0 2012 10:01 AM    Calcium, ionized (POC) 1.14 2012 10:01 AM     Lab Results   Component Value Date/Time    Bilirubin, total 0.4 2018 09:07 AM    ALT (SGPT) 15 2018 09:07 AM    AST (SGOT) 23 2018 09:07 AM    Alk. phosphatase 61 2018 09:07 AM    Protein, total 7.5 2018 09:07 AM    Albumin 4.4 2018 09:07 AM    Globulin 4.5 (H) 2018 06:20 AM     Lab Results   Component Value Date/Time    Iron 59 01/10/2019 08:20 AM    TIBC 314 01/10/2019 08:20 AM    Iron % saturation 19 01/10/2019 08:20 AM    Ferritin 75 01/10/2019 08:20 AM     Lab Results   Component Value Date/Time    Vitamin B12 835 2018 09:07 AM    Folate 7.0 2018 02:42 AM         Imagin2018 XR CHEST  IMPRESSION: No acute abnormality     2017 abd/pelvis CT:      FINDINGS:   Liver is nodular compatible with cirrhosis. The spleen is normal in size. The  gallbladder is not distended. The a pancreas does appear unremarkable. Ostomy  seen in the right lower quadrant. There is a small supraumbilical abdominal wall  hernia containing nondistended colon. There is no bowel wall thickening or  obstruction. Prior cystectomy. There is no free air or free fluid. No definite  adenopathy in the abdomen or pelvis. Large left renal cyst unchanged. There is  no intrarenal calcification or obstruction. IMPRESSION: No acute findings. Prior surgery.     Assessment and Recommendations:   75 y/o female with hx of HTN, hypothyroidism, GERD, bladder cancer s/p urostomy,  L hip replacement (3/16) and hospital admission in 2018 for UTI admitted with nausea, vomiting and fever.     1. Pancytopenia: May be related to recurrent UTI. Review of prior counts show fluctuation and lows in the past as well. Lab workup in hospital revealed Vitamin B12 def and anemia of chronic disease. Retic count showed an inappropriate response which can be related to Vit B12 def.  TSH normal. The drop in PLT count from 150 to 74 could be from  medications or from the infection. She also has possible cirrhosis based on CT scan in 8/2017, but no risk factors for cirrhosis (no diabetes or alcohol use). Given the declining counts in all 3 cell lines, patient underwent bone marrow biopsy in 10/2018, and results were normal (normocellular marrow with trilineage hematopoiesis). -- I do not feel that Meloxicam is suppressing her blood counts. -- Return in 6 months with repeat CBC. 2. Vitamin B12 deficiency: Now with normal levels after starting on VitB12 injections approx 8/2018.  -- Continue Vitamin B12 IM 1000mcg monthly - sent script at last visit.     3. Recurrent UTI: h/o of recurrent UTIs. Completed Levaquin in the past.     4. Fatigue: Likely related to moderate anemia. 5. Hx of bladder cancer (1999): S/p ileal conduit/ urostomy; Follows with Dr Tomás Patricia as out patient  -- Urology following.     5. S/p Left hip replacement: Surgery was in 3/2018. Improvement in left hip pain. She is following closely with orthopedics. I appreciate the opportunity to participate in Ms. Radha Gonzalez's care.     Signed By: Stoney Baker MD     January 14, 2019

## 2019-01-18 ENCOUNTER — OFFICE VISIT (OUTPATIENT)
Dept: ONCOLOGY | Age: 74
End: 2019-01-18

## 2019-01-18 VITALS
HEART RATE: 82 BPM | WEIGHT: 199 LBS | BODY MASS INDEX: 33.15 KG/M2 | DIASTOLIC BLOOD PRESSURE: 65 MMHG | HEIGHT: 65 IN | SYSTOLIC BLOOD PRESSURE: 153 MMHG | TEMPERATURE: 97.4 F | OXYGEN SATURATION: 99 %

## 2019-01-18 DIAGNOSIS — D61.818 PANCYTOPENIA (HCC): Primary | ICD-10-CM

## 2019-02-24 ENCOUNTER — HOSPITAL ENCOUNTER (EMERGENCY)
Age: 74
Discharge: HOME | End: 2019-02-24
Attending: STUDENT IN AN ORGANIZED HEALTH CARE EDUCATION/TRAINING PROGRAM
Payer: MEDICARE

## 2019-02-24 ENCOUNTER — APPOINTMENT (OUTPATIENT)
Dept: GENERAL RADIOLOGY | Age: 74
End: 2019-02-24
Attending: STUDENT IN AN ORGANIZED HEALTH CARE EDUCATION/TRAINING PROGRAM
Payer: MEDICARE

## 2019-02-24 VITALS
SYSTOLIC BLOOD PRESSURE: 184 MMHG | RESPIRATION RATE: 14 BRPM | TEMPERATURE: 99 F | BODY MASS INDEX: 33.12 KG/M2 | HEART RATE: 99 BPM | DIASTOLIC BLOOD PRESSURE: 84 MMHG | OXYGEN SATURATION: 98 % | WEIGHT: 199 LBS

## 2019-02-24 DIAGNOSIS — J06.9 VIRAL UPPER RESPIRATORY TRACT INFECTION: Primary | ICD-10-CM

## 2019-02-24 PROCEDURE — 93005 ELECTROCARDIOGRAM TRACING: CPT

## 2019-02-24 PROCEDURE — 99282 EMERGENCY DEPT VISIT SF MDM: CPT

## 2019-02-24 PROCEDURE — 71046 X-RAY EXAM CHEST 2 VIEWS: CPT

## 2019-02-24 NOTE — DISCHARGE INSTRUCTIONS
Patient Education        Upper Respiratory Infection (Cold): Care Instructions  Your Care Instructions    An upper respiratory infection, or URI, is an infection of the nose, sinuses, or throat. URIs are spread by coughs, sneezes, and direct contact. The common cold is the most frequent kind of URI. The flu and sinus infections are other kinds of URIs. Almost all URIs are caused by viruses. Antibiotics won't cure them. But you can treat most infections with home care. This may include drinking lots of fluids and taking over-the-counter pain medicine. You will probably feel better in 4 to 10 days. The doctor has checked you carefully, but problems can develop later. If you notice any problems or new symptoms, get medical treatment right away. Follow-up care is a key part of your treatment and safety. Be sure to make and go to all appointments, and call your doctor if you are having problems. It's also a good idea to know your test results and keep a list of the medicines you take. How can you care for yourself at home? · To prevent dehydration, drink plenty of fluids, enough so that your urine is light yellow or clear like water. Choose water and other caffeine-free clear liquids until you feel better. If you have kidney, heart, or liver disease and have to limit fluids, talk with your doctor before you increase the amount of fluids you drink. · Take an over-the-counter pain medicine, such as acetaminophen (Tylenol), ibuprofen (Advil, Motrin), or naproxen (Aleve). Read and follow all instructions on the label. · Before you use cough and cold medicines, check the label. These medicines may not be safe for young children or for people with certain health problems. · Be careful when taking over-the-counter cold or flu medicines and Tylenol at the same time. Many of these medicines have acetaminophen, which is Tylenol. Read the labels to make sure that you are not taking more than the recommended dose.  Too much acetaminophen (Tylenol) can be harmful. · Get plenty of rest.  · Do not smoke or allow others to smoke around you. If you need help quitting, talk to your doctor about stop-smoking programs and medicines. These can increase your chances of quitting for good. When should you call for help? Call 911 anytime you think you may need emergency care. For example, call if:    · You have severe trouble breathing.    Call your doctor now or seek immediate medical care if:    · You seem to be getting much sicker.     · You have new or worse trouble breathing.     · You have a new or higher fever.     · You have a new rash.    Watch closely for changes in your health, and be sure to contact your doctor if:    · You have a new symptom, such as a sore throat, an earache, or sinus pain.     · You cough more deeply or more often, especially if you notice more mucus or a change in the color of your mucus.     · You do not get better as expected. Where can you learn more? Go to http://kris-lópez.info/. Enter W463 in the search box to learn more about \"Upper Respiratory Infection (Cold): Care Instructions. \"  Current as of: September 5, 2018  Content Version: 11.9  © 6172-1529 [employer]. Care instructions adapted under license by [employer] (which disclaims liability or warranty for this information). If you have questions about a medical condition or this instruction, always ask your healthcare professional. [place] [address] any warranty or liability for your use of this information.

## 2019-02-24 NOTE — ED PROVIDER NOTES
66 y.o. female with past medical history significant for gastrointestinal disorder, arthritis, hyperthyroid, bladder CA, HTN, GERD, who presents ambulatory to the ED, with chief complaint of sore throat and cough. Pt reports that she has been experiencing chest and back pain for ~1 week. She states that she also developed sore throat, cough and fever (\"99\") on 2/20/19. Pt rates her current throat pain as 4/10 in intensity. She states that she has not taken any medication for her symptoms, and has not seen her PCP because her insurance recently changed. Pt also reports that she received her flu shot in October 2018. There are no other acute medical concerns at this time. Social hx: Tobacco use (former smoker, quit date: 7/3/99); Negative for EtOH use; Negative for Illicit Drug use  PCP: Shabnam Novoa MD    Note written by Jimmy Toro, as dictated by Yasmin Garcia MD 3:22 PM      The history is provided by the patient and medical records. No  was used.         Past Medical History:   Diagnosis Date    Arthritis     Cancer [facility])     bladder    Endocrine disease     hyperthyroid    Gastrointestinal disorder     GERD (gastroesophageal reflux disease)     Hypertension     Other ill-defined conditions(799.89)     hyperthyroid       Past Surgical History:   Procedure Laterality Date    HX GYN      HX HIP REPLACEMENT Left     HX UROLOGICAL      stoma since 78         Family History:   Problem Relation Age of Onset    Heart Disease Father     Cancer Father         Lung       Social History     Socioeconomic History    Marital status:      Spouse name: Not on file    Number of children: Not on file    Years of education: Not on file    Highest education level: Not on file   Social Needs    Financial resource strain: Not on file    Food insecurity - worry: Not on file    Food insecurity - inability: Not on file    Transportation needs - medical: Not on file  Transportation needs - non-medical: Not on file   Occupational History    Not on file   Tobacco Use    Smoking status: Former Smoker     Packs/day: 1.00     Last attempt to quit: 7/3/1999     Years since quittin.6    Smokeless tobacco: Never Used   Substance and Sexual Activity    Alcohol use: No    Drug use: No    Sexual activity: No   Other Topics Concern    Not on file   Social History Narrative    Not on file         ALLERGIES: Bactrim [sulfamethoprim]; Ciprofloxacin; Diclofenac; and Sulfa (sulfonamide antibiotics)    Review of Systems   Constitutional: Positive for fever. Negative for activity change, diaphoresis and fatigue. HENT: Positive for sore throat. Negative for congestion. Eyes: Negative for photophobia and visual disturbance. Respiratory: Positive for cough. Negative for chest tightness and shortness of breath. Cardiovascular: Positive for chest pain. Negative for palpitations and leg swelling. Gastrointestinal: Negative for abdominal pain, blood in stool, constipation, diarrhea, nausea and vomiting. Genitourinary: Negative for difficulty urinating, dysuria, flank pain, frequency and hematuria. Musculoskeletal: Positive for back pain. Neurological: Negative for dizziness, syncope, numbness and headaches. All other systems reviewed and are negative. Vitals:    19 1522   BP: 184/84   Pulse: 99   Resp: 14   Temp: 99 °F (37.2 °C)   SpO2: 98%   Weight: 90.3 kg (199 lb)            Physical Exam   Constitutional: She is oriented to person, place, and time. She appears well-developed and well-nourished. HENT:   Head: Normocephalic and atraumatic. Mouth/Throat: Uvula is midline, oropharynx is clear and moist and mucous membranes are normal.   Eyes: EOM are normal. Pupils are equal, round, and reactive to light. Neck: Normal range of motion. Neck supple. Cardiovascular: Normal rate.    Pulmonary/Chest: Effort normal and breath sounds normal.   Abdominal: Soft. Bowel sounds are normal.   Neurological: She is alert and oriented to person, place, and time. Psychiatric: She has a normal mood and affect. Her behavior is normal.   Nursing note and vitals reviewed. MDM  Number of Diagnoses or Management Options  Viral upper respiratory tract infection:      Amount and/or Complexity of Data Reviewed  Tests in the radiology section of CPT®: reviewed and ordered    Risk of Complications, Morbidity, and/or Mortality  Presenting problems: low  Diagnostic procedures: low  Management options: low    Patient Progress  Patient progress: stable         Procedures     4:48 PM  Patient's results have been reviewed with them. Patient and/or family have verbally conveyed their understanding and agreement of the patient's signs, symptoms, diagnosis, treatment and prognosis and additionally agree to follow up as recommended or return to the Emergency Room should their condition change prior to follow-up. Discharge instructions have also been provided to the patient with some educational information regarding their diagnosis as well a list of reasons why they would want to return to the ER prior to their follow-up appointment should their condition change.

## 2019-02-25 LAB
ATRIAL RATE: 101 BPM
CALCULATED P AXIS, ECG09: 47 DEGREES
CALCULATED R AXIS, ECG10: -17 DEGREES
CALCULATED T AXIS, ECG11: 14 DEGREES
DIAGNOSIS, 93000: NORMAL
P-R INTERVAL, ECG05: 126 MS
Q-T INTERVAL, ECG07: 366 MS
QRS DURATION, ECG06: 90 MS
QTC CALCULATION (BEZET), ECG08: 474 MS
VENTRICULAR RATE, ECG03: 101 BPM

## 2019-03-07 ENCOUNTER — OFFICE VISIT (OUTPATIENT)
Dept: FAMILY MEDICINE CLINIC | Age: 74
End: 2019-03-07

## 2019-03-07 VITALS — WEIGHT: 199.4 LBS | RESPIRATION RATE: 20 BRPM | BODY MASS INDEX: 33.22 KG/M2 | HEIGHT: 65 IN

## 2019-03-07 DIAGNOSIS — E03.9 ACQUIRED HYPOTHYROIDISM: ICD-10-CM

## 2019-03-07 DIAGNOSIS — E87.6 HYPOKALEMIA: ICD-10-CM

## 2019-03-07 DIAGNOSIS — E55.9 VITAMIN D DEFICIENCY: ICD-10-CM

## 2019-03-07 DIAGNOSIS — Z13.220 SCREENING FOR HYPERLIPIDEMIA: ICD-10-CM

## 2019-03-07 DIAGNOSIS — Z76.89 ESTABLISHING CARE WITH NEW DOCTOR, ENCOUNTER FOR: Primary | ICD-10-CM

## 2019-03-07 NOTE — PROGRESS NOTES
CHIEF COMPLAINT:   Chief Complaint   Patient presents with    New Patient     from Dr Domenica Villegas ([facility])       HISTORY OF PRESENT ILLNESS:   66 y.o. female with past medical history significant for gastrointestinal disorder, arthritis, hyperthyroid, bladder CA, HTN, GERD,. She is here as a new patient. Mostly, it is time for her to have her TSH rechecked and she will also have fasting labs as well. PAST MEDICAL HISTORY:  Past Medical History:   Diagnosis Date    Arthritis     Cancer ([address]     bladder    Endocrine disease     hyperthyroid    Gastrointestinal disorder     GERD (gastroesophageal reflux disease)     Hypertension     Other ill-defined conditions(799.89)     hyperthyroid       PAST SURGICAL HISTORY:  Past Surgical History:   Procedure Laterality Date    HX GYN      HX HIP REPLACEMENT Left     HX UROLOGICAL      stoma since 78       MEDICATIONS:  Current Outpatient Medications   Medication Sig Dispense Refill    Syringe, Disposable, 3 mL syrg Use 1 syringe every month for your Vitamin B injections 12 Syringe 3    Safety Needles 25 x 5/8 \" ndle Use 1 syringe every month for your Vitamin B injections 12 Pen Needle 3    meloxicam (MOBIC) 15 mg tablet Take 15 mg by mouth.  cyanocobalamin (VITAMIN B12) 1,000 mcg/mL injection 1 mL by IntraMUSCular route every thirty (30) days. 12 Vial 3    levothyroxine (SYNTHROID) 75 mcg tablet Take 75 mcg by mouth Daily (before breakfast).  losartan-hydroCHLOROthiazide (HYZAAR) 50-12.5 mg per tablet Take 1 Tab by mouth every evening.  omeprazole (PRILOSEC) 20 mg capsule Take 20 mg by mouth daily.  L. acidoph & paracasei- S therm- Bifido (ALEXEI-Q/RISAQUAD) 8 billion cell cap cap Take 1 Cap by mouth daily.  7 Cap 0       ALLERGIES:  Allergies   Allergen Reactions    Bactrim [Sulfamethoprim] Nausea and Vomiting    Ciprofloxacin Other (comments)     Joint aches    Diclofenac Nausea Only    Sulfa (Sulfonamide Antibiotics) Nausea Only       SOCIAL HISTORY:   Social History     Socioeconomic History    Marital status:      Spouse name: Not on file    Number of children: 2    Years of education: Not on file    Highest education level: Not on file   Social Needs    Financial resource strain: Not on file    Food insecurity - worry: Not on file    Food insecurity - inability: Not on file   [employer] needs - medical: Not on file   [employer] needs - non-medical: Not on file   Occupational History    Retired - . Tobacco Use    Smoking status: Former Smoker     Packs/day: 1.00     Last attempt to quit: 7/3/1999     Years since quittin.6    Smokeless tobacco: Never Used   Substance and Sexual Activity    Alcohol use: No    Drug use: No    Sexual activity: No       FAMILY HISTORY:   Family History   Problem Relation Age of Onset    Heart Disease Father     Cancer Father         Lung   Mother is still living 80 and doing well. REVIEW OF SYSTEMS:  Review of Systems - Negative except for documented in the HPI. PHYSICAL EXAMINATION:  Visit Vitals  Resp 20   Ht 5' 5\" (1.651 m)   Wt 199 lb 6.4 oz (90.4 kg)   BMI 33.18 kg/m²     General Appearance:  Well developed, well nourished,alert and oriented x 3, and individual in no acute distress. Ears/Nose/Mouth/Throat:   Hearing grossly normal.         Neck: Supple. Chest:   Lungs clear to auscultation bilaterally. Cardiovascular:  Regular rate and rhythm, S1, S2 normal, no murmur. Abdomen:   Soft, non-tender, bowel sounds are active. Extremities: No edema bilaterally. Skin: Warm and dry. LABORATORY DATA:  Not available. IMPRESSION AND PLAN:   Diagnoses and all orders for this visit:    1. Establishing care with new doctor, encounter for    2. Acquired hypothyroidism  -     THYROID CASCADE PROFILE    3. Screening for hyperlipidemia  -     METABOLIC PANEL, COMPREHENSIVE  -     CBC WITH AUTOMATED DIFF  -     LIPID PANEL    4. Vitamin D deficiency  -     VITAMIN D, 25 HYDROXY    I have discussed the diagnosis with the patient and the intended treatment plan as seen in the above orders. The patient has received an after-visit summary and questions were answered concerning future plans. Asked to return should symptoms worsen or not improve with treatment. Any pending labs and studies will be relayed to patient when they become available. Pt verbalizes understanding of plan of care and denies further questions or concerns at this time. Follow-up Disposition:  Return if symptoms worsen or fail to improve. Shannan Dee MD    Patient Instructions        Well Visit, Over 72: Care Instructions  Your Care Instructions    Physical exams can help you stay healthy. Your doctor has checked your overall health and may have suggested ways to take good care of yourself. He or she also may have recommended tests. At home, you can help prevent illness with healthy eating, regular exercise, and other steps. Follow-up care is a key part of your treatment and safety. Be sure to make and go to all appointments, and call your doctor if you are having problems. It's also a good idea to know your test results and keep a list of the medicines you take. How can you care for yourself at home? · Reach and stay at a healthy weight. This will lower your risk for many problems, such as obesity, diabetes, heart disease, and high blood pressure. · Get at least 30 minutes of exercise on most days of the week. Walking is a good choice. You also may want to do other activities, such as running, swimming, cycling, or playing tennis or team sports. · Do not smoke. Smoking can make health problems worse. If you need help quitting, talk to your doctor about stop-smoking programs and medicines. These can increase your chances of quitting for good. · Protect your skin from too much sun.  When you're outdoors from 10 a.m. to 4 p.m., stay in the shade or cover up with clothing and a hat with a wide brim. Wear sunglasses that block UV rays. Even when it's cloudy, put broad-spectrum sunscreen (SPF 30 or higher) on any exposed skin. · See a dentist one or two times a year for checkups and to have your teeth cleaned. · Wear a seat belt in the car. · Limit alcohol to 2 drinks a day for men and 1 drink a day for women. Too much alcohol can cause health problems. Follow your doctor's advice about when to have certain tests. These tests can spot problems early. For men and women  · Cholesterol. Your doctor will tell you how often to have this done based on your overall health and other things that can increase your risk for heart attack and stroke. · Blood pressure. Have your blood pressure checked during a routine doctor visit. Your doctor will tell you how often to check your blood pressure based on your age, your blood pressure results, and other factors. · Diabetes. Ask your doctor whether you should have tests for diabetes. · Vision. Experts recommend that you have yearly exams for glaucoma and other age-related eye problems. · Hearing. Tell your doctor if you notice any change in your hearing. You can have tests to find out how well you hear. · Colon cancer tests. Keep having colon cancer tests as your doctor recommends. You can have one of several types of tests. · Heart attack and stroke risk. At least every 4 to 6 years, you should have your risk for heart attack and stroke assessed. Your doctor uses factors such as your age, blood pressure, cholesterol, and whether you smoke or have diabetes to show what your risk for a heart attack or stroke is over the next 10 years. · Osteoporosis. Talk to your doctor about whether you should have a bone density test to find out whether you have thinning bones. Also ask your doctor about whether you should take calcium and vitamin D supplements.   For women  · Pap test and pelvic exam. You may no longer need a Pap test. Talk with your doctor about whether to stop or continue to have Pap tests. · Breast exam and mammogram. Ask how often you should have a mammogram, which is an X-ray of your breasts. A mammogram can spot breast cancer before it can be felt and when it is easiest to treat. · Thyroid disease. Talk to your doctor about whether to have your thyroid checked as part of a regular physical exam. Women have an increased chance of a thyroid problem. For men  · Prostate exam. Talk to your doctor about whether you should have a blood test (called a PSA test) for prostate cancer. Experts disagree on whether men should have this test. Some experts recommend that you discuss the benefits and risks of the test with your doctor. · Abdominal aortic aneurysm. Ask your doctor whether you should have a test to check for an aneurysm. You may need a test if you ever smoked or if your parent, brother, sister, or child has had an aneurysm. When should you call for help? Watch closely for changes in your health, and be sure to contact your doctor if you have any problems or symptoms that concern you. Where can you learn more? Go to http://kris-lópez.info/. Enter D603 in the search box to learn more about \"Well Visit, Over 65: Care Instructions. \"  Current as of: March 28, 2018  Content Version: 11.9  © 0824-0835 [employer]. Care instructions adapted under license by [employer] (which disclaims liability or warranty for this information). If you have questions about a medical condition or this instruction, always ask your healthcare professional. [place] [address] any warranty or liability for your use of this information.

## 2019-03-07 NOTE — PATIENT INSTRUCTIONS

## 2019-03-20 ENCOUNTER — LAB ONLY (OUTPATIENT)
Dept: FAMILY MEDICINE CLINIC | Age: 74
End: 2019-03-20

## 2019-03-21 LAB
25(OH)D3+25(OH)D2 SERPL-MCNC: 19.2 NG/ML (ref 30–100)
ALBUMIN SERPL-MCNC: 4.3 G/DL (ref 3.5–4.8)
ALBUMIN/GLOB SERPL: 1.3 {RATIO} (ref 1.2–2.2)
ALP SERPL-CCNC: 61 IU/L (ref 39–117)
ALT SERPL-CCNC: 15 IU/L (ref 0–32)
AST SERPL-CCNC: 21 IU/L (ref 0–40)
BASOPHILS # BLD AUTO: 0 X10E3/UL (ref 0–0.2)
BASOPHILS NFR BLD AUTO: 1 %
BILIRUB SERPL-MCNC: 0.4 MG/DL (ref 0–1.2)
BUN SERPL-MCNC: 22 MG/DL (ref 8–27)
BUN/CREAT SERPL: 20 (ref 12–28)
CALCIUM SERPL-MCNC: 9.6 MG/DL (ref 8.7–10.3)
CHLORIDE SERPL-SCNC: 105 MMOL/L (ref 96–106)
CHOLEST SERPL-MCNC: 186 MG/DL (ref 100–199)
CO2 SERPL-SCNC: 23 MMOL/L (ref 20–29)
CREAT SERPL-MCNC: 1.12 MG/DL (ref 0.57–1)
EOSINOPHIL # BLD AUTO: 0.1 X10E3/UL (ref 0–0.4)
EOSINOPHIL NFR BLD AUTO: 3 %
ERYTHROCYTE [DISTWIDTH] IN BLOOD BY AUTOMATED COUNT: 14.6 % (ref 12.3–15.4)
GLOBULIN SER CALC-MCNC: 3.3 G/DL (ref 1.5–4.5)
GLUCOSE SERPL-MCNC: 118 MG/DL (ref 65–99)
HCT VFR BLD AUTO: 31.4 % (ref 34–46.6)
HDLC SERPL-MCNC: 50 MG/DL
HGB BLD-MCNC: 10.3 G/DL (ref 11.1–15.9)
IMM GRANULOCYTES # BLD AUTO: 0 X10E3/UL (ref 0–0.1)
IMM GRANULOCYTES NFR BLD AUTO: 0 %
INTERPRETATION, 910389: NORMAL
INTERPRETATION: NORMAL
LDLC SERPL CALC-MCNC: 92 MG/DL (ref 0–99)
LYMPHOCYTES # BLD AUTO: 0.7 X10E3/UL (ref 0.7–3.1)
LYMPHOCYTES NFR BLD AUTO: 29 %
MCH RBC QN AUTO: 29.1 PG (ref 26.6–33)
MCHC RBC AUTO-ENTMCNC: 32.8 G/DL (ref 31.5–35.7)
MCV RBC AUTO: 89 FL (ref 79–97)
MONOCYTES # BLD AUTO: 0.2 X10E3/UL (ref 0.1–0.9)
MONOCYTES NFR BLD AUTO: 8 %
NEUTROPHILS # BLD AUTO: 1.5 X10E3/UL (ref 1.4–7)
NEUTROPHILS NFR BLD AUTO: 59 %
PDF IMAGE, 910387: NORMAL
PLATELET # BLD AUTO: 114 X10E3/UL (ref 150–379)
POTASSIUM SERPL-SCNC: 4.4 MMOL/L (ref 3.5–5.2)
PROT SERPL-MCNC: 7.6 G/DL (ref 6–8.5)
RBC # BLD AUTO: 3.54 X10E6/UL (ref 3.77–5.28)
SODIUM SERPL-SCNC: 142 MMOL/L (ref 134–144)
TRIGL SERPL-MCNC: 218 MG/DL (ref 0–149)
TSH SERPL DL<=0.005 MIU/L-ACNC: 1.69 UIU/ML (ref 0.45–4.5)
VLDLC SERPL CALC-MCNC: 44 MG/DL (ref 5–40)
WBC # BLD AUTO: 2.6 X10E3/UL (ref 3.4–10.8)

## 2019-03-26 ENCOUNTER — TELEPHONE (OUTPATIENT)
Dept: FAMILY MEDICINE CLINIC | Age: 74
End: 2019-03-26

## 2019-03-26 NOTE — TELEPHONE ENCOUNTER
Please make an appointment to go over your labs. Multiple abnormalities - none major, but need to be addressed in the office and possible new medications ordered/adjusted. She will make a appt for tomorrow.

## 2019-03-27 ENCOUNTER — OFFICE VISIT (OUTPATIENT)
Dept: FAMILY MEDICINE CLINIC | Age: 74
End: 2019-03-27

## 2019-03-27 VITALS
TEMPERATURE: 97.9 F | DIASTOLIC BLOOD PRESSURE: 50 MMHG | BODY MASS INDEX: 33.39 KG/M2 | WEIGHT: 200.4 LBS | RESPIRATION RATE: 16 BRPM | HEART RATE: 90 BPM | HEIGHT: 65 IN | OXYGEN SATURATION: 96 % | SYSTOLIC BLOOD PRESSURE: 150 MMHG

## 2019-03-27 DIAGNOSIS — D61.818 PANCYTOPENIA (HCC): ICD-10-CM

## 2019-03-27 DIAGNOSIS — C67.9 MALIGNANT NEOPLASM OF URINARY BLADDER, UNSPECIFIED SITE (HCC): ICD-10-CM

## 2019-03-27 DIAGNOSIS — E78.2 ELEVATED TRIGLYCERIDES WITH HIGH CHOLESTEROL: Primary | ICD-10-CM

## 2019-03-27 DIAGNOSIS — E55.9 VITAMIN D DEFICIENCY: ICD-10-CM

## 2019-03-27 DIAGNOSIS — K43.2 INCISIONAL HERNIA, WITHOUT OBSTRUCTION OR GANGRENE: ICD-10-CM

## 2019-03-27 DIAGNOSIS — Z96.642 HISTORY OF LEFT HIP REPLACEMENT: ICD-10-CM

## 2019-03-27 DIAGNOSIS — R53.82 CHRONIC FATIGUE: ICD-10-CM

## 2019-03-27 RX ORDER — ERGOCALCIFEROL 1.25 MG/1
50000 CAPSULE ORAL
Qty: 5 CAP | Refills: 5 | Status: SHIPPED | OUTPATIENT
Start: 2019-03-27 | End: 2019-07-27 | Stop reason: SDUPTHER

## 2019-03-27 NOTE — ASSESSMENT & PLAN NOTE
This condition is managed by Specialist.  Key Oncology Meds     Patient is on no Oncologic meds. Key Pain Meds             meloxicam (MOBIC) 15 mg tablet (Taking) Take 15 mg by mouth. Lab Results   Component Value Date/Time    WBC 2.6 03/20/2019 08:22 AM    ABS.  NEUTROPHILS 1.5 03/20/2019 08:22 AM    HGB 10.3 03/20/2019 08:22 AM    HCT 31.4 03/20/2019 08:22 AM    PLATELET 321 78/25/8100 08:22 AM    Creatinine 1.12 03/20/2019 08:22 AM    BUN 22 03/20/2019 08:22 AM    ALT (SGPT) 15 03/20/2019 08:22 AM    AST (SGOT) 21 03/20/2019 08:22 AM    Albumin 4.3 03/20/2019 08:22 AM

## 2019-03-27 NOTE — PROGRESS NOTES
Chief Complaint   Patient presents with    Results   73 y/o female with hx of HTN, hypothyroidism, GERD, bladder cancer s/p urostomy,  L hip replacement (3/16) and hospital admission in 8/2018 for UTI admitted with nausea, vomiting and fever. She has been followed by Dr. Bipin Alberts for the pancytopenia which is felt to be related to the use of her Meloxicam. She has tried to decrease the use and only takes it on days where her pain is unbearable. In addition, she has a h/o bladder cancer and along the sites of her urostomy bag, she has 2-hernias. She is being evaluated for possible hernia repair. She recently presented to have fasting labs done and for the most part, the aligned with her chronic issues and still showing some pancytopenia. She is scheduled to see Dr. Bipin Alberts in July. She was also noted to have hypertriglyceridemia and we discussed this in detail including lifestyle modification and possible medications. She was also found to have a vitamin D deficiency.      She denies any overt pain except the joint pains. Results for orders placed or performed in visit on 06/13/39   METABOLIC PANEL, COMPREHENSIVE   Result Value Ref Range    Glucose 118 (H) 65 - 99 mg/dL    BUN 22 8 - 27 mg/dL    Creatinine 1.12 (H) 0.57 - 1.00 mg/dL    GFR est non-AA 48 (L) >59 mL/min/1.73    GFR est AA 55 (L) >59 mL/min/1.73    BUN/Creatinine ratio 20 12 - 28    Sodium 142 134 - 144 mmol/L    Potassium 4.4 3.5 - 5.2 mmol/L    Chloride 105 96 - 106 mmol/L    CO2 23 20 - 29 mmol/L    Calcium 9.6 8.7 - 10.3 mg/dL    Protein, total 7.6 6.0 - 8.5 g/dL    Albumin 4.3 3.5 - 4.8 g/dL    GLOBULIN, TOTAL 3.3 1.5 - 4.5 g/dL    A-G Ratio 1.3 1.2 - 2.2    Bilirubin, total 0.4 0.0 - 1.2 mg/dL    Alk.  phosphatase 61 39 - 117 IU/L    AST (SGOT) 21 0 - 40 IU/L    ALT (SGPT) 15 0 - 32 IU/L   CBC WITH AUTOMATED DIFF   Result Value Ref Range    WBC 2.6 (L) 3.4 - 10.8 x10E3/uL    RBC 3.54 (L) 3.77 - 5.28 x10E6/uL    HGB 10.3 (L) 11.1 - 15.9 g/dL HCT 31.4 (L) 34.0 - 46.6 %    MCV 89 79 - 97 fL    MCH 29.1 26.6 - 33.0 pg    MCHC 32.8 31.5 - 35.7 g/dL    RDW 14.6 12.3 - 15.4 %    PLATELET 736 (L) 168 - 379 x10E3/uL    NEUTROPHILS 59 Not Estab. %    Lymphocytes 29 Not Estab. %    MONOCYTES 8 Not Estab. %    EOSINOPHILS 3 Not Estab. %    BASOPHILS 1 Not Estab. %    ABS. NEUTROPHILS 1.5 1.4 - 7.0 x10E3/uL    Abs Lymphocytes 0.7 0.7 - 3.1 x10E3/uL    ABS. MONOCYTES 0.2 0.1 - 0.9 x10E3/uL    ABS. EOSINOPHILS 0.1 0.0 - 0.4 x10E3/uL    ABS. BASOPHILS 0.0 0.0 - 0.2 x10E3/uL    IMMATURE GRANULOCYTES 0 Not Estab. %    ABS. IMM. GRANS. 0.0 0.0 - 0.1 x10E3/uL   LIPID PANEL   Result Value Ref Range    Cholesterol, total 186 100 - 199 mg/dL    Triglyceride 218 (H) 0 - 149 mg/dL    HDL Cholesterol 50 >39 mg/dL    VLDL, calculated 44 (H) 5 - 40 mg/dL    LDL, calculated 92 0 - 99 mg/dL   THYROID CASCADE PROFILE   Result Value Ref Range    TSH 1.690 0.450 - 4.500 uIU/mL   VITAMIN D, 25 HYDROXY   Result Value Ref Range    VITAMIN D, 25-HYDROXY 19.2 (L) 30.0 - 100.0 ng/mL       Reviewed PmHx, RxHx, FmHx, SocHx, AllgHx and updated and dated in the chart. Patient Active Problem List    Diagnosis    UTI (urinary tract infection)    HTN (hypertension)    Hypokalemia    Anemia    Acquired hypothyroidism    GERD (gastroesophageal reflux disease)    Bladder cancer ([facility] [address]       Review of Systems - negative except as listed above in the HPI    Objective:     Vitals:    03/27/19 1604   BP: 150/50   Pulse: 90   Resp: 16   Temp: 97.9 °F (36.6 °C)   TempSrc: Oral   SpO2: 96%   Weight: 200 lb 6.4 oz (90.9 kg)   Height: 5' 5\" (1.651 m)     Physical Examination: General appearance - alert, well appearing, and in no distress    Assessment/ Plan:   73 y/o female with hx of HTN, hypothyroidism, GERD, bladder cancer s/p urostomy,  L hip replacement (3/16) and hospital admission in 8/2018 for UTI admitted with nausea, vomiting and fever.  She has been followed by Dr. Zack Grimes for the pancytopenia which is felt to be related to the use of her Meloxicam. She has tried to decrease the use and only takes it on days where her pain is unbearable. In addition, she has a h/o bladder cancer and along the sites of her urostomy bag, she has 2-hernias. She is being evaluated for possible hernia repair. 1. Hypertriglyceridemia:   - Employ diet and lifestyle modification.    - Mediterranean diet outline given to patient. 2. Pancytopenia:    - Followed by Dr. Gordo Bullard (reviewed note from 1/14/2019). - Meloxicam is potential culprit. suppressing her blood counts. - She will have repeat CBC in 6-months with Dr. Areli Moncada       3. Abdominal Hernia   - She is in consultation with [facility] about possible repair.    - They are not incarcerated.      4. Fatigue: Likely related to moderate anemia.    - Following. No interval worsening or improvement    5. Vitamin D deficiency    - ergocalciferol (ERGOCALCIFEROL) 50,000 unit capsule; Take 1 Cap by mouth every seven (7) days for 30 days. 6. Hx of bladder cancer (1999):    - S/p ileal conduit/ urostomy;    - Follows with Dr Alis Granado as out patient   - Urology following.     7. S/p Left hip replacement:    - Surgery was in 3/2018. - Improvement in left hip pain. - She closely followed by her orthopedist.     Diagnoses and all orders for this visit:. I have discussed the diagnosis with the patient and the intended treatment plan as seen in the above orders. The patient has received an after-visit summary and questions were answered concerning future plans. Asked to return should symptoms worsen or not improve with treatment. Any pending labs and studies will be relayed to patient when they become available. Pt verbalizes understanding of plan of care and denies further questions or concerns at this time. Follow-up and Dispositions    · Return in about 3 months (around 6/27/2019), or if symptoms worsen or fail to improve.        Drew Kovacs, MD  Patient Instructions        Adopt a Mediterranean-style lifestyle:  1. Lots of fresh, locally-grown fruits and vegetables (aim for 7 or more servings a day). 2. Complex carbohydrates like whole grains, nuts, beans and bread (with at least 4 grams of fiber per serving). Avoid the whites - white flour, sugar, white pasta, white rice. 3. Minimally processed foods (5 or fewer listed ingredients on the label). 4. Olive oil as the primary source of fat. 5. Low to moderate amounts of dairy, fish and poultry. 6. Red meat rarely (grass-fed, organic when you do eat it). 7. Low amounts of wine with meals (optional). 8. Unhurried meals with loved ones. 9. Daily exercise (30 - 60 minutes).   10.   Resources:   FindPure.gl   http://www.MySupportAssistant/health/mediterranean-diet/ZK06185     Cookbooks:   Mediterranean [place] by Jenny Barriga   The Best of Recipes for Health by Jenny Barriga   The Sprouted Kitchen by Nadege Lacey   The Food You Crave by Steffany Singh   So Easy by Steffany Singh

## 2019-03-27 NOTE — PROGRESS NOTES
All health maintenance and other pertinent information has been reviewed in preparation for today's office visit. Patient presents in the office today for:    Chief Complaint   Patient presents with    Results     1. Have you been to the ER, urgent care clinic since your last visit? Hospitalized since your last visit? No    2. Have you seen or consulted any other health care providers outside of the [address] [place] [address] since your last visit? Include any pap smears or colon screening. Yes. Follows up with Urology.

## 2019-03-27 NOTE — PATIENT INSTRUCTIONS
Adopt a Mediterranean-style lifestyle:  1. Lots of fresh, locally-grown fruits and vegetables (aim for 7 or more servings a day). 2. Complex carbohydrates like whole grains, nuts, beans and bread (with at least 4 grams of fiber per serving). Avoid the whites - white flour, sugar, white pasta, white rice. 3. Minimally processed foods (5 or fewer listed ingredients on the label). 4. Olive oil as the primary source of fat. 5. Low to moderate amounts of dairy, fish and poultry. 6. Red meat rarely (grass-fed, organic when you do eat it). 7. Low amounts of wine with meals (optional). 8. Unhurried meals with loved ones. 9. Daily exercise (30 - 60 minutes).   10.   Resources:   FindPure.gl   http://www.[employer]/health/mediterranean-diet/CD45886     Cookbooks:   Mediterranean [place] by Hayley Christianson   The Best of Recipes for Health by Hayley Christianson   The Sprouted Kitchen by Kathy Rodriguez   The Food You Crave by Arabella Silva   So Easy by Arabella Silva

## 2019-05-29 ENCOUNTER — OFFICE VISIT (OUTPATIENT)
Dept: FAMILY MEDICINE CLINIC | Age: 74
End: 2019-05-29

## 2019-05-29 VITALS
WEIGHT: 198 LBS | TEMPERATURE: 98.7 F | DIASTOLIC BLOOD PRESSURE: 72 MMHG | OXYGEN SATURATION: 97 % | RESPIRATION RATE: 18 BRPM | HEIGHT: 65 IN | SYSTOLIC BLOOD PRESSURE: 138 MMHG | HEART RATE: 124 BPM | BODY MASS INDEX: 32.99 KG/M2

## 2019-05-29 DIAGNOSIS — J01.00 ACUTE NON-RECURRENT MAXILLARY SINUSITIS: Primary | ICD-10-CM

## 2019-05-29 RX ORDER — AZITHROMYCIN 250 MG/1
TABLET, FILM COATED ORAL
Qty: 6 TAB | Refills: 0 | Status: SHIPPED | OUTPATIENT
Start: 2019-05-29 | End: 2019-06-03

## 2019-05-29 RX ORDER — ERGOCALCIFEROL 1.25 MG/1
1 CAPSULE ORAL
Refills: 5 | COMMUNITY
Start: 2019-04-29 | End: 2020-01-22 | Stop reason: SDUPTHER

## 2019-05-29 NOTE — PATIENT INSTRUCTIONS
Sinusitis: Care Instructions  Your Care Instructions    Sinusitis is an infection of the lining of the sinus cavities in your head. Sinusitis often follows a cold. It causes pain and pressure in your head and face. In most cases, sinusitis gets better on its own in 1 to 2 weeks. But some mild symptoms may last for several weeks. Sometimes antibiotics are needed. Follow-up care is a key part of your treatment and safety. Be sure to make and go to all appointments, and call your doctor if you are having problems. It's also a good idea to know your test results and keep a list of the medicines you take. How can you care for yourself at home? · Take an over-the-counter pain medicine, such as acetaminophen (Tylenol), ibuprofen (Advil, Motrin), or naproxen (Aleve). Read and follow all instructions on the label. · If the doctor prescribed antibiotics, take them as directed. Do not stop taking them just because you feel better. You need to take the full course of antibiotics. · Be careful when taking over-the-counter cold or flu medicines and Tylenol at the same time. Many of these medicines have acetaminophen, which is Tylenol. Read the labels to make sure that you are not taking more than the recommended dose. Too much acetaminophen (Tylenol) can be harmful. · Breathe warm, moist air from a steamy shower, a hot bath, or a sink filled with hot water. Avoid cold, dry air. Using a humidifier in your home may help. Follow the directions for cleaning the machine. · Use saline (saltwater) nasal washes to help keep your nasal passages open and wash out mucus and bacteria. You can buy saline nose drops at a grocery store or drugstore. Or you can make your own at home by adding 1 teaspoon of salt and 1 teaspoon of baking soda to 2 cups of distilled water. If you make your own, fill a bulb syringe with the solution, insert the tip into your nostril, and squeeze gently. Willia Slocumb your nose.   · Put a hot, wet towel or a warm gel pack on your face 3 or 4 times a day for 5 to 10 minutes each time. · Try a decongestant nasal spray like oxymetazoline (Afrin). Do not use it for more than 3 days in a row. Using it for more than 3 days can make your congestion worse. When should you call for help? Call your doctor now or seek immediate medical care if:    · You have new or worse swelling or redness in your face or around your eyes.     · You have a new or higher fever.    Watch closely for changes in your health, and be sure to contact your doctor if:    · You have new or worse facial pain.     · The mucus from your nose becomes thicker (like pus) or has new blood in it.     · You are not getting better as expected. Where can you learn more? Go to http://kris-lópez.info/. Enter H102 in the search box to learn more about \"Sinusitis: Care Instructions. \"  Current as of: March 27, 2018  Content Version: 11.9  © 1869-4396 [employer]. Care instructions adapted under license by [employer] (which disclaims liability or warranty for this information). If you have questions about a medical condition or this instruction, always ask your healthcare professional. [place] [address] any warranty or liability for your use of this information.

## 2019-05-29 NOTE — PROGRESS NOTES
Identified pt with two pt identifiers(name and ). Chief Complaint   Patient presents with    Sore Throat     Symtoms Began saturday    Nasal Congestion    Cough        Health Maintenance Due   Topic    DTaP/Tdap/Td series (1 - Tdap)    Shingrix Vaccine Age 50> (1 of 2)    GLAUCOMA SCREENING Q2Y     Bone Densitometry (Dexa) Screening     Pneumococcal 65+ years (1 of 2 - PCV13)    MEDICARE YEARLY EXAM        Wt Readings from Last 3 Encounters:   19 198 lb (89.8 kg)   19 200 lb 6.4 oz (90.9 kg)   19 199 lb 6.4 oz (90.4 kg)     Temp Readings from Last 3 Encounters:   19 98.7 °F (37.1 °C) (Oral)   19 97.9 °F (36.6 °C) (Oral)   19 99 °F (37.2 °C)     BP Readings from Last 3 Encounters:   19 138/72   19 150/50   19 184/84     Pulse Readings from Last 3 Encounters:   19 (!) 124   19 90   19 99         Learning Assessment:  :     Learning Assessment 2019   PRIMARY LEARNER Patient   PRIMARY LANGUAGE ENGLISH   LEARNER PREFERENCE PRIMARY DEMONSTRATION   ANSWERED BY patient   RELATIONSHIP SELF       Depression Screening:  :     3 most recent PHQ Screens 3/27/2019   Little interest or pleasure in doing things Not at all   Feeling down, depressed, irritable, or hopeless Not at all   Total Score PHQ 2 0       Fall Risk Assessment:  :     Fall Risk Assessment, last 12 mths 3/7/2019   Able to walk? Yes   Fall in past 12 months? No       Abuse Screening:  :     Abuse Screening Questionnaire 3/7/2019 8/3/2018   Do you ever feel afraid of your partner? N N   Are you in a relationship with someone who physically or mentally threatens you? N N   Is it safe for you to go home?  Y Y       Coordination of Care Questionnaire:  :     1) Have you been to an emergency room, urgent care clinic since your last visit? no   Hospitalized since your last visit? no             2) Have you seen or consulted any other health care providers outside of [facility] since your last visit? no  (Include any pap smears or colon screenings in this section.)    3) Do you have an Advance Directive on file? no  Are you interested in receiving information about Advance Directives? no    Reviewed record in preparation for visit and have obtained necessary documentation. Medication reconciliation up to date and corrected with patient at this time.

## 2019-05-29 NOTE — PROGRESS NOTES
HISTORY OF PRESENT ILLNESS  Chris Cage is a 66 y.o. female. HPI   Pt presents with \"sinus infection\"  Symptoms have been present for about 4 days  Sore throat  Runny nose  Nasal congestion  Mild cough  No fever  OTC: none  Review of Systems   Constitutional: Negative for fever. HENT: Positive for congestion, sinus pain and sore throat. Respiratory: Positive for cough. Gastrointestinal: Negative for diarrhea and vomiting. Physical Exam   Constitutional: She is oriented to person, place, and time. She appears well-developed and well-nourished. HENT:   Head: Normocephalic and atraumatic. Right Ear: Hearing, tympanic membrane, external ear and ear canal normal.   Left Ear: Hearing, tympanic membrane, external ear and ear canal normal.   Nose: Mucosal edema and rhinorrhea present. Mouth/Throat: Posterior oropharyngeal edema and posterior oropharyngeal erythema present. Neck: Normal range of motion. Neck supple. Cardiovascular: Normal rate, regular rhythm and normal heart sounds. Pulmonary/Chest: Effort normal and breath sounds normal.   Lymphadenopathy:     She has cervical adenopathy. Neurological: She is alert and oriented to person, place, and time. Skin: Skin is warm and dry. Psychiatric: She has a normal mood and affect. Her behavior is normal.       ASSESSMENT and PLAN    ICD-10-CM ICD-9-CM    1. Acute non-recurrent maxillary sinusitis J01.00 461.0 azithromycin (ZITHROMAX) 250 mg tablet     Educated patient about taking medication as prescribed, with food  Should stay well hydrated, and treat fever as needed    Pt informed to return to office with worsening of symptoms, or PRN with any questions or concerns. Pt verbalizes understanding of plan of care and denies further questions or concerns at this time.

## 2019-06-05 ENCOUNTER — OFFICE VISIT (OUTPATIENT)
Dept: FAMILY MEDICINE CLINIC | Age: 74
End: 2019-06-05

## 2019-06-05 VITALS
BODY MASS INDEX: 33.45 KG/M2 | RESPIRATION RATE: 20 BRPM | HEART RATE: 93 BPM | DIASTOLIC BLOOD PRESSURE: 80 MMHG | OXYGEN SATURATION: 98 % | TEMPERATURE: 98.4 F | HEIGHT: 65 IN | SYSTOLIC BLOOD PRESSURE: 132 MMHG | WEIGHT: 200.8 LBS

## 2019-06-05 DIAGNOSIS — J01.00 ACUTE NON-RECURRENT MAXILLARY SINUSITIS: Primary | ICD-10-CM

## 2019-06-05 PROBLEM — N39.0 UTI (URINARY TRACT INFECTION): Status: RESOLVED | Noted: 2018-04-22 | Resolved: 2019-06-05

## 2019-06-05 RX ORDER — AMOXICILLIN 500 MG/1
500 CAPSULE ORAL 3 TIMES DAILY
Qty: 30 CAP | Refills: 0 | Status: SHIPPED | OUTPATIENT
Start: 2019-06-05 | End: 2019-06-15

## 2019-06-05 RX ORDER — FLUTICASONE PROPIONATE 50 MCG
SPRAY, SUSPENSION (ML) NASAL
Qty: 1 BOTTLE | Refills: 2 | Status: SHIPPED | OUTPATIENT
Start: 2019-06-05 | End: 2019-08-30 | Stop reason: SDUPTHER

## 2019-06-05 NOTE — PROGRESS NOTES
Subjective:   Shanice Farr is a 66 y.o. female who complains of congestion, swollen glands, productive cough, bilateral sinus pain and hoarseness for over 7 days, unchanged since that time. She denies a history of fevers, shortness of breath and wheezing. She is scheduled for inguinal hernia repair 6/13/19 by Dr Sondra Fuller. Evaluation to date: 5/29/19. Treatment to date: antibiotics -Z Baldev. Began taking 7 days ago., OTC products. Patient does not smoke cigarettes. Relevant PMH: No pertinent additional PMH. Patient Active Problem List    Diagnosis Date Noted    HTN (hypertension) 04/18/2018    Hypokalemia 04/18/2018    Anemia 04/18/2018    Acquired hypothyroidism 04/18/2018    GERD (gastroesophageal reflux disease) 04/18/2018    Bladder cancer ([facility] [address] 04/18/2018     Allergies   Allergen Reactions    Bactrim [Sulfamethoprim] Nausea and Vomiting    Ciprofloxacin Other (comments)     Joint aches    Diclofenac Nausea Only    Sulfa (Sulfonamide Antibiotics) Nausea Only        Review of Systems  Pertinent items are noted in HPI. Objective:     Visit Vitals  /80 (BP 1 Location: Right arm, BP Patient Position: Sitting) Comment: manual   Pulse 93   Temp 98.4 °F (36.9 °C) (Oral)   Resp 20   Ht 5' 5\" (1.651 m)   Wt 200 lb 12.8 oz (91.1 kg)   SpO2 98%   BMI 33.41 kg/m²     General:  alert, cooperative, no distress   Eyes: negative   Ears: normal TM's and external ear canals AU   Sinuses: tenderness over bilateral maxillary   Mouth:  Lips, mucosa, and tongue normal. Teeth and gums normal   Neck: supple, symmetrical, trachea midline and no adenopathy. Heart: S1 and S2 normal, no murmurs noted. Lungs: clear to auscultation bilaterally   Abdomen:         Assessment/Plan:         ICD-10-CM ICD-9-CM    1. Acute non-recurrent maxillary sinusitis J01.00 461.0 amoxicillin (AMOXIL) 500 mg capsule      fluticasone propionate (FLONASE) 50 mcg/actuation nasal spray   .

## 2019-06-05 NOTE — PROGRESS NOTES
Identified pt with two pt identifiers(name and ). Chief Complaint   Patient presents with    Cold Symptoms     she saw Mateo Gillespie and took ZPac - now in chest    Hernia (Non Specific)     surgery week from tomorrow - Dr Anguiano Stains Maintenance Due   Topic    DTaP/Tdap/Td series (1 - Tdap)    Shingrix Vaccine Age 50> (1 of 2)    GLAUCOMA SCREENING Q2Y     Bone Densitometry (Dexa) Screening     Pneumococcal 65+ years (1 of 2 - PCV13)    MEDICARE YEARLY EXAM        Wt Readings from Last 3 Encounters:   19 200 lb 12.8 oz (91.1 kg)   19 198 lb (89.8 kg)   19 200 lb 6.4 oz (90.9 kg)     Temp Readings from Last 3 Encounters:   19 98.4 °F (36.9 °C) (Oral)   19 98.7 °F (37.1 °C) (Oral)   19 97.9 °F (36.6 °C) (Oral)     BP Readings from Last 3 Encounters:   19 132/80   19 138/72   19 150/50     Pulse Readings from Last 3 Encounters:   19 93   19 (!) 124   19 90         Learning Assessment:  :     Learning Assessment 2019   PRIMARY LEARNER Patient   PRIMARY LANGUAGE ENGLISH   LEARNER PREFERENCE PRIMARY DEMONSTRATION   ANSWERED BY patient   RELATIONSHIP SELF       Depression Screening:  :     3 most recent PHQ Screens 3/27/2019   Little interest or pleasure in doing things Not at all   Feeling down, depressed, irritable, or hopeless Not at all   Total Score PHQ 2 0       Fall Risk Assessment:  :     Fall Risk Assessment, last 12 mths 3/7/2019   Able to walk? Yes   Fall in past 12 months? No       Abuse Screening:  :     Abuse Screening Questionnaire 3/7/2019 8/3/2018   Do you ever feel afraid of your partner? N N   Are you in a relationship with someone who physically or mentally threatens you? N N   Is it safe for you to go home?  Y Y       Coordination of Care Questionnaire:  :     1) Have you been to an emergency room, urgent care clinic since your last visit? no   Hospitalized since your last visit? no 2) Have you seen or consulted any other health care providers outside of [address] [place] [address] since your last visit? no  (Include any pap smears or colon screenings in this section.)    3) Do you have an Advance Directive on file? yes  Are you interested in receiving information about Advance Directives? no    Reviewed record in preparation for visit and have obtained necessary documentation. Medication reconciliation up to date and corrected with patient at this time.

## 2019-07-16 LAB
BASOPHILS # BLD AUTO: 0 X10E3/UL (ref 0–0.2)
BASOPHILS NFR BLD AUTO: 1 %
EOSINOPHIL # BLD AUTO: 0.1 X10E3/UL (ref 0–0.4)
EOSINOPHIL NFR BLD AUTO: 2 %
ERYTHROCYTE [DISTWIDTH] IN BLOOD BY AUTOMATED COUNT: 12.4 % (ref 12.3–15.4)
HCT VFR BLD AUTO: 29.7 % (ref 34–46.6)
HGB BLD-MCNC: 9.7 G/DL (ref 11.1–15.9)
IMM GRANULOCYTES # BLD AUTO: 0 X10E3/UL (ref 0–0.1)
IMM GRANULOCYTES NFR BLD AUTO: 0 %
LYMPHOCYTES # BLD AUTO: 0.9 X10E3/UL (ref 0.7–3.1)
LYMPHOCYTES NFR BLD AUTO: 23 %
MCH RBC QN AUTO: 29.1 PG (ref 26.6–33)
MCHC RBC AUTO-ENTMCNC: 32.7 G/DL (ref 31.5–35.7)
MCV RBC AUTO: 89 FL (ref 79–97)
MONOCYTES # BLD AUTO: 0.3 X10E3/UL (ref 0.1–0.9)
MONOCYTES NFR BLD AUTO: 8 %
NEUTROPHILS # BLD AUTO: 2.5 X10E3/UL (ref 1.4–7)
NEUTROPHILS NFR BLD AUTO: 66 %
PLATELET # BLD AUTO: 153 X10E3/UL (ref 150–450)
RBC # BLD AUTO: 3.33 X10E6/UL (ref 3.77–5.28)
WBC # BLD AUTO: 3.7 X10E3/UL (ref 3.4–10.8)

## 2019-07-17 NOTE — PROGRESS NOTES
[id number]  Medical Oncology at [facility]  [phone]    Hematology / Oncology Progress Note      History of Present Illness:   [facility] a 66 y.o. female with HTN, remote h/o bladder cancer who comes in for follow up of pancytopenia. She was recently hospitalized after p/w nausea and vomiting since taking Bactrim for UTI as well as fever 102, found to be pancytopenic. She has a h/o of bladder cancer (1999) and underwent radical cystectomy with ileal conduit. She was evaluated in the urology office for UTI and was started on macrobid, later switched to Bactrim. This medication seemed to cause n/v. Upon questioning, [facility]. [facility] denies any previous h/o anemia except when pregnant - at which time she used to take iron. She notes occasional blood in her urine but otherwise denies any other signs of bleeding. She was switched to Levaquin, tolerated this well with eventual resolution of fevers. Workup of pancytopenia revealed low VitB12 level, and patient was started on VitB12 injections. She completed daily injections x 7 (in and out of the hospital), and she is currently on weekly injections. She states she is having significant pain in her left hip. She recently underwent left hip replacement in March 2018, and she is following closely with orthopedics. She does state that her energy level has improved after discharge from the hospital.   Patient had briefly stopped the taking the B12 injections, then she restarted based on my instructions. She continues to feel fatigued despite restarting the B12 injections. She follows with Urology for recurrent UTI. She comes in today for review of bone marrow biopsy results. Today, patient comes in for follow up. No recent infections or antibiotics. Patient states she had abdominal hernia surgery 6/13/19. She states her appetite has been low since then, but gradually improving. She is drinking Ensure once daily. No melena/hematochezia.      Past Medical History:   Diagnosis Date    Arthritis     Cancer ([facility] [address]     bladder    Endocrine disease     hyperthyroid    Gastrointestinal disorder     GERD (gastroesophageal reflux disease)     Hypertension     Other ill-defined conditions(799.89)     hyperthyroid      Past Surgical History:   Procedure Laterality Date    HX GYN      HX HIP REPLACEMENT Left     HX UROLOGICAL      stoma since 78      Social History     Tobacco Use    Smoking status: Former Smoker     Packs/day: 1.00     Last attempt to quit: 7/3/1999     Years since quittin.0    Smokeless tobacco: Never Used   Substance Use Topics    Alcohol use: No      Family History   Problem Relation Age of Onset    Heart Disease Father     Cancer Father         Lung     Current Outpatient Medications   Medication Sig    fluticasone propionate (FLONASE) 50 mcg/actuation nasal spray 2 sprays per nostril once a day    ergocalciferol (ERGOCALCIFEROL) 50,000 unit capsule Take 1 Cap by mouth every seven (7) days.  Syringe, Disposable, 3 mL syrg Use 1 syringe every month for your Vitamin B injections    Safety Oxford 25 x 5/8 \" ndle Use 1 syringe every month for your Vitamin B injections    meloxicam (MOBIC) 15 mg tablet Take 15 mg by mouth.  cyanocobalamin (VITAMIN B12) 1,000 mcg/mL injection 1 mL by IntraMUSCular route every thirty (30) days.  L. acidoph & paracasei- S therm- Bifido (ALEXEI-Q/RISAQUAD) 8 billion cell cap cap Take 1 Cap by mouth daily.  levothyroxine (SYNTHROID) 75 mcg tablet Take 75 mcg by mouth Daily (before breakfast).  losartan-hydroCHLOROthiazide (HYZAAR) 50-12.5 mg per tablet Take 1 Tab by mouth every evening.  omeprazole (PRILOSEC) 20 mg capsule Take 20 mg by mouth daily. No current facility-administered medications for this visit.        Allergies   Allergen Reactions    Bactrim [Sulfamethoprim] Nausea and Vomiting    Ciprofloxacin Other (comments)     Joint aches    Diclofenac Nausea Only    Sulfa (Sulfonamide Antibiotics) Nausea Only        Review of Systems: A complete review of systems was obtained, negative except as described above. Physical Exam:     There were no vitals taken for this visit. ECOG PS: 2  General: Well developed, no acute distress, walking with a cane. Eyes: PERRLA, EOMI, anicteric sclerae  HENT: Atraumatic, OP clear, TMs intact without erythema  Neck: Supple  Lymphatic: No cervical, supraclavicular, axillary or inguinal adenopathy  Respiratory: CTAB, normal respiratory effort  CV: Normal rate, regular rhythm, no murmurs, no peripheral edema  GI: Soft, nontender, nondistended, no masses, no hepatomegaly, no splenomegaly  MS: Digits without clubbing or cyanosis. Skin: No rashes, ecchymoses, or petechiae. Normal temperature, turgor, and texture. Neuro/Psych: Alert, oriented. 5/5 strength in all 4 extremities. Appropriate affect, normal judgment/insight. Results:     Lab Results   Component Value Date/Time    WBC 3.7 07/15/2019 01:27 PM    HGB 9.7 (L) 07/15/2019 01:27 PM    HCT 29.7 (L) 07/15/2019 01:27 PM    PLATELET 332 83/37/5460 01:27 PM    MCV 89 07/15/2019 01:27 PM    ABS.  NEUTROPHILS 2.5 07/15/2019 01:27 PM    Hemoglobin (POC) 11.9 01/03/2012 10:01 AM    Hematocrit (POC) 35 01/03/2012 10:01 AM     Lab Results   Component Value Date/Time    Sodium 142 03/20/2019 08:22 AM    Potassium 4.4 03/20/2019 08:22 AM    Chloride 105 03/20/2019 08:22 AM    CO2 23 03/20/2019 08:22 AM    Glucose 118 (H) 03/20/2019 08:22 AM    BUN 22 03/20/2019 08:22 AM    Creatinine 1.12 (H) 03/20/2019 08:22 AM    GFR est AA 55 (L) 03/20/2019 08:22 AM    GFR est non-AA 48 (L) 03/20/2019 08:22 AM    Calcium 9.6 03/20/2019 08:22 AM    Sodium (POC) 143 01/03/2012 10:01 AM    Potassium (POC) 4.1 01/03/2012 10:01 AM    Chloride (POC) 105 01/03/2012 10:01 AM    Glucose (POC) 104 (H) 04/22/2018 11:18 AM    BUN (POC) 11 01/03/2012 10:01 AM    Creatinine (POC) 1.0 01/03/2012 10:01 AM    Calcium, ionized (POC) 1.14 2012 10:01 AM     Lab Results   Component Value Date/Time    Bilirubin, total 0.4 2019 08:22 AM    ALT (SGPT) 15 2019 08:22 AM    AST (SGOT) 21 2019 08:22 AM    Alk. phosphatase 61 2019 08:22 AM    Protein, total 7.6 2019 08:22 AM    Albumin 4.3 2019 08:22 AM    Globulin 4.5 (H) 2018 06:20 AM     Lab Results   Component Value Date/Time    Iron 59 01/10/2019 08:20 AM    TIBC 314 01/10/2019 08:20 AM    Iron % saturation 19 01/10/2019 08:20 AM    Ferritin 75 01/10/2019 08:20 AM     Lab Results   Component Value Date/Time    Vitamin B12 835 2018 09:07 AM    Folate 7.0 2018 02:42 AM         Imagin2018 XR CHEST  IMPRESSION: No acute abnormality     2017 abd/pelvis CT:      FINDINGS:   Liver is nodular compatible with cirrhosis. The spleen is normal in size. The  gallbladder is not distended. The a pancreas does appear unremarkable. Ostomy  seen in the right lower quadrant. There is a small supraumbilical abdominal wall  hernia containing nondistended colon. There is no bowel wall thickening or  obstruction. Prior cystectomy. There is no free air or free fluid. No definite  adenopathy in the abdomen or pelvis. Large left renal cyst unchanged. There is  no intrarenal calcification or obstruction. IMPRESSION: No acute findings. Prior surgery.     Assessment and Recommendations:   65 y/o female with hx of HTN, hypothyroidism, GERD, bladder cancer s/p urostomy,  L hip replacement (3/16) and hospital admission in 2018 for UTI admitted with nausea, vomiting and fever.     1. Pancytopenia: Now with WBC and PLT improved to normal range. May have been related to recurrent UTI in the past. Review of prior counts show fluctuation and lows in the past as well. Lab workup in hospital revealed Vitamin B12 def and anemia of chronic disease. Retic count showed an inappropriate response which can be related to Vit B12 def.  TSH normal. The drop in PLT count from 150 to 74 could be from  medications or from the infection. She also has possible cirrhosis based on CT scan in 8/2017, but no risk factors for cirrhosis (no diabetes or alcohol use). Given the declining counts in all 3 cell lines, patient underwent bone marrow biopsy in 10/2018, and results were normal (normocellular marrow with trilineage hematopoiesis). I do not feel that Meloxicam is suppressing her blood counts. -- Return in 6 months with repeat CBC. 2. Vitamin B12 deficiency: Now with normal levels after starting on VitB12 injections approx 8/2018.  -- Continue Vitamin B12 IM 1000mcg monthly - sent script at last visit.     3. Recurrent UTI: h/o of recurrent UTIs. Completed Levaquin in the past. No recent infectoins.      4. Fatigue: Likely related to moderate anemia. 5. Hx of bladder cancer (1999): S/p ileal conduit/ urostomy; Follows with Dr Chanda Kawasaki as out patient  -- Urology following.     5. S/p Left hip replacement: Surgery was in 3/2018. Improvement in left hip pain. She is following closely with orthopedics. I appreciate the opportunity to participate in Ms. Trung Gonzalez's care.     Signed By: Ministerio Royal MD     July 18, 2019

## 2019-07-18 ENCOUNTER — OFFICE VISIT (OUTPATIENT)
Dept: ONCOLOGY | Age: 74
End: 2019-07-18

## 2019-07-18 VITALS
WEIGHT: 195 LBS | OXYGEN SATURATION: 97 % | SYSTOLIC BLOOD PRESSURE: 123 MMHG | RESPIRATION RATE: 16 BRPM | HEART RATE: 94 BPM | HEIGHT: 65 IN | BODY MASS INDEX: 32.49 KG/M2 | DIASTOLIC BLOOD PRESSURE: 75 MMHG | TEMPERATURE: 98 F

## 2019-07-18 DIAGNOSIS — N39.0 RECURRENT UTI: ICD-10-CM

## 2019-07-18 DIAGNOSIS — D63.8 ANEMIA OF CHRONIC DISEASE: ICD-10-CM

## 2019-07-18 DIAGNOSIS — D61.818 PANCYTOPENIA (HCC): Primary | ICD-10-CM

## 2019-07-18 DIAGNOSIS — D51.8 VITAMIN B12 DEFICIENCY (DIETARY) ANEMIA: ICD-10-CM

## 2019-07-18 NOTE — PROGRESS NOTES
Rahul Yu is a 66 y.o. female here today for follow up of pancytopenia. States she had hernia surgery on 6/13/19. States no appetite and has not been eating much which is causing weakness.

## 2019-07-27 DIAGNOSIS — E55.9 VITAMIN D DEFICIENCY: ICD-10-CM

## 2019-07-30 RX ORDER — ERGOCALCIFEROL 1.25 MG/1
50000 CAPSULE ORAL
Qty: 15 CAP | Refills: 1 | Status: SHIPPED | OUTPATIENT
Start: 2019-07-30 | End: 2019-08-29

## 2019-08-30 DIAGNOSIS — J01.00 ACUTE NON-RECURRENT MAXILLARY SINUSITIS: ICD-10-CM

## 2019-08-30 RX ORDER — FLUTICASONE PROPIONATE 50 MCG
SPRAY, SUSPENSION (ML) NASAL
Qty: 48 G | Refills: 0 | Status: SHIPPED | OUTPATIENT
Start: 2019-08-30 | End: 2019-11-22 | Stop reason: SDUPTHER

## 2019-09-18 ENCOUNTER — OFFICE VISIT (OUTPATIENT)
Dept: FAMILY MEDICINE CLINIC | Age: 74
End: 2019-09-18

## 2019-09-18 VITALS
OXYGEN SATURATION: 97 % | SYSTOLIC BLOOD PRESSURE: 122 MMHG | BODY MASS INDEX: 31.65 KG/M2 | WEIGHT: 190 LBS | TEMPERATURE: 97.4 F | DIASTOLIC BLOOD PRESSURE: 62 MMHG | RESPIRATION RATE: 18 BRPM | HEART RATE: 101 BPM | HEIGHT: 65 IN

## 2019-09-18 DIAGNOSIS — Z23 ENCOUNTER FOR IMMUNIZATION: ICD-10-CM

## 2019-09-18 DIAGNOSIS — Z79.899 ENCOUNTER FOR LONG-TERM CURRENT USE OF MEDICATION: ICD-10-CM

## 2019-09-18 DIAGNOSIS — M25.432 SWELLING OF JOINT OF LEFT WRIST: Primary | ICD-10-CM

## 2019-09-18 DIAGNOSIS — G89.29 CHRONIC MIDLINE LOW BACK PAIN, WITH SCIATICA PRESENCE UNSPECIFIED: ICD-10-CM

## 2019-09-18 DIAGNOSIS — I10 ESSENTIAL HYPERTENSION: ICD-10-CM

## 2019-09-18 DIAGNOSIS — D64.9 ANEMIA, UNSPECIFIED TYPE: ICD-10-CM

## 2019-09-18 DIAGNOSIS — E03.9 ACQUIRED HYPOTHYROIDISM: ICD-10-CM

## 2019-09-18 DIAGNOSIS — M54.5 CHRONIC MIDLINE LOW BACK PAIN, WITH SCIATICA PRESENCE UNSPECIFIED: ICD-10-CM

## 2019-09-18 RX ORDER — LOSARTAN POTASSIUM AND HYDROCHLOROTHIAZIDE 12.5; 5 MG/1; MG/1
1 TABLET ORAL EVERY EVENING
Qty: 90 TAB | Refills: 1 | Status: SHIPPED | OUTPATIENT
Start: 2019-09-18 | End: 2020-01-22 | Stop reason: SDUPTHER

## 2019-09-18 RX ORDER — LEVOTHYROXINE SODIUM 75 UG/1
75 TABLET ORAL
Qty: 90 TAB | Refills: 1 | Status: SHIPPED | OUTPATIENT
Start: 2019-09-18 | End: 2020-01-22 | Stop reason: SDUPTHER

## 2019-09-18 RX ORDER — GABAPENTIN 300 MG/1
300 CAPSULE ORAL
Qty: 30 CAP | Refills: 2 | Status: SHIPPED | OUTPATIENT
Start: 2019-09-18 | End: 2019-12-03

## 2019-09-18 NOTE — PROGRESS NOTES
Identified pt with two pt identifiers(name and ). Chief Complaint   Patient presents with    Medication Refill    Joint Pain     patient is trying not to take meloxicam as much due to kidney fuction    Immunization/Injection        Health Maintenance Due   Topic    DTaP/Tdap/Td series (1 - Tdap)    Shingrix Vaccine Age 50> (1 of 2)    GLAUCOMA SCREENING Q2Y     Bone Densitometry (Dexa) Screening     Pneumococcal 65+ years (1 of 2 - PCV13)    MEDICARE YEARLY EXAM     Influenza Age 5 to Adult        Wt Readings from Last 3 Encounters:   19 190 lb (86.2 kg)   19 195 lb (88.5 kg)   19 200 lb 12.8 oz (91.1 kg)     Temp Readings from Last 3 Encounters:   19 97.4 °F (36.3 °C) (Oral)   19 98 °F (36.7 °C) (Oral)   19 98.4 °F (36.9 °C) (Oral)     BP Readings from Last 3 Encounters:   19 122/62   19 123/75   19 132/80     Pulse Readings from Last 3 Encounters:   19 (!) 101   19 94   19 93         Learning Assessment:  :     Learning Assessment 2019   PRIMARY LEARNER Patient   PRIMARY LANGUAGE ENGLISH   LEARNER PREFERENCE PRIMARY DEMONSTRATION   ANSWERED BY patient   RELATIONSHIP SELF       Depression Screening:  :     3 most recent PHQ Screens 3/27/2019   Little interest or pleasure in doing things Not at all   Feeling down, depressed, irritable, or hopeless Not at all   Total Score PHQ 2 0       Fall Risk Assessment:  :     Fall Risk Assessment, last 12 mths 3/7/2019   Able to walk? Yes   Fall in past 12 months? No       Abuse Screening:  :     Abuse Screening Questionnaire 3/7/2019 8/3/2018   Do you ever feel afraid of your partner? N N   Are you in a relationship with someone who physically or mentally threatens you? N N   Is it safe for you to go home?  Y Y       Coordination of Care Questionnaire:  :     1) Have you been to an emergency room, urgent care clinic since your last visit? no   Hospitalized since your last visit? no 2) Have you seen or consulted any other health care providers outside of [address] [place] [address] since your last visit? no  (Include any pap smears or colon screenings in this section.)    3) Do you have an Advance Directive on file? no  Are you interested in receiving information about Advance Directives? no    Reviewed record in preparation for visit and have obtained necessary documentation. Medication reconciliation up to date and corrected with patient at this time.

## 2019-09-19 NOTE — PROGRESS NOTES
HISTORY OF PRESENT ILLNESS  Veronica Johnson is a 66 y.o. female. HPI  FU chronic med conditions include HTN, hypothyroidism, chronic low back pain, wrist and hand swelling, CKD, anemia. She is followed by Nephrology. Pt is unable to take NSAID's and Tylenol is not helpful for back pain. Worse once she is up and walking. Limit activity. She is due for labs. Pt is relatively new to our office. Review of Systems   Constitutional: Positive for malaise/fatigue. Musculoskeletal: Positive for back pain and joint pain. Patient Active Problem List   Diagnosis Code    HTN (hypertension) I10    Hypokalemia E87.6    Anemia D64.9    Acquired hypothyroidism E03.9    GERD (gastroesophageal reflux disease) K21.9    Bladder cancer (HCC) C67.9     Current Outpatient Medications on File Prior to Visit   Medication Sig Dispense Refill    fluticasone propionate (FLONASE) 50 mcg/actuation nasal spray INSTILL 2 SPRAYS IN EACH NOSTRIL ONCE A DAY 48 g 0    ergocalciferol (ERGOCALCIFEROL) 50,000 unit capsule Take 1 Cap by mouth every seven (7) days. 5    Syringe, Disposable, 3 mL syrg Use 1 syringe every month for your Vitamin B injections 12 Syringe 3    Safety Needles 25 x 5/8 \" ndle Use 1 syringe every month for your Vitamin B injections 12 Pen Needle 3    cyanocobalamin (VITAMIN B12) 1,000 mcg/mL injection 1 mL by IntraMUSCular route every thirty (30) days. 12 Vial 3    L. acidoph & paracasei- S therm- Bifido (ALEXEI-Q/RISAQUAD) 8 billion cell cap cap Take 1 Cap by mouth daily. 7 Cap 0    omeprazole (PRILOSEC) 20 mg capsule Take 20 mg by mouth daily. No current facility-administered medications on file prior to visit.       Visit Vitals  /62 (BP 1 Location: Right arm, BP Patient Position: Sitting) Comment: Manual   Pulse (!) 101   Temp 97.4 °F (36.3 °C) (Oral) Comment: .   Resp 18   Ht 5' 5\" (1.651 m)   Wt 190 lb (86.2 kg)   SpO2 97%   BMI 31.62 kg/m²         Physical Exam   Constitutional: She appears well-developed and well-nourished. Cardiovascular: Regular rhythm and normal heart sounds. Pulmonary/Chest: Effort normal and breath sounds normal.   Musculoskeletal:        Left wrist: She exhibits tenderness and swelling. Lumbar back: She exhibits decreased range of motion, tenderness and bony tenderness. Left hand: She exhibits deformity and swelling. Neurological: She is alert. Vitals reviewed. ASSESSMENT and PLAN    ICD-10-CM ICD-9-CM    1. Swelling of joint of left wrist M25.432 719.03 SED RATE (ESR)      C REACTIVE PROTEIN, QT      RHEUMASSURE   2. Acquired hypothyroidism E03.9 244.9 T4, FREE      TSH 3RD GENERATION      levothyroxine (SYNTHROID) 75 mcg tablet   3. Chronic midline low back pain, with sciatica presence unspecified M54.5 724.2 XR SPINE LUMB 2 OR 3 V    G89.29 338.29 gabapentin (NEURONTIN) 300 mg capsule   4. Encounter for immunization Z23 V03.89 INFLUENZA VACCINE INACTIVATED (IIV), SUBUNIT, ADJUVANTED, IM      ADMIN INFLUENZA VIRUS VAC      pneumococcal 13 chaim conj dip (PREVNAR 13, PF,) 0.5 mL syrg injection   5. Encounter for long-term current use of medication V92.631 G33.78 METABOLIC PANEL, COMPREHENSIVE   6. Anemia, unspecified type D64.9 285.9 CBC WITH AUTOMATED DIFF      IRON PROFILE      FERRITIN   7. Essential hypertension I10 401.9 losartan-hydroCHLOROthiazide (HYZAAR) 50-12.5 mg per tablet     Order labs. Trial gabapentin QHS. Med refills sent. HD FLU vaccine given.

## 2019-09-24 ENCOUNTER — LAB ONLY (OUTPATIENT)
Dept: FAMILY MEDICINE CLINIC | Age: 74
End: 2019-09-24

## 2019-09-28 LAB
14.3.3 ETA, RHEUM. ARTHRITIS: <0.2 NG/ML
ALBUMIN SERPL-MCNC: 4.3 G/DL (ref 3.5–4.8)
ALBUMIN/GLOB SERPL: 1.5 {RATIO} (ref 1.2–2.2)
ALP SERPL-CCNC: 52 IU/L (ref 39–117)
ALT SERPL-CCNC: 11 IU/L (ref 0–32)
AST SERPL-CCNC: 17 IU/L (ref 0–40)
BASOPHILS # BLD AUTO: 0 X10E3/UL (ref 0–0.2)
BASOPHILS NFR BLD AUTO: 0 %
BILIRUB SERPL-MCNC: 0.4 MG/DL (ref 0–1.2)
BUN SERPL-MCNC: 23 MG/DL (ref 8–27)
BUN/CREAT SERPL: 21 (ref 12–28)
CALCIUM SERPL-MCNC: 9.5 MG/DL (ref 8.7–10.3)
CCP IGA+IGG SERPL IA-ACNC: 90 UNITS
CHLORIDE SERPL-SCNC: 104 MMOL/L (ref 96–106)
CO2 SERPL-SCNC: 18 MMOL/L (ref 20–29)
CREAT SERPL-MCNC: 1.08 MG/DL (ref 0.57–1)
CRP SERPL-MCNC: 3 MG/L (ref 0–10)
EOSINOPHIL # BLD AUTO: 0.1 X10E3/UL (ref 0–0.4)
EOSINOPHIL NFR BLD AUTO: 2 %
ERYTHROCYTE [DISTWIDTH] IN BLOOD BY AUTOMATED COUNT: 13.3 % (ref 12.3–15.4)
ERYTHROCYTE [SEDIMENTATION RATE] IN BLOOD BY WESTERGREN METHOD: 17 MM/HR (ref 0–40)
FERRITIN SERPL-MCNC: 133 NG/ML (ref 15–150)
GLOBULIN SER CALC-MCNC: 2.8 G/DL (ref 1.5–4.5)
GLUCOSE SERPL-MCNC: 100 MG/DL (ref 65–99)
HCT VFR BLD AUTO: 28.9 % (ref 34–46.6)
HGB BLD-MCNC: 9.3 G/DL (ref 11.1–15.9)
IMM GRANULOCYTES # BLD AUTO: 0 X10E3/UL (ref 0–0.1)
IMM GRANULOCYTES NFR BLD AUTO: 0 %
INTERPRETATION: NORMAL
IRON SATN MFR SERPL: 21 % (ref 15–55)
IRON SERPL-MCNC: 55 UG/DL (ref 27–139)
LYMPHOCYTES # BLD AUTO: 0.7 X10E3/UL (ref 0.7–3.1)
LYMPHOCYTES NFR BLD AUTO: 28 %
MCH RBC QN AUTO: 29.2 PG (ref 26.6–33)
MCHC RBC AUTO-ENTMCNC: 32.2 G/DL (ref 31.5–35.7)
MCV RBC AUTO: 91 FL (ref 79–97)
MONOCYTES # BLD AUTO: 0.2 X10E3/UL (ref 0.1–0.9)
MONOCYTES NFR BLD AUTO: 7 %
NEUTROPHILS # BLD AUTO: 1.6 X10E3/UL (ref 1.4–7)
NEUTROPHILS NFR BLD AUTO: 63 %
PLATELET # BLD AUTO: 107 X10E3/UL (ref 150–450)
POTASSIUM SERPL-SCNC: 4.3 MMOL/L (ref 3.5–5.2)
PROT SERPL-MCNC: 7.1 G/DL (ref 6–8.5)
RBC # BLD AUTO: 3.19 X10E6/UL (ref 3.77–5.28)
RHEUMATOID FACT SERPL-ACNC: <14 UNITS/ML
SODIUM SERPL-SCNC: 140 MMOL/L (ref 134–144)
T4 FREE SERPL-MCNC: 1.35 NG/DL (ref 0.82–1.77)
TIBC SERPL-MCNC: 268 UG/DL (ref 250–450)
TSH SERPL DL<=0.005 MIU/L-ACNC: 0.67 UIU/ML (ref 0.45–4.5)
UIBC SERPL-MCNC: 213 UG/DL (ref 118–369)
WBC # BLD AUTO: 2.5 X10E3/UL (ref 3.4–10.8)

## 2019-09-30 ENCOUNTER — TELEPHONE (OUTPATIENT)
Dept: FAMILY MEDICINE CLINIC | Age: 74
End: 2019-09-30

## 2019-09-30 DIAGNOSIS — M06.9 RHEUMATOID ARTHRITIS, INVOLVING UNSPECIFIED SITE, UNSPECIFIED RHEUMATOID FACTOR PRESENCE: Primary | ICD-10-CM

## 2019-09-30 NOTE — TELEPHONE ENCOUNTER
Call pt. Refer to RHEUM + CCP test consistent with RA. Also pancytopenia. Does she have Fu with Dr Arlette Parham ([facility])?

## 2019-09-30 NOTE — TELEPHONE ENCOUNTER
I spoke with pt about results. Will refer her to [facility] ASAP for + CCP. Papito Lema, please set up appt and call her back with info. Let me know if she cannot get appt within reasonable time. Also I will send message to Dr Annita Medellin regarding her CBC results. She does not have FU til Jan 2020 with her.

## 2019-10-01 PROBLEM — D61.818 PANCYTOPENIA (HCC): Status: ACTIVE | Noted: 2019-10-01

## 2019-10-01 NOTE — TELEPHONE ENCOUNTER
called Dr Le Lung office and first available appt is 12/18/19 which I setup for the pt at 8am with arrival being 7:30am. Dr Nina Callejas is the only dr in the office that will take medicaid secondary policy. The office has put pt on a waiting list if they are able to get pt in sooner.   Will call pt after checking with dr castro to see if this time frame will work as they could not get pt in any earlier knowing it was an urgent matter

## 2019-10-01 NOTE — TELEPHONE ENCOUNTER
Kevin Fabian, this appt is too far off. I need her seen sooner. Can you call over to Dr Madison Miranda office to see if she can accommodate sooner appt?

## 2019-10-14 DIAGNOSIS — D63.8 ANEMIA OF CHRONIC DISEASE: ICD-10-CM

## 2019-10-14 DIAGNOSIS — D61.818 PANCYTOPENIA (HCC): ICD-10-CM

## 2019-11-07 ENCOUNTER — HOSPITAL ENCOUNTER (OUTPATIENT)
Dept: GENERAL RADIOLOGY | Age: 74
Discharge: HOME | End: 2019-11-07
Attending: FAMILY MEDICINE
Payer: MEDICARE

## 2019-11-07 ENCOUNTER — HOSPITAL ENCOUNTER (OUTPATIENT)
Dept: GENERAL RADIOLOGY | Age: 74
Discharge: HOME | End: 2019-11-07
Attending: INTERNAL MEDICINE
Payer: MEDICARE

## 2019-11-07 ENCOUNTER — TELEPHONE (OUTPATIENT)
Dept: FAMILY MEDICINE CLINIC | Age: 74
End: 2019-11-07

## 2019-11-07 DIAGNOSIS — M79.641 RIGHT HAND PAIN: ICD-10-CM

## 2019-11-07 DIAGNOSIS — M54.50 CHRONIC MIDLINE LOW BACK PAIN: ICD-10-CM

## 2019-11-07 DIAGNOSIS — G89.29 CHRONIC MIDLINE LOW BACK PAIN: ICD-10-CM

## 2019-11-07 PROCEDURE — 73130 X-RAY EXAM OF HAND: CPT

## 2019-11-07 PROCEDURE — 72100 X-RAY EXAM L-S SPINE 2/3 VWS: CPT

## 2019-11-08 NOTE — TELEPHONE ENCOUNTER
Call pt to advise her back XR shows multi level degenerative disc disease and arthritis.   Would she like to try PT or referral to [facility] back specialist?

## 2019-11-14 NOTE — TELEPHONE ENCOUNTER
She has appt with arthritis specialist next week. She said she can not tell the gabapentin is doing anything. Can she just stop taking it?

## 2019-11-22 DIAGNOSIS — J01.00 ACUTE NON-RECURRENT MAXILLARY SINUSITIS: ICD-10-CM

## 2019-11-22 RX ORDER — FLUTICASONE PROPIONATE 50 MCG
SPRAY, SUSPENSION (ML) NASAL
Qty: 48 G | Refills: 1 | Status: SHIPPED | OUTPATIENT
Start: 2019-11-22 | End: 2019-12-03 | Stop reason: SINTOL

## 2019-11-29 DIAGNOSIS — E53.8 VITAMIN B12 DEFICIENCY: ICD-10-CM

## 2019-12-02 RX ORDER — CYANOCOBALAMIN 1000 UG/ML
INJECTION, SOLUTION INTRAMUSCULAR; SUBCUTANEOUS
Qty: 12 ML | Refills: 3 | Status: SHIPPED | OUTPATIENT
Start: 2019-12-02 | End: 2020-01-17 | Stop reason: SDUPTHER

## 2019-12-03 ENCOUNTER — OFFICE VISIT (OUTPATIENT)
Dept: FAMILY MEDICINE CLINIC | Age: 74
End: 2019-12-03

## 2019-12-03 VITALS
OXYGEN SATURATION: 98 % | SYSTOLIC BLOOD PRESSURE: 128 MMHG | TEMPERATURE: 98.7 F | BODY MASS INDEX: 31.99 KG/M2 | DIASTOLIC BLOOD PRESSURE: 78 MMHG | HEIGHT: 65 IN | WEIGHT: 192 LBS | HEART RATE: 78 BPM | RESPIRATION RATE: 18 BRPM

## 2019-12-03 DIAGNOSIS — J01.90 ACUTE RHINOSINUSITIS: ICD-10-CM

## 2019-12-03 DIAGNOSIS — J06.9 URI WITH COUGH AND CONGESTION: Primary | ICD-10-CM

## 2019-12-03 RX ORDER — AZITHROMYCIN 250 MG/1
TABLET, FILM COATED ORAL
Qty: 6 TAB | Refills: 0 | Status: SHIPPED | OUTPATIENT
Start: 2019-12-03 | End: 2019-12-08

## 2019-12-03 NOTE — PROGRESS NOTES
Identified pt with two pt identifiers(name and ). Chief Complaint   Patient presents with    Cough     coughing began [address] Maintenance Due   Topic    DTaP/Tdap/Td series (1 - Tdap)    Shingrix Vaccine Age 50> (1 of 2)    GLAUCOMA SCREENING Q2Y     Bone Densitometry (Dexa) Screening     MEDICARE YEARLY EXAM        Wt Readings from Last 3 Encounters:   19 192 lb (87.1 kg)   19 190 lb (86.2 kg)   19 195 lb (88.5 kg)     Temp Readings from Last 3 Encounters:   19 98.7 °F (37.1 °C) (Oral)   19 97.4 °F (36.3 °C) (Oral)   19 98 °F (36.7 °C) (Oral)     BP Readings from Last 3 Encounters:   19 128/78   19 122/62   19 123/75     Pulse Readings from Last 3 Encounters:   19 78   19 (!) 101   19 94         Learning Assessment:  :     Learning Assessment 2019   PRIMARY LEARNER Patient   PRIMARY LANGUAGE ENGLISH   LEARNER PREFERENCE PRIMARY DEMONSTRATION   ANSWERED BY patient   RELATIONSHIP SELF       Depression Screening:  :     3 most recent PHQ Screens 3/27/2019   Little interest or pleasure in doing things Not at all   Feeling down, depressed, irritable, or hopeless Not at all   Total Score PHQ 2 0       Fall Risk Assessment:  :     Fall Risk Assessment, last 12 mths 3/7/2019   Able to walk? Yes   Fall in past 12 months? No       Abuse Screening:  :     Abuse Screening Questionnaire 3/7/2019 8/3/2018   Do you ever feel afraid of your partner? N N   Are you in a relationship with someone who physically or mentally threatens you? N N   Is it safe for you to go home?  Y Y       Coordination of Care Questionnaire:  :     1) Have you been to an emergency room, urgent care clinic since your last visit? no   Hospitalized since your last visit? no             2) Have you seen or consulted any other health care providers outside of [address] [place] [address] since your last visit? no  (Include any pap smears or colon screenings in this section.)    3) Do you have an Advance Directive on file? no  Are you interested in receiving information about Advance Directives? no    Reviewed record in preparation for visit and have obtained necessary documentation. Medication reconciliation up to date and corrected with patient at this time.

## 2019-12-03 NOTE — PATIENT INSTRUCTIONS

## 2019-12-17 LAB
BASOPHILS # BLD AUTO: 0 X10E3/UL (ref 0–0.2)
BASOPHILS NFR BLD AUTO: 1 %
EOSINOPHIL # BLD AUTO: 0.1 X10E3/UL (ref 0–0.4)
EOSINOPHIL NFR BLD AUTO: 2 %
ERYTHROCYTE [DISTWIDTH] IN BLOOD BY AUTOMATED COUNT: 13.1 % (ref 12.3–15.4)
FERRITIN SERPL-MCNC: 98 NG/ML (ref 15–150)
HCT VFR BLD AUTO: 30.9 % (ref 34–46.6)
HGB BLD-MCNC: 10.3 G/DL (ref 11.1–15.9)
IMM GRANULOCYTES # BLD AUTO: 0 X10E3/UL (ref 0–0.1)
IMM GRANULOCYTES NFR BLD AUTO: 0 %
IRON SATN MFR SERPL: 25 % (ref 15–55)
IRON SERPL-MCNC: 69 UG/DL (ref 27–139)
LYMPHOCYTES # BLD AUTO: 0.8 X10E3/UL (ref 0.7–3.1)
LYMPHOCYTES NFR BLD AUTO: 26 %
MCH RBC QN AUTO: 29.9 PG (ref 26.6–33)
MCHC RBC AUTO-ENTMCNC: 33.3 G/DL (ref 31.5–35.7)
MCV RBC AUTO: 90 FL (ref 79–97)
MONOCYTES # BLD AUTO: 0.3 X10E3/UL (ref 0.1–0.9)
MONOCYTES NFR BLD AUTO: 8 %
NEUTROPHILS # BLD AUTO: 2 X10E3/UL (ref 1.4–7)
NEUTROPHILS NFR BLD AUTO: 63 %
PLATELET # BLD AUTO: 127 X10E3/UL (ref 150–450)
RBC # BLD AUTO: 3.44 X10E6/UL (ref 3.77–5.28)
TIBC SERPL-MCNC: 273 UG/DL (ref 250–450)
UIBC SERPL-MCNC: 204 UG/DL (ref 118–369)
WBC # BLD AUTO: 3.1 X10E3/UL (ref 3.4–10.8)

## 2020-01-14 NOTE — PROGRESS NOTES
[address]  Medical Oncology at [address] [place] [address]  [phone]    Hematology / Oncology Progress Note      History of Present Illness:   Ms. Denae Carranza is a 66 y.o. female with HTN, remote h/o bladder cancer who comes in for follow up of pancytopenia. She was recently hospitalized after p/w nausea and vomiting since taking Bactrim for UTI as well as fever 102, found to be pancytopenic. She has a h/o of bladder cancer (1999) and underwent radical cystectomy with ileal conduit. She was evaluated in the urology office for UTI and was started on macrobid, later switched to Bactrim. This medication seemed to cause n/v. Upon questioning, Ms. Denae Carranza denies any previous h/o anemia except when pregnant - at which time she used to take iron. She notes occasional blood in her urine but otherwise denies any other signs of bleeding. She was switched to Levaquin, tolerated this well with eventual resolution of fevers. Workup of pancytopenia revealed low VitB12 level, and patient was started on VitB12 injections. She completed daily injections x 7 (in and out of the hospital), and she is currently on weekly injections. She states she is having significant pain in her left hip. She recently underwent left hip replacement in March 2018, and she is following closely with orthopedics. She does state that her energy level has improved after discharge from the hospital.   Patient had briefly stopped the taking the B12 injections, then she restarted based on my instructions. She continues to feel fatigued despite restarting the B12 injections. She follows with Urology for recurrent UTI. She comes in today for review of bone marrow biopsy results. Today, patient comes in for follow up. No recent infections or antibiotics. No melena/hematochezia. She states that Dr. Erum Todd in Rheumatology started her on Embrel for RA approx 3 weeks ago.       Past Medical History:   Diagnosis Date    Arthritis     Cancer [facility])     bladder  Endocrine disease     hyperthyroid    Gastrointestinal disorder     GERD (gastroesophageal reflux disease)     Hypertension     Other ill-defined conditions(799.89)     hyperthyroid      Past Surgical History:   Procedure Laterality Date    HX GYN      HX HERNIA REPAIR      HX HIP REPLACEMENT Left     HX UROLOGICAL      stoma since 78      Social History     Tobacco Use    Smoking status: Former Smoker     Packs/day: 1.00     Last attempt to quit: 7/3/1999     Years since quittin.5    Smokeless tobacco: Never Used   Substance Use Topics    Alcohol use: No      Family History   Problem Relation Age of Onset    Heart Disease Father     Cancer Father         Lung     Current Outpatient Medications   Medication Sig    cyanocobalamin (VITAMIN B12) 1,000 mcg/mL injection TAKE 1 ML BY INTRAMUSCULAR ROUTE EVERY THIRTY (30) DAYS.  losartan-hydroCHLOROthiazide (HYZAAR) 50-12.5 mg per tablet Take 1 Tab by mouth every evening. Indications: high blood pressure    levothyroxine (SYNTHROID) 75 mcg tablet Take 1 Tab by mouth Daily (before breakfast). Indications: hypothyroidism    ergocalciferol (ERGOCALCIFEROL) 50,000 unit capsule Take 1 Cap by mouth every seven (7) days.  Syringe, Disposable, 3 mL syrg Use 1 syringe every month for your Vitamin B injections    Safety [place] 25 x 5/8 \" ndle Use 1 syringe every month for your Vitamin B injections    L. acidoph & paracasei- S therm- Bifido (ALEXEI-Q/RISAQUAD) 8 billion cell cap cap Take 1 Cap by mouth daily.  omeprazole (PRILOSEC) 20 mg capsule Take 20 mg by mouth daily. No current facility-administered medications for this visit.        Allergies   Allergen Reactions    Bactrim [Sulfamethoprim] Nausea and Vomiting    Ciprofloxacin Other (comments)     Joint aches    Diclofenac Nausea Only    Sulfa (Sulfonamide Antibiotics) Nausea Only        Review of Systems: A complete review of systems was obtained, negative except as described above.    Physical Exam:     There were no vitals taken for this visit. ECOG PS: 2  General: Well developed, no acute distress, walking with a cane. Eyes: PERRLA, EOMI, anicteric sclerae  HENT: Atraumatic, OP clear, TMs intact without erythema  Neck: Supple  Lymphatic: No cervical, supraclavicular, axillary or inguinal adenopathy  Respiratory: CTAB, normal respiratory effort  CV: Normal rate, regular rhythm, no murmurs, no peripheral edema  GI: Soft, nontender, nondistended, no masses, no hepatomegaly, no splenomegaly  MS: Digits without clubbing or cyanosis. Skin: No rashes, ecchymoses, or petechiae. Normal temperature, turgor, and texture. Neuro/Psych: Alert, oriented. 5/5 strength in all 4 extremities. Appropriate affect, normal judgment/insight. Results:     Lab Results   Component Value Date/Time    WBC 3.1 (L) 12/16/2019 08:39 AM    HGB 10.3 (L) 12/16/2019 08:39 AM    HCT 30.9 (L) 12/16/2019 08:39 AM    PLATELET 278 (L) 11/20/6739 08:39 AM    MCV 90 12/16/2019 08:39 AM    ABS.  NEUTROPHILS 2.0 12/16/2019 08:39 AM    Hemoglobin (POC) 11.9 01/03/2012 10:01 AM    Hematocrit (POC) 35 01/03/2012 10:01 AM     Lab Results   Component Value Date/Time    Sodium 140 09/24/2019 08:12 AM    Potassium 4.3 09/24/2019 08:12 AM    Chloride 104 09/24/2019 08:12 AM    CO2 18 (L) 09/24/2019 08:12 AM    Glucose 100 (H) 09/24/2019 08:12 AM    BUN 23 09/24/2019 08:12 AM    Creatinine 1.08 (H) 09/24/2019 08:12 AM    GFR est AA 58 (L) 09/24/2019 08:12 AM    GFR est non-AA 50 (L) 09/24/2019 08:12 AM    Calcium 9.5 09/24/2019 08:12 AM    Sodium (POC) 143 01/03/2012 10:01 AM    Potassium (POC) 4.1 01/03/2012 10:01 AM    Chloride (POC) 105 01/03/2012 10:01 AM    Glucose (POC) 104 (H) 04/22/2018 11:18 AM    BUN (POC) 11 01/03/2012 10:01 AM    Creatinine (POC) 1.0 01/03/2012 10:01 AM    Calcium, ionized (POC) 1.14 01/03/2012 10:01 AM     Lab Results   Component Value Date/Time    Bilirubin, total 0.4 09/24/2019 08:12 AM    ALT (SGPT) 11 2019 08:12 AM    AST (SGOT) 17 2019 08:12 AM    Alk. phosphatase 52 2019 08:12 AM    Protein, total 7.1 2019 08:12 AM    Albumin 4.3 2019 08:12 AM    Globulin 4.5 (H) 2018 06:20 AM     Lab Results   Component Value Date/Time    Iron 69 2019 08:39 AM    TIBC 273 2019 08:39 AM    Iron % saturation 25 2019 08:39 AM    Ferritin 98 2019 08:39 AM     Lab Results   Component Value Date/Time    Vitamin B12 835 2018 09:07 AM    Folate 7.0 2018 02:42 AM         Imagin2018 XR CHEST  IMPRESSION: No acute abnormality     2017 abd/pelvis CT:      FINDINGS:   Liver is nodular compatible with cirrhosis. The spleen is normal in size. The  gallbladder is not distended. The a pancreas does appear unremarkable. Ostomy  seen in the right lower quadrant. There is a small supraumbilical abdominal wall  hernia containing nondistended colon. There is no bowel wall thickening or  obstruction. Prior cystectomy. There is no free air or free fluid. No definite  adenopathy in the abdomen or pelvis. Large left renal cyst unchanged. There is  no intrarenal calcification or obstruction. IMPRESSION: No acute findings. Prior surgery.     Assessment and Recommendations:   67 y/o female with hx of HTN, hypothyroidism, GERD, bladder cancer s/p urostomy,  L hip replacement (3/16) and hospital admission in 2018 for UTI admitted with nausea, vomiting and fever.     1. Pancytopenia: WBC and PLT continue to fluctuate up and down in relation to UTIs and perhaps some influence from RA. Lab workup in hospital revealed Vitamin B12 def and anemia of chronic disease. Retic count showed an inappropriate response which can be related to Vit B12 def. TSH normal. The drop in PLT count from 150 to 74 could be from  medications or from the infection. She also has possible cirrhosis based on CT scan in 2017, but no risk factors for cirrhosis (no diabetes or alcohol use). Given the declining counts in all 3 cell lines, patient underwent bone marrow biopsy in 10/2018, and results were normal (normocellular marrow with trilineage hematopoiesis). I do not feel that Meloxicam is suppressing her blood counts. -- Follow up as needed. PCP and Rheumatology can monitor counts. -- Please refer back if ANC < 1.0, Hgb < 10, PLT < 100 or with any other questions. 2. Vitamin B12 deficiency: Now with normal levels after starting on VitB12 injections approx 8/2018.  -- Continue Vitamin B12 IM 1000mcg monthly - sent script at last visit. 3. Rheumatoid arthritis: Affects her wrists, hands, shoulders. Sees Dr. Morena Blanco and was recently started on Embrel.      4. Recurrent UTI: h/o of recurrent UTIs. Completed Levaquin in the past. No recent infectoins. 5. Fatigue: Likely related to moderate anemia. 6. Hx of bladder cancer (1999): S/p ileal conduit/ urostomy; Follows with Dr Michelle Michele as out patient  -- Urology following.     7. S/p Left hip replacement: Surgery was in 3/2018. Improvement in left hip pain. She is following closely with orthopedics. I appreciate the opportunity to participate in Ms. Bk Gonzalez's care.     Signed By: Rian Cheney MD     January 17, 2020

## 2020-01-17 ENCOUNTER — OFFICE VISIT (OUTPATIENT)
Dept: ONCOLOGY | Age: 75
End: 2020-01-17

## 2020-01-17 VITALS
SYSTOLIC BLOOD PRESSURE: 126 MMHG | DIASTOLIC BLOOD PRESSURE: 76 MMHG | RESPIRATION RATE: 16 BRPM | TEMPERATURE: 97.7 F | HEIGHT: 65 IN | HEART RATE: 87 BPM | WEIGHT: 197 LBS | BODY MASS INDEX: 32.82 KG/M2 | OXYGEN SATURATION: 99 %

## 2020-01-17 DIAGNOSIS — D61.818 PANCYTOPENIA (HCC): Primary | ICD-10-CM

## 2020-01-17 DIAGNOSIS — M06.9 RHEUMATOID ARTHRITIS INVOLVING BOTH SHOULDERS, UNSPECIFIED RHEUMATOID FACTOR PRESENCE: ICD-10-CM

## 2020-01-17 DIAGNOSIS — D63.8 ANEMIA OF CHRONIC DISEASE: ICD-10-CM

## 2020-01-17 DIAGNOSIS — E53.8 VITAMIN B12 DEFICIENCY: ICD-10-CM

## 2020-01-17 DIAGNOSIS — N39.0 RECURRENT UTI: ICD-10-CM

## 2020-01-17 DIAGNOSIS — Z85.51 HISTORY OF BLADDER CANCER: ICD-10-CM

## 2020-01-17 RX ORDER — SYRINGE, DISPOSABLE, 3 ML
SYRINGE, EMPTY DISPOSABLE MISCELLANEOUS
Qty: 12 SYRINGE | Refills: 3 | Status: SHIPPED | OUTPATIENT
Start: 2020-01-17 | End: 2020-12-09

## 2020-01-17 RX ORDER — OMEPRAZOLE 20 MG/1
20 CAPSULE, DELAYED RELEASE ORAL DAILY
Qty: 90 CAP | Refills: 1 | Status: SHIPPED | OUTPATIENT
Start: 2020-01-17 | End: 2020-08-19

## 2020-01-17 RX ORDER — ETANERCEPT 50 MG/ML
SOLUTION SUBCUTANEOUS
COMMUNITY
Start: 2019-12-13

## 2020-01-17 RX ORDER — CYANOCOBALAMIN 1000 UG/ML
1000 INJECTION, SOLUTION INTRAMUSCULAR; SUBCUTANEOUS
Qty: 12 ML | Refills: 3
Start: 2020-01-17 | End: 2021-02-11

## 2020-01-22 ENCOUNTER — OFFICE VISIT (OUTPATIENT)
Dept: FAMILY MEDICINE CLINIC | Age: 75
End: 2020-01-22

## 2020-01-22 VITALS
BODY MASS INDEX: 33.15 KG/M2 | WEIGHT: 199 LBS | HEART RATE: 84 BPM | SYSTOLIC BLOOD PRESSURE: 126 MMHG | DIASTOLIC BLOOD PRESSURE: 60 MMHG | OXYGEN SATURATION: 97 % | RESPIRATION RATE: 20 BRPM | HEIGHT: 65 IN | TEMPERATURE: 98.3 F

## 2020-01-22 DIAGNOSIS — I10 ESSENTIAL HYPERTENSION: ICD-10-CM

## 2020-01-22 DIAGNOSIS — E03.9 ACQUIRED HYPOTHYROIDISM: ICD-10-CM

## 2020-01-22 DIAGNOSIS — E53.8 B12 DEFICIENCY: ICD-10-CM

## 2020-01-22 DIAGNOSIS — Z23 NEED FOR SHINGLES VACCINE: ICD-10-CM

## 2020-01-22 DIAGNOSIS — Z00.00 MEDICARE ANNUAL WELLNESS VISIT, SUBSEQUENT: Primary | ICD-10-CM

## 2020-01-22 DIAGNOSIS — M06.9 RHEUMATOID ARTHRITIS, INVOLVING UNSPECIFIED SITE, UNSPECIFIED RHEUMATOID FACTOR PRESENCE: ICD-10-CM

## 2020-01-22 DIAGNOSIS — Z79.899 ENCOUNTER FOR LONG-TERM CURRENT USE OF MEDICATION: ICD-10-CM

## 2020-01-22 DIAGNOSIS — E55.9 VITAMIN D DEFICIENCY: ICD-10-CM

## 2020-01-22 PROBLEM — C67.9 BLADDER CANCER (HCC): Status: RESOLVED | Noted: 2018-04-18 | Resolved: 2020-01-22

## 2020-01-22 RX ORDER — ERGOCALCIFEROL 1.25 MG/1
50000 CAPSULE ORAL
Qty: 15 CAP | Refills: 3 | Status: SHIPPED | OUTPATIENT
Start: 2020-01-22 | End: 2021-02-24

## 2020-01-22 RX ORDER — LEVOTHYROXINE SODIUM 75 UG/1
75 TABLET ORAL
Qty: 90 TAB | Refills: 1 | Status: SHIPPED | OUTPATIENT
Start: 2020-01-22 | End: 2020-10-22

## 2020-01-22 RX ORDER — LOSARTAN POTASSIUM AND HYDROCHLOROTHIAZIDE 12.5; 5 MG/1; MG/1
1 TABLET ORAL EVERY EVENING
Qty: 90 TAB | Refills: 1 | Status: SHIPPED | OUTPATIENT
Start: 2020-01-22 | End: 2020-10-23 | Stop reason: DRUGHIGH

## 2020-01-22 NOTE — PROGRESS NOTES
This is the Subsequent Medicare Annual Wellness Exam, performed 12 months or more after the Initial AWV or the last Subsequent AWV    I have reviewed the patient's medical history in detail and updated the computerized patient record. History     Patient Active Problem List   Diagnosis Code    HTN (hypertension) I10    Hypokalemia E87.6    Anemia D64.9    Acquired hypothyroidism E03.9    GERD (gastroesophageal reflux disease) K21.9    Pancytopenia (HCC) K01.407     Past Medical History:   Diagnosis Date    Arthritis     Cancer ([facility] [address]     bladder    Endocrine disease     hyperthyroid    Gastrointestinal disorder     GERD (gastroesophageal reflux disease)     Hypertension     Other ill-defined conditions(799.89)     hyperthyroid      Past Surgical History:   Procedure Laterality Date    HX GYN      HX HERNIA REPAIR      HX HIP REPLACEMENT Left     HX UROLOGICAL      stoma since 89     Current Outpatient Medications   Medication Sig Dispense Refill    varicella-zoster recombinant, PF, (SHINGRIX, PF,) 50 mcg/0.5 mL susr injection 0.5mL by IntraMUSCular route once now and then repeat in 2-6 months 0.5 mL 1    losartan-hydroCHLOROthiazide (HYZAAR) 50-12.5 mg per tablet Take 1 Tab by mouth every evening. Indications: high blood pressure 90 Tab 1    levothyroxine (SYNTHROID) 75 mcg tablet Take 1 Tab by mouth Daily (before breakfast). Indications: a condition with low thyroid hormone levels 90 Tab 1    ergocalciferol (ERGOCALCIFEROL) 1,250 mcg (50,000 unit) capsule Take 1 Cap by mouth every seven (7) days. Indications: low vitamin D levels 15 Cap 3    omeprazole (PRILOSEC) 20 mg capsule Take 1 Cap by mouth daily. 90 Cap 1    ENBREL MINI 50 mg/mL (1 mL) crtg every seven (7) days.       Safety [place] 25 x 5/8 \" ndle Use 1 syringe every month for your Vitamin B injections 12 Pen Needle 3    Syringe, Disposable, 3 mL syrg Use 1 syringe every month for your Vitamin B injections 12 Syringe 3    cyanocobalamin (VITAMIN B12) 1,000 mcg/mL injection 1 mL by SubCUTAneous route every thirty (30) days. 12 mL 3     Allergies   Allergen Reactions    Bactrim [Sulfamethoprim] Nausea and Vomiting    Ciprofloxacin Other (comments)     Joint aches    Diclofenac Nausea Only    Sulfa (Sulfonamide Antibiotics) Nausea Only       Family History   Problem Relation Age of Onset    Heart Disease Father     Cancer Father         Lung     Social History     Tobacco Use    Smoking status: Former Smoker     Packs/day: 1.00     Last attempt to quit: 7/3/1999     Years since quittin.5    Smokeless tobacco: Never Used   Substance Use Topics    Alcohol use: No       Depression Risk Factor Screening:     3 most recent PHQ Screens 3/27/2019   Little interest or pleasure in doing things Not at all   Feeling down, depressed, irritable, or hopeless Not at all   Total Score PHQ 2 0       Alcohol Risk Factor Screening:   Do you average 1 drink per night or more than 7 drinks a week:  No    On any one occasion in the past three months have you have had more than 3 drinks containing alcohol:  No      Functional Ability and Level of Safety:   Hearing: The patient needs further evaluation. Activities of Daily Living: The home contains: [employer]  Patient does total self care    Ambulation: with mild difficulty    Fall Risk:  Fall Risk Assessment, last 12 mths 3/7/2019   Able to walk? Yes   Fall in past 12 months?  No       Abuse Screen:  Patient is not abused    Cognitive Screening   Has your family/caregiver stated any concerns about your memory: no      Patient Care Team   Patient Care Team:  Joselin Kelly MD as PCP - General (Family Practice)  Joselin Kelly MD as PCP - REHABILITATION [facility] Empaneled Provider  Avinash Rees MD (Urology)    Assessment/Plan   Education and counseling provided:  Are appropriate based on today's review and evaluation  End-of-Life planning (with patient's consent)  Pneumococcal Vaccine  Influenza Vaccine  Screening Mammography  Colorectal cancer screening tests  Cardiovascular screening blood test  Bone mass measurement (DEXA)  Screening for glaucoma  Diabetes screening test    Diagnoses and all orders for this visit:    1. Medicare annual wellness visit, subsequent    2. Need for shingles vaccine  -     varicella-zoster recombinant, PF, (SHINGRIX, PF,) 50 mcg/0.5 mL susr injection; 0.5mL by IntraMUSCular route once now and then repeat in 2-6 months    3. Essential hypertension  -     losartan-hydroCHLOROthiazide (HYZAAR) 50-12.5 mg per tablet; Take 1 Tab by mouth every evening. Indications: high blood pressure  -     LIPID PANEL; Future    4. Acquired hypothyroidism  -     levothyroxine (SYNTHROID) 75 mcg tablet; Take 1 Tab by mouth Daily (before breakfast). Indications: a condition with low thyroid hormone levels  -     TSH 3RD GENERATION; Future  -     T4, FREE; Future    5. Vitamin D deficiency  -     ergocalciferol (ERGOCALCIFEROL) 1,250 mcg (50,000 unit) capsule; Take 1 Cap by mouth every seven (7) days. Indications: low vitamin D levels  -     VITAMIN D, 25 HYDROXY; Future    6. Encounter for long-term current use of medication  -     METABOLIC PANEL, COMPREHENSIVE; Future  -     CBC WITH AUTOMATED DIFF; Future    7. B12 deficiency  -     VITAMIN B12; Future    8. Rheumatoid arthritis, involving unspecified site, unspecified rheumatoid factor presence (HCC)  -     C REACTIVE PROTEIN, QT; Future  -     SED RATE (ESR); Future        Health Maintenance Due   Topic Date Due    DTaP/Tdap/Td series (1 - Tdap) 02/18/1956    Shingrix Vaccine Age 50> (1 of 2) 02/18/1993    GLAUCOMA SCREENING Q2Y  02/18/2008    Bone Densitometry (Dexa) Screening  02/18/2008       Subjective:     Sina Bosch is a 66 y.o. female who presents for follow up of hypertension and obesity.     Diet and Lifestyle: generally follows a low fat low cholesterol diet, generally follows a low sodium diet, sedentary, nonsmoker  Home BP Monitoring: is not measured at home    Cardiovascular ROS: taking medications as instructed, no medication side effects noted, no TIA's, no chest pain on exertion, no dyspnea on exertion, no swelling of ankles. New concerns: she has started on med for RA 1 month ago. Waiting for it to work. .     Patient Active Problem List    Diagnosis Date Noted    Pancytopenia ([facility] [address] 10/01/2019    HTN (hypertension) 04/18/2018    Hypokalemia 04/18/2018    Anemia 04/18/2018    Acquired hypothyroidism 04/18/2018    GERD (gastroesophageal reflux disease) 04/18/2018     Current Outpatient Medications   Medication Sig Dispense Refill    varicella-zoster recombinant, PF, (SHINGRIX, PF,) 50 mcg/0.5 mL susr injection 0.5mL by IntraMUSCular route once now and then repeat in 2-6 months 0.5 mL 1    losartan-hydroCHLOROthiazide (HYZAAR) 50-12.5 mg per tablet Take 1 Tab by mouth every evening. Indications: high blood pressure 90 Tab 1    levothyroxine (SYNTHROID) 75 mcg tablet Take 1 Tab by mouth Daily (before breakfast). Indications: a condition with low thyroid hormone levels 90 Tab 1    ergocalciferol (ERGOCALCIFEROL) 1,250 mcg (50,000 unit) capsule Take 1 Cap by mouth every seven (7) days. Indications: low vitamin D levels 15 Cap 3    omeprazole (PRILOSEC) 20 mg capsule Take 1 Cap by mouth daily. 90 Cap 1    ENBREL MINI 50 mg/mL (1 mL) crtg every seven (7) days.  Safety [place] 25 x 5/8 \" ndle Use 1 syringe every month for your Vitamin B injections 12 Pen Needle 3    Syringe, Disposable, 3 mL syrg Use 1 syringe every month for your Vitamin B injections 12 Syringe 3    cyanocobalamin (VITAMIN B12) 1,000 mcg/mL injection 1 mL by SubCUTAneous route every thirty (30) days.  12 mL 3     Allergies   Allergen Reactions    Bactrim [Sulfamethoprim] Nausea and Vomiting    Ciprofloxacin Other (comments)     Joint aches    Diclofenac Nausea Only    Sulfa (Sulfonamide Antibiotics) Nausea Only     Past Medical History: Diagnosis Date    Arthritis     Cancer [facility])     bladder    Endocrine disease     hyperthyroid    Gastrointestinal disorder     GERD (gastroesophageal reflux disease)     Hypertension     Other ill-defined conditions(799.89)     hyperthyroid     Past Surgical History:   Procedure Laterality Date    HX GYN      HX HERNIA REPAIR      HX HIP REPLACEMENT Left     HX UROLOGICAL      stoma since 78     Family History   Problem Relation Age of Onset    Heart Disease Father     Cancer Father         Lung     Social History     Tobacco Use    Smoking status: Former Smoker     Packs/day: 1.00     Last attempt to quit: 7/3/1999     Years since quittin.5    Smokeless tobacco: Never Used   Substance Use Topics    Alcohol use: No             Review of Systems, additional:  Pertinent items are noted in HPI. Objective:     Visit Vitals  /60 (BP 1 Location: Right arm, BP Patient Position: Sitting) Comment: manual   Pulse 84   Temp 98.3 °F (36.8 °C) (Oral)   Resp 20   Ht 5' 5\" (1.651 m)   Wt 199 lb (90.3 kg)   SpO2 97%   BMI 33.12 kg/m²     Appearance: alert, well appearing, and in no distress and overweight. General exam: CVS exam BP noted to be well controlled today in office, S1, S2 normal, no gallop, no murmur, chest clear, no JVD, no HSM, no edema. Lab review: orders written for new lab studies as appropriate; see orders. Assessment/Plan:     .  orders and follow up as documented in patient record. ICD-10-CM ICD-9-CM    1. Medicare annual wellness visit, subsequent Z00.00 V70.0    2. Need for shingles vaccine Z23 V04.89 varicella-zoster recombinant, PF, (SHINGRIX, PF,) 50 mcg/0.5 mL susr injection   3. Essential hypertension I10 401.9 losartan-hydroCHLOROthiazide (HYZAAR) 50-12.5 mg per tablet      LIPID PANEL   4. Acquired hypothyroidism E03.9 244.9 levothyroxine (SYNTHROID) 75 mcg tablet      TSH 3RD GENERATION      T4, FREE   5.  Vitamin D deficiency E55.9 268.9 ergocalciferol (ERGOCALCIFEROL) 1,250 mcg (50,000 unit) capsule      VITAMIN D, 25 HYDROXY   6. Encounter for long-term current use of medication E25.098 S81.93 METABOLIC PANEL, COMPREHENSIVE      CBC WITH AUTOMATED DIFF   7. B12 deficiency E53.8 266.2 VITAMIN B12   8. Rheumatoid arthritis, involving unspecified site, unspecified rheumatoid factor presence (HCC) M06.9 714.0 C REACTIVE PROTEIN, QT      SED RATE (ESR)     Order labs. Share with [facility] Dr Kimberley Mueller. Med refills.

## 2020-01-22 NOTE — PROGRESS NOTES
Identified pt with two pt identifiers(name and ). Chief Complaint   Patient presents with    Medication Evaluation        Health Maintenance Due   Topic    DTaP/Tdap/Td series (1 - Tdap)    Shingrix Vaccine Age 50> (1 of 2)    GLAUCOMA SCREENING Q2Y     Bone Densitometry (Dexa) Screening        Wt Readings from Last 3 Encounters:   20 199 lb (90.3 kg)   20 197 lb (89.4 kg)   19 192 lb (87.1 kg)     Temp Readings from Last 3 Encounters:   20 98.3 °F (36.8 °C) (Oral)   20 97.7 °F (36.5 °C) (Oral)   19 98.7 °F (37.1 °C) (Oral)     BP Readings from Last 3 Encounters:   20 126/60   20 126/76   19 128/78     Pulse Readings from Last 3 Encounters:   20 84   20 87   19 78         Learning Assessment:  :     Learning Assessment 2019   PRIMARY LEARNER Patient   PRIMARY LANGUAGE ENGLISH   LEARNER PREFERENCE PRIMARY DEMONSTRATION   ANSWERED BY patient   RELATIONSHIP SELF       Depression Screening:  :     3 most recent PHQ Screens 2020   Little interest or pleasure in doing things Not at all   Feeling down, depressed, irritable, or hopeless Not at all   Total Score PHQ 2 0       Fall Risk Assessment:  :     Fall Risk Assessment, last 12 mths 2020   Able to walk? Yes   Fall in past 12 months? No       Abuse Screening:  :     Abuse Screening Questionnaire 2020 3/7/2019 8/3/2018   Do you ever feel afraid of your partner? N N N   Are you in a relationship with someone who physically or mentally threatens you? N N N   Is it safe for you to go home? Anaid Hester       Coordination of Care Questionnaire:  :     1) Have you been to an emergency room, urgent care clinic since your last visit? no   Hospitalized since your last visit? no             2) Have you seen or consulted any other health care providers outside of [address] [place] [address] since your last visit?  yes  Dr Ildefonso Cobb (Include any pap smears or colon screenings in this section.)    3) Do you have an Advance Directive on file? yes  Are you interested in receiving information about Advance Directives? no    Reviewed record in preparation for visit and have obtained necessary documentation. Medication reconciliation up to date and corrected with patient at this time.

## 2020-01-22 NOTE — PATIENT INSTRUCTIONS
Medicare Wellness Visit, Female     The best way to live healthy is to have a lifestyle where you eat a well-balanced diet, exercise regularly, limit alcohol use, and quit all forms of tobacco/nicotine, if applicable. Regular preventive services are another way to keep healthy. Preventive services (vaccines, screening tests, monitoring & exams) can help personalize your care plan, which helps you manage your own care. Screening tests can find health problems at the earliest stages, when they are easiest to treat. [facility] follows the current, evidence-based guidelines published by the [facility] Darrel Tirado ([facility]) when recommending preventive services for our patients. Because we follow these guidelines, sometimes recommendations change over time as research supports it. (For example, mammograms used to be recommended annually. Even though Medicare will still pay for an annual mammogram, the newer guidelines recommend a mammogram every two years for women of average risk). Of course, you and your doctor may decide to screen more often for some diseases, based on your risk and your co-morbidities (chronic disease you are already diagnosed with). Preventive services for you include:  - Medicare offers their members a free annual wellness visit, which is time for you and your primary care provider to discuss and plan for your preventive service needs. Take advantage of this benefit every year!  -All adults over the age of 72 should receive the recommended pneumonia vaccines. Current USPSTF guidelines recommend a series of two vaccines for the best pneumonia protection.   -All adults should have a flu vaccine yearly and a tetanus vaccine every 10 years.   -All adults age 48 and older should receive the shingles vaccines (series of two vaccines).       -All adults age 38-68 who are overweight should have a diabetes screening test once every three years.   -All adults born between [birth year] and 1965 should be screened once for Hepatitis C.  -Other screening tests and preventive services for persons with diabetes include: an eye exam to screen for diabetic retinopathy, a kidney function test, a foot exam, and stricter control over your cholesterol.   -Cardiovascular screening for adults with routine risk involves an electrocardiogram (ECG) at intervals determined by your doctor.   -Colorectal cancer screenings should be done for adults age 54-65 with no increased risk factors for colorectal cancer. There are a number of acceptable methods of screening for this type of cancer. Each test has its own benefits and drawbacks. Discuss with your doctor what is most appropriate for you during your annual wellness visit. The different tests include: colonoscopy (considered the best screening method), a fecal occult blood test, a fecal DNA test, and sigmoidoscopy.    -A bone mass density test is recommended when a woman turns 65 to screen for osteoporosis. This test is only recommended one time, as a screening. Some providers will use this same test as a disease monitoring tool if you already have osteoporosis. -Breast cancer screenings are recommended every other year for women of normal risk, age 54-69.  -Cervical cancer screenings for women over age 72 are only recommended with certain risk factors.      Here is a list of your current Health Maintenance items (your personalized list of preventive services) with a due date:  Health Maintenance Due   Topic Date Due    DTaP/Tdap/Td  (1 - Tdap) 02/18/1956    Shingles Vaccine (1 of 2) 02/18/1993    Glaucoma Screening   02/18/2008    Bone Mineral Density   02/18/2008    Annual Well Visit  10/08/2018

## 2020-01-27 ENCOUNTER — HOSPITAL ENCOUNTER (OUTPATIENT)
Dept: LAB | Age: 75
Discharge: HOME | End: 2020-01-27

## 2020-01-27 DIAGNOSIS — I10 ESSENTIAL HYPERTENSION: ICD-10-CM

## 2020-01-27 DIAGNOSIS — E55.9 VITAMIN D DEFICIENCY: ICD-10-CM

## 2020-01-27 DIAGNOSIS — E53.8 B12 DEFICIENCY: ICD-10-CM

## 2020-01-27 DIAGNOSIS — M06.9 RHEUMATOID ARTHRITIS, INVOLVING UNSPECIFIED SITE, UNSPECIFIED RHEUMATOID FACTOR PRESENCE: ICD-10-CM

## 2020-01-27 DIAGNOSIS — Z79.899 ENCOUNTER FOR LONG-TERM CURRENT USE OF MEDICATION: ICD-10-CM

## 2020-01-27 DIAGNOSIS — E03.9 ACQUIRED HYPOTHYROIDISM: ICD-10-CM

## 2020-01-27 LAB
ALBUMIN SERPL-MCNC: 3.9 G/DL (ref 3.5–5)
ALBUMIN/GLOB SERPL: 1.1 {RATIO} (ref 1.1–2.2)
ALP SERPL-CCNC: 57 U/L (ref 45–117)
ALT SERPL-CCNC: 18 U/L (ref 12–78)
ANION GAP SERPL CALC-SCNC: 4 MMOL/L (ref 5–15)
AST SERPL-CCNC: 22 U/L (ref 15–37)
BILIRUB SERPL-MCNC: 0.7 MG/DL (ref 0.2–1)
BUN SERPL-MCNC: 24 MG/DL (ref 6–20)
BUN/CREAT SERPL: 20 (ref 12–20)
CALCIUM SERPL-MCNC: 9.2 MG/DL (ref 8.5–10.1)
CHLORIDE SERPL-SCNC: 108 MMOL/L (ref 97–108)
CHOLEST SERPL-MCNC: 174 MG/DL
CO2 SERPL-SCNC: 26 MMOL/L (ref 21–32)
CREAT SERPL-MCNC: 1.19 MG/DL (ref 0.55–1.02)
CRP SERPL-MCNC: <0.29 MG/DL (ref 0–0.6)
GLOBULIN SER CALC-MCNC: 3.7 G/DL (ref 2–4)
GLUCOSE SERPL-MCNC: 105 MG/DL (ref 65–100)
HDLC SERPL-MCNC: 54 MG/DL
HDLC SERPL: 3.2 {RATIO} (ref 0–5)
LDLC SERPL CALC-MCNC: 83.4 MG/DL (ref 0–100)
LIPID PROFILE,FLP: ABNORMAL
POTASSIUM SERPL-SCNC: 3.7 MMOL/L (ref 3.5–5.1)
PROT SERPL-MCNC: 7.6 G/DL (ref 6.4–8.2)
SODIUM SERPL-SCNC: 138 MMOL/L (ref 136–145)
T4 FREE SERPL-MCNC: 1 NG/DL (ref 0.8–1.5)
TRIGL SERPL-MCNC: 183 MG/DL (ref ?–150)
TSH SERPL DL<=0.05 MIU/L-ACNC: 1.37 UIU/ML (ref 0.36–3.74)
VIT B12 SERPL-MCNC: 489 PG/ML (ref 193–986)
VLDLC SERPL CALC-MCNC: 36.6 MG/DL

## 2020-01-28 LAB
25(OH)D3 SERPL-MCNC: 78.8 NG/ML (ref 30–100)
BASOPHILS # BLD: 0 K/UL (ref 0–0.1)
BASOPHILS NFR BLD: 1 % (ref 0–1)
DIFFERENTIAL METHOD BLD: ABNORMAL
EOSINOPHIL # BLD: 0 K/UL (ref 0–0.4)
EOSINOPHIL NFR BLD: 2 % (ref 0–7)
ERYTHROCYTE [DISTWIDTH] IN BLOOD BY AUTOMATED COUNT: 13.4 % (ref 11.5–14.5)
ERYTHROCYTE [SEDIMENTATION RATE] IN BLOOD: 47 MM/HR (ref 0–30)
HCT VFR BLD AUTO: 32.4 % (ref 35–47)
HGB BLD-MCNC: 9.9 G/DL (ref 11.5–16)
IMM GRANULOCYTES # BLD AUTO: 0 K/UL (ref 0–0.04)
IMM GRANULOCYTES NFR BLD AUTO: 0 % (ref 0–0.5)
LYMPHOCYTES # BLD: 0.7 K/UL (ref 0.8–3.5)
LYMPHOCYTES NFR BLD: 33 % (ref 12–49)
MCH RBC QN AUTO: 29.5 PG (ref 26–34)
MCHC RBC AUTO-ENTMCNC: 30.6 G/DL (ref 30–36.5)
MCV RBC AUTO: 96.4 FL (ref 80–99)
MONOCYTES # BLD: 0.2 K/UL (ref 0–1)
MONOCYTES NFR BLD: 8 % (ref 5–13)
NEUTS SEG # BLD: 1.2 K/UL (ref 1.8–8)
NEUTS SEG NFR BLD: 56 % (ref 32–75)
NRBC # BLD: 0 K/UL (ref 0–0.01)
NRBC BLD-RTO: 0 PER 100 WBC
PLATELET # BLD AUTO: 109 K/UL (ref 150–400)
PMV BLD AUTO: 11.9 FL (ref 8.9–12.9)
RBC # BLD AUTO: 3.36 M/UL (ref 3.8–5.2)
RBC MORPH BLD: ABNORMAL
RBC MORPH BLD: ABNORMAL
WBC # BLD AUTO: 2.1 K/UL (ref 3.6–11)

## 2020-01-30 ENCOUNTER — TELEPHONE (OUTPATIENT)
Dept: FAMILY MEDICINE CLINIC | Age: 75
End: 2020-01-30

## 2020-06-29 ENCOUNTER — VIRTUAL VISIT (OUTPATIENT)
Dept: FAMILY MEDICINE CLINIC | Age: 75
End: 2020-06-29

## 2020-06-29 ENCOUNTER — TELEPHONE (OUTPATIENT)
Dept: FAMILY MEDICINE CLINIC | Age: 75
End: 2020-06-29

## 2020-06-29 DIAGNOSIS — M06.9 RHEUMATOID ARTHRITIS INVOLVING MULTIPLE SITES, UNSPECIFIED RHEUMATOID FACTOR PRESENCE: ICD-10-CM

## 2020-06-29 NOTE — PROGRESS NOTES
TROY Meza I was trying to set [facility] up for her appt with Dr Татьяна Rolon X 2. Per Isidoro Cockayne she moved her appt to tomorrow morning with Dr Renae Kolb.

## 2020-06-29 NOTE — TELEPHONE ENCOUNTER
Pt's family calling in regards to the pt's appt that was scheduled for 1:30pm - video feed does not work and never got a text message to try (asked nurse who stated will have to try later having to bring another pt back now)    Call [phone] phone call only land line

## 2020-06-30 ENCOUNTER — VIRTUAL VISIT (OUTPATIENT)
Dept: FAMILY MEDICINE CLINIC | Age: 75
End: 2020-06-30

## 2020-06-30 DIAGNOSIS — B02.9 HERPES ZOSTER WITHOUT COMPLICATION: Primary | ICD-10-CM

## 2020-06-30 RX ORDER — VALACYCLOVIR HYDROCHLORIDE 1 G/1
1000 TABLET, FILM COATED ORAL 3 TIMES DAILY
Qty: 21 TAB | Refills: 0 | Status: SHIPPED | OUTPATIENT
Start: 2020-06-30 | End: 2020-07-07

## 2020-06-30 NOTE — PROGRESS NOTES
Brooks Dennison is a 66 y.o. female who was seen by synchronous (real-time) audio-video technology on 6/30/2020 for Rash (right side of abdomen, itching and burning, started 1 wk and 1/2 ago)      Assessment & Plan:   Diagnoses and all orders for this visit:    1. Herpes zoster without complication: history and appearance concerning for. Symptom onset >48 hours ago, but given that she is on Enbrel will start antiviral therapy. -     valACYclovir (VALTREX) 1 gram tablet; Take 1 Tab by mouth three (3) times daily for 7 days.  - Keep area clean and dry   - Recommend reevaluation if symptoms worsen or fail to improve as expected    Subjective:   Brooks Dennison is a 66 y.o. female with complaint of rash located on the right lower abdomen around her stoma. History provided by patient and her daughter Jaxson Pitt. Onset was about 1.5 weeks ago. Associated with pruritis and burning pain. No recent change in soaps, lotions, detergents. No fever. Has had a mild headache. Concerned for shingles. Evaluation to date: none  Treatment to date: none    Prior to Admission medications    Medication Sig Start Date End Date Taking? Authorizing Provider   losartan-hydroCHLOROthiazide (HYZAAR) 50-12.5 mg per tablet Take 1 Tab by mouth every evening. Indications: high blood pressure 0/39/25  Yes Shannon Thomas MD   levothyroxine (SYNTHROID) 75 mcg tablet Take 1 Tab by mouth Daily (before breakfast). Indications: a condition with low thyroid hormone levels 5/50/81  Yes Shannon Thomas MD   ergocalciferol (ERGOCALCIFEROL) 1,250 mcg (50,000 unit) capsule Take 1 Cap by mouth every seven (7) days. Indications: low vitamin D levels 0/33/43  Yes Shannon Thomas MD   omeprazole (PRILOSEC) 20 mg capsule Take 1 Cap by mouth daily. 1/17/20  Yes Delilah Ballesteros MD   ENBREL MINI 50 mg/mL (1 mL) crtg every seven (7) days.  12/13/19  Yes Provider, Historical   Safety Needles 25 x 5/8 \" ndle Use 1 syringe every month for your Vitamin B injections 20  Yes Denia Bergman MD   Syringe, Disposable, 3 mL syrg Use 1 syringe every month for your Vitamin B injections 20  Yes Denia Bergman MD   cyanocobalamin (VITAMIN B12) 1,000 mcg/mL injection 1 mL by SubCUTAneous route every thirty (30) days. 20  Yes Denia Bergman MD   varicella-zoster recombinant, PF, (SHINGRIX, PF,) 50 mcg/0.5 mL susr injection 0.5mL by IntraMUSCular route once now and then repeat in 2-6 months    Coby Miranda MD       Past Medical History:   Diagnosis Date    Arthritis     Cancer [facility])     bladder    Endocrine disease     hyperthyroid    Gastrointestinal disorder     GERD (gastroesophageal reflux disease)     Hypertension     Other ill-defined conditions(799.89)     hyperthyroid       Social History     Tobacco Use    Smoking status: Former Smoker     Packs/day: 1.00     Last attempt to quit: 7/3/1999     Years since quittin.0    Smokeless tobacco: Never Used   Substance Use Topics    Alcohol use: No       ROS  Pertinent items noted in HPI    Objective:     Patient-Reported Vitals 2020   Patient-Reported Weight 197lb      General: alert, cooperative, no distress   Mental  status: normal mood, behavior, speech, dress, motor activity, and thought processes, able to follow commands   HENT: NCAT   Neck: no visualized mass   Resp: no respiratory distress   Neuro: no gross deficits   Skin: Clusters of vesicular lesions on the right abdomen around stoma   Psychiatric: normal affect, consistent with stated mood, no evidence of hallucinations       We discussed the expected course, resolution and complications of the diagnosis(es) in detail. Medication risks, benefits, costs, interactions, and alternatives were discussed as indicated. I advised her to contact the office if her condition worsens, changes or fails to improve as anticipated. She expressed understanding with the diagnosis(es) and plan.        Tyree Briggs, who was evaluated through a patient-initiated, synchronous (real-time) audio-video encounter, and/or her healthcare decision maker, is aware that it is a billable service, with coverage as determined by her insurance carrier. She provided verbal consent to proceed: Yes, and patient identification was verified. It was conducted pursuant to the emergency declaration under the [address] [place] [address], [address] [place] [address] and the [employer] and [employer] General Act. A caregiver was present when appropriate. Ability to conduct physical exam was limited. I was in the office. The patient was at home.       Elena Chanel MD

## 2020-06-30 NOTE — PROGRESS NOTES
Chief Complaint   Patient presents with    Rash     right side of abdomen, itching and burning, started 1 wk and 1/2 ago     1. Have you been to the ER, urgent care clinic since your last visit? Hospitalized since your last visit? No    2. Have you seen or consulted any other health care providers outside of the [address] [place] [address] since your last visit? Include any pap smears or colon screening.  No

## 2020-08-19 RX ORDER — OMEPRAZOLE 20 MG/1
CAPSULE, DELAYED RELEASE ORAL
Qty: 90 CAP | Refills: 1 | Status: SHIPPED | OUTPATIENT
Start: 2020-08-19 | End: 2021-02-09 | Stop reason: SDUPTHER

## 2020-10-22 DIAGNOSIS — E03.9 ACQUIRED HYPOTHYROIDISM: ICD-10-CM

## 2020-10-22 RX ORDER — LEVOTHYROXINE SODIUM 75 UG/1
TABLET ORAL
Qty: 90 TAB | Refills: 0 | Status: SHIPPED | OUTPATIENT
Start: 2020-10-22 | End: 2021-02-09 | Stop reason: SDUPTHER

## 2020-10-23 ENCOUNTER — OFFICE VISIT (OUTPATIENT)
Dept: FAMILY MEDICINE CLINIC | Age: 75
End: 2020-10-23
Payer: MEDICARE

## 2020-10-23 VITALS
SYSTOLIC BLOOD PRESSURE: 130 MMHG | DIASTOLIC BLOOD PRESSURE: 68 MMHG | HEIGHT: 65 IN | OXYGEN SATURATION: 97 % | HEART RATE: 101 BPM | RESPIRATION RATE: 16 BRPM | BODY MASS INDEX: 33.99 KG/M2 | WEIGHT: 204 LBS | TEMPERATURE: 98.9 F

## 2020-10-23 DIAGNOSIS — R01.1 HEART MURMUR: ICD-10-CM

## 2020-10-23 DIAGNOSIS — I10 ESSENTIAL HYPERTENSION: Primary | ICD-10-CM

## 2020-10-23 DIAGNOSIS — B37.2 YEAST DERMATITIS: ICD-10-CM

## 2020-10-23 DIAGNOSIS — E55.9 VITAMIN D DEFICIENCY: ICD-10-CM

## 2020-10-23 DIAGNOSIS — K21.9 GASTROESOPHAGEAL REFLUX DISEASE, UNSPECIFIED WHETHER ESOPHAGITIS PRESENT: ICD-10-CM

## 2020-10-23 DIAGNOSIS — Z23 NEEDS FLU SHOT: ICD-10-CM

## 2020-10-23 DIAGNOSIS — E53.8 B12 DEFICIENCY: ICD-10-CM

## 2020-10-23 DIAGNOSIS — E03.9 ACQUIRED HYPOTHYROIDISM: ICD-10-CM

## 2020-10-23 DIAGNOSIS — Z79.899 ENCOUNTER FOR LONG-TERM CURRENT USE OF MEDICATION: ICD-10-CM

## 2020-10-23 DIAGNOSIS — Z23 NEED FOR SHINGLES VACCINE: ICD-10-CM

## 2020-10-23 PROCEDURE — 99214 OFFICE O/P EST MOD 30 MIN: CPT | Performed by: FAMILY MEDICINE

## 2020-10-23 PROCEDURE — 1101F PT FALLS ASSESS-DOCD LE1/YR: CPT | Performed by: FAMILY MEDICINE

## 2020-10-23 PROCEDURE — G8417 CALC BMI ABV UP PARAM F/U: HCPCS | Performed by: FAMILY MEDICINE

## 2020-10-23 PROCEDURE — 90694 VACC AIIV4 NO PRSRV 0.5ML IM: CPT | Performed by: FAMILY MEDICINE

## 2020-10-23 PROCEDURE — G0008 ADMIN INFLUENZA VIRUS VAC: HCPCS | Performed by: FAMILY MEDICINE

## 2020-10-23 PROCEDURE — G8754 DIAS BP LESS 90: HCPCS | Performed by: FAMILY MEDICINE

## 2020-10-23 PROCEDURE — G8427 DOCREV CUR MEDS BY ELIG CLIN: HCPCS | Performed by: FAMILY MEDICINE

## 2020-10-23 PROCEDURE — G8536 NO DOC ELDER MAL SCRN: HCPCS | Performed by: FAMILY MEDICINE

## 2020-10-23 PROCEDURE — G8752 SYS BP LESS 140: HCPCS | Performed by: FAMILY MEDICINE

## 2020-10-23 PROCEDURE — G8432 DEP SCR NOT DOC, RNG: HCPCS | Performed by: FAMILY MEDICINE

## 2020-10-23 PROCEDURE — G8400 PT W/DXA NO RESULTS DOC: HCPCS | Performed by: FAMILY MEDICINE

## 2020-10-23 PROCEDURE — 1090F PRES/ABSN URINE INCON ASSESS: CPT | Performed by: FAMILY MEDICINE

## 2020-10-23 RX ORDER — LOSARTAN POTASSIUM 50 MG/1
50 TABLET ORAL DAILY
Qty: 90 TAB | Refills: 1 | Status: SHIPPED | OUTPATIENT
Start: 2020-10-23 | End: 2021-04-17

## 2020-10-23 RX ORDER — ZOSTER VACCINE RECOMBINANT, ADJUVANTED 50 MCG/0.5
KIT INTRAMUSCULAR
Qty: 0.5 ML | Refills: 1 | Status: SHIPPED | OUTPATIENT
Start: 2020-10-23 | End: 2020-12-09

## 2020-10-23 RX ORDER — HYDROCHLOROTHIAZIDE 25 MG/1
25 TABLET ORAL DAILY
Qty: 90 TAB | Refills: 1 | Status: SHIPPED | OUTPATIENT
Start: 2020-10-23 | End: 2021-05-10 | Stop reason: SDUPTHER

## 2020-10-23 RX ORDER — NYSTATIN 100000 [USP'U]/G
POWDER TOPICAL 4 TIMES DAILY
Qty: 60 G | Refills: 5 | Status: SHIPPED | OUTPATIENT
Start: 2020-10-23 | End: 2020-12-09

## 2020-10-23 NOTE — PROGRESS NOTES
Identified pt with two pt identifiers(name and ). Chief Complaint   Patient presents with    Thyroid Problem    GERD    Hypertension     usually takes BP medication in the evenings - has noticed her ankles swelling more than usual lately    Medication Refill     needs meds refilled    Labs     has eaten today and would like to return for labs    Immunization/Injection     influenza vaccine        Health Maintenance Due   Topic    DTaP/Tdap/Td series (1 - Tdap)    Shingrix Vaccine Age 50> (1 of 2)    GLAUCOMA SCREENING Q2Y     Bone Densitometry (Dexa) Screening     Flu Vaccine (1)    Pneumococcal 65+ years (2 of 2 - PPSV23)       Wt Readings from Last 3 Encounters:   10/23/20 204 lb (92.5 kg)   20 199 lb (90.3 kg)   20 197 lb (89.4 kg)     Temp Readings from Last 3 Encounters:   10/23/20 98.9 °F (37.2 °C) (Oral)   20 98.3 °F (36.8 °C) (Oral)   20 97.7 °F (36.5 °C) (Oral)     BP Readings from Last 3 Encounters:   10/23/20 (!) 144/68   20 126/60   20 126/76     Pulse Readings from Last 3 Encounters:   10/23/20 (!) 101   20 84   20 87         Learning Assessment:  :     Learning Assessment 2019   PRIMARY LEARNER Patient   PRIMARY LANGUAGE ENGLISH   LEARNER PREFERENCE PRIMARY DEMONSTRATION   ANSWERED BY patient   RELATIONSHIP SELF       Depression Screening:  :     3 most recent PHQ Screens 2020   Little interest or pleasure in doing things Not at all   Feeling down, depressed, irritable, or hopeless Not at all   Total Score PHQ 2 0       Fall Risk Assessment:  :     Fall Risk Assessment, last 12 mths 2020   Able to walk? Yes   Fall in past 12 months? No       Abuse Screening:  :     Abuse Screening Questionnaire 2020 2020 3/7/2019 8/3/2018   Do you ever feel afraid of your partner? N N N N   Are you in a relationship with someone who physically or mentally threatens you? N N N N   Is it safe for you to go home?  Danna Del Toro Coordination of Care Questionnaire:  :     1) Have you been to an emergency room, urgent care clinic since your last visit? no   Hospitalized since your last visit? no             2) Have you seen or consulted any other health care providers outside of [address] [place] [address] since your last visit? Yes has seen the wound specialist at [facility] since her last office visit. 3) Do you have an Advance Directive on file? no  Are you interested in receiving information about Advance Directives? no    Patient is accompanied by self. Reviewed record in preparation for visit and have obtained necessary documentation. Medication reconciliation up to date and corrected with patient at this time.

## 2020-10-23 NOTE — PATIENT INSTRUCTIONS
Vaccine Information Statement    Influenza (Flu) Vaccine (Inactivated or Recombinant): What You Need to Know    Many Vaccine Information Statements are available in [place] and other languages. See www.immunize.org/vis  Hojas de información sobre vacunas están disponibles en español y en muchos otros idiomas. Visite www.immunize.org/vis    1. Why get vaccinated? Influenza vaccine can prevent influenza (flu). Flu is a contagious disease that spreads around the [facility] every year, usually between October and May. Anyone can get the flu, but it is more dangerous for some people. Infants and young children, people 72years of age and older, pregnant women, and people with certain health conditions or a weakened immune system are at greatest risk of flu complications. Pneumonia, bronchitis, sinus infections and ear infections are examples of flu-related complications. If you have a medical condition, such as heart disease, cancer or diabetes, flu can make it worse. Flu can cause fever and chills, sore throat, muscle aches, fatigue, cough, headache, and runny or stuffy nose. Some people may have vomiting and diarrhea, though this is more common in children than adults. Each year thousands of people in the [facility] die from flu, and many more are hospitalized. Flu vaccine prevents millions of illnesses and flu-related visits to the doctor each year. 2. Influenza vaccines     CDC recommends everyone 10months of age and older get vaccinated every flu season. Children 6 months through 6years of age may need 2 doses during a single flu season. Everyone else needs only 1 dose each flu season. It takes about 2 weeks for protection to develop after vaccination. There are many flu viruses, and they are always changing. Each year a new flu vaccine is made to protect against three or four viruses that are likely to cause disease in the upcoming flu season.  Even when the vaccine doesnt exactly match these viruses, it may still provide some protection. Influenza vaccine does not cause flu. Influenza vaccine may be given at the same time as other vaccines. 3. Talk with your health care provider    Tell your vaccine provider if the person getting the vaccine:   Has had an allergic reaction after a previous dose of influenza vaccine, or has any severe, life-threatening allergies.  Has ever had Guillain-Barré Syndrome (also called GBS). In some cases, your health care provider may decide to postpone influenza vaccination to a future visit. People with minor illnesses, such as a cold, may be vaccinated. People who are moderately or severely ill should usually wait until they recover before getting influenza vaccine. Your health care provider can give you more information. 4. Risks of a reaction     Soreness, redness, and swelling where shot is given, fever, muscle aches, and headache can happen after influenza vaccine.  There may be a very small increased risk of Guillain-Barré Syndrome (GBS) after inactivated influenza vaccine (the flu shot). [facility] children who get the flu shot along with pneumococcal vaccine (PCV13), and/or DTaP vaccine at the same time might be slightly more likely to have a seizure caused by fever. Tell your health care provider if a child who is getting flu vaccine has ever had a seizure. People sometimes faint after medical procedures, including vaccination. Tell your provider if you feel dizzy or have vision changes or ringing in the ears. As with any medicine, there is a very remote chance of a vaccine causing a severe allergic reaction, other serious injury, or death. 5. What if there is a serious problem? An allergic reaction could occur after the vaccinated person leaves the clinic.  If you see signs of a severe allergic reaction (hives, swelling of the face and throat, difficulty breathing, a fast heartbeat, dizziness, or weakness), call 9-1-1 and get the person to the nearest hospital.    For other signs that concern you, call your health care provider. Adverse reactions should be reported to the Vaccine Adverse Event Reporting System (VAERS). Your health care provider will usually file this report, or you can do it yourself. Visit the VAERS website at www.vaers. [facility].gov or call 4-285.448.2368. VAERS is only for reporting reactions, and VAERS staff do not give medical advice. 6. The National Vaccine Injury Compensation Program    The [facility] Vaccine Injury Compensation Program (VICP) is a federal program that was created to compensate people who may have been injured by certain vaccines. Visit the VICP website at www.[facility].gov/vaccinecompensation or call 6-377.655.6642 to learn about the program and about filing a claim. There is a time limit to file a claim for compensation. 7. How can I learn more?  Ask your health care provider.  Call your local or state health department.  Contact the Centers for Disease Control and Prevention (CDC):  - Call 9-332.567.4957 (1-800-CDC-INFO) or  - Visit CDCs influenza website at www.cdc.gov/flu    Vaccine Information Statement (Interim)  Inactivated Influenza Vaccine   8/15/2019  [address] TUNDE Hernandez [id number]   Department of Health and Human Services  Centers for Disease Control and Prevention    Office Use Only

## 2020-10-24 NOTE — PROGRESS NOTES
Subjective:     Blanca Harmon is a 66 y.o. female who presents for follow up of hypertension and hyperlipidemia. Diet and Lifestyle: generally follows a low fat low cholesterol diet, generally follows a low sodium diet, sedentary, nonsmoker  Home BP Monitoring: is well controlled at home, ranging 130's/80's    Cardiovascular ROS: taking medications as instructed, no medication side effects noted, no TIA's, no chest pain on exertion, no dyspnea on exertion, noting swelling of ankles. New concerns: cont to have joint pain due to RA. Taking Enbrel per RHEUM. Told by insurance nurse that she has a heart murmur during home visit. Patient Active Problem List    Diagnosis Date Noted    Rheumatoid arthritis involving multiple sites ([facility] 06/29/2020    Pancytopenia ([facility] 10/01/2019    HTN (hypertension) 04/18/2018    Hypokalemia 04/18/2018    Anemia 04/18/2018    Acquired hypothyroidism 04/18/2018    GERD (gastroesophageal reflux disease) 04/18/2018     Current Outpatient Medications   Medication Sig Dispense Refill    losartan (COZAAR) 50 mg tablet Take 1 Tab by mouth daily. Indications: high blood pressure 90 Tab 1    hydroCHLOROthiazide (HYDRODIURIL) 25 mg tablet Take 1 Tab by mouth daily. 90 Tab 1    nystatin (MYCOSTATIN) powder Apply  to affected area four (4) times daily. 60 g 5    varicella-zoster recombinant, PF, (Shingrix, PF,) 50 mcg/0.5 mL susr injection 0.5mL by IntraMUSCular route once now and then repeat in 2-6 months  Indications: shingles vaccination 0.5 mL 1    levothyroxine (SYNTHROID) 75 mcg tablet TAKE 1 TAB BY MOUTH DAILY (BEFORE BREAKFAST). 90 Tab 0    omeprazole (PRILOSEC) 20 mg capsule TAKE 1 CAPSULE BY MOUTH EVERY DAY 90 Cap 1    ergocalciferol (ERGOCALCIFEROL) 1,250 mcg (50,000 unit) capsule Take 1 Cap by mouth every seven (7) days. Indications: low vitamin D levels 15 Cap 3    ENBREL MINI 50 mg/mL (1 mL) crtg every seven (7) days.       Safety Ketchum 25 x 5/8 \" ndle Use 1 syringe every month for your Vitamin B injections 12 Pen Needle 3    Syringe, Disposable, 3 mL syrg Use 1 syringe every month for your Vitamin B injections 12 Syringe 3    cyanocobalamin (VITAMIN B12) 1,000 mcg/mL injection 1 mL by SubCUTAneous route every thirty (30) days. 12 mL 3     Allergies   Allergen Reactions    Bactrim [Sulfamethoprim] Nausea and Vomiting    Ciprofloxacin Other (comments)     Joint aches    Diclofenac Nausea Only    Sulfa (Sulfonamide Antibiotics) Nausea Only     Past Medical History:   Diagnosis Date    Arthritis     Cancer ([facility]     bladder    Endocrine disease     hyperthyroid    Gastrointestinal disorder     GERD (gastroesophageal reflux disease)     Hypertension     Other ill-defined conditions(799.89)     hyperthyroid     Past Surgical History:   Procedure Laterality Date    HX GYN      HX HERNIA REPAIR      HX HIP REPLACEMENT Left     HX UROLOGICAL      stoma since 78     Family History   Problem Relation Age of Onset    Heart Disease Father     Cancer Father         Lung     Social History     Tobacco Use    Smoking status: Former Smoker     Packs/day: 1.00     Last attempt to quit: 7/3/1999     Years since quittin.3    Smokeless tobacco: Never Used   Substance Use Topics    Alcohol use: No             Review of Systems, additional:  Pertinent items are noted in HPI. Objective:     Visit Vitals  /68 (BP 1 Location: Left arm, BP Patient Position: At rest)   Pulse (!) 101   Temp 98.9 °F (37.2 °C) (Oral)   Resp 16   Ht 5' 5\" (1.651 m)   Wt 204 lb (92.5 kg)   SpO2 97%   BMI 33.95 kg/m²     Appearance: alert, well appearing, and in no distress and overweight. General exam: CVS exam BP noted to be well controlled today in office, CVS exam  - normal rate and regular rhythm, gurmeet heard. Lab review: orders written for new lab studies as appropriate; see orders. Assessment/Plan:     .  orders and follow up as documented in patient record. ICD-10-CM ICD-9-CM    1. Essential hypertension  I10 401.9 LIPID PANEL      losartan (COZAAR) 50 mg tablet      hydroCHLOROthiazide (HYDRODIURIL) 25 mg tablet   2. Acquired hypothyroidism  E03.9 244.9 TSH 3RD GENERATION      T4, FREE   3. Gastroesophageal reflux disease, unspecified whether esophagitis present  K21.9 530.81    4. Needs flu shot  Z23 V04.81 FLU (FLUAD QUAD INFLUENZA VACCINE,QUAD,ADJUVANTED)   5. Encounter for long-term current use of medication  Z79.899 V58.69 CBC WITH AUTOMATED DIFF      METABOLIC PANEL, COMPREHENSIVE   6. B12 deficiency  E53.8 266.2 VITAMIN B12   7. Vitamin D deficiency  E55.9 268.9 VITAMIN D, 25 HYDROXY   8. Yeast dermatitis  B37.2 112.3 nystatin (MYCOSTATIN) powder   9. Heart murmur  R01.1 785.2 ECHO ADULT COMPLETE   10. Need for shingles vaccine  Z23 V04.89 varicella-zoster recombinant, PF, (Shingrix, PF,) 50 mcg/0.5 mL susr injection     Pt will return for fasting labs. FLU vaccine given.   Order ECHO

## 2020-10-26 ENCOUNTER — LAB ONLY (OUTPATIENT)
Dept: FAMILY MEDICINE CLINIC | Age: 75
End: 2020-10-26

## 2020-10-26 DIAGNOSIS — Z79.899 ENCOUNTER FOR LONG-TERM CURRENT USE OF MEDICATION: ICD-10-CM

## 2020-10-26 DIAGNOSIS — E03.9 ACQUIRED HYPOTHYROIDISM: ICD-10-CM

## 2020-10-26 DIAGNOSIS — E53.8 B12 DEFICIENCY: ICD-10-CM

## 2020-10-26 DIAGNOSIS — E55.9 VITAMIN D DEFICIENCY: ICD-10-CM

## 2020-10-26 DIAGNOSIS — I10 ESSENTIAL HYPERTENSION: ICD-10-CM

## 2020-10-26 LAB
ALBUMIN SERPL-MCNC: 3.9 G/DL (ref 3.5–5)
ALBUMIN/GLOB SERPL: 1.1 {RATIO} (ref 1.1–2.2)
ALP SERPL-CCNC: 60 U/L (ref 45–117)
ALT SERPL-CCNC: 20 U/L (ref 12–78)
ANION GAP SERPL CALC-SCNC: 8 MMOL/L (ref 5–15)
AST SERPL-CCNC: 23 U/L (ref 15–37)
BILIRUB SERPL-MCNC: 0.5 MG/DL (ref 0.2–1)
BUN SERPL-MCNC: 21 MG/DL (ref 6–20)
BUN/CREAT SERPL: 17 (ref 12–20)
CALCIUM SERPL-MCNC: 9.4 MG/DL (ref 8.5–10.1)
CHLORIDE SERPL-SCNC: 107 MMOL/L (ref 97–108)
CHOLEST SERPL-MCNC: 167 MG/DL
CO2 SERPL-SCNC: 24 MMOL/L (ref 21–32)
CREAT SERPL-MCNC: 1.23 MG/DL (ref 0.55–1.02)
GLOBULIN SER CALC-MCNC: 3.7 G/DL (ref 2–4)
GLUCOSE SERPL-MCNC: 116 MG/DL (ref 65–100)
HDLC SERPL-MCNC: 48 MG/DL
HDLC SERPL: 3.5 {RATIO} (ref 0–5)
LDLC SERPL CALC-MCNC: 69.6 MG/DL (ref 0–100)
LIPID PROFILE,FLP: ABNORMAL
POTASSIUM SERPL-SCNC: 3.7 MMOL/L (ref 3.5–5.1)
PROT SERPL-MCNC: 7.6 G/DL (ref 6.4–8.2)
SODIUM SERPL-SCNC: 139 MMOL/L (ref 136–145)
T4 FREE SERPL-MCNC: 1.1 NG/DL (ref 0.8–1.5)
TRIGL SERPL-MCNC: 247 MG/DL (ref ?–150)
TSH SERPL DL<=0.05 MIU/L-ACNC: 0.97 UIU/ML (ref 0.36–3.74)
VIT B12 SERPL-MCNC: 442 PG/ML (ref 193–986)
VLDLC SERPL CALC-MCNC: 49.4 MG/DL

## 2020-10-27 LAB
25(OH)D3 SERPL-MCNC: 78.1 NG/ML (ref 30–100)
BASOPHILS # BLD: 0 K/UL (ref 0–0.1)
BASOPHILS NFR BLD: 1 % (ref 0–1)
DIFFERENTIAL METHOD BLD: ABNORMAL
EOSINOPHIL # BLD: 0.1 K/UL (ref 0–0.4)
EOSINOPHIL NFR BLD: 3 % (ref 0–7)
ERYTHROCYTE [DISTWIDTH] IN BLOOD BY AUTOMATED COUNT: 13.1 % (ref 11.5–14.5)
HCT VFR BLD AUTO: 33 % (ref 35–47)
HGB BLD-MCNC: 10.4 G/DL (ref 11.5–16)
IMM GRANULOCYTES # BLD AUTO: 0 K/UL (ref 0–0.04)
IMM GRANULOCYTES NFR BLD AUTO: 0 % (ref 0–0.5)
LYMPHOCYTES # BLD: 0.7 K/UL (ref 0.8–3.5)
LYMPHOCYTES NFR BLD: 28 % (ref 12–49)
MCH RBC QN AUTO: 30.7 PG (ref 26–34)
MCHC RBC AUTO-ENTMCNC: 31.5 G/DL (ref 30–36.5)
MCV RBC AUTO: 97.3 FL (ref 80–99)
MONOCYTES # BLD: 0.2 K/UL (ref 0–1)
MONOCYTES NFR BLD: 8 % (ref 5–13)
NEUTS SEG # BLD: 1.5 K/UL (ref 1.8–8)
NEUTS SEG NFR BLD: 60 % (ref 32–75)
NRBC # BLD: 0 K/UL (ref 0–0.01)
NRBC BLD-RTO: 0 PER 100 WBC
PLATELET # BLD AUTO: 115 K/UL (ref 150–400)
PLATELET COMMENTS,PCOM: ABNORMAL
PMV BLD AUTO: 11.7 FL (ref 8.9–12.9)
RBC # BLD AUTO: 3.39 M/UL (ref 3.8–5.2)
RBC MORPH BLD: ABNORMAL
WBC # BLD AUTO: 2.5 K/UL (ref 3.6–11)

## 2020-10-29 ENCOUNTER — HOSPITAL ENCOUNTER (OUTPATIENT)
Dept: NON INVASIVE DIAGNOSTICS | Age: 75
Discharge: HOME | End: 2020-10-29
Attending: FAMILY MEDICINE
Payer: MEDICARE

## 2020-10-29 VITALS
HEIGHT: 65 IN | SYSTOLIC BLOOD PRESSURE: 178 MMHG | WEIGHT: 203.93 LBS | BODY MASS INDEX: 33.98 KG/M2 | DIASTOLIC BLOOD PRESSURE: 83 MMHG

## 2020-10-29 DIAGNOSIS — R01.1 HEART MURMUR: ICD-10-CM

## 2020-10-29 LAB
ECHO AO ASC DIAM: 3.03 CM
ECHO AO ROOT DIAM: 3.13 CM
ECHO IVC PROX: 0.73 CM
ECHO LA AREA 4C: 9.87 CM2
ECHO LA MAJOR AXIS: 3.52 CM
ECHO LA MINOR AXIS: 1.76 CM
ECHO LA VOL 2C: 24.73 ML (ref 22–52)
ECHO LA VOL 4C: 21.25 ML (ref 22–52)
ECHO LA VOL BP: 24.53 ML (ref 22–52)
ECHO LA VOL/BSA BIPLANE: 12.3 ML/M2 (ref 16–28)
ECHO LA VOLUME INDEX A2C: 12.4 ML/M2 (ref 16–28)
ECHO LA VOLUME INDEX A4C: 10.65 ML/M2 (ref 16–28)
ECHO LV E' LATERAL VELOCITY: 6.45 CENTIMETER/SECOND
ECHO LV E' SEPTAL VELOCITY: 6.07 CENTIMETER/SECOND
ECHO LV INTERNAL DIMENSION DIASTOLIC: 3.72 CM (ref 3.9–5.3)
ECHO LV INTERNAL DIMENSION SYSTOLIC: 2.48 CM
ECHO LV IVSD: 1.15 CM (ref 0.6–0.9)
ECHO LV MASS 2D: 120.1 G (ref 67–162)
ECHO LV MASS INDEX 2D: 60.2 G/M2 (ref 43–95)
ECHO LV POSTERIOR WALL DIASTOLIC: 0.93 CM (ref 0.6–0.9)
ECHO LVOT DIAM: 2.07 CM
ECHO MV A VELOCITY: 128.56 CENTIMETER/SECOND
ECHO MV AREA PHT: 3.69 CM2
ECHO MV E DECELERATION TIME (DT): 205.72 MS
ECHO MV E VELOCITY: 97.67 CENTIMETER/SECOND
ECHO MV MAX VELOCITY: 131.81 CM/S
ECHO MV MEAN GRADIENT: 3.82 MMHG
ECHO MV PEAK GRADIENT: 6.95 MMHG
ECHO MV PRESSURE HALF TIME (PHT): 59.66 MS
ECHO MV VTI: 21.86 CM
ECHO PV MAX VELOCITY: 101.86 CM/S
ECHO PV PEAK INSTANTANEOUS GRADIENT SYSTOLIC: 4.15 MMHG
ECHO RV INTERNAL DIMENSION: 3.65 CM
ECHO RV TAPSE: 1.52 CM (ref 1.5–2)
LA VOL DISK BP: 22.95 ML (ref 22–52)

## 2020-10-29 PROCEDURE — 93306 TTE W/DOPPLER COMPLETE: CPT

## 2020-10-29 PROCEDURE — 93306 TTE W/DOPPLER COMPLETE: CPT | Performed by: INTERNAL MEDICINE

## 2020-10-31 ENCOUNTER — TELEPHONE (OUTPATIENT)
Dept: FAMILY MEDICINE CLINIC | Age: 75
End: 2020-10-31

## 2020-10-31 NOTE — PROGRESS NOTES
Letter sent. Anemia improved. Elevated sugar and renal function. Normal thyroid.  Good B 12 and Vit D.

## 2020-11-01 ENCOUNTER — TELEPHONE (OUTPATIENT)
Dept: FAMILY MEDICINE CLINIC | Age: 75
End: 2020-11-01

## 2020-11-02 NOTE — TELEPHONE ENCOUNTER
Call pt to let her know that heart echocardiogram looked good. No valvular problems. Good pumping action of heart muscle.

## 2020-11-02 NOTE — TELEPHONE ENCOUNTER
----- Message from [place] sent at 11/2/2020 12:01 PM EST -----  Regarding: Dr. Alejandro Garrett first and last name: Ksenia Pisano  Reason for call: Missed call from Dr. Verna Jade required yes/no and why: Yes  Best contact number(s): [phone] - Mondays; [phone] and Wednesdays   Details to clarify the request: N/A

## 2020-12-07 ENCOUNTER — TELEPHONE (OUTPATIENT)
Dept: FAMILY MEDICINE CLINIC | Age: 75
End: 2020-12-07

## 2020-12-07 NOTE — TELEPHONE ENCOUNTER
----- Message from Nivia Spencer sent at 12/7/2020  9:48 AM EST -----  Regarding: Dr. Praveen Lopez / Telephone  Caller's first and last name: Olvin Humphrey, daughter  Reason for call: Pt tested positive for COVID 12/6/2020; medication prescribed by [facility] is causing diarrhea. Callback required yes/no and why: Yes, to discuss options   Best contact number(s): [phone]  Details to clarify the request: Ammy Mcgowan is requesting guidance on care and possible different medication.

## 2020-12-07 NOTE — TELEPHONE ENCOUNTER
Spoke with pt's daughter. Pt tested + COVID 19 over weekend. Prescribed Z Baldev, ALbuterol, tessalon perles by [facility]. Pt unable to tolerate Z Baldev. No fever at the moment. No SOB. [address]. Cont with use of albuterol inhaler and cough suppressant PRN. If develop increased dyspnea or CP, go to ER.

## 2020-12-08 PROCEDURE — 99285 EMERGENCY DEPT VISIT HI MDM: CPT

## 2020-12-09 ENCOUNTER — HOSPITAL ENCOUNTER (INPATIENT)
Age: 75
LOS: 6 days | Discharge: HOME | DRG: 871 | End: 2020-12-15
Attending: EMERGENCY MEDICINE | Admitting: FAMILY MEDICINE
Payer: MEDICARE

## 2020-12-09 ENCOUNTER — APPOINTMENT (OUTPATIENT)
Dept: GENERAL RADIOLOGY | Age: 75
DRG: 871 | End: 2020-12-09
Attending: EMERGENCY MEDICINE
Payer: MEDICARE

## 2020-12-09 ENCOUNTER — APPOINTMENT (OUTPATIENT)
Dept: CT IMAGING | Age: 75
DRG: 871 | End: 2020-12-09
Attending: EMERGENCY MEDICINE
Payer: MEDICARE

## 2020-12-09 DIAGNOSIS — R50.9 FEVER, UNSPECIFIED FEVER CAUSE: ICD-10-CM

## 2020-12-09 DIAGNOSIS — U07.1 PNEUMONIA DUE TO COVID-19 VIRUS: ICD-10-CM

## 2020-12-09 DIAGNOSIS — N10 ACUTE PYELONEPHRITIS: ICD-10-CM

## 2020-12-09 DIAGNOSIS — D84.9 IMMUNE DEFICIENCY DISORDER (HCC): ICD-10-CM

## 2020-12-09 DIAGNOSIS — E03.9 ACQUIRED HYPOTHYROIDISM: ICD-10-CM

## 2020-12-09 DIAGNOSIS — N39.0 UTI DUE TO KLEBSIELLA SPECIES: ICD-10-CM

## 2020-12-09 DIAGNOSIS — E53.8 VITAMIN B12 DEFICIENCY: ICD-10-CM

## 2020-12-09 DIAGNOSIS — D61.818 PANCYTOPENIA (HCC): ICD-10-CM

## 2020-12-09 DIAGNOSIS — R91.8 BILATERAL PULMONARY INFILTRATES ON CXR: ICD-10-CM

## 2020-12-09 DIAGNOSIS — R65.10 SIRS (SYSTEMIC INFLAMMATORY RESPONSE SYNDROME) (HCC): Primary | ICD-10-CM

## 2020-12-09 DIAGNOSIS — J18.9 PNEUMONIA OF BOTH LUNGS DUE TO INFECTIOUS ORGANISM, UNSPECIFIED PART OF LUNG: ICD-10-CM

## 2020-12-09 DIAGNOSIS — I10 ESSENTIAL HYPERTENSION: ICD-10-CM

## 2020-12-09 DIAGNOSIS — U07.1 COVID-19 VIRUS INFECTION: ICD-10-CM

## 2020-12-09 DIAGNOSIS — B95.2 ENTEROCOCCUS UTI: ICD-10-CM

## 2020-12-09 DIAGNOSIS — D64.9 ANEMIA, UNSPECIFIED TYPE: ICD-10-CM

## 2020-12-09 DIAGNOSIS — R09.02 HYPOXIA: ICD-10-CM

## 2020-12-09 DIAGNOSIS — J12.82 PNEUMONIA DUE TO COVID-19 VIRUS: ICD-10-CM

## 2020-12-09 DIAGNOSIS — N39.0 ENTEROCOCCUS UTI: ICD-10-CM

## 2020-12-09 DIAGNOSIS — M06.9 RHEUMATOID ARTHRITIS INVOLVING MULTIPLE SITES, UNSPECIFIED WHETHER RHEUMATOID FACTOR PRESENT (HCC): ICD-10-CM

## 2020-12-09 DIAGNOSIS — B96.89 UTI DUE TO KLEBSIELLA SPECIES: ICD-10-CM

## 2020-12-09 DIAGNOSIS — N17.9 AKI (ACUTE KIDNEY INJURY) (HCC): ICD-10-CM

## 2020-12-09 PROBLEM — Z85.51 HISTORY OF BLADDER CANCER: Status: ACTIVE | Noted: 2020-12-09

## 2020-12-09 PROBLEM — A41.9 SEPSIS (HCC): Status: ACTIVE | Noted: 2020-12-09

## 2020-12-09 LAB
ALBUMIN SERPL-MCNC: 3.6 G/DL (ref 3.5–5)
ALBUMIN/GLOB SERPL: 0.8 {RATIO} (ref 1.1–2.2)
ALP SERPL-CCNC: 45 U/L (ref 45–117)
ALT SERPL-CCNC: 38 U/L (ref 12–78)
ANION GAP SERPL CALC-SCNC: 7 MMOL/L (ref 5–15)
APPEARANCE UR: ABNORMAL
APTT PPP: 27.1 SEC (ref 22.1–31)
AST SERPL-CCNC: 54 U/L (ref 15–37)
ATRIAL RATE: 107 BPM
BACTERIA URNS QL MICRO: ABNORMAL /HPF
BASOPHILS # BLD: 0 K/UL (ref 0–0.1)
BASOPHILS NFR BLD: 0 % (ref 0–1)
BILIRUB SERPL-MCNC: 0.7 MG/DL (ref 0.2–1)
BILIRUB UR QL: NEGATIVE
BNP SERPL-MCNC: 125 PG/ML
BUN SERPL-MCNC: 40 MG/DL (ref 6–20)
BUN/CREAT SERPL: 23 (ref 12–20)
CALCIUM SERPL-MCNC: 8.6 MG/DL (ref 8.5–10.1)
CALCULATED P AXIS, ECG09: 55 DEGREES
CALCULATED R AXIS, ECG10: -9 DEGREES
CALCULATED T AXIS, ECG11: 35 DEGREES
CHLORIDE SERPL-SCNC: 104 MMOL/L (ref 97–108)
CK MB CFR SERPL CALC: NORMAL % (ref 0–2.5)
CK MB SERPL-MCNC: <1 NG/ML (ref 5–25)
CK SERPL-CCNC: 208 U/L (ref 26–192)
CO2 SERPL-SCNC: 24 MMOL/L (ref 21–32)
COLOR UR: ABNORMAL
COVID-19 RAPID TEST, COVR: DETECTED
CREAT SERPL-MCNC: 1.73 MG/DL (ref 0.55–1.02)
D DIMER PPP FEU-MCNC: 0.86 MG/L FEU (ref 0–0.65)
DIAGNOSIS, 93000: NORMAL
DIFFERENTIAL METHOD BLD: ABNORMAL
EOSINOPHIL # BLD: 0 K/UL (ref 0–0.4)
EOSINOPHIL NFR BLD: 0 % (ref 0–7)
EPITH CASTS URNS QL MICRO: ABNORMAL /LPF
ERYTHROCYTE [DISTWIDTH] IN BLOOD BY AUTOMATED COUNT: 12.8 % (ref 11.5–14.5)
FERRITIN SERPL-MCNC: 284 NG/ML (ref 26–388)
FLUAV AG NPH QL IA: NEGATIVE
FLUBV AG NOSE QL IA: NEGATIVE
GLOBULIN SER CALC-MCNC: 4.5 G/DL (ref 2–4)
GLUCOSE SERPL-MCNC: 129 MG/DL (ref 65–100)
GLUCOSE UR STRIP.AUTO-MCNC: NEGATIVE MG/DL
HCT VFR BLD AUTO: 32.5 % (ref 35–47)
HEALTH STATUS, XMCV2T: ABNORMAL
HGB BLD-MCNC: 11 G/DL (ref 11.5–16)
HGB UR QL STRIP: ABNORMAL
IMM GRANULOCYTES # BLD AUTO: 0 K/UL (ref 0–0.04)
IMM GRANULOCYTES NFR BLD AUTO: 0 % (ref 0–0.5)
INR PPP: 1.1 (ref 0.9–1.1)
KETONES UR QL STRIP.AUTO: NEGATIVE MG/DL
LACTATE BLD-SCNC: 1.48 MMOL/L (ref 0.4–2)
LDH SERPL L TO P-CCNC: 319 U/L (ref 81–246)
LEUKOCYTE ESTERASE UR QL STRIP.AUTO: ABNORMAL
LYMPHOCYTES # BLD: 0.4 K/UL (ref 0.8–3.5)
LYMPHOCYTES NFR BLD: 14 % (ref 12–49)
MCH RBC QN AUTO: 30.6 PG (ref 26–34)
MCHC RBC AUTO-ENTMCNC: 33.8 G/DL (ref 30–36.5)
MCV RBC AUTO: 90.5 FL (ref 80–99)
MONOCYTES # BLD: 0.3 K/UL (ref 0–1)
MONOCYTES NFR BLD: 9 % (ref 5–13)
NEUTS SEG # BLD: 2.3 K/UL (ref 1.8–8)
NEUTS SEG NFR BLD: 77 % (ref 32–75)
NITRITE UR QL STRIP.AUTO: POSITIVE
NRBC # BLD: 0 K/UL (ref 0–0.01)
NRBC BLD-RTO: 0 PER 100 WBC
P-R INTERVAL, ECG05: 118 MS
PH UR STRIP: 6 [PH] (ref 5–8)
PLATELET # BLD AUTO: 83 K/UL (ref 150–400)
PMV BLD AUTO: 12.6 FL (ref 8.9–12.9)
POTASSIUM SERPL-SCNC: 4.2 MMOL/L (ref 3.5–5.1)
PROCALCITONIN SERPL-MCNC: <0.05 NG/ML
PROT SERPL-MCNC: 8.1 G/DL (ref 6.4–8.2)
PROT UR STRIP-MCNC: 100 MG/DL
PROTHROMBIN TIME: 11.4 SEC (ref 9–11.1)
Q-T INTERVAL, ECG07: 336 MS
QRS DURATION, ECG06: 82 MS
QTC CALCULATION (BEZET), ECG08: 448 MS
RBC # BLD AUTO: 3.59 M/UL (ref 3.8–5.2)
RBC #/AREA URNS HPF: ABNORMAL /HPF (ref 0–5)
RBC MORPH BLD: ABNORMAL
SODIUM SERPL-SCNC: 135 MMOL/L (ref 136–145)
SOURCE, COVRS: ABNORMAL
SP GR UR REFRACTOMETRY: 1.01 (ref 1–1.03)
SPECIMEN SOURCE, FCOV2M: ABNORMAL
SPECIMEN TYPE, XMCV1T: ABNORMAL
THERAPEUTIC RANGE,PTTT: NORMAL SECS (ref 58–77)
TROPONIN I SERPL-MCNC: <0.05 NG/ML
UROBILINOGEN UR QL STRIP.AUTO: 0.2 EU/DL (ref 0.2–1)
VENTRICULAR RATE, ECG03: 107 BPM
WBC # BLD AUTO: 3 K/UL (ref 3.6–11)
WBC URNS QL MICRO: ABNORMAL /HPF (ref 0–4)

## 2020-12-09 PROCEDURE — 71250 CT THORAX DX C-: CPT

## 2020-12-09 PROCEDURE — 82550 ASSAY OF CK (CPK): CPT

## 2020-12-09 PROCEDURE — 84484 ASSAY OF TROPONIN QUANT: CPT

## 2020-12-09 PROCEDURE — 74011250636 HC RX REV CODE- 250/636: Performed by: STUDENT IN AN ORGANIZED HEALTH CARE EDUCATION/TRAINING PROGRAM

## 2020-12-09 PROCEDURE — 81001 URINALYSIS AUTO W/SCOPE: CPT

## 2020-12-09 PROCEDURE — 85379 FIBRIN DEGRADATION QUANT: CPT

## 2020-12-09 PROCEDURE — 97161 PT EVAL LOW COMPLEX 20 MIN: CPT

## 2020-12-09 PROCEDURE — 97530 THERAPEUTIC ACTIVITIES: CPT

## 2020-12-09 PROCEDURE — 82728 ASSAY OF FERRITIN: CPT

## 2020-12-09 PROCEDURE — 87040 BLOOD CULTURE FOR BACTERIA: CPT

## 2020-12-09 PROCEDURE — 93005 ELECTROCARDIOGRAM TRACING: CPT

## 2020-12-09 PROCEDURE — 74011250637 HC RX REV CODE- 250/637: Performed by: STUDENT IN AN ORGANIZED HEALTH CARE EDUCATION/TRAINING PROGRAM

## 2020-12-09 PROCEDURE — 87804 INFLUENZA ASSAY W/OPTIC: CPT

## 2020-12-09 PROCEDURE — 84145 PROCALCITONIN (PCT): CPT

## 2020-12-09 PROCEDURE — 85610 PROTHROMBIN TIME: CPT

## 2020-12-09 PROCEDURE — 65660000000 HC RM CCU STEPDOWN

## 2020-12-09 PROCEDURE — 74011000250 HC RX REV CODE- 250: Performed by: STUDENT IN AN ORGANIZED HEALTH CARE EDUCATION/TRAINING PROGRAM

## 2020-12-09 PROCEDURE — 97165 OT EVAL LOW COMPLEX 30 MIN: CPT | Performed by: OCCUPATIONAL THERAPIST

## 2020-12-09 PROCEDURE — 96375 TX/PRO/DX INJ NEW DRUG ADDON: CPT

## 2020-12-09 PROCEDURE — 87635 SARS-COV-2 COVID-19 AMP PRB: CPT

## 2020-12-09 PROCEDURE — 87086 URINE CULTURE/COLONY COUNT: CPT

## 2020-12-09 PROCEDURE — 80053 COMPREHEN METABOLIC PANEL: CPT

## 2020-12-09 PROCEDURE — 36415 COLL VENOUS BLD VENIPUNCTURE: CPT

## 2020-12-09 PROCEDURE — 97110 THERAPEUTIC EXERCISES: CPT | Performed by: OCCUPATIONAL THERAPIST

## 2020-12-09 PROCEDURE — 74011000250 HC RX REV CODE- 250: Performed by: EMERGENCY MEDICINE

## 2020-12-09 PROCEDURE — 85730 THROMBOPLASTIN TIME PARTIAL: CPT

## 2020-12-09 PROCEDURE — 99223 1ST HOSP IP/OBS HIGH 75: CPT | Performed by: INTERNAL MEDICINE

## 2020-12-09 PROCEDURE — 85025 COMPLETE CBC W/AUTO DIFF WBC: CPT

## 2020-12-09 PROCEDURE — 87186 SC STD MICRODIL/AGAR DIL: CPT

## 2020-12-09 PROCEDURE — 96365 THER/PROPH/DIAG IV INF INIT: CPT

## 2020-12-09 PROCEDURE — 87077 CULTURE AEROBIC IDENTIFY: CPT

## 2020-12-09 PROCEDURE — 83605 ASSAY OF LACTIC ACID: CPT

## 2020-12-09 PROCEDURE — 74011250636 HC RX REV CODE- 250/636: Performed by: EMERGENCY MEDICINE

## 2020-12-09 PROCEDURE — 74011250637 HC RX REV CODE- 250/637: Performed by: EMERGENCY MEDICINE

## 2020-12-09 PROCEDURE — 83615 LACTATE (LD) (LDH) ENZYME: CPT

## 2020-12-09 PROCEDURE — 83880 ASSAY OF NATRIURETIC PEPTIDE: CPT

## 2020-12-09 PROCEDURE — 82553 CREATINE MB FRACTION: CPT

## 2020-12-09 PROCEDURE — 71045 X-RAY EXAM CHEST 1 VIEW: CPT

## 2020-12-09 PROCEDURE — 97116 GAIT TRAINING THERAPY: CPT

## 2020-12-09 PROCEDURE — 74011000258 HC RX REV CODE- 258: Performed by: STUDENT IN AN ORGANIZED HEALTH CARE EDUCATION/TRAINING PROGRAM

## 2020-12-09 RX ORDER — ERGOCALCIFEROL 1.25 MG/1
50000 CAPSULE ORAL
Status: DISCONTINUED | OUTPATIENT
Start: 2020-12-15 | End: 2020-12-15 | Stop reason: HOSPADM

## 2020-12-09 RX ORDER — BENZONATATE 100 MG/1
100 CAPSULE ORAL
Status: DISCONTINUED | OUTPATIENT
Start: 2020-12-09 | End: 2020-12-15 | Stop reason: HOSPADM

## 2020-12-09 RX ORDER — SODIUM CHLORIDE 0.9 % (FLUSH) 0.9 %
5-10 SYRINGE (ML) INJECTION AS NEEDED
Status: DISCONTINUED | OUTPATIENT
Start: 2020-12-09 | End: 2020-12-15 | Stop reason: HOSPADM

## 2020-12-09 RX ORDER — PROCHLORPERAZINE EDISYLATE 5 MG/ML
10 INJECTION INTRAMUSCULAR; INTRAVENOUS
Status: DISCONTINUED | OUTPATIENT
Start: 2020-12-09 | End: 2020-12-15 | Stop reason: HOSPADM

## 2020-12-09 RX ORDER — ZINC SULFATE 50(220)MG
1 CAPSULE ORAL DAILY
Status: DISCONTINUED | OUTPATIENT
Start: 2020-12-09 | End: 2020-12-15 | Stop reason: HOSPADM

## 2020-12-09 RX ORDER — SODIUM CHLORIDE 9 MG/ML
125 INJECTION, SOLUTION INTRAVENOUS CONTINUOUS
Status: DISCONTINUED | OUTPATIENT
Start: 2020-12-09 | End: 2020-12-10

## 2020-12-09 RX ORDER — LOSARTAN POTASSIUM 50 MG/1
50 TABLET ORAL DAILY
Status: DISCONTINUED | OUTPATIENT
Start: 2020-12-09 | End: 2020-12-09

## 2020-12-09 RX ORDER — ATORVASTATIN CALCIUM 20 MG/1
20 TABLET, FILM COATED ORAL DAILY
Status: DISCONTINUED | OUTPATIENT
Start: 2020-12-09 | End: 2020-12-15 | Stop reason: HOSPADM

## 2020-12-09 RX ORDER — ACETAMINOPHEN 325 MG/1
650 TABLET ORAL
Status: DISCONTINUED | OUTPATIENT
Start: 2020-12-09 | End: 2020-12-12

## 2020-12-09 RX ORDER — ASCORBIC ACID 500 MG
500 TABLET ORAL 2 TIMES DAILY
Status: DISCONTINUED | OUTPATIENT
Start: 2020-12-09 | End: 2020-12-15 | Stop reason: HOSPADM

## 2020-12-09 RX ORDER — SODIUM CHLORIDE 0.9 % (FLUSH) 0.9 %
5-40 SYRINGE (ML) INJECTION AS NEEDED
Status: DISCONTINUED | OUTPATIENT
Start: 2020-12-09 | End: 2020-12-15 | Stop reason: HOSPADM

## 2020-12-09 RX ORDER — ONDANSETRON 2 MG/ML
8 INJECTION INTRAMUSCULAR; INTRAVENOUS
Status: COMPLETED | OUTPATIENT
Start: 2020-12-09 | End: 2020-12-09

## 2020-12-09 RX ORDER — ASCORBIC ACID 500 MG
500 TABLET ORAL DAILY
Status: DISCONTINUED | OUTPATIENT
Start: 2020-12-09 | End: 2020-12-09

## 2020-12-09 RX ORDER — NYSTATIN 100000 [USP'U]/G
POWDER TOPICAL
COMMUNITY
End: 2022-06-08 | Stop reason: ALTCHOICE

## 2020-12-09 RX ORDER — ACETAMINOPHEN 500 MG
1000 TABLET ORAL
Status: COMPLETED | OUTPATIENT
Start: 2020-12-09 | End: 2020-12-09

## 2020-12-09 RX ORDER — HYDROCHLOROTHIAZIDE 25 MG/1
25 TABLET ORAL DAILY
Status: DISCONTINUED | OUTPATIENT
Start: 2020-12-09 | End: 2020-12-09

## 2020-12-09 RX ORDER — ACETAMINOPHEN 650 MG/1
650 SUPPOSITORY RECTAL
Status: DISCONTINUED | OUTPATIENT
Start: 2020-12-09 | End: 2020-12-12

## 2020-12-09 RX ORDER — LEVOTHYROXINE SODIUM 75 UG/1
75 TABLET ORAL
Status: DISCONTINUED | OUTPATIENT
Start: 2020-12-09 | End: 2020-12-15 | Stop reason: HOSPADM

## 2020-12-09 RX ORDER — SODIUM CHLORIDE 0.9 % (FLUSH) 0.9 %
5-40 SYRINGE (ML) INJECTION EVERY 8 HOURS
Status: DISCONTINUED | OUTPATIENT
Start: 2020-12-09 | End: 2020-12-15 | Stop reason: HOSPADM

## 2020-12-09 RX ORDER — PANTOPRAZOLE SODIUM 40 MG/1
40 TABLET, DELAYED RELEASE ORAL DAILY
Status: DISCONTINUED | OUTPATIENT
Start: 2020-12-09 | End: 2020-12-15 | Stop reason: HOSPADM

## 2020-12-09 RX ORDER — VANCOMYCIN/0.9 % SOD CHLORIDE 1.5G/250ML
1500 PLASTIC BAG, INJECTION (ML) INTRAVENOUS ONCE
Status: COMPLETED | OUTPATIENT
Start: 2020-12-09 | End: 2020-12-09

## 2020-12-09 RX ORDER — GUAIFENESIN 600 MG/1
1200 TABLET, EXTENDED RELEASE ORAL EVERY 12 HOURS
Status: DISCONTINUED | OUTPATIENT
Start: 2020-12-09 | End: 2020-12-15 | Stop reason: HOSPADM

## 2020-12-09 RX ADMIN — CEFTRIAXONE 2 G: 2 INJECTION, POWDER, FOR SOLUTION INTRAMUSCULAR; INTRAVENOUS at 08:42

## 2020-12-09 RX ADMIN — OXYCODONE HYDROCHLORIDE AND ACETAMINOPHEN 500 MG: 500 TABLET ORAL at 08:40

## 2020-12-09 RX ADMIN — GUAIFENESIN 1200 MG: 600 TABLET ORAL at 12:28

## 2020-12-09 RX ADMIN — DOXYCYCLINE 100 MG: 100 INJECTION, POWDER, LYOPHILIZED, FOR SOLUTION INTRAVENOUS at 21:50

## 2020-12-09 RX ADMIN — VANCOMYCIN HYDROCHLORIDE 1500 MG: 10 INJECTION, POWDER, LYOPHILIZED, FOR SOLUTION INTRAVENOUS at 00:57

## 2020-12-09 RX ADMIN — ZINC SULFATE 220 MG (50 MG) CAPSULE 1 CAPSULE: CAPSULE at 08:40

## 2020-12-09 RX ADMIN — DOXYCYCLINE 100 MG: 100 INJECTION, POWDER, LYOPHILIZED, FOR SOLUTION INTRAVENOUS at 08:51

## 2020-12-09 RX ADMIN — ACETAMINOPHEN 1000 MG: 500 TABLET ORAL at 00:54

## 2020-12-09 RX ADMIN — ATORVASTATIN CALCIUM 20 MG: 20 TABLET, FILM COATED ORAL at 08:40

## 2020-12-09 RX ADMIN — ACETAMINOPHEN 650 MG: 325 TABLET ORAL at 23:55

## 2020-12-09 RX ADMIN — Medication 10 ML: at 05:30

## 2020-12-09 RX ADMIN — BENZONATATE 100 MG: 100 CAPSULE ORAL at 16:44

## 2020-12-09 RX ADMIN — Medication 10 ML: at 16:44

## 2020-12-09 RX ADMIN — SODIUM CHLORIDE 1000 ML: 900 INJECTION, SOLUTION INTRAVENOUS at 03:25

## 2020-12-09 RX ADMIN — OXYCODONE HYDROCHLORIDE AND ACETAMINOPHEN 500 MG: 500 TABLET ORAL at 16:44

## 2020-12-09 RX ADMIN — GUAIFENESIN 1200 MG: 600 TABLET ORAL at 20:39

## 2020-12-09 RX ADMIN — LEVOTHYROXINE SODIUM 75 MCG: 0.07 TABLET ORAL at 12:28

## 2020-12-09 RX ADMIN — PANTOPRAZOLE SODIUM 40 MG: 40 TABLET, DELAYED RELEASE ORAL at 12:28

## 2020-12-09 RX ADMIN — SODIUM CHLORIDE 1000 ML: 900 INJECTION, SOLUTION INTRAVENOUS at 00:53

## 2020-12-09 RX ADMIN — CEFEPIME HYDROCHLORIDE 2 G: 2 INJECTION, POWDER, FOR SOLUTION INTRAVENOUS at 00:52

## 2020-12-09 RX ADMIN — Medication 10 ML: at 21:52

## 2020-12-09 RX ADMIN — ONDANSETRON 8 MG: 2 INJECTION INTRAMUSCULAR; INTRAVENOUS at 00:53

## 2020-12-09 RX ADMIN — SODIUM CHLORIDE 1000 ML: 900 INJECTION, SOLUTION INTRAVENOUS at 01:42

## 2020-12-09 RX ADMIN — SODIUM CHLORIDE 125 ML/HR: 900 INJECTION, SOLUTION INTRAVENOUS at 05:29

## 2020-12-09 NOTE — CONSULTS
[id number] [facility] at [facility]  [phone]    Hematology / Oncology Consult    Reason for Visit:   Benson Aguero is a 66 y.o. female who is seen by synchronous (real-time) audio-video technology on 2020 in consultation at the request of Dr. Brian for evaluation of pancytopenia. History of Present Illness:   Benson Aguero is a 66 y.o. female with HTN, RA, remote h/o bladder cancer admitted with COVID19 infection, found to have pancytopenia. She was last seen in our clinic in 2019 with improvement of WBC and PLT counts to normal range at that time. She was also found to be B12 deficient in the past which improved with supplementation. Review of counts this year show that WBC range from 2.1 - now 3.0, PLT range 83 - 115. Pt states other family members are also sick with COVID 23. She denies bruising, bleeding. She has a dry cough, fevers and malaise. No specific SOB, CP. She follows with Urology for recurrent UTI. She comes in today for review of bone marrow biopsy results.   She has RA and is on Embrel at home.       Past Medical History:   Diagnosis Date    Arthritis     Cancer ([address]     bladder    Endocrine disease     hyperthyroid    Gastrointestinal disorder     GERD (gastroesophageal reflux disease)     Hypertension     Other ill-defined conditions(799.89)     hyperthyroid      Past Surgical History:   Procedure Laterality Date    HX GYN      HX HERNIA REPAIR      HX HIP REPLACEMENT Left     HX UROLOGICAL      stoma since 78      Social History     Tobacco Use    Smoking status: Former Smoker     Packs/day: 1.00     Last attempt to quit: 7/3/1999     Years since quittin.4    Smokeless tobacco: Never Used   Substance Use Topics    Alcohol use: No      Family History   Problem Relation Age of Onset    Heart Disease Father     Cancer Father         Lung     Current Facility-Administered Medications   Medication Dose Route Frequency    sodium chloride (NS) flush 5-10 mL  5-10 mL IntraVENous PRN    sodium chloride (NS) flush 5-40 mL  5-40 mL IntraVENous Q8H    sodium chloride (NS) flush 5-40 mL  5-40 mL IntraVENous PRN    acetaminophen (TYLENOL) tablet 650 mg  650 mg Oral Q6H PRN    Or    acetaminophen (TYLENOL) suppository 650 mg  650 mg Rectal Q6H PRN    prochlorperazine (COMPAZINE) injection 10 mg  10 mg IntraVENous Q4H PRN    atorvastatin (LIPITOR) tablet 20 mg  20 mg Oral DAILY    zinc sulfate (ZINCATE) 220 (50) mg capsule 1 Cap  1 Cap Oral DAILY    ascorbic acid (vitamin C) (VITAMIN C) tablet 500 mg  500 mg Oral BID    [START ON 12/15/2020] ergocalciferol capsule 50,000 Units  50,000 Units Oral Q7D    levothyroxine (SYNTHROID) tablet 75 mcg  75 mcg Oral 6am    pantoprazole (PROTONIX) tablet 40 mg  40 mg Oral DAILY    0.9% sodium chloride infusion  125 mL/hr IntraVENous CONTINUOUS    cefTRIAXone (ROCEPHIN) 2 g in sterile water (preservative free) 20 mL IV syringe  2 g IntraVENous Q24H    doxycycline (VIBRAMYCIN) 100 mg in 0.9% sodium chloride (MBP/ADV) 100 mL MBP  100 mg IntraVENous Q12H    guaiFENesin ER (MUCINEX) tablet 1,200 mg  1,200 mg Oral Q12H    benzonatate (TESSALON) capsule 100 mg  100 mg Oral TID PRN      Allergies   Allergen Reactions    Bactrim [Sulfamethoprim] Nausea and Vomiting    Ciprofloxacin Other (comments)     Joint aches    Diclofenac Nausea Only    Sulfa (Sulfonamide Antibiotics) Nausea Only        Review of Systems: A complete review of systems was obtained, negative except as described above. Physical Exam:     Visit Vitals  /62 (BP 1 Location: Left arm, BP Patient Position: Sitting)   Pulse 82   Temp 98.7 °F (37.1 °C)   Resp 16   Wt 194 lb (88 kg)   SpO2 96%   BMI 32.28 kg/m²       Due to this being a TeleHealth evaluation, many elements of the physical examination are unable to be assessed.      Constitutional: Appears well-developed and well-nourished in no apparent distress   Mental status: Alert and awake, Oriented to person/place/time, Able to follow commands  Eyes: EOM normal, Sclera normal, No visible ocular discharge  HENT: Normocephalic, atraumatic; Mouth/Throat: Moist mucous membranes, External Ears normal  Neck: No visualized mass  Pulmonary/Chest: Respiratory effort normal, No visualized signs of difficulty breathing or respiratory distress   Musculoskeletal: Normal gait with no signs of ataxia, Normal range of motion of neck  Neurological: No facial asymmetry (Cranial nerve 7 motor function), No gaze palsy  Skin: No significant exanthematous lesions or discoloration noted on facial skin  Psychiatric: Normal affect, normal judgment/insight. No hallucinations       Results:     Lab Results   Component Value Date/Time    WBC 3.0 (L) 12/09/2020 12:36 AM    HGB 11.0 (L) 12/09/2020 12:36 AM    HCT 32.5 (L) 12/09/2020 12:36 AM    PLATELET 83 (L) 47/17/2323 12:36 AM    MCV 90.5 12/09/2020 12:36 AM    ABS. NEUTROPHILS 2.3 12/09/2020 12:36 AM    Hemoglobin (POC) 11.9 01/03/2012 10:01 AM    Hematocrit (POC) 35 01/03/2012 10:01 AM     Lab Results   Component Value Date/Time    Sodium 135 (L) 12/09/2020 12:36 AM    Potassium 4.2 12/09/2020 12:36 AM    Chloride 104 12/09/2020 12:36 AM    CO2 24 12/09/2020 12:36 AM    Glucose 129 (H) 12/09/2020 12:36 AM    BUN 40 (H) 12/09/2020 12:36 AM    Creatinine 1.73 (H) 12/09/2020 12:36 AM    GFR est AA 35 (L) 12/09/2020 12:36 AM    GFR est non-AA 29 (L) 12/09/2020 12:36 AM    Calcium 8.6 12/09/2020 12:36 AM    Sodium (POC) 143 01/03/2012 10:01 AM    Potassium (POC) 4.1 01/03/2012 10:01 AM    Chloride (POC) 105 01/03/2012 10:01 AM    Glucose (POC) 104 (H) 04/22/2018 11:18 AM    BUN (POC) 11 01/03/2012 10:01 AM    Creatinine (POC) 1.0 01/03/2012 10:01 AM    Calcium, ionized (POC) 1.14 01/03/2012 10:01 AM     Lab Results   Component Value Date/Time    Bilirubin, total 0.7 12/09/2020 12:36 AM    ALT (SGPT) 38 12/09/2020 12:36 AM    Alk.  phosphatase 45 12/09/2020 12:36 AM    Protein, total 8.1 12/09/2020 12:36 AM    Albumin 3.6 12/09/2020 12:36 AM    Globulin 4.5 (H) 12/09/2020 12:36 AM     Lab Results   Component Value Date/Time    Vitamin B12 442 10/26/2020 09:07 AM    Folate 7.0 04/20/2018 02:42 AM       Chest CT 12/9/20:  IMPRESSION:  Multiple small patchy groundglass infiltrates. Assessment and Recommendations:   May Garcia is a 66 y.o. female with HTN, hypothyroidism, GERD, bladder cancer s/p urostomy, RA,  L hip replacement, recurrent UTI currently admitted with COVID19 infection and pancytopenia. 1. Pancytopenia:   Her current ANC is normal and total WBC is actually higher her baseline this year. PLT count is lower than her usual baseline and is likely due to COVID19 infection. Will recheck B12, folate given h/o B12 deficiency in the past and h/o of her stopping the B12 injections in the past. Prior workup included normal bone marrow biopsy in 10/2018. No additional recommendations at this time. -- Checking folate level     2. Vitamin B12 deficiency:   Was started on VitB12 injections approx 8/2018. B12 level normal in 10/2020.   -- Continue Vitamin B12 IM 1000mcg monthly - can one dose in hospital now     3. COVID19 PNA / Fevers: On Zinc, Doxycycline, Ceftriaxone with improvement in fevers so far. Blood and urine cx pending. 4. Hx of bladder cancer (1999): S/p ileal conduit/ urostomy; Follows with Dr Cristhian Urias as out patient. 5. Rheumatoid arthritis:   On Embrel at home. I was in the office while conducting this encounter. The patient was at her hospital room. Consent:  She and/or her healthcare decision maker is aware that this patient-initiated Telehealth encounter is a billable service, with coverage as determined by her insurance carrier.  She is aware that she may receive a bill and has provided verbal consent to proceed: Yes    Pursuant to the emergency declaration under the [facility] waiver authority and the [employer] and [employer] Act, this Virtual  Visit was conducted, with patient's (and/or legal guardian's) consent, to reduce the patient's risk of exposure to COVID-19 and provide necessary medical care. Services were provided through a video synchronous discussion virtually to substitute for in-person visit.       Signed By: Lori Myers MD     December 9, 2020

## 2020-12-09 NOTE — ED NOTES
Received call from Warren Raya in the lab with results of COVID, POSITIVE.  Results notified to Dr. Ryland Rees and [employer], RN

## 2020-12-09 NOTE — ED TRIAGE NOTES
Pt. Here for worsening covid symptoms, states tested positive on Sunday, states has had continued weakness, fever with cough. Pt. States her mouth feels so dry and has bad taste in her mouth. Pt. States last tylenol dose was this morning.

## 2020-12-09 NOTE — PROGRESS NOTES
Pharmacy changed vancomycin to 1500 mg IV now, per protocol. Will follow for further orders.   Renal labs pending,

## 2020-12-09 NOTE — ACP (ADVANCE CARE PLANNING)
12/9/2020  1:28 PM    Advance Care Planning     Advance Care Planning Activator (Inpatient)  Conversation Note      Date of ACP Conversation: 12/09/20     Conversation Conducted with:   Patient with Decision Making Capacity    ACP Activator: [id number] :    Current Designated Health Care Decision Maker:   Primary Decision Maker: Ophelia Carson - [phone]    Secondary Decision Maker: Chasidy García - [phone]    Care Preferences    Ventilation: \"If you were in your present state of health and suddenly became very ill and were unable to breathe on your own, what would your preference be about the use of a ventilator (breathing machine) if it were available to you? \"      If patient would desire the use of a ventilator (breathing machine), answer \"yes\", if not \"no\":yes    \"If your health worsens and it becomes clear that your chance of recovery is unlikely, what would your preference be about the use of a ventilator (breathing machine) if it were available to you? \"     Would the patient desire the use of a ventilator (breathing machine)? NO      Resuscitation  \"CPR works best to restart the heart when there is a sudden event, like a heart attack, in someone who is otherwise healthy. Unfortunately, CPR does not typically restart the heart for people who have serious health conditions or who are very sick. \"    \"In the event your heart stopped as a result of an underlying serious health condition, would you want attempts to be made to restart your heart (answer \"yes\" for attempt to resuscitate) or would you prefer a natural death (answer \"no\" for do not attempt to resuscitate)? \" yes        . [x] Yes  [] No   Educated Patient /regarding differences between Advance Directives and portable DNR orders.     Length of ACP Conversation in minutes:  10 minutes    Conversation Outcomes:  [] ACP discussion completed  [] Existing advance directive reviewed with patient; no changes to patient's previously recorded wishes     [] New Advance Directive completed   [] Portable Do Not Resuscitate prepared for Provider review and signature  [] POLST/POST/MOLST/MOST prepared for Provider review and signature      Follow-up plan:    [] Schedule follow-up conversation to continue planning  [] Referred individual to Provider for additional questions/concerns   [] Advised patient/agent/surrogate to review completed ACP document and update if needed with changes in condition, patient preferences or care setting     [] This note routed to one or more involved healthcare providers

## 2020-12-09 NOTE — ED NOTES
Visit Vitals  BP (!) 97/45   Pulse 89   Temp (!) 101 °F (38.3 °C)   Resp 25   Wt 88 kg (194 lb)   SpO2 92%   BMI 32.28 kg/m²     Patient 2L IVF completed, Dr Shahla Spears made aware of BP, additional IVF bolus ordered

## 2020-12-09 NOTE — ED NOTES
Visit Vitals  BP (!) 99/43 (BP 1 Location: Left arm, BP Patient Position: At rest)   Pulse 83   Temp 99.7 °F (37.6 °C)   Resp 21   Wt 88 kg (194 lb)   SpO2 95%   BMI 32.28 kg/m²     Patient IVF completed, Dr Jaki Doan made aware of patient BP via phone, Dr Jaki Doan to come assess patient and no orders received at this time

## 2020-12-09 NOTE — PROGRESS NOTES
12/9/2020  1:12 PM  Case management note    Reason for Admission:   Sepsis    EVAL done via phone    Patient came to hospital for worsening COVID symptoms. She has cough, N & V.  Patient has history of HTN, GERD, RA and bladder cancer with urostomy. Patient lives with daughter, she drives and uses cane for ambulation. [facility] [address]                   RUR Score:         15%            Plan for utilizing home health:      PT/OT to eval    PCP: First and Last name:  Davis Herring   Name of Practice:    Are you a current patient: Yes/No: yes   Approximate date of last visit: 6 weeks   Can you participate in a virtual visit with your PCP: yes                    Current Advanced Directive/Advance Care Plan: yes, not on file                         Transition of Care Plan:                      1. Home with family assistance  2. PCP follow up  3. PT/OT evals  4. Family to bring in AD  5. CM to follow for discharge needs    Care Management Interventions  PCP Verified by CM: Yes(Dr. Alexander Burnham)  Mode of Transport at Discharge: Self  Current Support Network: Lives with Spouse  Confirm Follow Up Transportation.   Mian Mcmanus

## 2020-12-09 NOTE — ED NOTES
Verbal report given to (name) lo Mendez being transferred to Telemetry (unit) for routine progression of care    Report consisted of patient's Situation, Background, Assessment and Recommendations (SBAR)    Information from the following report(s)  SBAR, Kardex, ED Summary, Intake/Output, MAR, Recent Results and Cardiac Rhythm SR was reviewed with the receiving nurse. Opportunity for questions and clarification was provided.     Last Filed Values:  Temp: 99.7 °F (37.6 °C) (12/09/20 0445)  Pulse (Heart Rate): 79 (12/09/20 0630)  Resp Rate: 24 (12/09/20 0630)  O2 Sat (%): 96 % (12/09/20 0630)  BP: (!) 106/46 (12/09/20 0630)  MAP (Monitor): (!) 61 (12/09/20 0630)  MAP (Calculated): (!) 62 (12/09/20 0445)  Level of Consciousness: Alert (12/09/20 0445)      Lab Results   Component Value Date/Time    WBC 3.0 (L) 12/09/2020 12:36 AM       Repeat LA: n/a    Blood Cultures Drawn:  yes    Fluid Resuscitation:  Total needed 2640, Status completed, amount 3000 mL    All Antibiotics Started:  yes (cefepime and vancomycin)    VS x 2 post-fluid resuscitation:   yes    Vasopressor Infusion:  no     Provider Reassessment needed and notified:  yes , Due 0300    Additional Interventions/Comments:  ED physician was made aware of hypotension, order for additional fluid and then patient reassessed after 3rd IVF completed, Family practice made aware, maintenance fluids started

## 2020-12-09 NOTE — PROGRESS NOTES
[facility] Dosing Services: Antimicrobial Stewardship Progress Note    Consult for antibiotic dosing of Vancomycin by Dr. Rosa Mt  Pharmacist reviewed antibiotic appropriateness for 66year old , female  for indication of sepsis  Day of Therapy 1    Plan:  Vancomycin therapy:  Start Vancomycin therapy, with loading dose of 1500 (mg) at 0057 12/09/20. Follow with maintenance dose of : by random level due to increase in serum creatnine. Dose calculated to approximate a therapeutic trough of 15-20 mcg/mL. Last trough level / Plan for level:   Pharmacy to follow daily and will make changes to dose and/or frequency based on clinical status. Date Dose & Interval Measured (mcg/mL) Extrapolated (mcg/mL)   ? ? ? ?   ? ? ? ?   ? ? ? ? Non-Kinetic Antimicrobial Dosing:   Current Regimen:  cefepime 2 grams IV q12H  Recommendation:   Other Antimicrobial  (not dosed by pharmacist)      Cultures        Serum Creatinine     Lab Results   Component Value Date/Time    Creatinine 1.73 (H) 12/09/2020 12:36 AM    Creatinine (POC) 1.0 01/03/2012 10:01 AM       Creatinine Clearance CrCl cannot be calculated (Unknown ideal weight.).      Procalcitonin    Lab Results   Component Value Date/Time    Procalcitonin <0.05 12/09/2020 12:36 AM        Temp   99.7 °F (37.6 °C)    WBC   Lab Results   Component Value Date/Time    WBC 3.0 (L) 12/09/2020 12:36 AM       H/H   Lab Results   Component Value Date/Time    HGB 11.0 (L) 12/09/2020 12:36 AM      Platelets Lab Results   Component Value Date/Time    PLATELET 83 (L) 24/60/2183 12:36 AM            Pharmacist: Signed Yasmany Fofana information: [phone]

## 2020-12-09 NOTE — PROGRESS NOTES
[facility]   Senior Resident Admission Note    CC: worsening COVID    HPI:  Brady Devine is a 66 y.o. female with PMH of HTN, hypothyroid, GERD, RA, pancytopenia, B12 deficiency, remote bladder cancer in 1999 s/p urostomy who presents to the ER complaining of worsening COVID. Diagnosed with COVID 4 days ago. Has had worsening fever, chills, cough, n/v, and weakness since. States has been unable to keep anything down x 2 days. States urine in urostomy has been slightly darker, but not foul smelling/cloudy. No CP, SOB, abdominal pain. Chart reviewed. Patient discussed with Dr. Jodelle Romberg (PGY-1). See Dr. Janes Cannon note for more details. In ED: vitals significant for fever 103, , on RA. Labs remarkable for WBC 3.0 (BL < 3), Hgb 11.0 (BL 10), plt 83 (), Cr 1.73 (BL 1.1-1.2), lactic acid 1.48, procal < 0.05, D dimer 0.86, , UA with 100 protein, moderate blood, + nitrites, small LE, 2+ bacteremia, COVID +. CXR neg, CTA chest with multiple small patchy groundglass infiltrates. EKG with sinus tach. Given tylenol, zofran, vanc + cefepime, 3L NS bolus.     Patient Vitals for the past 12 hrs:   Temp Pulse Resp BP SpO2   12/09/20 0715 -- 76 23 (!) 103/49 95 %   12/09/20 0700 -- 78 23 (!) 101/46 95 %   12/09/20 0630 -- 79 24 (!) 106/46 96 %   12/09/20 0615 -- 79 25 (!) 106/52 98 %   12/09/20 0600 -- 80 23 (!) 106/55 95 %   12/09/20 0545 -- 80 23 (!) 111/50 97 %   12/09/20 0530 -- 82 27 (!) 121/58 99 %   12/09/20 0515 -- 81 23 (!) 105/53 96 %   12/09/20 0500 -- 81 23 (!) 108/52 96 %   12/09/20 0445 99.7 °F (37.6 °C) 83 21 (!) 99/43 95 %   12/09/20 0430 -- 82 21 (!) 99/49 94 %   12/09/20 0415 -- 82 22 (!) 99/50 93 %   12/09/20 0400 -- 82 22 (!) 98/47 92 %   12/09/20 0345 -- 83 23 (!) 104/47 91 %   12/09/20 0330 -- 84 23 (!) 98/52 93 %   12/09/20 0315 -- 89 20 (!) 97/49 94 %   12/09/20 0303 -- 89 25 (!) 97/45 92 %   12/09/20 0245 (!) 101 °F (38.3 °C) 90 26 (!) 98/55 93 %   12/09/20 0230 -- 90 25 (!) 98/46 93 %   12/09/20 0200 -- 93 25 (!) 109/49 94 %   12/09/20 0145 -- 98 21 (!) 115/47 96 %   12/09/20 0137 (!) 103 °F (39.4 °C) (!) 101 25 (!) 102/51 94 %   12/09/20 0115 -- 100 27 (!) 101/48 95 %   12/09/20 0100 -- (!) 104 21 (!) 117/51 100 %   12/09/20 0018 (!) 103 °F (39.4 °C) (!) 108 17 (!) 124/58 96 %     PE:   General: NAD  Cardiac: RRR, no murmurs  Lungs: CTA bilaterally, no respiratory distress  Abd: soft, non distended, non tender, urostomy with clear/yellow urine  Skin: warm, dry    A/P: 65 yo female with  PMH of HTN, hypothyroid, GERD, RA, pancytopenia, B12 deficiency, remote bladder cancer in 1999 s/p urostomy admitted for sepsis 2/2 COVID vs. UTI. 1. Sepsis 2/2 COVID vs. UTI: (3/4 SIRS POA) COVID + 4 days ago. Worsening respiratory symptoms. · Admit to tele, vitals per unit routine  · MIVF  · vanc + cefepime, can de-escalate once cultures result  · Follow up bcx, ucx  · Tylenol for fever  2. COVID Positive: Symptoms started 12/5. · Droplet plus precautions  · Daily COVID labs  · Atorvastatin, vit c, zinc  3. Pancytopenia: WBC 3.0 (BL < 3), Hgb 11.0 (BL 10), plt 83 (). Has seen Dr. Sari Reddy OP who stated likely 2/2 B12 deficiency and wanted to follow up if plt drop below 100. · Daily CBC  · Consult hematology, appreciate recs  4. At Risk LEATHA: Cr 1.73 (BL 1.1-1.2)  · MIVF  · Avoid nephrotoxic agents  5. B12 deficiency: last B12 442. Due for home B12 injection, but missed dose d/t COVID +.  6. Hypothyroid: TSH 0.97 (10/26). Continue synthroid 75mcg daily  7. HTN: hold home HCTZ + losartan while slightly hypotensive  8. GERD: protonix in place of home omeprazole  9. RA: Hold home enbril  10. Bladder cancer: remote hx in 1999. S/p ileal conduit + urostomy. Follow up with Urology OP. I agree with remaining assessment and plan as documented in Dr. Bello Rise note.       Pt discussed with Dr. Stefan Zapata (on-call attending physician)    Yves Mederos DO  Family Medicine Resident

## 2020-12-09 NOTE — ED PROVIDER NOTES
The patient is a 77-year-old female with a past medical history significant for rheumatoid arthritis, bladder cancer, hypothyroidism, GERD, hypertension, status post hernia repair, left hip replacement, recent diagnosis of COVID-19 infection 4 days ago at urgent care who presents to the ED with a complaint of fever and chills x4 days, dry cough, congestion, shortness of breath with coughing and chest pain, nausea, vomiting, weather with nonbloody diarrhea and general malaise. The patient stated she feels extremely weak and has been unable to keep anything down for the past 2 days. She denies any abdominal pain, neck and back pain, dysuria, vaginal discharge or bleeding, extremity weakness or numbness, skin rash or recent travel.            Past Medical History:   Diagnosis Date    Arthritis     Cancer [facility])     bladder    Endocrine disease     hyperthyroid    Gastrointestinal disorder     GERD (gastroesophageal reflux disease)     Hypertension     Other ill-defined conditions(799.89)     hyperthyroid       Past Surgical History:   Procedure Laterality Date    HX GYN      HX HERNIA REPAIR      HX HIP REPLACEMENT Left     HX UROLOGICAL      stoma since 78         Family History:   Problem Relation Age of Onset    Heart Disease Father     Cancer Father         Lung       Social History     Socioeconomic History    Marital status:      Spouse name: Not on file    Number of children: Not on file    Years of education: Not on file    Highest education level: Not on file   Occupational History    Not on file   Social Needs    Financial resource strain: Not on file    Food insecurity     Worry: Not on file     Inability: Not on file    Transportation needs     Medical: Not on file     Non-medical: Not on file   Tobacco Use    Smoking status: Former Smoker     Packs/day: 1.00     Last attempt to quit: 7/3/1999     Years since quittin.4    Smokeless tobacco: Never Used   Substance and Sexual Activity    Alcohol use: No    Drug use: Never    Sexual activity: Not Currently   Lifestyle    Physical activity     Days per week: Not on file     Minutes per session: Not on file    Stress: Not on file   Relationships    Social connections     Talks on phone: Not on file     Gets together: Not on file     Attends Synagogue service: Not on file     Active member of club or organization: Not on file     Attends meetings of clubs or organizations: Not on file     Relationship status: Not on file    Intimate partner violence     Fear of current or ex partner: Not on file     Emotionally abused: Not on file     Physically abused: Not on file     Forced sexual activity: Not on file   Other Topics Concern    Not on file   Social History Narrative    Not on file         ALLERGIES: Bactrim [sulfamethoprim]; Ciprofloxacin; Diclofenac; and Sulfa (sulfonamide antibiotics)    Review of Systems   All other systems reviewed and are negative. There were no vitals filed for this visit. Physical Exam  Vitals signs and nursing note reviewed. Exam conducted with a chaperone present. CONSTITUTIONAL: Well-appearing; well-nourished; in mild distress  HEAD: Normocephalic; atraumatic  EYES: PERRL; EOM intact; conjunctiva and sclera are clear bilaterally. ENT: No rhinorrhea; normal pharynx with no tonsillar hypertrophy; mucous membranes pink/moist, no erythema, no exudate. NECK: Supple; non-tender; no cervical lymphadenopathy  CARD: Normal S1, S2; no murmurs, rubs, or gallops. Regular rate and rhythm. RESP: Normal respiratory effort; breath sounds clear and equal bilaterally; no wheezes, rhonchi, or rales. ABD: Normal bowel sounds; non-distended; non-tender; no palpable organomegaly, no masses, no bruits. Back Exam: Normal inspection; no vertebral point tenderness, no CVA tenderness. Normal range of motion.   EXT: Normal ROM in all four extremities; non-tender to palpation; no swelling or deformity; distal pulses are normal, no edema. SKIN: Warm; dry; no rash. NEURO:Alert and oriented x 3, coherent, ULZ-XII grossly intact, sensory and motor are non-focal.        MDM  Number of Diagnoses or Management Options  Acute pyelonephritis:   LEATHA (acute kidney injury) ([facility] [address]):   COVID-19 virus infection:   Pneumonia of both lungs due to infectious organism, unspecified part of lung:   SIRS (systemic inflammatory response syndrome) [facility]):   Diagnosis management comments: Assessment: 68-year-old female with recent diagnosis of COVID-19 infection 4 days ago who presents to the ED with general malaise, fever, body aches, nausea vomiting, diarrhea and shortness of breath with productive cough. Symptoms appear consistent with COVID-19 infection rule out sepsis, electrolytes abnormality and dehydration. Plan: Lab/EKG/chest x-ray/IV fluid/broad-spectrum antibiotics/antipyretic/serial exam/ Monitor and Reevaluate. Amount and/or Complexity of Data Reviewed  Clinical lab tests: ordered and reviewed  Tests in the radiology section of CPT®: ordered and reviewed  Tests in the medicine section of CPT®: reviewed and ordered  Discussion of test results with the performing providers: yes  Decide to obtain previous medical records or to obtain history from someone other than the patient: yes  Obtain history from someone other than the patient: yes  Review and summarize past medical records: yes  Discuss the patient with other providers: yes  Independent visualization of images, tracings, or specimens: yes    Risk of Complications, Morbidity, and/or Mortality  Presenting problems: moderate  Diagnostic procedures: moderate  Management options: moderate  General comments: Total critical care time spent exclusive of procedures:  45 minutes    Patient Progress  Patient progress: stable         Procedures    ED EKG interpretation:  Rhythm: sinus tachycardia; and regular .  Rate (approx.): 107; Axis: left axis deviation; P wave: normal; QRS interval: normal ; ST/T wave: normal; in  Lead: Diffusely; Other findings: borderline ekg. This EKG was interpreted by Kevin Rosario MD,ED Provider. XRAY INTERPRETATION (ED MD)  Chest Xray  No acute process seen. Normal heart size. No bony abnormalities. No infiltrate. Lux Macias MD 12:40 AM    PROGRESS NOTE:  Pt has been reexamined by Lxu Macias MD all available results have been reviewed with pt and any available family. Pt understands sx, dx, and tx in ED. Care plan has been outlined and questions have been answered. Pt and any available family understands and agrees to need for admission to hospital for further tx not available in ED. Pt is ready for admission. Written by Kevin Rosario MD,  2:28 AM    Perfect Serve Consult for Admission  2:28 AM    ED Room Number: ER06/06  Patient Name and age:  Brady Clock 66 y.o.  female  Working Diagnosis:   1. SIRS (systemic inflammatory response syndrome) (HCC)    2. Acute pyelonephritis    3. LEATHA (acute kidney injury) ([facility] [address]    4. COVID-19 virus infection        COVID-19 Suspicion:  yes  Sepsis present:  yes  Reassessment needed: no  Code Status:  Full Code  Readmission: no  Isolation Requirements:  no  Recommended Level of Care:  telemetry  Department:[facility] ED - [phone]  Other: The patient appears hemodynamically stable at this time. She is high risk for deterioration. Please consider palliative care consult. CONSULT NOTE:  Lux Macias MD spoke with the [facility] residency team discussed patient's presentation, history, physical assessment, and available diagnostic results.  They will evaluate, write orders and admit the patient to the hospital. 2:30 AM  .

## 2020-12-09 NOTE — PROGRESS NOTES
Problem: Mobility Impaired (Adult and Pediatric)  Goal: *Acute Goals and Plan of Care (Insert Text)  Description: FUNCTIONAL STATUS PRIOR TO ADMISSION: Patient was independent and active without use of DME.    HOME SUPPORT PRIOR TO ADMISSION: The patient lived with daughter but did not require assist.    Physical Therapy Goals  Initiated 12/9/2020  1. Patient will move from supine to sit and sit to supine , scoot up and down, and roll side to side in bed with independence within 7 day(s). 2.  Patient will transfer from bed to chair and chair to bed with independence using the least restrictive device within 7 day(s). 3.  Patient will perform sit to stand with independence within 7 day(s). 4.  Patient will ambulate with independence for 200 feet with the least restrictive device within 7 day(s). 5.  Patient will ascend/descend 4 stairs with  handrail(s) with independence within 7 day(s). Outcome: Progressing Towards Goal   PHYSICAL THERAPY EVALUATION  Patient: oPwer Michel (46 y.o. female)  Date: 12/9/2020  Primary Diagnosis: Sepsis ([address] [A41.9]        Precautions:   Fall, covid -19 positive    ASSESSMENT  Based on the objective data described below, the patient presents with generalized weakness and difficulty with ambulation. Communicated with nurse cleared for therapy. Sit on the edge of bed mi assist, sit to stand CGA, ambulate inside the ED room CGA no assistive device, decreased pace no loss of balance. Steady sitting and standing. Assisted back to bed after ambulation performed some active range of motion exercise on both LE all planes. Educated and instructed patient purse lip breathing and OOB to chair as tolerated. communicated with nurse who agreed to monitor patient. Current Level of Function Impacting Discharge (mobility/balance): min assist with bed mobility, sit to stand CGA, ambulate without assistive device. Functional Outcome Measure:   The patient scored 40/100 on the barthel index outcome measure . Other factors to consider for discharge: poor endurance     Patient will benefit from skilled therapy intervention to address the above noted impairments. PLAN :  Recommendations and Planned Interventions: bed mobility training, transfer training, gait training, therapeutic exercises, neuromuscular re-education, orthotic/prosthetic training, patient and family training/education and therapeutic activities      Frequency/Duration: Patient will be followed by physical therapy:  2 times a week to address goals. Recommendation for discharge: (in order for the patient to meet his/her long term goals)  No skilled physical therapy/ follow up rehabilitation needs identified at this time. This discharge recommendation:  Has been made in collaboration with the attending provider and/or case management    IF patient discharges home will need the following DME: none         SUBJECTIVE:   Patient stated Sylvain Fraga.     OBJECTIVE DATA SUMMARY:   HISTORY:    Past Medical History:   Diagnosis Date    Arthritis     Cancer ([facility] [address]     bladder    Endocrine disease     hyperthyroid    Gastrointestinal disorder     GERD (gastroesophageal reflux disease)     Hypertension     Other ill-defined conditions(799.89)     hyperthyroid     Past Surgical History:   Procedure Laterality Date    HX GYN      HX HERNIA REPAIR      HX HIP REPLACEMENT Left     HX UROLOGICAL      stoma since 89       Personal factors and/or comorbidities impacting plan of care:     Home Situation  Home Environment: Private residence  # Steps to Enter: 4  One/Two Story Residence: One story  Living Alone: No  Support Systems: Child(felipe)  Current DME Used/Available at Home: Grab bars  Tub or Shower Type: Tub/Shower combination    EXAMINATION/PRESENTATION/DECISION MAKING:   Critical Behavior:  Neurologic State: Alert  Orientation Level: Oriented X4  Cognition: Follows commands, Appropriate safety awareness, Appropriate for age attention/concentration, Appropriate decision making  Safety/Judgement: Awareness of environment, Fall prevention, Home safety, Insight into deficits  Hearing: Auditory  Auditory Impairment: Hard of hearing, bilateral, Hearing aid(s)  Hearing Aids/Status: Does not own    Range Of Motion:  AROM: Within functional limits                       Strength:    Strength: Within functional limits                    Tone & Sensation:   Tone: Normal              Sensation: Intact               Coordination:  Coordination: Within functional limits  Vision:      Functional Mobility:  Bed Mobility:  Rolling: Minimum assistance  Supine to Sit: Minimum assistance  Sit to Supine: Minimum assistance  Scooting: Supervision  Transfers:  Sit to Stand: Supervision  Stand to Sit: Supervision  Stand Pivot Transfers: Contact guard assistance     Bed to Chair: Contact guard assistance              Balance:   Sitting: Intact  Standing: Impaired  Standing - Static: Good; Unsupported  Standing - Dynamic : Not tested  Ambulation/Gait Training:  Distance (ft): 10 Feet (ft)  Assistive Device: Gait belt  Ambulation - Level of Assistance: Contact guard assistance     Gait Description (WDL): Exceptions to WDL  Gait Abnormalities: Path deviations        Base of Support: Widened     Speed/Kenzie: Pace decreased (<100 feet/min)  Step Length: Right shortened;Left shortened                       Therapeutic Exercises:    Instructed patient to continue active range of motion exercise on both legs while up on chair or on bed. Functional Measure:  Barthel Index:    Bathin  Bladder: 0  Bowels: 5  Groomin  Dressin  Feeding: 10  Mobility: 0  Stairs: 0  Toilet Use: 5  Transfer (Bed to Chair and Back): 10  Total: 40/100       The Barthel ADL Index: Guidelines  1. The index should be used as a record of what a patient does, not as a record of what a patient could do.   2. The main aim is to establish degree of independence from any help, physical or verbal, however minor and for whatever reason. 3. The need for supervision renders the patient not independent. 4. A patient's performance should be established using the best available evidence. Asking the patient, friends/relatives and nurses are the usual sources, but direct observation and common sense are also important. However direct testing is not needed. 5. Usually the patient's performance over the preceding 24-48 hours is important, but occasionally longer periods will be relevant. 6. Middle categories imply that the patient supplies over 50 per cent of the effort. 7. Use of aids to be independent is allowed. Celine Bah., Barthel, D.W. (1027). Functional evaluation: the Barthel Index. [address] [facility] (14)2. Juan Beaulieu kelly DIDIER Dodge, Daniel Hyde., Patricia Lewis., [place], [address] [place] [address] Ave (). Measuring the change indisability after inpatient rehabilitation; comparison of the responsiveness of the Barthel Index and Functional Garvin Measure. Journal of Neurology, Neurosurgery, and Psychiatry, 66(4), 162-475. Remigio Mulligan, NKiarraJ.[email or web address], EDWIN Valero, & Sadie Barbosa, MKiarraA. (2004.) Assessment of post-stroke quality of life in cost-effectiveness studies: The usefulness of the Barthel Index and the EuroQoL-5D.  Quality of Life Research, 15, 015-69          Physical Therapy Evaluation Charge Determination   History Examination Presentation Decision-Making   LOW Complexity : Zero comorbidities / personal factors that will impact the outcome / POC LOW Complexity : 1-2 Standardized tests and measures addressing body structure, function, activity limitation and / or participation in recreation  LOW Complexity : Stable, uncomplicated  Other outcome measures barthel  LOW       Based on the above components, the patient evaluation is determined to be of the following complexity level: LOW     Pain Ratin/10    Activity Tolerance:   Good    After treatment patient left in no apparent distress:   Sitting in chair, Heels elevated for pressure relief, Call bell within reach and Bed / chair alarm activated    COMMUNICATION/EDUCATION:   The patients plan of care was discussed with: Occupational therapist and Registered nurse. Fall prevention education was provided and the patient/caregiver indicated understanding. Thank you for this referral.  Giancarlo Saleem PT,C.    Time Calculation: 29 mins

## 2020-12-09 NOTE — PROGRESS NOTES
[address] [facility] RESIDENCY PROGRAM  PROGRESS NOTE     20/2/1426  PCP: Cheyanne Balderas MD     [address] Residency Attending Addendum:  I saw and evaluated the patient on the day of the encounter with Dr. Garima Pisano, performing the kumar elements of the service. I discussed the findings, assessment and plan with the resident and agree with the resident's findings and plan as documented in the resident's note. Repeat CXR for fever and check procal.  Cr improved. Pending cultures. If continues to do well, potential DC tomorrow. Catarina Lerner MD, FAAFP, Hali Iglesias, RMSK      Assessment/Plan:     Arlette Anderson is a 66 y.o. female with a PMHx of [place] Justin is a 66 y.o. female with a PMH of COVID positivity, bladder cancer in 1999 s/p urostomy, HTN, hypokalemia, pancytopenia, B12 deficiency, rheumatoid arthritis, hypothyroidism, and ERD who is admitted for Sepsis 2/2 COVID infection vs UTI. Overnight Events: Patient spiked fever of 100.9 degrees F at around midnight (given tylenol with improvement) and 101.5 degrees F at 8 AM.     Telemetry Reviewed: Sinus tachycardia with rate in the low 100s, then NSR with rate in the 90s. Occasional PVC noted.      Sepsis 2/2 COVID vs UTI: (3/4 SIRS POA: WBC 3.0, temp, HR). CXR neg, CTA chest with multiple small patchy groundglass infiltrates. COVID + (12/5, 12/9). LA and procalcitonin wnl on admission. UA + nitrites, LE, 2+ bacteria. Urine cx pending. Blood cx no growth x 1 day. - DC MIVF due to rales on lung exam  - Strict I&Os  - F/u final blood and urine culture  - S/p Vanc and Cefepime started in ED.  Continue Ceftriaxone and Doxycycline to cover for UTI, PNA.   - Tylenol 650mg q6h prn for fever  - Compazine 10mg IV q4h prn for N/V      COVID positive: On 12/5 reportedly, confirmed 12/9.   - Trend daily D-dimer, CRP, LD, ferritin  - Atorvastatin 20mg daily, Vitamin C 500mg BID, Zinc 220mg daily  - Mucinex BID and Tessalon PRN  - Encourage incentive spirometry  - Droplet plus precautions  - Repeat chest X-ray this AM due to new rales on exam and continued fever     Pancytopenia: POA WBC 3.0 (BL < 3), Hgb 11.0 (BL 10), PLT 83 (). AM labs with decrease in all 3 cell lines and ANC of 1.3, likely due to hemodilution after being given 3.5 L IVF yesterday. Hx of B12 deficiency. Followed by Dr. Peña Wall in the past. Continued fever is concerning in the setting of leukopenia and ANC of 1.3, however this is likely related to ongoing infection.   - Hematology / oncology consult - appreciate recs  - Daily CBC  - F/u folate level      At risk LEATHA: POA Cr 1.73 (BL 1.0-1.25). Improved after IV hydration.   - Monitor with daily BMP     HTN: Home HCTZ 25 mg daily and losartan 50 mg daily.  - Hold home meds d/t hypotension     Bladder Cancer: s/p urosotomy ~20 years. Followed by Dr. Harriet Bee. Hypothyroidism: TSH 0.97 in 10/2020. Home synthroid 75mcg daily  - Continue home meds     RA: Home Enbrel 50mg/ml every Thursday. Skipped last week due to COVID infection.  - Hold Enbrel while inpatient      Vitamin D deficiency: 1250 mcg every Tuesday  - Continue home meds     B12 Deficiency: 1000 mcg/ml every month. Per Dr. Pñea Wall can give one dose here and have her follow up outpatient. - F/u outpatient     Obesity: Body mass index is 32.28 kg/m². - Encourage lifestyle modification and further follow up with PCP outpatient.      FEN/GI: Regular diet. Encourage PO intake. Activity: Ambulate with assistance. DVT prophylaxis: SCDs  GI prophylaxis:  None  Disposition: Plan to d/c to TBD. PT/OT consulted. POC: Yumi Cramer (daughter) [phone]     CODE STATUS:  Full Code    Celia Briceond discussed with Dr. Martín Alicea. Subjective:   Pt was seen and examined at bedside by Dr. Yvonne Dennis, PGY-3. Patient reports continued cough but states that she is not feeling SOB. She is still fatigued but would like to go home.  Denies chest pain, vomiting, abdominal pain, dizziness.      Objective:   Physical examination  Patient Vitals for the past 24 hrs:   Temp Pulse Resp BP SpO2   20 1621 98.8 °F (37.1 °C) 86 20 (!) 129/59 94 %   20 1502 -- 82 -- -- --   20 1324 -- 81 -- -- --   20 1314 98.7 °F (37.1 °C) 85 16 131/62 96 %   20 1236 98.7 °F (37.1 °C) 86 14 (!) 117/57 100 %   20 1200 -- 79 24 (!) 102/51 98 %   20 1145 -- 78 24 -- 99 %   20 1130 -- 91 27 -- --   20 1115 -- 85 25 -- 98 %   20 1100 -- 77 24 (!) 108/53 100 %   20 1045 -- 80 22 -- 100 %   20 1030 -- 78 25 (!) 107/51 99 %   20 1000 -- 84 20 (!) 115/54 99 %   20 0930 -- 80 25 (!) 102/49 99 %   20 0839 98 °F (36.7 °C) 78 20 (!) 101/48 97 %   20 0815 -- 91 20 (!) 101/52 98 %   20 0745 -- 78 23 (!) 113/52 97 %   20 0715 -- 76 23 (!) 103/49 95 %   20 0700 -- 78 23 (!) 101/46 95 %   20 0630 -- 79 24 (!) 106/46 96 %   20 0615 -- 79 25 (!) 106/52 98 %   20 0600 -- 80 23 (!) 106/55 95 %   20 0545 -- 80 23 (!) 111/50 97 %   20 0530 -- 82 27 (!) 121/58 99 %   20 0515 -- 81 23 (!) 105/53 96 %   20 0500 -- 81 23 (!) 108/52 96 %   20 0445 99.7 °F (37.6 °C) 83 21 (!) 99/43 95 %   20 0430 -- 82 21 (!) 99/49 94 %   20 0415 -- 82 22 (!) 99/50 93 %   20 0400 -- 82 22 (!) 98/47 92 %   20 0345 -- 83 23 (!) 104/47 91 %   20 0330 -- 84 23 (!) 98/52 93 %   20 0315 -- 89 20 (!) 97/49 94 %   20 0303 -- 89 25 (!) 97/45 92 %   20 0245 (!) 101 °F (38.3 °C) 90 26 (!) 98/55 93 %   20 0230 -- 90 25 (!) 98/46 93 %   20 0200 -- 93 25 (!) 109/49 94 %   20 0145 -- 98 21 (!) 115/47 96 %   20 0137 (!) 103 °F (39.4 °C) (!) 101 25 (!) 102/51 94 %   20 0115 -- 100 27 (!) 101/48 95 %   20 0100 -- (!) 104 21 (!) 117/51 100 %   20 0018 (!) 103 °F (39.4 °C) (!) 108 17 (!) 124/58 96 %      Temp (24hrs), Av.1 °F (37.8 °C), Min:98 °F (36.7 °C), Max:103 °F (39.4 °C)         O2 Device: Room air    Date 12/08/20 0700 - 12/09/20 0659(Not Admitted) 12/09/20 0700 - 12/10/20 0659   Shift 4627-3762 4062-7774 24 Hour Total 5726-9760 0290-3994 24 Hour Total   INTAKE   I.V.  3500(3.3) 3500(1.7) 100  100     Volume (sodium chloride 0.9 % bolus infusion 1,000 mL)  1000 1000        Volume (sodium chloride 0.9 % bolus infusion 1,000 mL)  1000 1000        Volume (sodium chloride 0.9 % bolus infusion 1,000 mL)  1000 1000        Volume (doxycycline (VIBRAMYCIN) 100 mg in 0.9% sodium chloride (MBP/ADV) 100 mL MBP)    100  100     Volume (vancomycin (VANCOCIN) 1500 mg in  ml infusion)  500 500      Shift Total(mL/kg)  3500(39.8) 3500(39.8) 100(1.1)  100(1.1)   OUTPUT   Urine  575(0.5) 575(0.3) 1100  1100     Urine Voided    1100   Shift Total(mL/kg)  575(6.5) 575(6.5) 1100(12.5)  1100(12.5)   NET  2925 2925 -1000  -1000   Weight (kg)  88 88 88 88 88   *due to COVID-19 pandemic and limitations on patient contact the physical exam has been performed and reported by [facility] Medicine senior resident, Dr. Di Brito*    General:   Alert, cooperative, no acute distress, sitting upright in bed   Head:   Atraumatic   Eyes:   Conjunctivae clear   ENT:  Oral mucosa normal   Neck:  Supple, trachea midline   Lungs:   No increased work of breathing. No wheezing or rhonchi. Rales noted in bilateral lung bases. Coughing on exam.    Heart:   Normal rate, regular rhythm, no murmur   Abdomen:    Soft, non-tender. Urostomy bag noted.     Extremities:  No edema or DVT signs   Pulses:  Symmetric all extremities   Skin:  Warm and dry    No rashes or lesions   Neurologic:  Alert and oriented x4   No focal deficits     Data Review:     CBC:  Recent Labs     12/09/20  0036   WBC 3.0*   HGB 11.0*   HCT 32.5*   PLT 83*     Metabolic Panel:  Recent Labs     12/09/20 0037 12/09/20 0036   NA  --  135*   K  --  4.2   CL  --  104   CO2  --  24   BUN  --  40* CREA  --  1.73*   GLU  --  129*   CA  --  8.6   ALB  --  3.6   TBILI  --  0.7   ALT  --  38   INR 1.1  --      Micro:  Lab Results   Component Value Date/Time    Culture result: NO GROWTH AFTER 4 HOURS 12/09/2020 12:37 AM    Culture result: NO GROWTH 1 DAY 04/21/2018 10:42 AM    Culture result:  04/18/2018 06:58 AM     >2 ORGANISMS - CONTAMINATED SPECIMEN. SUGGEST RECOLLECTION    Culture result: (A) 04/18/2018 06:58 AM     INCLUDING AT LEAST 3  GRAM NEGATIVE RODS AND GRAM POSITIVE COCCI     Imaging:  Ct Chest Wo Cont    Result Date: 12/9/2020  INDICATION: Shortness of breath COMPARISON: None CONTRAST: None. TECHNIQUE:  5 mm axial images were obtained through the chest. Coronal and sagittal reformats were generated. CT dose reduction was achieved through use of a standardized protocol tailored for this examination and automatic exposure control for dose modulation. The absence of intravenous contrast reduces the sensitivity for evaluation of the mediastinum, george, vasculature, and upper abdominal organs. FINDINGS: CHEST WALL: No mass or axillary lymphadenopathy. THYROID: No nodule. MEDIASTINUM: No mass or lymphadenopathy. GEORGE: No mass or lymphadenopathy. THORACIC AORTA: No aneurysm. MAIN PULMONARY ARTERY: Normal in caliber. TRACHEA/BRONCHI: Patent. ESOPHAGUS: No wall thickening or dilatation. HEART: Normal in size. PLEURA: No effusion or pneumothorax. LUNGS: Multiple small groundglass bilateral infiltrates. Those are nonspecific, they can BE seen with atypical pneumonia. INCIDENTALLY IMAGED UPPER ABDOMEN: No significant abnormality in the incidentally imaged upper abdomen. BONES: No destructive bone lesion. IMPRESSION: Multiple small patchy groundglass infiltrates. Xr Chest Port    Result Date: 12/9/2020  Clinical indication: Chest pain. Portable AP upright view of the chest is obtained, comparison February 24, 2019. The  heart size is normal. There is no acute infiltrate.      IMPRESSION: No acute changes. Medications reviewed  Current Facility-Administered Medications   Medication Dose Route Frequency    sodium chloride (NS) flush 5-10 mL  5-10 mL IntraVENous PRN    sodium chloride (NS) flush 5-40 mL  5-40 mL IntraVENous Q8H    sodium chloride (NS) flush 5-40 mL  5-40 mL IntraVENous PRN    acetaminophen (TYLENOL) tablet 650 mg  650 mg Oral Q6H PRN    Or    acetaminophen (TYLENOL) suppository 650 mg  650 mg Rectal Q6H PRN    prochlorperazine (COMPAZINE) injection 10 mg  10 mg IntraVENous Q4H PRN    atorvastatin (LIPITOR) tablet 20 mg  20 mg Oral DAILY    zinc sulfate (ZINCATE) 220 (50) mg capsule 1 Cap  1 Cap Oral DAILY    ascorbic acid (vitamin C) (VITAMIN C) tablet 500 mg  500 mg Oral BID    [START ON 12/15/2020] ergocalciferol capsule 50,000 Units  50,000 Units Oral Q7D    levothyroxine (SYNTHROID) tablet 75 mcg  75 mcg Oral 6am    pantoprazole (PROTONIX) tablet 40 mg  40 mg Oral DAILY    0.9% sodium chloride infusion  125 mL/hr IntraVENous CONTINUOUS    cefTRIAXone (ROCEPHIN) 2 g in sterile water (preservative free) 20 mL IV syringe  2 g IntraVENous Q24H    doxycycline (VIBRAMYCIN) 100 mg in 0.9% sodium chloride (MBP/ADV) 100 mL MBP  100 mg IntraVENous Q12H    guaiFENesin ER (MUCINEX) tablet 1,200 mg  1,200 mg Oral Q12H    benzonatate (TESSALON) capsule 100 mg  100 mg Oral TID PRN         Signed:   Denita Renee DO  Family Medicine Resident      Attending note: Attending note to follow. ..

## 2020-12-09 NOTE — PROGRESS NOTES
[facility]: MED RECONCILIATION    Medications added:     none    Medications removed:    none    Medications adjusted:    Enbrel once weekly (on Thursdays) - patient missed dose last week d/t illness  Nystatin powder QID PRN (instead of scheduled) - uses around stoma when needed    Information obtained from: patient (via phone d/t COVID+), Rx query    Significant PMH/Disease States:   Past Medical History:   Diagnosis Date    Arthritis     Cancer ([facility] [address]     bladder    Endocrine disease     hyperthyroid    Gastrointestinal disorder     GERD (gastroesophageal reflux disease)     Hypertension     Other ill-defined conditions(799.89)     hyperthyroid     Chief Complaint for this Admission:   Chief Complaint   Patient presents with    Concern For COVID-19 (Coronavirus)     Allergies: Bactrim [sulfamethoprim]; Ciprofloxacin; Diclofenac; and Sulfa (sulfonamide antibiotics)    Prior to Admission Medications:     Medication Documentation Review Audit       Reviewed by Ruddy Nyhan, PHARMD (Pharmacist) on 12/09/20 at [phone]      Medication Sig Documenting Provider Last Dose Status Taking? cyanocobalamin (VITAMIN B12) 1,000 mcg/mL injection 1 mL by SubCUTAneous route every thirty (30) days. Isabel Colby MD 11/9/2020 Unknown time Active Yes           Med Note (Suzette Chi   Wed Dec 9, 2020  9:30 AM) Due for a dose now   ENBREL MINI 50 mg/mL (1 mL) crtg Every Thursday. Provider, Historical 11/26/2020 Active Yes           Med Note (Suzette Chi   Wed Dec 9, 2020  9:31 AM) Missed dose last Thurs 12/3/2020 d/t illness   ergocalciferol (ERGOCALCIFEROL) 1,250 mcg (50,000 unit) capsule Take 1 Cap by mouth every seven (7) days. Indications: low vitamin D levels Bernard Browne MD 37/4/1595 am Active Yes           Med Note (Suzette Chi   Wed Dec 9, 2020  9:32 AM) Tod Pod on Tuesdays   hydroCHLOROthiazide (HYDRODIURIL) 25 mg tablet Take 1 Tab by mouth daily.  Bernard Browne MD 68/8/5576 am Active Yes   levothyroxine (SYNTHROID) 75 mcg tablet TAKE 1 TAB BY MOUTH DAILY (BEFORE BREAKFAST). Noman Jean-Baptiste MD 72/2/0742 am Active Yes   losartan (COZAAR) 50 mg tablet Take 1 Tab by mouth daily. Indications: high blood pressure Noman Jean-Baptiste MD 84/0/1178 am Active Yes   nystatin (MYCOSTATIN) powder Apply  to affected area four (4) times daily as needed. Use around stoma Provider, Historical  Active Yes   omeprazole (PRILOSEC) 20 mg capsule TAKE 1 CAPSULE BY MOUTH EVERY DAY Shiva Godoy MD 12/8/2020 am Active Yes                  Thank you for the consult,  Zaid NATHAN, [facility]

## 2020-12-09 NOTE — PROGRESS NOTES
Pharmacy renally adjusted cefepime to 2 grams IV q12H, for CrCl @38 ml/min, per renal protocol. Will follow daily.

## 2020-12-09 NOTE — PROGRESS NOTES
Problem: Self Care Deficits Care Plan (Adult)  Goal: *Acute Goals and Plan of Care (Insert Text)  Description:   FUNCTIONAL STATUS PRIOR TO ADMISSION: Patient was independent and active without use of DME. Drove minimally    HOME SUPPORT: The patient lived with daughter but did not require assist.    Occupational Therapy Goals  Initiated 12/9/2020  1. Patient will perform grooming with modified independence standing at the sink within 7 day(s). 2.  Patient will perform upper body dressing with supervision/set-up within 7 day(s). 3.  Patient will perform lower body dressing with supervision/set-up and best technique within 7 day(s). 4.  Patient will perform toilet transfers with modified independence within 7 day(s). 5.  Patient will perform all aspects of toileting with supervision/set-up within 7 day(s). 6.  Patient will participate in upper extremity therapeutic exercise/activities with supervision/set-up for 5 minutes with < or = 2 rest breaks within 7 day(s). 7.  Patient will perform breathing exercises with handout independently within 7 day(s). Outcome: Not Met    OCCUPATIONAL THERAPY EVALUATION  Patient: Pablo Felix (79 y.o. female)  Date: 12/9/2020  Primary Diagnosis: Sepsis ([facility] [address] [A41.9]        Precautions:   Fall    ASSESSMENT  Based on the objective data described below, the patient presents with decreased activity tolerance, received on room air and sats in upper 90's,  primarily complaint of abdominal discomfort and noted loose stools upon sitting up. Patient moving well however with increased coughing upon sitting edge of bed. Encouraged increased time sitting edge of bed for meals and to bedside commode for BM. Patient hard of hearing and does not have hearing aides, usually can hear well enough however COVID + ppe limit her ability to hear well in room this date. Will continue to follow. Recommend instruction on LE dressing with equipment for energy conservation.      Current Level of Function Impacting Discharge (ADLs/self-care): limited by decreased activity tolerance, coughing with activity/movement and abdominal pain,     Functional Outcome Measure: The patient scored 40/100 on the Barthel Index outcome measure   Other factors to consider for discharge: daughter works during day     Patient will benefit from skilled therapy intervention to address the above noted impairments. PLAN :  Recommendations and Planned Interventions: self care training, functional mobility training, therapeutic exercise, balance training, therapeutic activities, endurance activities, patient education, home safety training, and family training/education    Frequency/Duration: Patient will be followed by occupational therapy 2 times a week to address goals. Recommendation for discharge: (in order for the patient to meet his/her long term goals)  Occupational therapy at least 2 days/week in the home     This discharge recommendation:  Has not yet been discussed the attending provider and/or case management    IF patient discharges home will need the following DME: to be determined       SUBJECTIVE:   Patient stated just sore here.  rubbing abdomen, when asked how she was feeling    OBJECTIVE DATA SUMMARY:   HISTORY:   Past Medical History:   Diagnosis Date    Arthritis     Cancer ([facility] [address]     bladder    Endocrine disease     hyperthyroid    Gastrointestinal disorder     GERD (gastroesophageal reflux disease)     Hypertension     Other ill-defined conditions(799.89)     hyperthyroid     Past Surgical History:   Procedure Laterality Date    HX GYN      HX HERNIA REPAIR      HX HIP REPLACEMENT Left     HX UROLOGICAL      stoma since 78       Expanded or extensive additional review of patient history:     Home Situation  Home Environment: Private residence  # Steps to Enter: 4  One/Two Story Residence: One story  Living Alone: No  Support Systems: Child(felipe)  Current DME Used/Available at Home: [employer]  Tub or Shower Type: Tub/Shower combination    Hand dominance: Right    EXAMINATION OF PERFORMANCE DEFICITS:  Cognitive/Behavioral Status:  Neurologic State: Alert  Orientation Level: Oriented X4  Cognition: Follows commands; Appropriate safety awareness; Appropriate for age attention/concentration; Appropriate decision making  Perception: Appears intact  Perseveration: No perseveration noted  Safety/Judgement: Awareness of environment; Fall prevention;Home safety; Insight into deficits    Skin: intact, urostomy in place and informed nursing tech of need to empty    Edema: none noted    Hearing: Auditory  Auditory Impairment: Hard of hearing, bilateral, Hearing aid(s)  Hearing Aids/Status: Does not own    Vision/Perceptual:       Appear WFL     Range of Motion:  AROM: Within functional limits          Strength:  Strength: Within functional limits     Coordination:  Coordination: Within functional limits  Fine Motor Skills-Upper: Left Intact; Right Intact    Gross Motor Skills-Upper: Left Intact; Right Intact    Tone & Sensation:  Tone: Normal  Sensation: Intact        Balance:  Sitting: Intact  Standing: Impaired  Standing - Static: Good; Unsupported  Standing - Dynamic : Not tested    Functional Mobility and Transfers for ADLs:  Bed Mobility:  Supine to Sit: Minimum assistance  Sit to Supine: Minimum assistance  Scooting: Supervision    Transfers:  Sit to Stand: Supervision  Stand to Sit: Supervision  Bed to Chair: Contact guard assistance  Toilet Transfer : Minimum assistance;Contact guard assistance    ADL Assessment:  Feeding: Modified independent    Oral Facial Hygiene/Grooming: Modified Independent    Bathing: Moderate assistance    Upper Body Dressing: Setup; Additional time    Lower Body Dressing: Moderate assistance    Toileting:  Moderate assistance                ADL Intervention and task modifications:          Educated on role of OT    Patient instructed and indicated understanding the benefits of maintaining activity tolerance, functional mobility, and independence with self care tasks during acute stay  to ensure safe return home and to baseline. Encouraged patient to increase frequency and duration OOB, be out of bed for all meals, perform daily ADLs (as approved by RN/MD regarding bathing etc), and performing functional mobility to/from bedside commode. Cognitive Retraining  Safety/Judgement: Awareness of environment; Fall prevention;Home safety; Insight into deficits    Therapeutic Exercise: Instructed in pursed lip breathing exercise  Instructed on bilateral UE shoulder flexion and shoulder abduction with incorporated breathing technique  Instructed in LE ankle pumps, marching, knee extension seated edge of bed    Instructed to incorporate some exercise every hour in supine and seated as tolerated to prevent decreased strength   Functional Measure:  Barthel Index:    Bathin  Bladder: 0  Bowels: 5  Groomin  Dressin  Feeding: 10  Mobility: 0  Stairs: 0  Toilet Use: 5  Transfer (Bed to Chair and Back): 10  Total: 40/100        The Barthel ADL Index: Guidelines  1. The index should be used as a record of what a patient does, not as a record of what a patient could do. 2. The main aim is to establish degree of independence from any help, physical or verbal, however minor and for whatever reason. 3. The need for supervision renders the patient not independent. 4. A patient's performance should be established using the best available evidence. Asking the patient, friends/relatives and nurses are the usual sources, but direct observation and common sense are also important. However direct testing is not needed. 5. Usually the patient's performance over the preceding 24-48 hours is important, but occasionally longer periods will be relevant. 6. Middle categories imply that the patient supplies over 50 per cent of the effort. 7. Use of aids to be independent is allowed.     Nikita Aguilera., Barthel, DKiarraW. (1965). Functional evaluation: the Barthel Index. [address] [employer] (14)2. Ambreen  DIDIER Hassan, Micheline Coombs.Karrie., Juan Rorynt, [address] (1999). Measuring the change indisability after inpatient rehabilitation; comparison of the responsiveness of the Barthel Index and Functional [place] Measure. Journal of Neurology, Neurosurgery, and Psychiatry, 66(4), 645-191. JAKE Ivan, EDWIN Valero, & Radha West M.A. (2004.) Assessment of post-stroke quality of life in cost-effectiveness studies: The usefulness of the Barthel Index and the EuroQoL-5D. Quality of Life Research, 15, 710-68         Occupational Therapy Evaluation Charge Determination   History Examination Decision-Making   LOW Complexity : Brief history review  LOW Complexity : 1-3 performance deficits relating to physical, cognitive , or psychosocial skils that result in activity limitations and / or participation restrictions  LOW Complexity : No comorbidities that affect functional and no verbal or physical assistance needed to complete eval tasks       Based on the above components, the patient evaluation is determined to be of the following complexity level: LOW   Pain Rating:  No complaint of pain    Activity Tolerance:   Poor and requires rest breaks    After treatment patient left in no apparent distress:    Supine in bed, Call bell within reach, and Side rails x 3    COMMUNICATION/EDUCATION:   The patients plan of care was discussed with: Physical therapist and Registered nurse. Home safety education was provided and the patient/caregiver indicated understanding. and Patient/family have participated as able in goal setting and plan of care. This patients plan of care is appropriate for delegation to [facility].     Thank you for this referral.  Cristal Salas, OTR/L  Time Calculation: 21 mins

## 2020-12-09 NOTE — PROGRESS NOTES
Physician Progress Note      DEUCEFidencio Soulier  [facility] #:                  [id number]  :                       1945  ADMIT DATE:       2020 12:13 AM  DISCH DATE:  RESPONDING  PROVIDER #:        Bea Tate MD          QUERY TEXT:    Pt admitted with UTI. Pt noted to have h/o of Bladder cancer S/p ileal conduit + urostomy. If possible, please document in the progress notes and discharge summary if you are evaluating and/or treating any of the following: The medical record reflects the following:  Risk Factors: 65 yo F admitted with sepsis 2/2 covid 19 and UTI  Clinical Indicators: H&P states \" Bladder cancer: remote hx in . S/p ileal conduit + urostomy. \"  UA positive nitrites, small LE, +2 bacteria  urine cx pending  Treatment: IV vanc + cefepime  Options provided:  -- UTI due to urostomy  -- UTI POA not due to urostomy  -- Other - I will add my own diagnosis  -- Disagree - Not applicable / Not valid  -- Disagree - Clinically unable to determine / Unknown  -- Refer to Clinical Documentation Reviewer    PROVIDER RESPONSE TEXT:    UTI POA not due to urostomy.     Query created by: Aniyah Park on 2020 9:19 AM      Electronically signed by:  Bea Tate MD 2020 5:48 PM

## 2020-12-10 ENCOUNTER — APPOINTMENT (OUTPATIENT)
Dept: GENERAL RADIOLOGY | Age: 75
DRG: 871 | End: 2020-12-10
Attending: FAMILY MEDICINE
Payer: MEDICARE

## 2020-12-10 LAB
ALBUMIN SERPL-MCNC: 3.1 G/DL (ref 3.5–5)
ALBUMIN/GLOB SERPL: 0.8 {RATIO} (ref 1.1–2.2)
ALP SERPL-CCNC: 40 U/L (ref 45–117)
ALT SERPL-CCNC: 31 U/L (ref 12–78)
ANION GAP SERPL CALC-SCNC: 4 MMOL/L (ref 5–15)
AST SERPL-CCNC: 51 U/L (ref 15–37)
BASOPHILS # BLD: 0 K/UL (ref 0–0.1)
BASOPHILS # BLD: 0 K/UL (ref 0–0.1)
BASOPHILS NFR BLD: 0 % (ref 0–1)
BASOPHILS NFR BLD: 0 % (ref 0–1)
BILIRUB SERPL-MCNC: 0.5 MG/DL (ref 0.2–1)
BUN SERPL-MCNC: 23 MG/DL (ref 6–20)
BUN/CREAT SERPL: 16 (ref 12–20)
CALCIUM SERPL-MCNC: 7.9 MG/DL (ref 8.5–10.1)
CHLORIDE SERPL-SCNC: 110 MMOL/L (ref 97–108)
CO2 SERPL-SCNC: 22 MMOL/L (ref 21–32)
COMMENT, HOLDF: NORMAL
CREAT SERPL-MCNC: 1.44 MG/DL (ref 0.55–1.02)
CRP SERPL-MCNC: 2.42 MG/DL (ref 0–0.6)
D DIMER PPP FEU-MCNC: 2.06 MG/L FEU (ref 0–0.65)
DIFFERENTIAL METHOD BLD: ABNORMAL
DIFFERENTIAL METHOD BLD: ABNORMAL
EOSINOPHIL # BLD: 0 K/UL (ref 0–0.4)
EOSINOPHIL # BLD: 0 K/UL (ref 0–0.4)
EOSINOPHIL NFR BLD: 0 % (ref 0–7)
EOSINOPHIL NFR BLD: 0 % (ref 0–7)
ERYTHROCYTE [DISTWIDTH] IN BLOOD BY AUTOMATED COUNT: 12.8 % (ref 11.5–14.5)
ERYTHROCYTE [DISTWIDTH] IN BLOOD BY AUTOMATED COUNT: 12.8 % (ref 11.5–14.5)
FERRITIN SERPL-MCNC: 317 NG/ML (ref 8–252)
FOLATE SERPL-MCNC: 14.1 NG/ML (ref 5–21)
GLOBULIN SER CALC-MCNC: 3.9 G/DL (ref 2–4)
GLUCOSE SERPL-MCNC: 88 MG/DL (ref 65–100)
HCT VFR BLD AUTO: 29.1 % (ref 35–47)
HCT VFR BLD AUTO: 31.2 % (ref 35–47)
HGB BLD-MCNC: 10.4 G/DL (ref 11.5–16)
HGB BLD-MCNC: 9.7 G/DL (ref 11.5–16)
IMM GRANULOCYTES # BLD AUTO: 0 K/UL (ref 0–0.04)
IMM GRANULOCYTES # BLD AUTO: 0 K/UL (ref 0–0.04)
IMM GRANULOCYTES NFR BLD AUTO: 0 % (ref 0–0.5)
IMM GRANULOCYTES NFR BLD AUTO: 0 % (ref 0–0.5)
LDH SERPL L TO P-CCNC: 333 U/L (ref 81–246)
LYMPHOCYTES # BLD: 0.4 K/UL (ref 0.8–3.5)
LYMPHOCYTES # BLD: 0.5 K/UL (ref 0.8–3.5)
LYMPHOCYTES NFR BLD: 16 % (ref 12–49)
LYMPHOCYTES NFR BLD: 27 % (ref 12–49)
MCH RBC QN AUTO: 30.2 PG (ref 26–34)
MCH RBC QN AUTO: 30.5 PG (ref 26–34)
MCHC RBC AUTO-ENTMCNC: 33.3 G/DL (ref 30–36.5)
MCHC RBC AUTO-ENTMCNC: 33.3 G/DL (ref 30–36.5)
MCV RBC AUTO: 90.7 FL (ref 80–99)
MCV RBC AUTO: 91.5 FL (ref 80–99)
MONOCYTES # BLD: 0.2 K/UL (ref 0–1)
MONOCYTES # BLD: 0.2 K/UL (ref 0–1)
MONOCYTES NFR BLD: 8 % (ref 5–13)
MONOCYTES NFR BLD: 8 % (ref 5–13)
NEUTS SEG # BLD: 1.3 K/UL (ref 1.8–8)
NEUTS SEG # BLD: 1.9 K/UL (ref 1.8–8)
NEUTS SEG NFR BLD: 65 % (ref 32–75)
NEUTS SEG NFR BLD: 76 % (ref 32–75)
NRBC # BLD: 0 K/UL (ref 0–0.01)
NRBC # BLD: 0 K/UL (ref 0–0.01)
NRBC BLD-RTO: 0 PER 100 WBC
NRBC BLD-RTO: 0 PER 100 WBC
PLATELET # BLD AUTO: 70 K/UL (ref 150–400)
PLATELET # BLD AUTO: 84 K/UL (ref 150–400)
PMV BLD AUTO: 11.8 FL (ref 8.9–12.9)
PMV BLD AUTO: 12.1 FL (ref 8.9–12.9)
POTASSIUM SERPL-SCNC: 4.1 MMOL/L (ref 3.5–5.1)
PROCALCITONIN SERPL-MCNC: <0.05 NG/ML
PROT SERPL-MCNC: 7 G/DL (ref 6.4–8.2)
RBC # BLD AUTO: 3.18 M/UL (ref 3.8–5.2)
RBC # BLD AUTO: 3.44 M/UL (ref 3.8–5.2)
RBC MORPH BLD: ABNORMAL
RBC MORPH BLD: ABNORMAL
SAMPLES BEING HELD,HOLD: NORMAL
SODIUM SERPL-SCNC: 136 MMOL/L (ref 136–145)
WBC # BLD AUTO: 2 K/UL (ref 3.6–11)
WBC # BLD AUTO: 2.5 K/UL (ref 3.6–11)

## 2020-12-10 PROCEDURE — 36415 COLL VENOUS BLD VENIPUNCTURE: CPT

## 2020-12-10 PROCEDURE — 74011000258 HC RX REV CODE- 258: Performed by: STUDENT IN AN ORGANIZED HEALTH CARE EDUCATION/TRAINING PROGRAM

## 2020-12-10 PROCEDURE — 97116 GAIT TRAINING THERAPY: CPT

## 2020-12-10 PROCEDURE — 82746 ASSAY OF FOLIC ACID SERUM: CPT

## 2020-12-10 PROCEDURE — 86140 C-REACTIVE PROTEIN: CPT

## 2020-12-10 PROCEDURE — 85379 FIBRIN DEGRADATION QUANT: CPT

## 2020-12-10 PROCEDURE — 85025 COMPLETE CBC W/AUTO DIFF WBC: CPT

## 2020-12-10 PROCEDURE — 74011250636 HC RX REV CODE- 250/636: Performed by: STUDENT IN AN ORGANIZED HEALTH CARE EDUCATION/TRAINING PROGRAM

## 2020-12-10 PROCEDURE — 84145 PROCALCITONIN (PCT): CPT

## 2020-12-10 PROCEDURE — 80053 COMPREHEN METABOLIC PANEL: CPT

## 2020-12-10 PROCEDURE — 83615 LACTATE (LD) (LDH) ENZYME: CPT

## 2020-12-10 PROCEDURE — 99232 SBSQ HOSP IP/OBS MODERATE 35: CPT | Performed by: FAMILY MEDICINE

## 2020-12-10 PROCEDURE — 74011000250 HC RX REV CODE- 250: Performed by: STUDENT IN AN ORGANIZED HEALTH CARE EDUCATION/TRAINING PROGRAM

## 2020-12-10 PROCEDURE — 94760 N-INVAS EAR/PLS OXIMETRY 1: CPT

## 2020-12-10 PROCEDURE — 65660000000 HC RM CCU STEPDOWN

## 2020-12-10 PROCEDURE — 82728 ASSAY OF FERRITIN: CPT

## 2020-12-10 PROCEDURE — 97535 SELF CARE MNGMENT TRAINING: CPT

## 2020-12-10 PROCEDURE — 74011250637 HC RX REV CODE- 250/637: Performed by: STUDENT IN AN ORGANIZED HEALTH CARE EDUCATION/TRAINING PROGRAM

## 2020-12-10 PROCEDURE — 71045 X-RAY EXAM CHEST 1 VIEW: CPT

## 2020-12-10 RX ORDER — LIDOCAINE 4 G/100G
1 PATCH TOPICAL EVERY 24 HOURS
Status: DISCONTINUED | OUTPATIENT
Start: 2020-12-10 | End: 2020-12-15 | Stop reason: HOSPADM

## 2020-12-10 RX ADMIN — CEFTRIAXONE 2 G: 2 INJECTION, POWDER, FOR SOLUTION INTRAMUSCULAR; INTRAVENOUS at 08:42

## 2020-12-10 RX ADMIN — PROCHLORPERAZINE EDISYLATE 10 MG: 5 INJECTION INTRAMUSCULAR; INTRAVENOUS at 09:40

## 2020-12-10 RX ADMIN — Medication 10 ML: at 17:02

## 2020-12-10 RX ADMIN — Medication 10 ML: at 05:20

## 2020-12-10 RX ADMIN — ZINC SULFATE 220 MG (50 MG) CAPSULE 1 CAPSULE: CAPSULE at 08:39

## 2020-12-10 RX ADMIN — OXYCODONE HYDROCHLORIDE AND ACETAMINOPHEN 500 MG: 500 TABLET ORAL at 08:39

## 2020-12-10 RX ADMIN — DOXYCYCLINE 100 MG: 100 INJECTION, POWDER, LYOPHILIZED, FOR SOLUTION INTRAVENOUS at 08:42

## 2020-12-10 RX ADMIN — GUAIFENESIN 1200 MG: 600 TABLET ORAL at 08:39

## 2020-12-10 RX ADMIN — DOXYCYCLINE 100 MG: 100 INJECTION, POWDER, LYOPHILIZED, FOR SOLUTION INTRAVENOUS at 20:21

## 2020-12-10 RX ADMIN — ACETAMINOPHEN 650 MG: 325 TABLET ORAL at 08:39

## 2020-12-10 RX ADMIN — OXYCODONE HYDROCHLORIDE AND ACETAMINOPHEN 500 MG: 500 TABLET ORAL at 17:02

## 2020-12-10 RX ADMIN — Medication 10 ML: at 20:22

## 2020-12-10 RX ADMIN — GUAIFENESIN 1200 MG: 600 TABLET ORAL at 20:21

## 2020-12-10 RX ADMIN — ACETAMINOPHEN 650 MG: 325 TABLET ORAL at 23:08

## 2020-12-10 RX ADMIN — ATORVASTATIN CALCIUM 20 MG: 20 TABLET, FILM COATED ORAL at 08:39

## 2020-12-10 RX ADMIN — PANTOPRAZOLE SODIUM 40 MG: 40 TABLET, DELAYED RELEASE ORAL at 08:39

## 2020-12-10 RX ADMIN — LEVOTHYROXINE SODIUM 75 MCG: 0.07 TABLET ORAL at 05:20

## 2020-12-10 NOTE — PROGRESS NOTES
Problem: Self Care Deficits Care Plan (Adult)  Goal: *Acute Goals and Plan of Care (Insert Text)  Description:   FUNCTIONAL STATUS PRIOR TO ADMISSION: Patient was independent and active without use of DME. Drove minimally    HOME SUPPORT: The patient lived with daughter but did not require assist.    Occupational Therapy Goals  Initiated 12/9/2020  1. Patient will perform grooming with modified independence standing at the sink within 7 day(s). 2.  Patient will perform upper body dressing with supervision/set-up within 7 day(s). 3.  Patient will perform lower body dressing with supervision/set-up and best technique within 7 day(s). 4.  Patient will perform toilet transfers with modified independence within 7 day(s). 5.  Patient will perform all aspects of toileting with supervision/set-up within 7 day(s). 6.  Patient will participate in upper extremity therapeutic exercise/activities with supervision/set-up for 5 minutes with < or = 2 rest breaks within 7 day(s). 7.  Patient will perform breathing exercises with handout independently within 7 day(s). Outcome: Progressing Towards Goal     OCCUPATIONAL THERAPY TREATMENT  Patient: Brady Devine (37 y.o. female)  Date: 12/10/2020  Diagnosis: Sepsis ([facility] [address] [A41.9]   Sepsis ([facility] [address]       Precautions: Fall  Chart, occupational therapy assessment, plan of care, and goals were reviewed. ASSESSMENT  Patient continues with skilled OT services and is progressing towards goals. Patient is received OOB in chair requesting to complete urostomy mgmt. Pt able to self-dictate entire process step-by-step and demos good technique for infection prevention, requiring only min A to manage multiple parts. Pt then completed standing hygiene at sink with HR ranging in low 100s during activity. SpO2 stable in 90s while on RA. She is left up to complete ambulation with PT. Continue to recommend follow up [place] therapy vs. None pending progress in acute setting.      Current Level of Function Impacting Discharge (ADLs): overall SBA to min A for ADLs    Other factors to consider for discharge: lives with dtr; stable on RA         PLAN :  Patient continues to benefit from skilled intervention to address the above impairments. Continue treatment per established plan of care. to address goals. Recommend with staff: 1 person assist for mobility to bathroom for toileting and ADLs as needed; OOB to chair for all meals    Recommend next OT session: serial standing tasks at sink; LB ADLs    Recommendation for discharge: (in order for the patient to meet his/her long term goals)  Occupational therapy at least 2 days/week in the home vs. None pending progress    This discharge recommendation:  Has been made in collaboration with the attending provider and/or case management    IF patient discharges home will need the following DME: anticipate none for OT       SUBJECTIVE:   Patient stated i've been doing this for the last 21 yeas.  - when discussing the urostomy bag change. OBJECTIVE DATA SUMMARY:   Cognitive/Behavioral Status:  Neurologic State: Alert  Orientation Level: Oriented X4  Cognition: Appropriate decision making; Follows commands  Perception: Appears intact  Perseveration: No perseveration noted  Safety/Judgement: Awareness of environment; Fall prevention;Home safety; Insight into deficits    Functional Mobility and Transfers for ADLs:  Bed Mobility:  Supine to Sit: (Pt received OOB in chair)    Transfers:  Sit to Stand: Stand-by assistance  Functional Transfers  Bathroom Mobility: Contact guard assistance  Bed to Chair: Contact guard assistance(w/o AD; occasionally reaches out for furniture)    Balance:  Sitting: Intact  Standing: Without support  Standing - Static: Good  Standing - Dynamic : Fair    ADL Intervention:  Grooming  Grooming Assistance: Stand-by assistance;Contact guard assistance  Position Performed: Standing(at sink)  Washing Hands: Stand-by assistance;Contact guard assistance    Toileting  Toileting Assistance: Supervision;Minimum assistance(seated in chair to manage urostomy )  Bladder Hygiene: Supervision;Minimum assistance(pt able to manage and change urostomy with up to min A)    Cognitive Retraining  Safety/Judgement: Awareness of environment; Fall prevention;Home safety; Insight into deficits    Pain:  Pt reporting minimal paint this session    Activity Tolerance:   Fair, SpO2 stable on RA, and requires rest breaks    After treatment patient left in no apparent distress:   Sitting in chair, Call bell within reach, and Bed / chair alarm activated    COMMUNICATION/COLLABORATION:   The patients plan of care was discussed with: Physical therapist and Registered nurse.      Rylee Gipson OT  Time Calculation: 10 mins

## 2020-12-10 NOTE — PROGRESS NOTES
Problem: Mobility Impaired (Adult and Pediatric)  Goal: *Acute Goals and Plan of Care (Insert Text)  Description: FUNCTIONAL STATUS PRIOR TO ADMISSION: Patient was independent and active without use of DME.    HOME SUPPORT PRIOR TO ADMISSION: The patient lived with daughter but did not require assist.    Physical Therapy Goals  Initiated 12/9/2020  1. Patient will move from supine to sit and sit to supine , scoot up and down, and roll side to side in bed with independence within 7 day(s). 2.  Patient will transfer from bed to chair and chair to bed with independence using the least restrictive device within 7 day(s). 3.  Patient will perform sit to stand with independence within 7 day(s). 4.  Patient will ambulate with independence for 200 feet with the least restrictive device within 7 day(s). 5.  Patient will ascend/descend 4 stairs with  handrail(s) with independence within 7 day(s). Note:   PHYSICAL THERAPY TREATMENT  Patient: Ella Bates (5 y.o. female)  Date: 12/10/2020  Diagnosis: Sepsis ([facility] [address] [A41.9]   Sepsis ([facility] [address]       Precautions: Fall droplet plus- covid +  Chart, physical therapy assessment, plan of care and goals were reviewed. ASSESSMENT  Patient continues with skilled PT services and is progressing towards goals. Patient reports she has been staying OOB most of each admission day due to discomfort in bed. Patient demonstrates decreased balance from her described baseline but CG A for amb. pacing in room without an assistive device and no overt loss of balance. Patient on RA and SPO2 stable 95-98%. Patient continues to require PT services to ensure she is ambulating increased distances regularly and does not develop complications of worsening strength, endurance and balance from inactivity.      Current Level of Function Impacting Discharge (mobility/balance): CG A x 1 for amb in room    Other factors to consider for discharge:          PLAN :  Patient continues to benefit from skilled intervention to address the above impairments. Continue treatment per established plan of care. to address goals. Recommendation for discharge: (in order for the patient to meet his/her long term goals)  Physical therapy at least 2 days/week in the home or none    This discharge recommendation:  Has not yet been discussed the attending provider and/or case management    IF patient discharges home will need the following DME: none       SUBJECTIVE:   Patient stated I maybe feel a touch better today.     OBJECTIVE DATA SUMMARY:   Critical Behavior:  Neurologic State: Alert  Orientation Level: Oriented X4  Cognition: Appropriate decision making, Follows commands  Safety/Judgement: Awareness of environment, Fall prevention, Home safety, Insight into deficits  Functional Mobility Training:  Bed Mobility:     Supine to Sit: (Pt received OOB in chair)              Transfers:  Sit to Stand: Stand-by assistance  Stand to Sit: Stand-by assistance  Stand Pivot Transfers: Stand-by assistance     Bed to Chair: Stand-by assistance                    Balance:  Sitting: Intact  Standing: Without support  Standing - Static: Good  Standing - Dynamic : Fair  Ambulation/Gait Training:   CG A x 1 for 40' pacing in room twice to door without device, no LOB or deviations    Pain Rating:  Denies any pain    Activity Tolerance:   Fair    After treatment patient left in no apparent distress:   Sitting in chair, Call bell within reach, and Bed / chair alarm activated    COMMUNICATION/COLLABORATION:   The patients plan of care was discussed with: Registered nurse.   And OT    Mario Montana PT, DPT   Time Calculation: 15 mins

## 2020-12-10 NOTE — PROGRESS NOTES
1920: Bedside and Verbal shift change report given to [facility] (oncoming nurse) by Eliane (offgoing nurse). Report included the following information SBAR, Kardex, Intake/Output, MAR and Recent Results. Charting in disaster mode    2355: Pt complaining of pain from IV. NS stopped at this time. Second IV access attempted x3 without success. Will attempt to call ED.     0300: Precious Deidre from ED attempted to get another IV with no success. 0: Asked Jamison Leiva if an ultrasound IV can be attempted. Brettlawanda Suki is unavailable to do ultrasound IV at this time. 0730: Bedside and Verbal shift change report given to Ivett (oncoming nurse) by [facility] (offgoing nurse). Report included the following information SBAR, Kardex, Intake/Output, MAR and Recent Results.

## 2020-12-10 NOTE — PROGRESS NOTES
Shift Change:      Bedside and Verbal shift change report given to [address] (oncoming nurse) by [address] [place] [address] (offgoing nurse). Report included the following information SBAR, Kardex and Recent Results. Shift Summary:    9091: fever of 101.5, MD notified and tylenol given  0930: temp 99.3  0940: patient complaint of nausea, compazine given  1130: temp 98.0  1250: patient states that she feels fatigued, oxygen sats 95% on RA, sitting up in chair  1730: patient states chronic back pain 8/10, requested medication besides tylenol, FP notified and received order for lidocaine patch    Shift Change:      Bedside and Verbal shift change report given to [address] [place] [address]  (oncoming nurse) by [address] (offgoing nurse). Report included the following information SBAR, Kardex and Recent Results.

## 2020-12-10 NOTE — PROGRESS NOTES
0730-Bedside and Verbal shift change report given to Rupert Maria, RN and Angelito Rao RN (oncoming nurse) by Nyla Mondragon RN (offgoing nurse). Report included the following information SBAR, Kardex, ED Summary, OR Summary, Procedure Summary, Intake/Output, MAR, Accordion and Recent Results.      0812-Vital signs completed and called to MD per MD request.

## 2020-12-10 NOTE — PROGRESS NOTES
Have reviewed today's blood counts. As patient continues to have fevers, it is reasonable to continue workup to ensure no other source is missed. Could consider abd/pelvis CT. However, patient does not have neutropenic fever (this is fever that occurs in someone who is already neutropenic with severe neutropenia of ANC <= 0.5).  However, this patient has declining WBC count, but already had infection and fevers first.

## 2020-12-11 ENCOUNTER — APPOINTMENT (OUTPATIENT)
Dept: CT IMAGING | Age: 75
DRG: 871 | End: 2020-12-11
Attending: STUDENT IN AN ORGANIZED HEALTH CARE EDUCATION/TRAINING PROGRAM
Payer: MEDICARE

## 2020-12-11 LAB
ALBUMIN SERPL-MCNC: 3 G/DL (ref 3.5–5)
ALBUMIN/GLOB SERPL: 0.7 {RATIO} (ref 1.1–2.2)
ALP SERPL-CCNC: 37 U/L (ref 45–117)
ALT SERPL-CCNC: 32 U/L (ref 12–78)
ANION GAP SERPL CALC-SCNC: 6 MMOL/L (ref 5–15)
AST SERPL-CCNC: 57 U/L (ref 15–37)
BACTERIA SPEC CULT: ABNORMAL
BACTERIA SPEC CULT: ABNORMAL
BASOPHILS # BLD: 0 K/UL (ref 0–0.1)
BASOPHILS NFR BLD: 1 % (ref 0–1)
BILIRUB SERPL-MCNC: 0.5 MG/DL (ref 0.2–1)
BUN SERPL-MCNC: 22 MG/DL (ref 6–20)
BUN/CREAT SERPL: 15 (ref 12–20)
CALCIUM SERPL-MCNC: 8.1 MG/DL (ref 8.5–10.1)
CC UR VC: ABNORMAL
CHLORIDE SERPL-SCNC: 109 MMOL/L (ref 97–108)
CO2 SERPL-SCNC: 21 MMOL/L (ref 21–32)
CREAT SERPL-MCNC: 1.44 MG/DL (ref 0.55–1.02)
CRP SERPL-MCNC: 4.35 MG/DL (ref 0–0.6)
D DIMER PPP FEU-MCNC: 2.16 MG/L FEU (ref 0–0.65)
DIFFERENTIAL METHOD BLD: ABNORMAL
EOSINOPHIL # BLD: 0 K/UL (ref 0–0.4)
EOSINOPHIL NFR BLD: 1 % (ref 0–7)
ERYTHROCYTE [DISTWIDTH] IN BLOOD BY AUTOMATED COUNT: 13 % (ref 11.5–14.5)
FERRITIN SERPL-MCNC: 386 NG/ML (ref 26–388)
GLOBULIN SER CALC-MCNC: 4.1 G/DL (ref 2–4)
GLUCOSE SERPL-MCNC: 86 MG/DL (ref 65–100)
HCT VFR BLD AUTO: 34 % (ref 35–47)
HGB BLD-MCNC: 10.9 G/DL (ref 11.5–16)
IMM GRANULOCYTES # BLD AUTO: 0 K/UL (ref 0–0.04)
IMM GRANULOCYTES NFR BLD AUTO: 0 % (ref 0–0.5)
LDH SERPL L TO P-CCNC: 379 U/L (ref 81–246)
LYMPHOCYTES # BLD: 0.6 K/UL (ref 0.8–3.5)
LYMPHOCYTES NFR BLD: 29 % (ref 12–49)
MCH RBC QN AUTO: 31.1 PG (ref 26–34)
MCHC RBC AUTO-ENTMCNC: 32.1 G/DL (ref 30–36.5)
MCV RBC AUTO: 97.1 FL (ref 80–99)
MONOCYTES # BLD: 0.2 K/UL (ref 0–1)
MONOCYTES NFR BLD: 9 % (ref 5–13)
NEUTS SEG # BLD: 1.3 K/UL (ref 1.8–8)
NEUTS SEG NFR BLD: 60 % (ref 32–75)
NRBC # BLD: 0 K/UL (ref 0–0.01)
NRBC BLD-RTO: 0 PER 100 WBC
PLATELET # BLD AUTO: 88 K/UL (ref 150–400)
POTASSIUM SERPL-SCNC: 4 MMOL/L (ref 3.5–5.1)
PROT SERPL-MCNC: 7.1 G/DL (ref 6.4–8.2)
RBC # BLD AUTO: 3.5 M/UL (ref 3.8–5.2)
RBC MORPH BLD: ABNORMAL
SERVICE CMNT-IMP: ABNORMAL
SODIUM SERPL-SCNC: 136 MMOL/L (ref 136–145)
WBC # BLD AUTO: 2.1 K/UL (ref 3.6–11)

## 2020-12-11 PROCEDURE — 74011000258 HC RX REV CODE- 258: Performed by: STUDENT IN AN ORGANIZED HEALTH CARE EDUCATION/TRAINING PROGRAM

## 2020-12-11 PROCEDURE — 80053 COMPREHEN METABOLIC PANEL: CPT

## 2020-12-11 PROCEDURE — 74011000250 HC RX REV CODE- 250: Performed by: STUDENT IN AN ORGANIZED HEALTH CARE EDUCATION/TRAINING PROGRAM

## 2020-12-11 PROCEDURE — 85379 FIBRIN DEGRADATION QUANT: CPT

## 2020-12-11 PROCEDURE — 74177 CT ABD & PELVIS W/CONTRAST: CPT

## 2020-12-11 PROCEDURE — 85025 COMPLETE CBC W/AUTO DIFF WBC: CPT

## 2020-12-11 PROCEDURE — 74011250637 HC RX REV CODE- 250/637: Performed by: STUDENT IN AN ORGANIZED HEALTH CARE EDUCATION/TRAINING PROGRAM

## 2020-12-11 PROCEDURE — 36415 COLL VENOUS BLD VENIPUNCTURE: CPT

## 2020-12-11 PROCEDURE — 86140 C-REACTIVE PROTEIN: CPT

## 2020-12-11 PROCEDURE — 94760 N-INVAS EAR/PLS OXIMETRY 1: CPT

## 2020-12-11 PROCEDURE — 82728 ASSAY OF FERRITIN: CPT

## 2020-12-11 PROCEDURE — 71275 CT ANGIOGRAPHY CHEST: CPT

## 2020-12-11 PROCEDURE — 83615 LACTATE (LD) (LDH) ENZYME: CPT

## 2020-12-11 PROCEDURE — 74011250637 HC RX REV CODE- 250/637: Performed by: FAMILY MEDICINE

## 2020-12-11 PROCEDURE — 74011250636 HC RX REV CODE- 250/636: Performed by: INTERNAL MEDICINE

## 2020-12-11 PROCEDURE — 74011250636 HC RX REV CODE- 250/636: Performed by: FAMILY MEDICINE

## 2020-12-11 PROCEDURE — 74011250636 HC RX REV CODE- 250/636: Performed by: STUDENT IN AN ORGANIZED HEALTH CARE EDUCATION/TRAINING PROGRAM

## 2020-12-11 PROCEDURE — 65660000000 HC RM CCU STEPDOWN

## 2020-12-11 PROCEDURE — 99232 SBSQ HOSP IP/OBS MODERATE 35: CPT | Performed by: FAMILY MEDICINE

## 2020-12-11 PROCEDURE — 74011000636 HC RX REV CODE- 636: Performed by: RADIOLOGY

## 2020-12-11 RX ORDER — CYANOCOBALAMIN 1000 UG/ML
1000 INJECTION, SOLUTION INTRAMUSCULAR; SUBCUTANEOUS ONCE
Status: COMPLETED | OUTPATIENT
Start: 2020-12-11 | End: 2020-12-11

## 2020-12-11 RX ORDER — DICLOFENAC SODIUM 10 MG/G
4 GEL TOPICAL
Status: DISCONTINUED | OUTPATIENT
Start: 2020-12-11 | End: 2020-12-15 | Stop reason: HOSPADM

## 2020-12-11 RX ORDER — HEPARIN SODIUM 5000 [USP'U]/ML
5000 INJECTION, SOLUTION INTRAVENOUS; SUBCUTANEOUS EVERY 8 HOURS
Status: DISCONTINUED | OUTPATIENT
Start: 2020-12-11 | End: 2020-12-15 | Stop reason: HOSPADM

## 2020-12-11 RX ORDER — BENZONATATE 100 MG/1
100 CAPSULE ORAL
Qty: 30 CAP | Refills: 0 | Status: SHIPPED | OUTPATIENT
Start: 2020-12-11 | End: 2020-12-21

## 2020-12-11 RX ADMIN — OXYCODONE HYDROCHLORIDE AND ACETAMINOPHEN 500 MG: 500 TABLET ORAL at 08:44

## 2020-12-11 RX ADMIN — PANTOPRAZOLE SODIUM 40 MG: 40 TABLET, DELAYED RELEASE ORAL at 08:44

## 2020-12-11 RX ADMIN — DOXYCYCLINE 100 MG: 100 INJECTION, POWDER, LYOPHILIZED, FOR SOLUTION INTRAVENOUS at 08:43

## 2020-12-11 RX ADMIN — ATORVASTATIN CALCIUM 20 MG: 20 TABLET, FILM COATED ORAL at 08:44

## 2020-12-11 RX ADMIN — Medication 10 ML: at 05:15

## 2020-12-11 RX ADMIN — LEVOTHYROXINE SODIUM 75 MCG: 0.07 TABLET ORAL at 05:15

## 2020-12-11 RX ADMIN — HEPARIN SODIUM 5000 UNITS: 5000 INJECTION INTRAVENOUS; SUBCUTANEOUS at 17:08

## 2020-12-11 RX ADMIN — Medication 10 ML: at 13:04

## 2020-12-11 RX ADMIN — OXYCODONE HYDROCHLORIDE AND ACETAMINOPHEN 500 MG: 500 TABLET ORAL at 17:08

## 2020-12-11 RX ADMIN — GUAIFENESIN 1200 MG: 600 TABLET ORAL at 08:43

## 2020-12-11 RX ADMIN — ZINC SULFATE 220 MG (50 MG) CAPSULE 1 CAPSULE: CAPSULE at 08:44

## 2020-12-11 RX ADMIN — Medication 10 ML: at 21:01

## 2020-12-11 RX ADMIN — IOPAMIDOL 80 ML: 755 INJECTION, SOLUTION INTRAVENOUS at 15:43

## 2020-12-11 RX ADMIN — CEFTRIAXONE 2 G: 2 INJECTION, POWDER, FOR SOLUTION INTRAMUSCULAR; INTRAVENOUS at 08:44

## 2020-12-11 RX ADMIN — ACETAMINOPHEN 650 MG: 325 TABLET ORAL at 11:01

## 2020-12-11 RX ADMIN — HEPARIN SODIUM 5000 UNITS: 5000 INJECTION INTRAVENOUS; SUBCUTANEOUS at 21:00

## 2020-12-11 RX ADMIN — DOXYCYCLINE 100 MG: 100 INJECTION, POWDER, LYOPHILIZED, FOR SOLUTION INTRAVENOUS at 20:59

## 2020-12-11 RX ADMIN — GUAIFENESIN 1200 MG: 600 TABLET ORAL at 21:00

## 2020-12-11 RX ADMIN — CYANOCOBALAMIN 1000 MCG: 1000 INJECTION, SOLUTION INTRAMUSCULAR; SUBCUTANEOUS at 08:43

## 2020-12-11 RX ADMIN — SODIUM CHLORIDE 1000 ML: 900 INJECTION, SOLUTION INTRAVENOUS at 08:55

## 2020-12-11 RX ADMIN — DICLOFENAC 4 G: 10 GEL TOPICAL at 10:41

## 2020-12-11 NOTE — PROGRESS NOTES
0700: Bedside shift change report given to [address] (oncoming nurse) by Baylee Sorenson (offgoing nurse). Report included the following information SBAR, Kardex, Procedure Summary, Intake/Output, MAR and Cardiac Rhythm NSR.      Disaster Charting was initiated on 12/11/20 as per Standard Operating Procedure during my shift from [address]

## 2020-12-11 NOTE — CONSULTS
Name: YOLANDA HILL: [facility]   : 1945 Admit Date: 2020   Phone:   Room: 824/   PCP: Mirta Harris MD  MRN: [id number]   Date: 2020  Code: Full Code          Chart and notes reviewed. Data reviewed. I review the patient's current medications in the medical record at each encounter. I have evaluated and examined the patient. HPI:    2:43 PM       History was obtained from patient. I was asked by Romel Magaña MD to see Celia Valdivia in consultation for a chief complaint of COVID-19. History of Present Illness:  Ms. Jet Ace is a 65 yo woman with a history of former tobacco use, RA, bladder cancer, GERD, HTN, and hyperthyroidism who is admitted with COVID-19. She was initially diagnosed with COVID  and came in with progressive cough, nausea, diarrhea and myalgias. She has some shortness of breath and chest tightness after prolonged coughing fit. No sputum production. Additionally noted to have a UTI. She has not required O2. She has been having persistent fevers and tachycardia to the low 100's.     Labs: WBC 2.1, d-dimer 2.16, cr 1.44, AST 57, ALT 32, strep UTI    Images:  CXR 12/10/20: mild bilateral airspace opacities      Past Medical History:   Diagnosis Date    Arthritis     Cancer ([address]     bladder    Endocrine disease     hyperthyroid    Gastrointestinal disorder     GERD (gastroesophageal reflux disease)     Hypertension     Other ill-defined conditions(799.89)     hyperthyroid       Past Surgical History:   Procedure Laterality Date    HX GYN      HX HERNIA REPAIR      HX HIP REPLACEMENT Left     HX UROLOGICAL      stoma since 78       Family History   Problem Relation Age of Onset    Heart Disease Father     Cancer Father         Lung       Social History     Tobacco Use    Smoking status: Former Smoker     Packs/day: 1.00     Last attempt to quit: 7/3/1999     Years since quittin.4    Smokeless tobacco: Never Used Substance Use Topics    Alcohol use: No       Allergies   Allergen Reactions    Bactrim [Sulfamethoprim] Nausea and Vomiting    Ciprofloxacin Other (comments)     Joint aches    Diclofenac Nausea Only    Sulfa (Sulfonamide Antibiotics) Nausea Only       Current Facility-Administered Medications   Medication Dose Route Frequency    diclofenac (VOLTAREN) 1 % topical gel 4 g  4 g Topical QID PRN    iopamidoL (ISOVUE-370) 76 % injection 100 mL  100 mL IntraVENous RAD ONCE    lidocaine 4 % patch 1 Patch  1 Patch TransDERmal Q24H    sodium chloride (NS) flush 5-10 mL  5-10 mL IntraVENous PRN    sodium chloride (NS) flush 5-40 mL  5-40 mL IntraVENous Q8H    sodium chloride (NS) flush 5-40 mL  5-40 mL IntraVENous PRN    acetaminophen (TYLENOL) tablet 650 mg  650 mg Oral Q6H PRN    Or    acetaminophen (TYLENOL) suppository 650 mg  650 mg Rectal Q6H PRN    prochlorperazine (COMPAZINE) injection 10 mg  10 mg IntraVENous Q4H PRN    atorvastatin (LIPITOR) tablet 20 mg  20 mg Oral DAILY    zinc sulfate (ZINCATE) 220 (50) mg capsule 1 Cap  1 Cap Oral DAILY    ascorbic acid (vitamin C) (VITAMIN C) tablet 500 mg  500 mg Oral BID    [START ON 12/15/2020] ergocalciferol capsule 50,000 Units  50,000 Units Oral Q7D    levothyroxine (SYNTHROID) tablet 75 mcg  75 mcg Oral 6am    pantoprazole (PROTONIX) tablet 40 mg  40 mg Oral DAILY    cefTRIAXone (ROCEPHIN) 2 g in sterile water (preservative free) 20 mL IV syringe  2 g IntraVENous Q24H    doxycycline (VIBRAMYCIN) 100 mg in 0.9% sodium chloride (MBP/ADV) 100 mL MBP  100 mg IntraVENous Q12H    guaiFENesin ER (MUCINEX) tablet 1,200 mg  1,200 mg Oral Q12H    benzonatate (TESSALON) capsule 100 mg  100 mg Oral TID PRN         REVIEW OF SYSTEMS   12 point ROS negative except as stated in the HPI.       Physical Exam:   Visit Vitals  /61 (BP 1 Location: Left arm, BP Patient Position: At rest)   Pulse (!) 104   Temp 99.6 °F (37.6 °C)   Resp 16   Ht 5' 5\" (1.651 m) Wt 88 kg (194 lb)   SpO2 94%   BMI 32.28 kg/m²       General:  Alert, cooperative, no distress, appears stated age. Head:  Normocephalic, without obvious abnormality, atraumatic. Eyes:  Conjunctivae/corneas clear. Throat: Lips, mucosa, and tongue normal.    Neck: Supple, symmetrical, trachea midline   Lungs:   Clear to auscultation bilaterally. Chest wall:  No tenderness or deformity. Heart:  Regular rate and rhythm, S1, S2 normal, no murmur, click, rub or gallop. Abdomen:   Soft, non-tender. Bowel sounds normal.   Extremities: Extremities normal, atraumatic, no cyanosis; trace LE edema   Pulses: 2+ and symmetric all extremities. Skin: Skin color, texture, turgor normal. No rashes or lesions       Neurologic: Grossly nonfocal       Lab Results   Component Value Date/Time    Sodium 136 12/11/2020 04:00 AM    Potassium 4.0 12/11/2020 04:00 AM    Chloride 109 (H) 12/11/2020 04:00 AM    CO2 21 12/11/2020 04:00 AM    BUN 22 (H) 12/11/2020 04:00 AM    Creatinine 1.44 (H) 12/11/2020 04:00 AM    Glucose 86 12/11/2020 04:00 AM    Calcium 8.1 (L) 12/11/2020 04:00 AM    Lactic acid 1.1 04/19/2018 01:20 AM       Lab Results   Component Value Date/Time    WBC 2.1 (L) 12/11/2020 05:20 AM    HGB 10.9 (L) 12/11/2020 05:20 AM    PLATELET 88 (L) 34/81/6798 05:20 AM    MCV 97.1 12/11/2020 05:20 AM       Lab Results   Component Value Date/Time    INR 1.1 12/09/2020 12:37 AM    aPTT 27.1 12/09/2020 12:37 AM    Alk.  phosphatase 37 (L) 12/11/2020 04:00 AM    Protein, total 7.1 12/11/2020 04:00 AM    Albumin 3.0 (L) 12/11/2020 04:00 AM    Globulin 4.1 (H) 12/11/2020 04:00 AM       Lab Results   Component Value Date/Time    Iron 69 12/16/2019 08:39 AM    TIBC 273 12/16/2019 08:39 AM    Iron % saturation 25 12/16/2019 08:39 AM    Ferritin 386 12/11/2020 04:00 AM       Lab Results   Component Value Date/Time    Sed rate (ESR) 17 09/24/2019 08:12 AM    C-Reactive protein 4.35 (H) 12/11/2020 04:00 AM    TSH 0.97 10/26/2020 09:07 AM        No results found for: PH, PHI, PCO2, PCO2I, PO2, PO2I, HCO3, HCO3I, FIO2, FIO2I    Lab Results   Component Value Date/Time    CK-MB Index Cannot be calculated 12/09/2020 12:36 AM    Troponin-I, Qt. <0.05 12/09/2020 12:36 AM        Lab Results   Component Value Date/Time    Culture result: GRAM NEGATIVE RODS (A) 12/09/2020 12:41 AM    Culture result: STREPTOCOCCUS SPECIES (A) 12/09/2020 12:41 AM    Culture result: NO GROWTH 2 DAYS 12/09/2020 12:37 AM       No results found for: TOXA1, RPR, HBCM, HBSAG, HAAB, HCAB1, HAAT, G6PD, CRYAC, HIVGT, HIVR, HIV1, HIV12, HIVPC, HIVRPI    No results found for: VANCT, CPK    Lab Results   Component Value Date/Time    Color YELLOW/STRAW 12/09/2020 12:41 AM    Appearance CLOUDY (A) 12/09/2020 12:41 AM    Specific gravity 1.020 04/21/2018 10:42 AM    pH (UA) 6.0 12/09/2020 12:41 AM    Protein 100 (A) 12/09/2020 12:41 AM    Glucose Negative 12/09/2020 12:41 AM    Ketone Negative 12/09/2020 12:41 AM    Bilirubin Negative 12/09/2020 12:41 AM    Blood MODERATE (A) 12/09/2020 12:41 AM    Urobilinogen 0.2 12/09/2020 12:41 AM    Nitrites Positive (A) 12/09/2020 12:41 AM    Leukocyte Esterase SMALL (A) 12/09/2020 12:41 AM    WBC 20-50 12/09/2020 12:41 AM    RBC 5-10 12/09/2020 12:41 AM    Bacteria 2+ (A) 12/09/2020 12:41 AM       IMPRESSION  · COVID-19  · Fevers  · UTI    PLAN  · Goal sats 88%, doing well on RA  · Doxy, ceftriaxone  · Zinc, Vitamin C  · Low threshold for decadron  · CT chest/abd/pelvis to evaluate for other sources of fever  · Has only been on SCDs, will add heparin      Thank you for allowing us to participate in the care of this patient. We will be happy to follow along in his/her progress with you.     Ac Arzate MD

## 2020-12-11 NOTE — PROGRESS NOTES
CM follow up:    Patient has follow up appointments scheduled for PCP and pulmonary associates. Patient given information on [facility]. 2nd IMM letter provided to patient.     Anastasia Martínez, RN, MSN/Care manager  [phone]

## 2020-12-11 NOTE — PROGRESS NOTES
1915: Bedside and Verbal shift change report given to [facility] (oncoming nurse) by [place] (offgoing nurse). Report included the following information SBAR, Kardex, Intake/Output, MAR and Recent Results. Documentation in disaster mode    0730: Bedside and Verbal shift change report given to Ekta Anne (oncoming nurse) by [facility] (offgoing nurse). Report included the following information SBAR, Kardex, Intake/Output, MAR and Recent Results.

## 2020-12-11 NOTE — PROGRESS NOTES
[facility] MEDICINE RESIDENCY PROGRAM  PROGRESS NOTE     88/[id number]  PCP: Rachell Bergman MD     Assessment/Plan:     Ella Bates is a 66 y.o. female with a PMHx of Ella Bates is a 66 y.o. female with a PMH of COVID positivity, bladder cancer in 1999 s/p urostomy, HTN, hypokalemia, pancytopenia, B12 deficiency, rheumatoid arthritis, hypothyroidism, and ERD who is admitted for Sepsis 2/2 COVID infection vs UTI. Overnight Events: Spiked a fever of 101.7 at 8:48 and then again to 102.3 at midnight. S/p tylenol at 11pm and then again at 1pm. Blood recultured     Sepsis 2/2 COVID vs UTI: (3/4 SIRS POA: WBC 3.0, temp, HR). Initial CXR neg, CT chest with multiple small patchy groundglass infiltrates. COVID + (12/5, 12/9). LA and procalcitonin wnl on admission, repeat procal 12/10 <0.05. S/p Vanc and Cefepime x1 in ED. UCx growing klebsiella resistant to ampicillin and unasyn as well as enterococcus faecalis resistant to tetracycline. Current regimen covers for PNA and kleb but not enterococcus faecalis. Worsened infiltrates appreciated on CTA chest on 12/11.  - Start levaquin 750 daily for 5 days until 12/16 to cover enterococcal UTI  - Continue Doxycycline (started 12/9) for COVID PNA until 12/13. Discontinue CTX  - F/u final blood culture and repeat bcx from 12/12  - Tylenol 650mg q6h prn for fever  - Compazine 10mg IV q4h prn for N/V  - LE duplex to rule out DVT as an alternative source of fever given patient was not on DVT Ppx until yesterday: negative  - Will consult ID if patient still spiking fevers in 24h     COVID-19 Pneumonia: On 12/5 reportedly, confirmed 12/9. Repeat chest X-ray 12/10 w/ mild bilateral airspace opacities. Procalcitonin <0.05 x2.  Worsened infiltrates appreciated on CTA chest on 12/11.  - Pulm consulted, appreciate recs      - Goal sat 88%      - Low threshold for decadron      - Heparin for DVT ppx  - Continue doxy until 12/13 to cover for PNA  - Trend daily D-dimer, CRP, LD, ferritin  - Atorvastatin 20mg daily, Vitamin C 500mg BID, Zinc 220mg daily  - Mucinex BID and Tessalon PRN  - Encourage incentive spirometry  - Droplet plus precautions    Complicated UTI: Hx of bladder cancer s/p urostomy. Hx of UTIs in the past. UA + nitrites, LE, 2+ bacteria. UCx growing klebsiella resistant to ampicillin and unasyn as well as enterococcus faecalis resistant to tetracycline. Current regimen covers for PNA and kleb but not enterococcus faecalis. - Start levaquin 750 daily for 5 days until 12/16 to cover enterococcal UTI    Elevated D-dimer: D-dimer up to 2. 16. Given tachycardia and period of hypoxia (spO2 89%) overnight will obtain CTA of chest to rule out PE. Wells score is 3 (moderate risk). - CTA chest: No PE. DDimer likely elevated 2/2 acute phase rxn 2/2 COVID     Pancytopenia: POA WBC 3.0 (BL < 3), Hgb 11.0 (BL 10), PLT 83 (). Hx of B12 deficiency. Folate level wnl. Followed by Dr. Sabina Willams in the past. Continued fever is concerning in the setting of leukopenia and ANC of 1.3, however this is likely related to ongoing infection.   - Hematology / oncology consult - appreciate recs  - Daily CBC     At risk LEATHA: POA Cr 1.73 (BL 1.0-1.25). Improved some after IV hydration.   - Monitor with daily BMP    Transaminitis: AST mildly elevated here, wnl at baseline. Possibly related to ongoing inflammation from COVID. Cirrhotic appearing liver in CT abd.  - Follow up outpatient with repeat CMP and consider workup     HTN: Home HCTZ 25 mg daily and losartan 50 mg daily.  - Hold home meds d/t hypotension     Hypothyroidism: TSH 0.97 in 10/2020. Home synthroid 75mcg daily  - Continue home meds     RA: Home Enbrel 50mg/ml every Thursday. Skipped last week due to COVID infection.  - Hold Enbrel while inpatient      Vitamin D deficiency: 1250 mcg every Tuesday  - Continue home meds     B12 Deficiency: 1000 mcg/ml every month.    - Will give dose of IM B12 as patient is due for her monthly injection   - F/u outpatient    Hx of Bladder Cancer: s/p urosotomy ~20 years. Followed by Dr. Yonatan Garrett.      Obesity: Body mass index is 32.28 kg/m². - Encourage lifestyle modification and further follow up with PCP outpatient.      Back pain: Chronic.  - Heating pad, lidocaine patch, voltaren gel     FEN/GI: Regular diet. Encourage PO intake. Activity: Ambulate with assistance. DVT prophylaxis: Heparin  GI prophylaxis:  None  Disposition: Plan to d/c to TBD. PT/OT consulted. POC: Miguel Andersen (daughter) [phone]     CODE STATUS:  Full Code    Jerad Romero discussed with Dr. Lynne Dominguez     Subjective:   Pt was seen and examined at bedside by Dr. Camacho Serrano and Dr Manjinder Moscoso. Patient reports continued cough. Complaining of chronic low back pain. Denies chest pain, vomiting, abdominal pain, dizziness. Doing well overnight. Concerns of lower back pain overnight while lying in bed. Denies SOB. Sitting up for a few hours yesterday. Denies abdominal ain and chest pain.     Objective:   Physical examination  Patient Vitals for the past 24 hrs:   Temp Pulse Resp BP SpO2   20 1514 100 °F (37.8 °C) 96 16 (!) 119/56 94 %   20 1500 -- 97 -- -- --   20 1307 99.6 °F (37.6 °C) -- -- -- --   20 1057 100.4 °F (38 °C) (!) 104 16 135/61 94 %   20 0839 99.5 °F (37.5 °C) (!) 102 18 (!) 142/65 93 %   20 0700 -- 97 -- -- --   20 0348 98.4 °F (36.9 °C) 92 18 (!) 109/55 92 %   20 0039 -- -- -- -- 93 %   20 0025 (!) 101.4 °F (38.6 °C) (!) 105 20 (!) 122/59 (!) 89 %   12/10/20 2327 -- (!) 104 -- -- --   12/10/20 2301 (!) 101.5 °F (38.6 °C) (!) 105 20 (!) 142/65 95 %   12/10/20 1927 100.4 °F (38 °C) (!) 105 22 131/61 94 %      Temp (24hrs), Av.2 °F (37.9 °C), Min:98.4 °F (36.9 °C), Max:101.5 °F (38.6 °C)         O2 Device: Room air    Date 12/10/20 0700 - 20 - 20   Shift 1543-2049 2966-2794 24 Hour Total  24 Hour Total INTAKE   P.O.    120  120     P. O.    120  120   Shift Total(mL/kg)    120(1.4)  120(1.4)   OUTPUT   Urine(mL/kg/hr) 675(0.6)  675(0.3) 900  900     Urine Voided 675  675 900  900   Emesis/NG output           Emesis Occurrence(s) 1 x  1 x      Stool           Stool Occurrence(s) 3 x  3 x 1 x  1 x   Shift Total(mL/kg) 675(7.7)  675(7.7) 900(10.2)  900(10.2)   NET -675  -675 -780  -780   Weight (kg) 88 88 88 88 88 88     *due to COVID-19 pandemic and limitations on patient contact the physical exam has been performed and reported by [facility] Medicine senior resident, Dr. Bere Guadarrama and Dr Vernal Spurling:   Alert, cooperative, no acute distress   Head:   Atraumatic   Eyes:   Conjunctivae clear   ENT:  Oral mucosa normal   Neck:  Supple, trachea midline   Lungs:   No increased work of breathing. On room air. Course breath sounds throughout. No resp distress   Heart:   Normal rate, regular rhythm, no murmur   Abdomen:    Soft, non-tender. Urostomy bag noted.     Extremities:  No edema or calf tenderness    Pulses:  Symmetric all extremities   Skin:  Warm and dry    No rashes or lesions   Neurologic:  Alert and oriented x4   No focal deficits     Data Review:     CBC:  Recent Labs     12/11/20  0520 12/10/20  0906 12/10/20  0502   WBC 2.1* 2.5* 2.0*   HGB 10.9* 10.4* 9.7*   HCT 34.0* 31.2* 29.1*   PLT 88* 84* 70*     Metabolic Panel:  Recent Labs     12/11/20  0400 12/10/20  0502 12/09/20  0037 12/09/20  0036    136  --  135*   K 4.0 4.1  --  4.2   * 110*  --  104   CO2 21 22  --  24   BUN 22* 23*  --  40*   CREA 1.44* 1.44*  --  1.73*   GLU 86 88  --  129*   CA 8.1* 7.9*  --  8.6   ALB 3.0* 3.1*  --  3.6   TBILI 0.5 0.5  --  0.7   ALT 32 31  --  38   INR  --   --  1.1  --      Micro:  Lab Results   Component Value Date/Time    Culture result: KLEBSIELLA OXYTOCA (A) 12/09/2020 12:41 AM    Culture result: ENTEROCOCCUS FAECALIS (A) 12/09/2020 12:41 AM    Culture result: NO GROWTH 2 DAYS 12/09/2020 12:37 AM Imaging:  Ct Chest Wo Cont    Result Date: 12/9/2020  INDICATION: Shortness of breath COMPARISON: None CONTRAST: None. TECHNIQUE:  5 mm axial images were obtained through the chest. Coronal and sagittal reformats were generated. CT dose reduction was achieved through use of a standardized protocol tailored for this examination and automatic exposure control for dose modulation. The absence of intravenous contrast reduces the sensitivity for evaluation of the mediastinum, george, vasculature, and upper abdominal organs. FINDINGS: CHEST WALL: No mass or axillary lymphadenopathy. THYROID: No nodule. MEDIASTINUM: No mass or lymphadenopathy. GEORGE: No mass or lymphadenopathy. THORACIC AORTA: No aneurysm. MAIN PULMONARY ARTERY: Normal in caliber. TRACHEA/BRONCHI: Patent. ESOPHAGUS: No wall thickening or dilatation. HEART: Normal in size. PLEURA: No effusion or pneumothorax. LUNGS: Multiple small groundglass bilateral infiltrates. Those are nonspecific, they can BE seen with atypical pneumonia. INCIDENTALLY IMAGED UPPER ABDOMEN: No significant abnormality in the incidentally imaged upper abdomen. BONES: No destructive bone lesion. IMPRESSION: Multiple small patchy groundglass infiltrates. Xr Chest Port    Result Date: 12/9/2020  Clinical indication: Chest pain. Portable AP upright view of the chest is obtained, comparison February 24, 2019. The  heart size is normal. There is no acute infiltrate. IMPRESSION: No acute changes.     Medications reviewed  Current Facility-Administered Medications   Medication Dose Route Frequency    diclofenac (VOLTAREN) 1 % topical gel 4 g  4 g Topical QID PRN    heparin (porcine) injection 5,000 Units  5,000 Units SubCUTAneous Q8H    lidocaine 4 % patch 1 Patch  1 Patch TransDERmal Q24H    sodium chloride (NS) flush 5-10 mL  5-10 mL IntraVENous PRN    sodium chloride (NS) flush 5-40 mL  5-40 mL IntraVENous Q8H    sodium chloride (NS) flush 5-40 mL  5-40 mL IntraVENous PRN  acetaminophen (TYLENOL) tablet 650 mg  650 mg Oral Q6H PRN    Or    acetaminophen (TYLENOL) suppository 650 mg  650 mg Rectal Q6H PRN    prochlorperazine (COMPAZINE) injection 10 mg  10 mg IntraVENous Q4H PRN    atorvastatin (LIPITOR) tablet 20 mg  20 mg Oral DAILY    zinc sulfate (ZINCATE) 220 (50) mg capsule 1 Cap  1 Cap Oral DAILY    ascorbic acid (vitamin C) (VITAMIN C) tablet 500 mg  500 mg Oral BID    [START ON 12/15/2020] ergocalciferol capsule 50,000 Units  50,000 Units Oral Q7D    levothyroxine (SYNTHROID) tablet 75 mcg  75 mcg Oral 6am    pantoprazole (PROTONIX) tablet 40 mg  40 mg Oral DAILY    cefTRIAXone (ROCEPHIN) 2 g in sterile water (preservative free) 20 mL IV syringe  2 g IntraVENous Q24H    doxycycline (VIBRAMYCIN) 100 mg in 0.9% sodium chloride (MBP/ADV) 100 mL MBP  100 mg IntraVENous Q12H    guaiFENesin ER (MUCINEX) tablet 1,200 mg  1,200 mg Oral Q12H    benzonatate (TESSALON) capsule 100 mg  100 mg Oral TID PRN         Signed:   Angelina Nuñez DO  Family Medicine Resident      Attending note: Attending note to follow. ..

## 2020-12-11 NOTE — PROGRESS NOTES
Specialty Telemedicine appointment has been scheduled with Dr. Jayy Murguia of [place]  For Friday, 12/18/20 at 10:45 a.m. Pending patient discharge.   Jessica Brooks, Care Management Specialist.

## 2020-12-11 NOTE — PROGRESS NOTES
[facility] MEDICINE RESIDENCY PROGRAM  PROGRESS NOTE     26/85/9147  PCP: Eduard Garrido MD     Assessment/Plan:     Harhsil Lockwood is a 66 y.o. female with a PMHx of Harshil Lockwood is a 66 y.o. female with a PMH of COVID positivity, bladder cancer in 1999 s/p urostomy, HTN, hypokalemia, pancytopenia, B12 deficiency, rheumatoid arthritis, hypothyroidism, and ERD who is admitted for Sepsis 2/2 COVID infection vs UTI. Overnight Events: Spiked a fever of 101.5 at ~11 PM, given tylenol. Afebrile since ~12:30 AM.      Sepsis 2/2 COVID vs UTI: (3/4 SIRS POA: WBC 3.0, temp, HR). Initial CXR neg, CT chest with multiple small patchy groundglass infiltrates. COVID + (12/5, 12/9). LA and procalcitonin wnl on admission, repeat procal 12/10 <0.05. Urine cx with >100K colonies gram negative rods as well as strep. Blood cx no growth x 2 days. - F/u final blood and urine culture  - S/p Vanc and Cefepime started in ED. Continue Ceftriaxone and Doxycycline (started 12/9) to cover for UTI, PNA.  - Tylenol 650mg q6h prn for fever  - Compazine 10mg IV q4h prn for N/V      COVID-19 Pneumonia: On 12/5 reportedly, confirmed 12/9. Repeat chest X-ray 12/10 w/ mild bilateral airspace opacities. Procalcitonin <0.05 x2. - Trend daily D-dimer, CRP, LD, ferritin  - Atorvastatin 20mg daily, Vitamin C 500mg BID, Zinc 220mg daily  - Mucinex BID and Tessalon PRN  - Encourage incentive spirometry  - Droplet plus precautions    Complicated UTI: Hx of bladder cancer s/p urostomy. Hx of UTIs in the past. UA + nitrites, LE, 2+ bacteria. Urine cx with >100K colonies gram negative rods and strep. - Continue ABX as above - on day 3 of CTX  - Follow final urine culture     Elevated D-dimer: D-dimer up to 2. 16. Given tachycardia and period of hypoxia (spO2 89%) overnight will obtain CTA of chest to rule out PE. Wells score is 3 (moderate risk).    - CTA chest - will give 1L NS bolus prior due to CKD/elevated Cr     Pancytopenia: POA WBC 3.0 (BL < 3), Hgb 11.0 (BL 10), PLT 83 (). Hx of B12 deficiency. Folate level wnl. Followed by Dr. Reina Fairly in the past. Continued fever is concerning in the setting of leukopenia and ANC of 1.3, however this is likely related to ongoing infection.   - Hematology / oncology consult - appreciate recs  - Daily CBC     At risk LEATHA: POA Cr 1.73 (BL 1.0-1.25). Improved some after IV hydration.   - Monitor with daily BMP    Transaminitis: AST mildly elevated here, wnl at baseline. Possibly related to ongoing inflammation from COVID.  - Follow up outpatient with repeat CMP        HTN: Home HCTZ 25 mg daily and losartan 50 mg daily.  - Hold home meds d/t hypotension     Hypothyroidism: TSH 0.97 in 10/2020. Home synthroid 75mcg daily  - Continue home meds     RA: Home Enbrel 50mg/ml every Thursday. Skipped last week due to COVID infection.  - Hold Enbrel while inpatient      Vitamin D deficiency: 1250 mcg every Tuesday  - Continue home meds     B12 Deficiency: 1000 mcg/ml every month. - Will give dose of IM B12 as patient is due for her monthly injection   - F/u outpatient    Hx of Bladder Cancer: s/p urosotomy ~20 years. Followed by Dr. Rika Hayes.      Obesity: Body mass index is 32.28 kg/m². - Encourage lifestyle modification and further follow up with PCP outpatient.      FEN/GI: Regular diet. Encourage PO intake. Activity: Ambulate with assistance. DVT prophylaxis: SCDs  GI prophylaxis:  None  Disposition: Plan to d/c to TBD. PT/OT consulted. POC: Juan Pablo Bansal (daughter) [phone]     CODE STATUS:  Full Code    Dora Mendez discussed with Dr. Lulú Green     Subjective:   Pt was seen and examined at bedside by Dr. Brenda Geiger, PGY-3. Patient reports continued cough but states that she is not feeling SOB. She states that she is cold and is complaining of chronic low back pain. Denies chest pain, vomiting, abdominal pain, dizziness.      Objective:   Physical examination  Patient Vitals for the past 24 hrs: Temp Pulse Resp BP SpO2   12/10/20 1927 100.4 °F (38 °C) (!) 105 22 131/61 94 %   12/10/20 1531 98.2 °F (36.8 °C) 94 18 (!) 106/53 96 %   12/10/20 1530 -- -- -- -- 96 %   12/10/20 1500 -- 87 -- -- --   12/10/20 1137 98 °F (36.7 °C) 92 20 (!) 102/55 93 %   12/10/20 0938 99.3 °F (37.4 °C) -- -- -- --   12/10/20 0804 (!) 101.5 °F (38.6 °C) 95 18 (!) 107/53 94 %   12/10/20 0700 -- 99 -- -- --   12/10/20 0403 99.1 °F (37.3 °C) 94 16 (!) 111/57 94 %   12/10/20 0002 (!) 100.9 °F (38.3 °C) (!) 109 20 (!) 140/62 95 %   20 2254 -- (!) 107 -- -- --      Temp (24hrs), Av.6 °F (37.6 °C), Min:98 °F (36.7 °C), Max:101.5 °F (38.6 °C)         O2 Device: Room air    Date 20 - 12/10/20 0659 12/10/20 07 - 20 0659   Shift 8401-1621 24 Hour Total 4240-8028 4124-7489 24 Hour Total   INTAKE   P.O. 480 720        P. O. 480 720      I. V.(mL/kg/hr) 0 100        Volume (0.9% sodium chloride infusion) 0 0        Volume (doxycycline (VIBRAMYCIN) 100 mg in 0.9% sodium chloride (MBP/ADV) 100 mL MBP)  100      Shift Total(mL/kg) 480(5.5) 820(9.3)      OUTPUT   Urine(mL/kg/hr) 1000 2100 675(0.6)  675     Urine Voided 1000 2100 675  675   Emesis/NG output          Emesis Occurrence(s)   1 x  1 x   Stool          Stool Occurrence(s)   3 x  3 x   Shift Total(mL/kg) 1000(11.4) 2100(23.9) 675(7.7)  675(7.7)   NET -520 -1280 -675  -675   Weight (kg) 88 88 88 88 88   *due to COVID-19 pandemic and limitations on patient contact the physical exam has been performed and reported by [facility] Medicine senior resident, Dr. Brenda Geiger*    General:   Alert, cooperative, no acute distress   Head:   Atraumatic   Eyes:   Conjunctivae clear   ENT:  Oral mucosa normal   Neck:  Supple, trachea midline   Lungs:   No increased work of breathing. Improved air movement compared to prior exam. No wheezing or rhonchi. Mild rales noted in bilateral lung bases.  Coughing on exam.    Heart:   Normal rate, regular rhythm, no murmur   Abdomen:    Soft, non-tender. Urostomy bag noted. Extremities:  No edema or calf tenderness    Pulses:  Symmetric all extremities   Skin:  Warm and dry    No rashes or lesions   Neurologic:  Alert and oriented x4   No focal deficits     Data Review:     CBC:  Recent Labs     12/10/20  0906 12/10/20  0502 12/09/20 0036   WBC 2.5* 2.0* 3.0*   HGB 10.4* 9.7* 11.0*   HCT 31.2* 29.1* 32.5*   PLT 84* 70* 83*     Metabolic Panel:  Recent Labs     12/10/20  0502 12/09/20 0037 12/09/20 0036     --  135*   K 4.1  --  4.2   *  --  104   CO2 22  --  24   BUN 23*  --  40*   CREA 1.44*  --  1.73*   GLU 88  --  129*   CA 7.9*  --  8.6   ALB 3.1*  --  3.6   TBILI 0.5  --  0.7   ALT 31  --  38   INR  --  1.1  --      Micro:  Lab Results   Component Value Date/Time    Culture result: GRAM NEGATIVE RODS (A) 12/09/2020 12:41 AM    Culture result: STREPTOCOCCUS SPECIES (A) 12/09/2020 12:41 AM    Culture result: NO GROWTH 1 DAY 12/09/2020 12:37 AM     Imaging:  Ct Chest Wo Cont    Result Date: 12/9/2020  INDICATION: Shortness of breath COMPARISON: None CONTRAST: None. TECHNIQUE:  5 mm axial images were obtained through the chest. Coronal and sagittal reformats were generated. CT dose reduction was achieved through use of a standardized protocol tailored for this examination and automatic exposure control for dose modulation. The absence of intravenous contrast reduces the sensitivity for evaluation of the mediastinum, george, vasculature, and upper abdominal organs. FINDINGS: CHEST WALL: No mass or axillary lymphadenopathy. THYROID: No nodule. MEDIASTINUM: No mass or lymphadenopathy. GEORGE: No mass or lymphadenopathy. THORACIC AORTA: No aneurysm. MAIN PULMONARY ARTERY: Normal in caliber. TRACHEA/BRONCHI: Patent. ESOPHAGUS: No wall thickening or dilatation. HEART: Normal in size. PLEURA: No effusion or pneumothorax. LUNGS: Multiple small groundglass bilateral infiltrates. Those are nonspecific, they can BE seen with atypical pneumonia. INCIDENTALLY IMAGED UPPER ABDOMEN: No significant abnormality in the incidentally imaged upper abdomen. BONES: No destructive bone lesion. IMPRESSION: Multiple small patchy groundglass infiltrates. Xr Chest Port    Result Date: 12/9/2020  Clinical indication: Chest pain. Portable AP upright view of the chest is obtained, comparison February 24, 2019. The  heart size is normal. There is no acute infiltrate. IMPRESSION: No acute changes. Medications reviewed  Current Facility-Administered Medications   Medication Dose Route Frequency    lidocaine 4 % patch 1 Patch  1 Patch TransDERmal Q24H    sodium chloride (NS) flush 5-10 mL  5-10 mL IntraVENous PRN    sodium chloride (NS) flush 5-40 mL  5-40 mL IntraVENous Q8H    sodium chloride (NS) flush 5-40 mL  5-40 mL IntraVENous PRN    acetaminophen (TYLENOL) tablet 650 mg  650 mg Oral Q6H PRN    Or    acetaminophen (TYLENOL) suppository 650 mg  650 mg Rectal Q6H PRN    prochlorperazine (COMPAZINE) injection 10 mg  10 mg IntraVENous Q4H PRN    atorvastatin (LIPITOR) tablet 20 mg  20 mg Oral DAILY    zinc sulfate (ZINCATE) 220 (50) mg capsule 1 Cap  1 Cap Oral DAILY    ascorbic acid (vitamin C) (VITAMIN C) tablet 500 mg  500 mg Oral BID    [START ON 12/15/2020] ergocalciferol capsule 50,000 Units  50,000 Units Oral Q7D    levothyroxine (SYNTHROID) tablet 75 mcg  75 mcg Oral 6am    pantoprazole (PROTONIX) tablet 40 mg  40 mg Oral DAILY    cefTRIAXone (ROCEPHIN) 2 g in sterile water (preservative free) 20 mL IV syringe  2 g IntraVENous Q24H    doxycycline (VIBRAMYCIN) 100 mg in 0.9% sodium chloride (MBP/ADV) 100 mL MBP  100 mg IntraVENous Q12H    guaiFENesin ER (MUCINEX) tablet 1,200 mg  1,200 mg Oral Q12H    benzonatate (TESSALON) capsule 100 mg  100 mg Oral TID PRN         Signed:   Ledy Guillermo DO  Family Medicine Resident      Attending note: Attending note to follow. ..

## 2020-12-11 NOTE — DISCHARGE SUMMARY
[address] [place] [address]   Office [phone]  Fax [phone]       Discharge / Transfer / Off-Service Note     Name: Miar López MRN: [id number]  Sex: Female   YOB: 1945  Age: 66 y.o. PCP: Gisel Jones MD     Date of admission: 12/9/2020  Date of discharge/transfer: 12/15/2020    Date of admission: 12/9/2020  Date of discharge/transfer: 12/15/2020    Attending physician at admission: Jennifer Orantes MD  Attending physician at discharge/transfer: Dr. Stevo Rosa   Resident physician at discharge/transfer: Laila Mosqueda MD     Consultants during hospitalization  IP CONSULT TO [address] [place] [address] PULMONOLOGY     Admission diagnoses   Sepsis ([facility] [address] [A41.9]    Recommended follow-up after discharge    1. PCP - follow up with Dr. Aminata Lemus via virtual visit on 12/16   2. Hematology/Oncology - schedule appointment to follow up with Dr. Deborah Paris   3. Pulmonology - Follow up with Dr. Job Adams on 12/18     Follow up tests after discharge   - Repeat CMP to check LFTs   - Repeat CBC to check Hgb, WBC, and platelet count   - Repeat CT chest in 6-8 weeks to ensure improvement   - Follow up with Dr. Deborah Paris regarding pancreatic cystic lesion      Medication Changes:  START  - Levaquin 750 mg PO every 48 hours (take on 12/16 and on 12/18) to complete 7 day course   - Decadron 6 mg PO daily for 8 days (to complete 10 day course)   - Mucinex 1200 mg BID   - Tessalon 100 mg TID PRN  - Nystatin powder topical BID  - Start taking daily multivitamin       ----------------------------------------------------------------------------------------------------------------------------  The patient was well enough to be discharged from the hospital. However, she was diagnosed with coronavirus.  PLEASE stay inside and self-quarantine for 10-14 days to prevent further spread of the coronavirus.  ------------------------------------------------------------------------------------------------------------------    History of Present Illness  Per the admitting physicians H&P Noy Chavez is a 66 y.o. female with a PMH of significant for covid positivity, bladder cancer, HTN, pancytopenia, rheumatoid arthritis, hypothyroidism, GERD, who presents to the ED complaining of worsening Covid symptoms. She tested positive for Covid at a urgent care on 12/5. Specifically, she is complaining of constant cough, nausea, diarrhea, muscle aches, no appetite. States her breathing has been OK. Patient has had troubles eating or drinking the last few days. Patient denies chest pain, hematuria, blood in stool, abdominal pain.     Of note, pt found to have a urinary tract infection. She does follow with Dr. Yu Bravo for bladder cancer and is s/p urostomy for 20+ years. Not on prophylactic antibiotics but does have frequent UTIs, believes the most recent one was 2 years ago. Denies dysuria but states urine has been  Darker the past few days. [facility]  Genesis Villavicencio was admitted into the Family Medicine Service from 12/9/2020 to 12/15/2020 for Sepsis ([facility] [address] [A41.9]. During the course of this admission, the following conditions were addressed/managed:    Sepsis 2/2 COVID and UTI - Improving: COVID positive (12/5, 12/9). UCx: >100,000 colonies klebsiella and enterococcus faecalis. CT chest with multiple small patchy groundglass infiltrates; worsened infiltrates appreciated on CTA chest on 12/11. Initially on Vanc and Cefepime, narrowed to CTX + Doxy, but subsequently was changed to Levaquin to cover for both PNA and UTI. Briefly was treated with an additional 2 days of CTX and amoxicillin PO per ID recs, however after re-evaluation antibiotics narrowed to Levaquin PO only. Blood cultures: 12/9 no growth x 5 days, 12/11 no growth x 3 days.    - Continue Levaquin 750 mg PO q48h (renally dosed) to complete a 7 day course - take on 12/16 and 12/18  - Follow up with PCP     COVID-19 Pneumonia: Tested positive on 12/5 outpatient, confirmed 12/9. Worsened infiltrates appreciated on CTA chest on 12/11. Procalcitonin <0.05 x2, so superimposed bacterial PNA less likely. Started on Remdesivir and Decadron 6 mg PO daily by ID on 12/13. Received 3 doses of Remdesivir IV. Followed by pulmonology and ID while inpatient. On day of discharge patient is only intermittently requiring supplemental O2 2-3 L via NC with activity. She is satting in the mid to low 90s on room air at rest.   - Discharge home with supplemental O2 (goal sat >/=88%)  - Continue Decadron 6 mg PO to complete 10 day course   - Abx as above  - Continue multivitamin daily, Mucinex BID, and Tessalon PRN     Complicated UTI: Hx of bladder cancer s/p urostomy. Hx of UTIs in the past. UCx grew Klebsiella and Enterococcus. - Continue Levaquin PO as above     Elevated D-dimer: CTA chest: No PE. LE duplex preliminary report no DVT/thrombophlebitis. D-Dimer likely elevated 2/2 acute phase reaction from COVID.     Chronic Pancytopenia - Stable: POA WBC 3.0 (BL < 3), Hgb 11.0 (BL 10), PLT 83 (). Hx of B12 deficiency. Folate level wnl. Followed by Dr. Carlito Alonso.   - Repeat CBC outpatient to check blood counts   - Follow up with hematology outpatient      At risk LEATHA - Improved: POA Cr 1.73 (BL 1.0-1.25).  Improved after IV hydration.      Transaminitis in the setting of Cirrhosis: AST mildly elevated here, wnl at baseline. Possibly related to ongoing inflammation from COVID.  Cirrhotic appearing liver in CT abd.  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   - Follow up outpatient      HTN: Home HCTZ 25 mg daily and losartan 50 mg daily.  - Resume home BP medications      Hypothyroidism: TSH 0.97 in 10/2020. Home synthroid 75mcg daily  - Continue home Synthroid      RA: Home Enbrel 50mg/ml every Thursday. Skipped last week due to COVID infection.  - Hold Enbrel while inpatient      Vitamin D deficiency: 1250 mcg every Tuesday  - Continue home vitamin D supplement      B12 Deficiency: 1000 mcg/ml every month.      Hx of Bladder Cancer: S/p urosotomy ~20 years. Followed by Dr. Ehsan Grey.      Obesity: Body mass index is 32.28 kg/m². - Encourage lifestyle modification and further follow up with PCP outpatient.      Rash: Erythematous rash noted 12/14 on bilateral inner thighs and groin region, likely due to cutaneous candidiasis. - Continue Nystatin powder BID    Condition at discharge: Stable. Labs  Recent Labs     12/15/20  1048   WBC 5.1   HGB 10.6*   HCT 30.8*        Recent Labs     12/15/20  1048      K 3.3*   *   CO2 17*   BUN 34*   CREA 1.22*   *   CA 8.7     Recent Labs     12/15/20  1048   ALT 32   AP 40*   TBILI 0.6   TP 7.1   ALB 3.0*   GLOB 4.1*     Recent Labs     12/15/20  1048   FERR 402*     Cultures  · Urine culture (12/9/2020): >100K colonies Klebsiella oxytoca (resistant to ampicillin and zosyn) and Enterococcus faecalis (resistant to tetracycline)  · Blood culture (12/9/2020): No growth 5 days  · Blood culture (12/11/2020) No growth 3 days     Imaging  CTA Chest 12/11/2020:   Chest:      CTPA: The pulmonary arteries are well opacified and there is no evidence of  pulmonary embolism. The thoracic aorta is normal in course and caliber and there  is no aortic dissection.     Lymph nodes: There is no axillary, mediastinal or hilar lymphadenopathy.      Heart: The heart is normal in the size and there is no pericardial effusion.      Lungs:  There has been interval worsening of bilateral multifocal groundglass  opacities, increased in distribution in conspicuity as compared to prior CT  dated December 9, 2020.     Pleura: No pleural fluid.     Bones: No lytic or sclerotic osseous lesion is visualized.     Abdomen and pelvis:     Liver: The liver is diminutive in size and nodular in morphology, consistent  with cirrhosis. There is no intra-abdominal ascites.     Adrenals: Adrenal glands are normal.     Pancreas: Within the pancreatic uncinate, there is a septated cystic lesion,  measuring 3.2 cm x 1.6 cm and measuring approximately 2.8 cm x 1.4 cm on prior  CT dated August 2017.     Gallbladder: The gallbladder is normal.     Kidneys: There is a 5.4 cm left renal cyst. There are several punctate  subcentimeter renal hypodensities, too small to further characterize, but  statistically likely to represent cysts. There is no hydronephrosis     Spleen: The spleen is normal.     Lymph nodes. There is no francois hepatitis, mesenteric, retroperitoneal or pelvic  lymphadenopathy.     Bowel: No thickened or dilated loop of large or small bowel is visualized. Scattered colonic diverticulosis is noted.     Appendix: The appendix is normal.     Urinary bladder: Urinary bladder is absent and there is a right lower quadrant  ileal conduit.     Bones: There is beam hardening artifact in the pelvis secondary to left hip  prosthesis. Osseous structures are diffusely demineralized. No lytic or  sclerotic osseous lesion is seen.     Miscellaneous: There is a 1.6 cm rounded fluid attenuation structure just to the  left the umbilicus, possibly representing a fluid containing ventral wall  hernia; prior CT dated August 2017 demonstrated a colon-containing hernia in  this region. There is no free intraperitoneal fluid or gas. There is no focal  fluid collection to suggest abscess. The uterus is absent.     IMPRESSION:   1. No evidence pulmonary embolism. 2. Interval worsening of bilateral multifocal groundglass opacities  3. Cirrhotic appearing liver. 4.. Interval increase in size of pancreatic uncinate septated cystic lesion, now  measuring 3.2 cm x 1.6 cm.     Duplex Lower Extremities 12/12/2020:   Right Lower Venous No evidence of deep vein thrombosis in the common femoral, profunda femoral, femoral, popliteal, posterior tibial, and peroneal veins. The veins were imaged in the transverse and longitudinal planes. The vessels showed normal color filling and compressibility. Doppler interrogation showed phasic and spontaneous flow. Left Lower Venous     No evidence of deep vein thrombosis in the common femoral, profunda femoral, femoral, popliteal, posterior tibial, and peroneal veins. The veins were imaged in the transverse and longitudinal planes. The vessels showed normal color filling and compressibility. Doppler interrogation showed phasic and spontaneous flow. Results from Hospital Encounter encounter on 12/09/20   XR CHEST PORT    Narrative INDICATION: covid    EXAM:  AP CHEST RADIOGRAPH    COMPARISON: 12/9/2020    FINDINGS:    AP portable view of the chest demonstrates a normal cardiomediastinal  silhouette. Vague areas of airspace opacity in both lungs without definite  change. No pneumothorax or pulmonary edema. The osseous structures are  unremarkable. Impression IMPRESSION:  Mild bilateral airspace opacities better seen on CT without definite interval  change. No results found for this or any previous visit. Results from [place] encounter on 12/09/20   CT ABD PELV W CONT    Narrative INDICATION: Covid19 positive. Evaluate for pulmonary embolism. Shortness of  breath. Chest tightness. Urinary tract infection. Persistent fever, tachycardia,  diarrhea, myalgia. CT pulmonary angiogram is performed with 2.5 mm collimation. 80 mL of nonionic  IV Isovue-370 was administered for exam. 3D coronal and sagittal postprocessed  images were performed for this examination. In addition, contrast-enhanced CT of  the abdomen and pelvis is performed with 5 mm collimation. Sagittal and coronal  reformatted images were also performed.     CT dose reduction was achieved through use of a standardized protocol tailored  for this examination and automatic exposure control for dose modulation. Erect comparison is made to prior CT of the chest dated December 9, 2020 and  prior CT the abdomen and pelvis dated August 2017. Chest:     CTPA: The pulmonary arteries are well opacified and there is no evidence of  pulmonary embolism. The thoracic aorta is normal in course and caliber and there  is no aortic dissection. Lymph nodes: There is no axillary, mediastinal or hilar lymphadenopathy. Heart: The heart is normal in the size and there is no pericardial effusion. Lungs: There has been interval worsening of bilateral multifocal groundglass  opacities, increased in distribution in conspicuity as compared to prior CT  dated December 9, 2020. Pleura: No pleural fluid. Bones: No lytic or sclerotic osseous lesion is visualized. Abdomen and pelvis:    Liver: The liver is diminutive in size and nodular in morphology, consistent  with cirrhosis. There is no intra-abdominal ascites. Adrenals: Adrenal glands are normal.    Pancreas: Within the pancreatic uncinate, there is a septated cystic lesion,  measuring 3.2 cm x 1.6 cm and measuring approximately 2.8 cm x 1.4 cm on prior  CT dated August 2017. Gallbladder: The gallbladder is normal.    Kidneys: There is a 5.4 cm left renal cyst. There are several punctate  subcentimeter renal hypodensities, too small to further characterize, but  statistically likely to represent cysts. There is no hydronephrosis    Spleen: The spleen is normal.    Lymph nodes. There is no francois hepatitis, mesenteric, retroperitoneal or pelvic  lymphadenopathy. Bowel: No thickened or dilated loop of large or small bowel is visualized. Scattered colonic diverticulosis is noted. Appendix: The appendix is normal.    Urinary bladder: Urinary bladder is absent and there is a right lower quadrant  ileal conduit. Bones:  There is beam hardening artifact in the pelvis secondary to left hip  prosthesis. Osseous structures are diffusely demineralized. No lytic or  sclerotic osseous lesion is seen. Miscellaneous: There is a 1.6 cm rounded fluid attenuation structure just to the  left the umbilicus, possibly representing a fluid containing ventral wall  hernia; prior CT dated August 2017 demonstrated a colon-containing hernia in  this region. There is no free intraperitoneal fluid or gas. There is no focal  fluid collection to suggest abscess. The uterus is absent. Impression IMPRESSION:   1. No evidence pulmonary embolism. 2. Interval worsening of bilateral multifocal groundglass opacities  3. Cirrhotic appearing liver. 4.. Interval increase in size of pancreatic uncinate septated cystic lesion, now  measuring 3.2 cm x 1.6 cm. No procedure found.       Chronic diagnoses   Problem List as of 12/15/2020 Date Reviewed: 10/23/2020          Codes Class Noted - Resolved    * (Principal) Sepsis ([facility] ICD-10-CM: A41.9  ICD-9-CM: 038.9, 995.91  12/9/2020 - Present        History of bladder cancer ICD-10-CM: Z85.51  ICD-9-CM: V10.51  12/9/2020 - Present        Rheumatoid arthritis involving multiple sites [facility]) ICD-10-CM: M06.9  ICD-9-CM: 714.0  6/29/2020 - Present    Overview Signed 6/29/2020  1:74 PM by Daniella Allen MD     Rheumatology Dr Josh Ramos             Pancytopenia [facility]) ICD-10-CM: M93.993  ICD-9-CM: 284.19  10/1/2019 - Present        HTN (hypertension) ICD-10-CM: I10  ICD-9-CM: 401.9  4/18/2018 - Present        Hypokalemia ICD-10-CM: E87.6  ICD-9-CM: 276.8  4/18/2018 - Present        Anemia ICD-10-CM: D64.9  ICD-9-CM: 285.9  4/18/2018 - Present        Acquired hypothyroidism ICD-10-CM: E03.9  ICD-9-CM: 244.9  4/18/2018 - Present        GERD (gastroesophageal reflux disease) ICD-10-CM: K21.9  ICD-9-CM: 530.81  4/18/2018 - Present        RESOLVED: UTI (urinary tract infection) ICD-10-CM: N39.0  ICD-9-CM: 599.0  4/22/2018 - 6/5/2019 RESOLVED: Sepsis ([facility] ICD-10-CM: A41.9  ICD-9-CM: 038.9, 995.91  4/18/2018 - 4/22/2018        RESOLVED: Hyponatremia ICD-10-CM: E87.1  ICD-9-CM: 276.1  4/18/2018 - 4/22/2018        RESOLVED: ARF (acute renal failure) ([facility] ICD-10-CM: N17.9  ICD-9-CM: 584.9  4/18/2018 - 4/22/2018        RESOLVED: Hyperglycemia ICD-10-CM: R73.9  ICD-9-CM: 790.29  4/18/2018 - 4/22/2018        RESOLVED: Lactic acidosis ICD-10-CM: E87.2  ICD-9-CM: 276.2  4/18/2018 - 4/22/2018        RESOLVED: Bladder cancer ([facility] ICD-10-CM: C67.9  ICD-9-CM: 188.9  4/18/2018 - 1/22/2020              Discharge/Transfer Medications  Discharge Medication List as of 12/15/2020  4:51 PM      START taking these medications    Details   dexAMETHasone (DECADRON) 6 mg tablet Take 1 Tab by mouth daily for 8 doses. , Normal, Disp-8 Tab,R-0      levoFLOXacin (LEVAQUIN) 750 mg tablet Take 1 Tab by mouth every fourty-eight (48) hours for 2 doses. Take one tablet by mouth on 12/16/2020 and one tablet by mouth on 12/18/2020., Normal, Disp-2 Tab,R-0      benzonatate (TESSALON) 100 mg capsule Take 1 Cap by mouth three (3) times daily as needed for Cough for up to 10 days. , Normal, Disp-30 Cap,R-0      guaiFENesin ER (MUCINEX) 1,200 mg Ta12 ER tablet Take 1 Tab by mouth every twelve (12) hours for 10 days. , Normal, Disp-20 Tab,R-0         CONTINUE these medications which have NOT CHANGED    Details   nystatin (MYCOSTATIN) powder Apply  to affected area four (4) times daily as needed. Use around stoma, Historical Med      losartan (COZAAR) 50 mg tablet Take 1 Tab by mouth daily. Indications: high blood pressure, Normal, Disp-90 Tab,R-1      hydroCHLOROthiazide (HYDRODIURIL) 25 mg tablet Take 1 Tab by mouth daily. , Normal, Disp-90 Tab,R-1      levothyroxine (SYNTHROID) 75 mcg tablet TAKE 1 TAB BY MOUTH DAILY (BEFORE BREAKFAST). , Normal, Disp-90 Tab,R-0      omeprazole (PRILOSEC) 20 mg capsule TAKE 1 CAPSULE BY MOUTH EVERY DAY, Normal, Disp-90 Cap,R-1      ergocalciferol (ERGOCALCIFEROL) 1,250 mcg (50,000 unit) capsule Take 1 Cap by mouth every seven (7) days. Indications: low vitamin D levels, Normal, Disp-15 Cap,R-3      ENBREL MINI 50 mg/mL (1 mL) crtg Every Thursday., Historical Med, NITHIN      cyanocobalamin (VITAMIN B12) 1,000 mcg/mL injection 1 mL by SubCUTAneous route every thirty (30) days. , No Print, Disp-12 mL,R-3              Diet:  Regular diet, encourage PO fluids     Activity:  As tolerated    Disposition: Home or Self Care    Discharge instructions to patient/family  Please seek medical attention for any new or worsening symptoms particularly fever, chest pain, shortness of breath, abdominal pain, nausea, vomiting    Follow up plans/appointments  Follow-up Information     Follow up With Specialties Details Why Contact Info    Ariadne Cordova MD Family Medicine Go on 12/16/2020 [facility] follow up appointment with Dr. Jeny Beard on Wednesday 12/16 at 10:30 AM - the office will call about 15 minutes prior to your appointment [address] [place] [address]      Mary Peterson MD Hematology and Oncology, Internal Medicine, Hematology, Oncology Schedule an appointment as soon as possible for a visit  [address] [place] [address]  [phone]      [facility] [id number].  On 12/18/2020 Hospital follow up Telemedicine visit Friday, 12/18/20 @ 10:45 a.m. with Dr. Aidan Snowden [address]  [facility] [phone]    [facility], In-Home Clinical Assessments  As needed Mobile [facility] That Comes To [facility]  [phone]           Reba Primrose, MD  Family Medicine Resident     For Billing    Chief Complaint   Patient presents with    Concern For [address] Problems  Date Reviewed: 10/23/2020          Codes Class Noted POA    * (Principal) Sepsis [facility]) ICD-10-CM: A41.9  ICD-9-CM: 038.9, 995.91  12/9/2020 Unknown        History of bladder cancer ICD-10-CM: Z85.51  ICD-9-CM: V10.51  12/9/2020 Unknown        Rheumatoid arthritis involving multiple sites [facility]) ICD-10-CM: M06.9  ICD-9-CM: 714.0  6/29/2020 Yes    Overview Signed 6/29/2020  5:71 PM by Chris Del Rio MD     Rheumatology Dr Chani Washington             Pancytopenia [facility]) ICD-10-CM: C09.464  ICD-9-CM: 284.19  10/1/2019 Yes        HTN (hypertension) ICD-10-CM: I10  ICD-9-CM: 401.9  4/18/2018 Yes        Anemia ICD-10-CM: D64.9  ICD-9-CM: 285.9  4/18/2018 Yes        Acquired hypothyroidism ICD-10-CM: E03.9  ICD-9-CM: 244.9  4/18/2018 Yes        GERD (gastroesophageal reflux disease) ICD-10-CM: K21.9  ICD-9-CM: 530.81  4/18/2018 Yes

## 2020-12-12 ENCOUNTER — APPOINTMENT (OUTPATIENT)
Dept: VASCULAR SURGERY | Age: 75
DRG: 871 | End: 2020-12-12
Attending: FAMILY MEDICINE
Payer: MEDICARE

## 2020-12-12 LAB
ALBUMIN SERPL-MCNC: 2.9 G/DL (ref 3.5–5)
ALBUMIN/GLOB SERPL: 0.7 {RATIO} (ref 1.1–2.2)
ALP SERPL-CCNC: 33 U/L (ref 45–117)
ALT SERPL-CCNC: 29 U/L (ref 12–78)
ANION GAP SERPL CALC-SCNC: 8 MMOL/L (ref 5–15)
AST SERPL-CCNC: 56 U/L (ref 15–37)
BASOPHILS # BLD: 0 K/UL (ref 0–0.1)
BASOPHILS NFR BLD: 0 % (ref 0–1)
BILIRUB SERPL-MCNC: 0.4 MG/DL (ref 0.2–1)
BUN SERPL-MCNC: 17 MG/DL (ref 6–20)
BUN/CREAT SERPL: 15 (ref 12–20)
CALCIUM SERPL-MCNC: 8.1 MG/DL (ref 8.5–10.1)
CHLORIDE SERPL-SCNC: 109 MMOL/L (ref 97–108)
CO2 SERPL-SCNC: 20 MMOL/L (ref 21–32)
CREAT SERPL-MCNC: 1.11 MG/DL (ref 0.55–1.02)
CRP SERPL-MCNC: 7 MG/DL (ref 0–0.6)
D DIMER PPP FEU-MCNC: 1.49 MG/L FEU (ref 0–0.65)
DIFFERENTIAL METHOD BLD: ABNORMAL
EOSINOPHIL # BLD: 0 K/UL (ref 0–0.4)
EOSINOPHIL NFR BLD: 0 % (ref 0–7)
ERYTHROCYTE [DISTWIDTH] IN BLOOD BY AUTOMATED COUNT: 12.8 % (ref 11.5–14.5)
FERRITIN SERPL-MCNC: 446 NG/ML (ref 8–252)
GLOBULIN SER CALC-MCNC: 3.9 G/DL (ref 2–4)
GLUCOSE SERPL-MCNC: 84 MG/DL (ref 65–100)
HCT VFR BLD AUTO: 27.3 % (ref 35–47)
HGB BLD-MCNC: 9.4 G/DL (ref 11.5–16)
IMM GRANULOCYTES # BLD AUTO: 0 K/UL (ref 0–0.04)
IMM GRANULOCYTES NFR BLD AUTO: 0 % (ref 0–0.5)
LDH SERPL L TO P-CCNC: 381 U/L (ref 81–246)
LYMPHOCYTES # BLD: 0.5 K/UL (ref 0.8–3.5)
LYMPHOCYTES NFR BLD: 23 % (ref 12–49)
MCH RBC QN AUTO: 30.9 PG (ref 26–34)
MCHC RBC AUTO-ENTMCNC: 34.4 G/DL (ref 30–36.5)
MCV RBC AUTO: 89.8 FL (ref 80–99)
MONOCYTES # BLD: 0.2 K/UL (ref 0–1)
MONOCYTES NFR BLD: 7 % (ref 5–13)
NEUTS SEG # BLD: 1.6 K/UL (ref 1.8–8)
NEUTS SEG NFR BLD: 70 % (ref 32–75)
NRBC # BLD: 0 K/UL (ref 0–0.01)
NRBC BLD-RTO: 0 PER 100 WBC
PLATELET # BLD AUTO: 88 K/UL (ref 150–400)
PMV BLD AUTO: 11.9 FL (ref 8.9–12.9)
POTASSIUM SERPL-SCNC: 3.9 MMOL/L (ref 3.5–5.1)
PROT SERPL-MCNC: 6.8 G/DL (ref 6.4–8.2)
RBC # BLD AUTO: 3.04 M/UL (ref 3.8–5.2)
SODIUM SERPL-SCNC: 137 MMOL/L (ref 136–145)
WBC # BLD AUTO: 2.4 K/UL (ref 3.6–11)

## 2020-12-12 PROCEDURE — 74011250637 HC RX REV CODE- 250/637: Performed by: STUDENT IN AN ORGANIZED HEALTH CARE EDUCATION/TRAINING PROGRAM

## 2020-12-12 PROCEDURE — 80053 COMPREHEN METABOLIC PANEL: CPT

## 2020-12-12 PROCEDURE — 82728 ASSAY OF FERRITIN: CPT

## 2020-12-12 PROCEDURE — 86140 C-REACTIVE PROTEIN: CPT

## 2020-12-12 PROCEDURE — 65660000000 HC RM CCU STEPDOWN

## 2020-12-12 PROCEDURE — 74011250636 HC RX REV CODE- 250/636: Performed by: STUDENT IN AN ORGANIZED HEALTH CARE EDUCATION/TRAINING PROGRAM

## 2020-12-12 PROCEDURE — 36415 COLL VENOUS BLD VENIPUNCTURE: CPT

## 2020-12-12 PROCEDURE — 74011000258 HC RX REV CODE- 258: Performed by: STUDENT IN AN ORGANIZED HEALTH CARE EDUCATION/TRAINING PROGRAM

## 2020-12-12 PROCEDURE — 83615 LACTATE (LD) (LDH) ENZYME: CPT

## 2020-12-12 PROCEDURE — 74011250636 HC RX REV CODE- 250/636: Performed by: INTERNAL MEDICINE

## 2020-12-12 PROCEDURE — 74011250636 HC RX REV CODE- 250/636: Performed by: FAMILY MEDICINE

## 2020-12-12 PROCEDURE — 85379 FIBRIN DEGRADATION QUANT: CPT

## 2020-12-12 PROCEDURE — 99232 SBSQ HOSP IP/OBS MODERATE 35: CPT | Performed by: FAMILY MEDICINE

## 2020-12-12 PROCEDURE — 93970 EXTREMITY STUDY: CPT

## 2020-12-12 PROCEDURE — 85025 COMPLETE CBC W/AUTO DIFF WBC: CPT

## 2020-12-12 PROCEDURE — 74011000250 HC RX REV CODE- 250: Performed by: STUDENT IN AN ORGANIZED HEALTH CARE EDUCATION/TRAINING PROGRAM

## 2020-12-12 PROCEDURE — 87040 BLOOD CULTURE FOR BACTERIA: CPT

## 2020-12-12 RX ORDER — LEVOFLOXACIN 5 MG/ML
750 INJECTION, SOLUTION INTRAVENOUS EVERY 24 HOURS
Status: DISCONTINUED | OUTPATIENT
Start: 2020-12-12 | End: 2020-12-14

## 2020-12-12 RX ADMIN — ATORVASTATIN CALCIUM 20 MG: 20 TABLET, FILM COATED ORAL at 09:04

## 2020-12-12 RX ADMIN — HEPARIN SODIUM 5000 UNITS: 5000 INJECTION INTRAVENOUS; SUBCUTANEOUS at 05:20

## 2020-12-12 RX ADMIN — Medication 10 ML: at 20:26

## 2020-12-12 RX ADMIN — OXYCODONE HYDROCHLORIDE AND ACETAMINOPHEN 500 MG: 500 TABLET ORAL at 17:36

## 2020-12-12 RX ADMIN — ZINC SULFATE 220 MG (50 MG) CAPSULE 1 CAPSULE: CAPSULE at 09:04

## 2020-12-12 RX ADMIN — ACETAMINOPHEN ORAL SOLUTION 650 MG: 650 SOLUTION ORAL at 21:56

## 2020-12-12 RX ADMIN — Medication 10 ML: at 05:20

## 2020-12-12 RX ADMIN — BENZONATATE 100 MG: 100 CAPSULE ORAL at 14:50

## 2020-12-12 RX ADMIN — HEPARIN SODIUM 5000 UNITS: 5000 INJECTION INTRAVENOUS; SUBCUTANEOUS at 13:31

## 2020-12-12 RX ADMIN — DOXYCYCLINE 100 MG: 100 INJECTION, POWDER, LYOPHILIZED, FOR SOLUTION INTRAVENOUS at 20:24

## 2020-12-12 RX ADMIN — LEVOFLOXACIN 750 MG: 5 INJECTION, SOLUTION INTRAVENOUS at 09:05

## 2020-12-12 RX ADMIN — GUAIFENESIN 1200 MG: 600 TABLET ORAL at 20:24

## 2020-12-12 RX ADMIN — GUAIFENESIN 1200 MG: 600 TABLET ORAL at 09:04

## 2020-12-12 RX ADMIN — PANTOPRAZOLE SODIUM 40 MG: 40 TABLET, DELAYED RELEASE ORAL at 09:04

## 2020-12-12 RX ADMIN — OXYCODONE HYDROCHLORIDE AND ACETAMINOPHEN 500 MG: 500 TABLET ORAL at 09:04

## 2020-12-12 RX ADMIN — Medication 10 ML: at 13:34

## 2020-12-12 RX ADMIN — HEPARIN SODIUM 5000 UNITS: 5000 INJECTION INTRAVENOUS; SUBCUTANEOUS at 20:25

## 2020-12-12 RX ADMIN — DOXYCYCLINE 100 MG: 100 INJECTION, POWDER, LYOPHILIZED, FOR SOLUTION INTRAVENOUS at 09:05

## 2020-12-12 RX ADMIN — PROCHLORPERAZINE EDISYLATE 10 MG: 5 INJECTION INTRAMUSCULAR; INTRAVENOUS at 10:58

## 2020-12-12 RX ADMIN — LEVOTHYROXINE SODIUM 75 MCG: 0.07 TABLET ORAL at 05:20

## 2020-12-12 RX ADMIN — ACETAMINOPHEN 650 MG: 325 TABLET ORAL at 01:03

## 2020-12-12 NOTE — PROGRESS NOTES
129 [facility] FAMILY MEDICINE RESIDENCY PROGRAM  PROGRESS NOTE     [id number]  PCP: Dennie Glen, MD     Assessment/Plan:     May Garcia is a 66 y.o. female with a PMHx of May Garcia is a 66 y.o. female with a PMH of COVID positivity, bladder cancer in 1999 s/p urostomy, HTN, hypokalemia, pancytopenia, B12 deficiency, rheumatoid arthritis, hypothyroidism, and GERD who is admitted for Sepsis 2/2 COVID infection vs UTI. Overnight Events: Patient spiked fever of 102.7 at around 8:00 PM. Given tylenol solution at 10:00 PM and has been afebrile since. We have not been able to get AM labs yet due to poor IV access.       Sepsis 2/2 COVID vs UTI: (3/4 SIRS POA: WBC 3.0, temp, HR). COVID + (12/5, 12/9). LA and procalcitonin wnl. Blood cx 12/9 no growth x 4 days, repeat blood cx 12/11 no growth x 1 day. CT chest with multiple small patchy groundglass infiltrates. Worsened infiltrates appreciated on CTA chest on 12/11. UCx: klebsiella resistant to ampicillin and unasyn and enterococcus faecalis resistant to tetracycline. S/p Vanc and Cefepime x1 in ED. S/p CTX x 3 doses as well as doxycyline IV (12/9-12/12). Started on Levaquin 750 mg IV yesterday to cover for Enterococcus. Fever curve and CRP trending up. - Consult ID, appreciate recs - spoke with Dr. Lesvia Bravo who recommended adding Ceftriaxone back  - Continue levaquin 750 daily for 5 days until 12/16 to cover enterococcal UTI  - Resume Ceftriaxone 2g IV q24h per ID recs  - F/u final blood culture and repeat bcx from 12/12  - Tylenol 650mg q6h prn for fever  - Compazine 10mg IV q4h prn for N/V     COVID-19 Pneumonia: Tested positive on 12/5 outpatient, confirmed 12/9. Worsened infiltrates appreciated on CTA chest on 12/11. Procalcitonin <0.05 x2. Not requiring supplemental O2 at this time.    - Pulm consulted, appreciate recs      - Goal sat 88%, supplemental O2 PRN  - Trend daily D-dimer, CRP, LD, ferritin  - Atorvastatin 20mg daily, Vitamin C 500mg BID, Zinc 220mg daily  - Mucinex BID and Tessalon PRN  - Encourage incentive spirometry  - Droplet plus precautions    Complicated UTI: Hx of bladder cancer s/p urostomy. Hx of UTIs in the past. UCx grew klebsiella resistant to ampicillin and unasyn as well as enterococcus faecalis resistant to tetracycline. Ceftriaxone was discontinued and Levaquin was added to cover for Enterococcus. - Continue Levaquin 750 mg q24h x 5 days    Elevated D-dimer: D-dimer up to 2. 16. CTA chest: No PE. LE duplex preliminary report no DVT/thrombophlebitis. D-Dimer likely elevated 2/2 acute phase rxn.     Pancytopenia: POA WBC 3.0 (BL < 3), Hgb 11.0 (BL 10), PLT 83 (). Hx of B12 deficiency. Folate level wnl. Followed by Dr. Nina Osborne. Continued fever is concerning in the setting of leukopenia, however this is likely related to ongoing COVID infection.   - Hematology / oncology consult - appreciate recs  - Daily CBC     At risk LEATHA: POA Cr 1.73 (BL 1.0-1.25). Improved after IV hydration.   - Monitor with daily BMP    Transaminitis: AST mildly elevated here, wnl at baseline. Possibly related to ongoing inflammation from COVID. Cirrhotic appearing liver in CT abd.  - Follow up outpatient with repeat CMP and consider workup     HTN: Home HCTZ 25 mg daily and losartan 50 mg daily.  - Hold home meds d/t hypotension     Hypothyroidism: TSH 0.97 in 10/2020. Home synthroid 75mcg daily  - Continue home meds     RA: Home Enbrel 50mg/ml every Thursday. Skipped last week due to COVID infection.  - Hold Enbrel while inpatient      Vitamin D deficiency: 1250 mcg every Tuesday  - Continue home meds     B12 Deficiency: 1000 mcg/ml every month. - S/p dose of IM B12 as patient was due for her monthly injection   - F/u outpatient    Hx of Bladder Cancer: s/p urosotomy ~20 years. Followed by Dr. Yadira Navas.      Obesity: Body mass index is 32.28 kg/m². - Encourage lifestyle modification and further follow up with PCP outpatient.      FEN/GI: Regular diet. Encourage PO intake. Activity: Ambulate with assistance. DVT prophylaxis: Heparin  GI prophylaxis:  None  Disposition: Plan to d/c to TBD. PT/OT consulted. POC: Raven Chatman (daughter) [phone]     CODE STATUS:  Full Code    May Garcia discussed with Dr. Doris Jacques     Subjective:   Pt was seen and examined at bedside by Dr. Eva Raphael. Patient reports continued cough, fatigue, and a few episodes of loose stool/diarrhea. She states that she knows her fevers are likely from [facility] [place] and she is requesting to go home. Denies chest pain, SOB, nausea, vomiting, abdominal pain. Objective:   Physical examination  Patient Vitals for the past 24 hrs:   Temp Pulse Resp BP SpO2   20 1455 99 °F (37.2 °C) (!) 102 20 (!) 121/59 93 %   20 1442 -- 99 -- -- --   20 1101 99.1 °F (37.3 °C) 97 24 (!) 106/54 91 %   20 0900 99.4 °F (37.4 °C) 95 24 (!) 121/58 91 %   20 0702 -- 91 -- -- --   20 0516 99 °F (37.2 °C) 92 24 (!) 109/55 90 %   20 0102 -- (!) 105 -- -- --   20 0058 (!) 102.3 °F (39.1 °C) (!) 105 24 134/60 91 %   20 2048 (!) 101.7 °F (38.7 °C) (!) 101 18 (!) 143/63 93 %      Temp (24hrs), Av.1 °F (37.8 °C), Min:99 °F (37.2 °C), Max:102.3 °F (39.1 °C)         O2 Device: Room air    Date 20 - 20 - 20 0659   Shift 9627-9898 7603-1037 24 Hour Total 8171-9572 7867-4792 24 Hour Total   INTAKE   P.O. 360 475 835 150  150     P. O. 360 475 835 150  150   Shift Total(mL/kg) 360(4.1) 475(5.4) 835(9.5) 150(1.7)  150(1.7)   OUTPUT   Urine(mL/kg/hr) 900(0.9) 950(0.9) 1850(0.9) 600  600     Urine Voided  600  600   Emesis/NG output           Emesis Occurrence(s)    1 x  1 x   Stool           Stool Occurrence(s) 1 x  1 x 2 x  2 x   Shift Total(mL/kg) 900(10.2) 950(10.8) 1850(21) 600(6.8)  600(6.8)   NET -540 -475 -1015 -450  -450   Weight (kg) 88 88 88 88 88 88     *due to COVID-19 pandemic and limitations on patient contact the physical exam has been performed and reported by [facility] Medicine senior resident, Dr. Shawn Stark*    General:   Alert, cooperative, no acute distress   Head:   Atraumatic   Eyes:   Conjunctivae clear   ENT:  Oral mucosa normal   Neck:  Supple, trachea midline   Lungs:   Had NC in place at 1L for comfort. Weaned off to room air while in room with no increased work of breathing. Faint crackles in bilateral lower lobes. Good air movement throughout lung fields. Heart:   Normal rate, regular rhythm, no murmur. Abdomen:    Soft, non-tender, nondistended. Urostomy bag noted. Extremities:  No edema or calf tenderness    Pulses:  Symmetric all extremities   Skin:  Warm and dry    No rashes or lesions   Neurologic:  Alert and oriented x4   No focal deficits     Data Review:     CBC:  Recent Labs     12/12/20  0108 12/11/20  0520 12/10/20  0906   WBC 2.4* 2.1* 2.5*   HGB 9.4* 10.9* 10.4*   HCT 27.3* 34.0* 31.2*   PLT 88* 88* 84*     Metabolic Panel:  Recent Labs     12/12/20  0108 12/11/20  0400 12/10/20  0502    136 136   K 3.9 4.0 4.1   * 109* 110*   CO2 20* 21 22   BUN 17 22* 23*   CREA 1.11* 1.44* 1.44*   GLU 84 86 88   CA 8.1* 8.1* 7.9*   ALB 2.9* 3.0* 3.1*   TBILI 0.4 0.5 0.5   ALT 29 32 31     Micro:  Lab Results   Component Value Date/Time    Culture result: NO GROWTH AFTER 5 HOURS 12/12/2020 01:08 AM    Culture result: KLEBSIELLA OXYTOCA (A) 12/09/2020 12:41 AM    Culture result: ENTEROCOCCUS FAECALIS (A) 12/09/2020 12:41 AM     Imaging:  Ct Chest Wo Cont    Result Date: 12/9/2020  INDICATION: Shortness of breath COMPARISON: None CONTRAST: None. TECHNIQUE:  5 mm axial images were obtained through the chest. Coronal and sagittal reformats were generated. CT dose reduction was achieved through use of a standardized protocol tailored for this examination and automatic exposure control for dose modulation.  The absence of intravenous contrast reduces the sensitivity for evaluation of the mediastinum, george, vasculature, and upper abdominal organs. FINDINGS: CHEST WALL: No mass or axillary lymphadenopathy. THYROID: No nodule. MEDIASTINUM: No mass or lymphadenopathy. GEORGE: No mass or lymphadenopathy. THORACIC AORTA: No aneurysm. MAIN PULMONARY ARTERY: Normal in caliber. TRACHEA/BRONCHI: Patent. ESOPHAGUS: No wall thickening or dilatation. HEART: Normal in size. PLEURA: No effusion or pneumothorax. LUNGS: Multiple small groundglass bilateral infiltrates. Those are nonspecific, they can BE seen with atypical pneumonia. INCIDENTALLY IMAGED UPPER ABDOMEN: No significant abnormality in the incidentally imaged upper abdomen. BONES: No destructive bone lesion. IMPRESSION: Multiple small patchy groundglass infiltrates. Xr Chest Port    Result Date: 12/9/2020  Clinical indication: Chest pain. Portable AP upright view of the chest is obtained, comparison February 24, 2019. The  heart size is normal. There is no acute infiltrate. IMPRESSION: No acute changes.     Medications reviewed  Current Facility-Administered Medications   Medication Dose Route Frequency    levoFLOXacin (LEVAQUIN) 750 mg in D5W IVPB  750 mg IntraVENous Q24H    diclofenac (VOLTAREN) 1 % topical gel 4 g  4 g Topical QID PRN    heparin (porcine) injection 5,000 Units  5,000 Units SubCUTAneous Q8H    lidocaine 4 % patch 1 Patch  1 Patch TransDERmal Q24H    sodium chloride (NS) flush 5-10 mL  5-10 mL IntraVENous PRN    sodium chloride (NS) flush 5-40 mL  5-40 mL IntraVENous Q8H    sodium chloride (NS) flush 5-40 mL  5-40 mL IntraVENous PRN    acetaminophen (TYLENOL) tablet 650 mg  650 mg Oral Q6H PRN    Or    acetaminophen (TYLENOL) suppository 650 mg  650 mg Rectal Q6H PRN    prochlorperazine (COMPAZINE) injection 10 mg  10 mg IntraVENous Q4H PRN    atorvastatin (LIPITOR) tablet 20 mg  20 mg Oral DAILY    zinc sulfate (ZINCATE) 220 (50) mg capsule 1 Cap  1 Cap Oral DAILY    ascorbic acid (vitamin C) (VITAMIN C) tablet 500 mg  500 mg Oral BID    [START ON 12/15/2020] ergocalciferol capsule 50,000 Units  50,000 Units Oral Q7D    levothyroxine (SYNTHROID) tablet 75 mcg  75 mcg Oral 6am    pantoprazole (PROTONIX) tablet 40 mg  40 mg Oral DAILY    doxycycline (VIBRAMYCIN) 100 mg in 0.9% sodium chloride (MBP/ADV) 100 mL MBP  100 mg IntraVENous Q12H    guaiFENesin ER (MUCINEX) tablet 1,200 mg  1,200 mg Oral Q12H    benzonatate (TESSALON) capsule 100 mg  100 mg Oral TID PRN         Signed:   Mila Ervin DO  Family Medicine Resident      Attending note: Attending note to follow. ..

## 2020-12-12 NOTE — PROGRESS NOTES
Bedside and Verbal shift change report given to Kim Mitchell RN (oncoming nurse) by Ariel El RN (offgoing nurse). Report included the following information SBAR, Kardex, Intake/Output, MAR, Accordion and Recent Results. Bedside and Verbal shift change report given to [facility], [address] [place] [address] (oncoming nurse) by Kim Mitchell RN (offgoing nurse). Report included the following information SBAR, Kardex, Intake/Output, MAR, Accordion and Recent Results.

## 2020-12-12 NOTE — PROGRESS NOTES
Bedside and Verbal shift change report given to Anca Salas (oncoming nurse) by Ekta Velez RN (offgoing nurse). Report included the following information SBAR, Kardex, ED Summary, Intake/Output, Recent Results, Med Rec Status and Cardiac Rhythm NSR.     2048: Pt alert, oriented, denies pain. Skin hot to touch, Temp 101.7, , spo2 93% on room air, RR 18, B/P 143/63. Offered pt Tylenol for fever; pt reported she felt fine and refused Tylenol. 2218: Pt resting quietly, lethargic, skin hot to touch; Temp 102.3, , RR 24 Spo2 @ 91% on room air, B/P 134/60. Administered Tylenol 650 mg PO.    0516: Pt resting quietly; skin warm to touch; Temp 99.0, HR 92, Spo2@ 90% on room air, RR 24, B/P 109/55. Pt reports feeling much better. Bedside and Verbal shift change report given to Adali Paris RN (oncoming nurse) by Anca Salas (offgoing nurse). Report included the following information SBAR, Kardex, ED Summary, Intake/Output, Recent Results, Med Rec Status and Cardiac Rhythm NSR/ST.

## 2020-12-13 PROCEDURE — 74011000258 HC RX REV CODE- 258: Performed by: INTERNAL MEDICINE

## 2020-12-13 PROCEDURE — XW043E5 INTRODUCTION OF REMDESIVIR ANTI-INFECTIVE INTO CENTRAL VEIN, PERCUTANEOUS APPROACH, NEW TECHNOLOGY GROUP 5: ICD-10-PCS | Performed by: FAMILY MEDICINE

## 2020-12-13 PROCEDURE — 74011250637 HC RX REV CODE- 250/637: Performed by: INTERNAL MEDICINE

## 2020-12-13 PROCEDURE — 74011000250 HC RX REV CODE- 250: Performed by: STUDENT IN AN ORGANIZED HEALTH CARE EDUCATION/TRAINING PROGRAM

## 2020-12-13 PROCEDURE — 99232 SBSQ HOSP IP/OBS MODERATE 35: CPT | Performed by: FAMILY MEDICINE

## 2020-12-13 PROCEDURE — 97116 GAIT TRAINING THERAPY: CPT

## 2020-12-13 PROCEDURE — 97530 THERAPEUTIC ACTIVITIES: CPT

## 2020-12-13 PROCEDURE — 74011250637 HC RX REV CODE- 250/637: Performed by: STUDENT IN AN ORGANIZED HEALTH CARE EDUCATION/TRAINING PROGRAM

## 2020-12-13 PROCEDURE — 74011250636 HC RX REV CODE- 250/636: Performed by: STUDENT IN AN ORGANIZED HEALTH CARE EDUCATION/TRAINING PROGRAM

## 2020-12-13 PROCEDURE — 97535 SELF CARE MNGMENT TRAINING: CPT

## 2020-12-13 PROCEDURE — 99223 1ST HOSP IP/OBS HIGH 75: CPT | Performed by: INTERNAL MEDICINE

## 2020-12-13 PROCEDURE — 65660000000 HC RM CCU STEPDOWN

## 2020-12-13 PROCEDURE — 74011250636 HC RX REV CODE- 250/636: Performed by: INTERNAL MEDICINE

## 2020-12-13 PROCEDURE — 94760 N-INVAS EAR/PLS OXIMETRY 1: CPT

## 2020-12-13 PROCEDURE — 74011000250 HC RX REV CODE- 250: Performed by: INTERNAL MEDICINE

## 2020-12-13 PROCEDURE — 74011250636 HC RX REV CODE- 250/636: Performed by: FAMILY MEDICINE

## 2020-12-13 RX ORDER — AMOXICILLIN 250 MG/1
500 CAPSULE ORAL EVERY 12 HOURS
Status: DISCONTINUED | OUTPATIENT
Start: 2020-12-13 | End: 2020-12-14

## 2020-12-13 RX ORDER — DEXAMETHASONE 6 MG/1
6 TABLET ORAL DAILY
Status: DISCONTINUED | OUTPATIENT
Start: 2020-12-14 | End: 2020-12-15 | Stop reason: HOSPADM

## 2020-12-13 RX ADMIN — ZINC SULFATE 220 MG (50 MG) CAPSULE 1 CAPSULE: CAPSULE at 11:13

## 2020-12-13 RX ADMIN — CEFTRIAXONE 2 G: 2 INJECTION, POWDER, FOR SOLUTION INTRAMUSCULAR; INTRAVENOUS at 11:14

## 2020-12-13 RX ADMIN — Medication 10 ML: at 05:47

## 2020-12-13 RX ADMIN — REMDESIVIR 200 MG: 100 INJECTION, POWDER, LYOPHILIZED, FOR SOLUTION INTRAVENOUS at 15:51

## 2020-12-13 RX ADMIN — ATORVASTATIN CALCIUM 20 MG: 20 TABLET, FILM COATED ORAL at 11:13

## 2020-12-13 RX ADMIN — AMOXICILLIN 500 MG: 250 CAPSULE ORAL at 21:16

## 2020-12-13 RX ADMIN — Medication 10 ML: at 21:16

## 2020-12-13 RX ADMIN — PANTOPRAZOLE SODIUM 40 MG: 40 TABLET, DELAYED RELEASE ORAL at 11:13

## 2020-12-13 RX ADMIN — OXYCODONE HYDROCHLORIDE AND ACETAMINOPHEN 500 MG: 500 TABLET ORAL at 18:52

## 2020-12-13 RX ADMIN — GUAIFENESIN 1200 MG: 600 TABLET ORAL at 21:16

## 2020-12-13 RX ADMIN — HEPARIN SODIUM 5000 UNITS: 5000 INJECTION INTRAVENOUS; SUBCUTANEOUS at 21:16

## 2020-12-13 RX ADMIN — ACETAMINOPHEN ORAL SOLUTION 650 MG: 650 SOLUTION ORAL at 21:16

## 2020-12-13 RX ADMIN — Medication 10 ML: at 14:00

## 2020-12-13 RX ADMIN — HEPARIN SODIUM 5000 UNITS: 5000 INJECTION INTRAVENOUS; SUBCUTANEOUS at 05:47

## 2020-12-13 RX ADMIN — LEVOFLOXACIN 750 MG: 5 INJECTION, SOLUTION INTRAVENOUS at 11:14

## 2020-12-13 RX ADMIN — LEVOTHYROXINE SODIUM 75 MCG: 0.07 TABLET ORAL at 05:47

## 2020-12-13 RX ADMIN — AMOXICILLIN 500 MG: 250 CAPSULE ORAL at 15:46

## 2020-12-13 RX ADMIN — HEPARIN SODIUM 5000 UNITS: 5000 INJECTION INTRAVENOUS; SUBCUTANEOUS at 15:50

## 2020-12-13 NOTE — PROGRESS NOTES
Remdesivir Initiation per Dr. Goldie Zacarias    This patient meets criteria for initiation of remdesivir based on the following:  Proven COVID-19  Moderate disease (Requiring supplemental O2)  Acceptable hepatic function (ALT within 5 times ULN)    Exclusion Criteria:  Severe disease requiring invasive or non-invasive mechanical ventilation (includes HFNC & BiPAP)  Could consider use in patients requiring high flow if early on in the disease course (based on symptom duration)  Use of more than 1 vasopressor prior to remdesivir initiation  Already improving on supportive treatment and/or impending discharge  Patients in whom the clinical team think death is in the immediate short-term where remdesivir is unlikely to change the clinical outcome     Liver function tests will be monitored daily while on remdesivir.     Thanks,   Eli Montiel, PHARMD

## 2020-12-13 NOTE — PROGRESS NOTES
Problem: Self Care Deficits Care Plan (Adult)  Goal: *Acute Goals and Plan of Care (Insert Text)  Description:   FUNCTIONAL STATUS PRIOR TO ADMISSION: Patient was independent and active without use of DME. Drove minimally    HOME SUPPORT: The patient lived with daughter but did not require assist.    Occupational Therapy Goals  Initiated 12/9/2020  1. Patient will perform grooming with modified independence standing at the sink within 7 day(s). 2.  Patient will perform upper body dressing with supervision/set-up within 7 day(s). 3.  Patient will perform lower body dressing with supervision/set-up and best technique within 7 day(s). 4.  Patient will perform toilet transfers with modified independence within 7 day(s). 5.  Patient will perform all aspects of toileting with supervision/set-up within 7 day(s). 6.  Patient will participate in upper extremity therapeutic exercise/activities with supervision/set-up for 5 minutes with < or = 2 rest breaks within 7 day(s). 7.  Patient will perform breathing exercises with handout independently within 7 day(s). Outcome: Progressing Towards Goal   OCCUPATIONAL THERAPY TREATMENT  Patient: Anjali Holley (20 y.o. female)  Date: 12/13/2020  Diagnosis: Sepsis ([facility] [address] [A41.9]   Sepsis ([facility] [address]       Precautions: Fall  Chart, occupational therapy assessment, plan of care, and goals were reviewed. ASSESSMENT  Patient continues with skilled OT services and is progressing towards goals. Patient received supine in bed, agreeable to activity. She reports plan for OOB, and reports MD requested. Patient to EOB with min assist.  Sit to stand with min assist and patient notes weakness from laying in bed. She is able to transfer to [facility] in bathroom after noting small soiled area to pad on bed. Patient with small bowel movement and requires mod assist for hygiene in standing after attempts to perform seated on her own.   Patient with short distance ambulation in room and left in chair at end of session, alarm set. Current Level of Function Impacting Discharge (ADLs): min assist for transfers, Mod assist for toileting     Other factors to consider for discharge: none         PLAN :  Patient continues to benefit from skilled intervention to address the above impairments. Continue treatment per established plan of care. to address goals. Recommend with staff: Douglas Frame for meals, ambulation to bathroom as needed     Recommend next OT session: continue goals     Recommendation for discharge: (in order for the patient to meet his/her long term goals)  Occupational therapy at least 2 days/week in the home     This discharge recommendation:  Has been made in collaboration with the attending provider and/or case management    IF patient discharges home will need the following DME: TBD       SUBJECTIVE:   Patient stated I do feel a bit more weak.     OBJECTIVE DATA SUMMARY:   Cognitive/Behavioral Status:  Neurologic State: Alert  Orientation Level: Oriented X4  Cognition: Follows commands  Perception: Appears intact  Perseveration: No perseveration noted  Safety/Judgement: Awareness of environment    Functional Mobility and Transfers for ADLs:  Bed Mobility:  Rolling: Minimum assistance  Supine to Sit: Minimum assistance    Transfers:  Sit to Stand: Minimum assistance  Functional Transfers  Bathroom Mobility: Contact guard assistance  Toilet Transfer : Minimum assistance  Cues: Physical assistance;Verbal cues provided  Bed to Chair: Contact guard assistance    Balance:  Sitting: Intact  Standing - Static: Good  Standing - Dynamic : Fair    ADL Intervention:                           Lower Body Dressing Assistance  Socks: Maximum assistance  Leg Crossed Method Used: No  Position Performed: Supine  Cues: Verbal cues provided;Physical assistance    Toileting  Toileting Assistance: Moderate assistance  Bowel Hygiene:  Moderate assistance  Clothing Management: Contact guard assistance  Cues: Verbal cues provided(physical assist with bowel hygiene)  Adaptive Equipment: Walker;Grab bars    Cognitive Retraining  Safety/Judgement: Awareness of environment    Therapeutic Exercises:       Pain:      Activity Tolerance:   Fair and requires rest breaks    After treatment patient left in no apparent distress:   Sitting in chair, Call bell within reach, and Bed / chair alarm activated    COMMUNICATION/COLLABORATION:   The patients plan of care was discussed with: Physical therapist and Registered nurse.      Krystina Baumann, OTR/L  Time Calculation: 26 mins

## 2020-12-13 NOTE — CONSULTS
Infectious Disease Consult  Joe Moya MD FAC    Date of Consultation:  December 13, 2020  Date of Admission: 12/9/2020   Referring Physician: Dr. Zackary Gore:     Patient is a 66 y.o. female who is being seen for-COVID-pneumonia, fever, UTI        IMPRESSION:   · Covid pneumonia  · Initial Covid test positive on 12/5 at urgent care, Covid rapid positive on 12/9 in ED  · CXR-bilateral airspace disease, CT chest worsening GGO  · Acute hypoxic respiratory failure on 91% on RA, currently not on O2  · Elevated acute phase reactants-( was 379), ferritin 446( was 386) CRP 7, D-dimer 1.49, procalcitonin < 0.05  · UTI urine culture-12/9->100,000 Klebsiella oxytoca, E.faecalis  · H/o CA bladder s/p urostomy  · H/o rheumatoid arthritis on enbrel, immunosuppressed  · Hypothyroidism  · Cirrhosis of liver  · Increase in cystic lesion of uncinate process of pancreas       PLAN:      Patient meets inclusion criteria for remdesivir IV, will start now  D/w pt -She is agreeable , D/w her clinical trial outcomes, risks , benefits  O2 by nasal cannula to keep sats>98  Daily CMP-monitor creatinine, LFTs  Hold remdesivir if e GFR<30 or AST, ALT>5 ULN  Remdesivir may be DC'd after day 3 if patient improves clinically with improvement on CXR , downward trend in inflammatory markers and is weaned off oxygen  Start Decadron p.o., continue zinc vitamin C p.o. Ceftriaxone, Levaquin, amoxicillin p.o.x 5 days, to cover 2ry bacterial pneumonia, UTI  Repeat CXR 1 to 2 days, monitor acute phase reactants  If further decline consider convalescent plasma.      The patient is a 78-year-old female with a past medical history significant for rheumatoid arthritis, bladder cancer s/p urostomy, hypothyroidism,   GERD, hypertension, status post hernia repair, left hip replacement, initial diagnosis of COVID-19 infection 4 days ago at urgent   care who presented to the ED on 12/9 with a complaint of fever and chills x4 days, dry cough, congestion, shortness of breath with coughing   and chest pain, nausea, vomiting, weather with nonbloody diarrhea and general malaise. Per ED note the patient stated she felt extremely   weak and was unable to keep anything down for  2 days PTA. She had denied abdominal pain, neck and back pain, dysuria,   vaginal discharge or bleeding, extremity weakness or numbness, skin rash or recent travel. Patient has been admitted treated with multiple antibiotics. Blood cultures have been negative. Urine culture 12/9+ for Klebsiella oxytoca and Enterococcus faecalis>100,00  Infectious disease consult is at request of Residency service due to persistent fevers. . Patient is now noted to be hypoxic 92% on room air. CTA-chest, Ct /abdomen, pelvis  IMPRESSION:   1. No evidence pulmonary embolism. 2. Interval worsening of bilateral multifocal groundglass opacities  3. Cirrhotic appearing liver. 4.. Interval increase in size of pancreatic uncinate septated cystic lesion, now  measuring 3.2 cm x 1.6 cm. On speaking to patient today she feels very tired and weak. Shortness of breath on moving, ambulating  Discussed with RN . D/w family medicine resident.   Patient Active Problem List   Diagnosis Code    HTN (hypertension) I10    Hypokalemia E87.6    Anemia D64.9    Acquired hypothyroidism E03.9    GERD (gastroesophageal reflux disease) K21.9    Pancytopenia (HCC) D61.818    Rheumatoid arthritis involving multiple sites ([facility] [address] M06.9    Sepsis ([facility] [address] A41.9    History of bladder cancer Z85.51     Past Medical History:   Diagnosis Date    Arthritis     Cancer ([facility] [address]     bladder    Endocrine disease     hyperthyroid    Gastrointestinal disorder     GERD (gastroesophageal reflux disease)     Hypertension     Other ill-defined conditions(799.89)     hyperthyroid      Family History   Problem Relation Age of Onset    Heart Disease Father     Cancer Father         Lung      Social History     Tobacco Use    Smoking status: Former Smoker     Packs/day: 1.00     Last attempt to quit: 7/3/1999     Years since quittin.4    Smokeless tobacco: Never Used   Substance Use Topics    Alcohol use: No     Past Surgical History:   Procedure Laterality Date    HX GYN      HX HERNIA REPAIR      HX HIP REPLACEMENT Left     HX UROLOGICAL      stoma since 99      Prior to Admission medications    Medication Sig Start Date End Date Taking? Authorizing Provider   benzonatate (TESSALON) 100 mg capsule Take 1 Cap by mouth three (3) times daily as needed for Cough for up to 10 days. 20 Yes Brenda Garcia MD   guaiFENesin ER (MUCINEX) 1,200 mg Ta12 ER tablet Take 1 Tab by mouth every twelve (12) hours for 10 days. 20 Yes Brenda Garcia MD   nystatin (MYCOSTATIN) powder Apply  to affected area four (4) times daily as needed. Use around stoma   Yes Provider, Historical   losartan (COZAAR) 50 mg tablet Take 1 Tab by mouth daily. Indications: high blood pressure 76/82/81  Yes Josee Padilla MD   hydroCHLOROthiazide (HYDRODIURIL) 25 mg tablet Take 1 Tab by mouth daily. /30  Yes Josee Padilla MD   levothyroxine (SYNTHROID) 75 mcg tablet TAKE 1 TAB BY MOUTH DAILY (BEFORE BREAKFAST).   Yes Josee Padilla MD   omeprazole (PRILOSEC) 20 mg capsule TAKE 1 CAPSULE BY MOUTH EVERY DAY 20  Yes Marcel Luis MD   ergocalciferol (ERGOCALCIFEROL) 1,250 mcg (50,000 unit) capsule Take 1 Cap by mouth every seven (7) days. Indications: low vitamin D levels   Yes Josee Padilla MD   ENBREL MINI 50 mg/mL (1 mL) crtg Every Thursday. 19  Yes Provider, Historical   cyanocobalamin (VITAMIN B12) 1,000 mcg/mL injection 1 mL by SubCUTAneous route every thirty (30) days.  20  Yes Natalie Fink MD     Allergies   Allergen Reactions    Bactrim [Sulfamethoprim] Nausea and Vomiting    Ciprofloxacin Other (comments)     Joint aches    Diclofenac Nausea Only    Sulfa (Sulfonamide Antibiotics) Nausea Only        Review of Systems:  A comprehensive review of systems was negative except for that written in the History of Present Illness. 10 point review of systems obtained . All other systems negative    Objective:   Blood pressure (!) 140/63, pulse (!) 118, temperature 99.5 °F (37.5 °C), resp. rate 18, height 5' 5\" (1.651 m), weight 194 lb (88 kg), SpO2 92 %.   Temp (24hrs), Av.7 °F (37.6 °C), Min:97.8 °F (36.6 °C), Max:102.7 °F (39.3 °C)    Current Facility-Administered Medications   Medication Dose Route Frequency    cefTRIAXone (ROCEPHIN) 2 g in sterile water (preservative free) 20 mL IV syringe  2 g IntraVENous Q24H    levoFLOXacin (LEVAQUIN) 750 mg in D5W IVPB  750 mg IntraVENous Q24H    acetaminophen (TYLENOL) solution 650 mg  650 mg Oral Q4H PRN    diclofenac (VOLTAREN) 1 % topical gel 4 g  4 g Topical QID PRN    heparin (porcine) injection 5,000 Units  5,000 Units SubCUTAneous Q8H    lidocaine 4 % patch 1 Patch  1 Patch TransDERmal Q24H    sodium chloride (NS) flush 5-10 mL  5-10 mL IntraVENous PRN    sodium chloride (NS) flush 5-40 mL  5-40 mL IntraVENous Q8H    sodium chloride (NS) flush 5-40 mL  5-40 mL IntraVENous PRN    prochlorperazine (COMPAZINE) injection 10 mg  10 mg IntraVENous Q4H PRN    atorvastatin (LIPITOR) tablet 20 mg  20 mg Oral DAILY    zinc sulfate (ZINCATE) 220 (50) mg capsule 1 Cap  1 Cap Oral DAILY    ascorbic acid (vitamin C) (VITAMIN C) tablet 500 mg  500 mg Oral BID    [START ON 12/15/2020] ergocalciferol capsule 50,000 Units  50,000 Units Oral Q7D    levothyroxine (SYNTHROID) tablet 75 mcg  75 mcg Oral 6am    pantoprazole (PROTONIX) tablet 40 mg  40 mg Oral DAILY    guaiFENesin ER (MUCINEX) tablet 1,200 mg  1,200 mg Oral Q12H    benzonatate (TESSALON) capsule 100 mg  100 mg Oral TID PRN        Exam:    20 1131  99.5 °F (37.5 °C)  118Abnormal    140/63Abnormal    89  --  At rest  18  92 %  --  --  --  --    20 0850  98.9 °F (37.2 °C)  101Abnormal    164/73Abnormal    103  --  At rest  18  95 %  --  --  --  --    12/13/20 0701  --  87  --  --  --  --  --  --  --  --  --  --    12/13/20 0345  97.8 °F (36.6 °C)  88  134/59Abnormal    84  --  At rest  16  92 %  --  --  --  --    12/13/20 0051  98.1 °F (36.7 °C)  93  112/59Abnormal    77  --  At rest  18  96 %  --  --  --  --      12/13/20 1131  99.5 °F (37.5 °C)  118Abnormal    140/63Abnormal    89  --  At rest  18  92 %  --  --  --  --    12/13/20 0850  98.9 °F (37.2 °C)  101Abnormal    164/73Abnormal    103  --  At rest  18  95 %  --  --  --  --    12/13/20 0701  --  87  --  --  --  --  --  --  --  --  --  --    12/13/20 0345  97.8 °F (36.6 °C)  88  134/59Abnormal    84  --  At rest  16  92 %  --  --  --  --    12/13/20 0051  98.1 °F (36.7 °C)  93  112/59Abnormal    77  --  At rest  18  96 %  --  --  --  --         Data Reviewed:     Lab Results   Component Value Date/Time    Culture result: NO GROWTH 1 DAY 12/12/2020 01:08 AM    Culture result: KLEBSIELLA OXYTOCA (A) 12/09/2020 12:41 AM    Culture result: ENTEROCOCCUS FAECALIS (A) 12/09/2020 12:41 AM    Culture result: NO GROWTH 4 DAYS 12/09/2020 12:37 AM    Culture result: NO GROWTH 1 DAY 04/21/2018 10:42 AM          XR Results (most recent):  Results from Hospital Encounter encounter on 12/09/20   XR CHEST PORT    Narrative INDICATION: covid    EXAM:  AP CHEST RADIOGRAPH    COMPARISON: 12/9/2020    FINDINGS:    AP portable view of the chest demonstrates a normal cardiomediastinal  silhouette. Vague areas of airspace opacity in both lungs without definite  change. No pneumothorax or pulmonary edema. The osseous structures are  unremarkable. Impression IMPRESSION:  Mild bilateral airspace opacities better seen on CT without definite interval  change. ICD-10-CM ICD-9-CM    1. SIRS (systemic inflammatory response syndrome) ([facility])  R65.10 995.90    2. Acute pyelonephritis  N10 590.10    3.  LEATHA (acute kidney injury) ([facility] [address] N17.9 584.9    4. COVID-19 virus infection  U07.1 079.89    5. Pneumonia of both lungs due to infectious organism, unspecified part of lung  J18.9 483.8    6. Pancytopenia ([facility] [address]  D61.818 284.19    7. Rheumatoid arthritis involving multiple sites, unspecified whether rheumatoid factor present ([facility]  M06.9 714.0    8. Vitamin B12 deficiency  E53.8 266.2    9. Acquired hypothyroidism  E03.9 244.9    10. Anemia, unspecified type  D64.9 285.9            Antibiotic History      I have discussed the diagnosis with the patient and the intended plan as seen in the above orders. I have discussed medication side effects and warnings with the patient as well.     Reviewed test results at length with patient    Signed By: Ethan Dunbar MD FACP    December 13, 2020

## 2020-12-13 NOTE — PROGRESS NOTES
Bedside shift change report given to [facility] (oncoming nurse) by Yanique Nguyễn (offgoing nurse). Report included the following information SBAR, Kardex, ED Summary, Intake/Output, MAR, Recent Results and Cardiac Rhythm NSR/ST. 1000 Unable to obtain morning labs. Family practice notified. New order for Arterial sample. 1100 @ x RT attempted to obtain blood sample without success. Family practice notified. 26 Pt son Jazmine Gonzalez) updated. Family practice notified of son asking for update. Bedside shift change report given to Yanique Nguyễn (oncoming nurse) by [facility] (offgoing nurse). Report included the following information SBAR, Kardex, ED Summary, Intake/Output, MAR, Recent Results and Cardiac Rhythm NSR.

## 2020-12-13 NOTE — PROGRESS NOTES
Shift change    Bedside and Verbal shift change report given to Germain Gallegos RN(oncoming nurse) by Yvrose Kelly RN (offgoing nurse). Report included the following information SBAR, Kardex and Recent Results. Shift Summary    2100: Called MD about fever and  Pt.  unable to swallow the tylenol PO and requesting liquid instead. Attempt to draw labs pt. Stuck twice notified charge nurse. Shift Change    Bedside and verbal shift change report given to [facility], RN (oncoming nurse) by Germain Gallegos RN (offgoing nurse). Report included the following information SBAR, Kardex and Recent Results.

## 2020-12-13 NOTE — PROGRESS NOTES
Problem: Mobility Impaired (Adult and Pediatric)  Goal: *Acute Goals and Plan of Care (Insert Text)  Description: FUNCTIONAL STATUS PRIOR TO ADMISSION: Patient was independent and active without use of DME.    HOME SUPPORT PRIOR TO ADMISSION: The patient lived with daughter but did not require assist.    Physical Therapy Goals  Initiated 12/9/2020  1. Patient will move from supine to sit and sit to supine , scoot up and down, and roll side to side in bed with independence within 7 day(s). 2.  Patient will transfer from bed to chair and chair to bed with independence using the least restrictive device within 7 day(s). 3.  Patient will perform sit to stand with independence within 7 day(s). 4.  Patient will ambulate with independence for 200 feet with the least restrictive device within 7 day(s). 5.  Patient will ascend/descend 4 stairs with  handrail(s) with independence within 7 day(s). Outcome: Progressing Towards Goal  Note:   PHYSICAL THERAPY TREATMENT  Patient: Spring Begum (4 y.o. female)  Date: 12/13/2020  Diagnosis: Sepsis ([facility] [address] [A41.9]   Sepsis ([facility] [address]       Precautions: Fall  Chart, physical therapy assessment, plan of care and goals were reviewed. ASSESSMENT  Patient continues with skilled PT services and is progressing towards goals. Patient requesting to increased out of bed activity and ambulate in room. She as able to increase ambulation distance to 35 feet with RW and CGA, no loss of balance. Still requires seated rest break between due to fatigue, slow to progress and continued medical complexity. Current Level of Function Impacting Discharge (mobility/balance): CGA    Other factors to consider for discharge:          PLAN :  Patient continues to benefit from skilled intervention to address the above impairments. Continue treatment per established plan of care. to address goals.     Recommendation for discharge: (in order for the patient to meet his/her long term goals)  Physical therapy at least 2 days/week in the home vs. TBD depending on progress may need rehab    This discharge recommendation:  A follow-up discussion with the attending provider and/or case management is planned    IF patient discharges home will need the following DME: to be determined (TBD)       SUBJECTIVE:   Patient stated i want to walk, can we go out in the hallway.     OBJECTIVE DATA SUMMARY:   Critical Behavior:  Neurologic State: Alert  Orientation Level: Oriented X4  Cognition: Follows commands  Safety/Judgement: Awareness of environment  Functional Mobility Training:  Bed Mobility:  Rolling: Minimum assistance  Supine to Sit: Minimum assistance              Transfers:  Sit to Stand: Minimum assistance  Stand to Sit: Contact guard assistance        Bed to Chair: Contact guard assistance                    Balance:  Sitting: Intact  Standing - Static: Good  Standing - Dynamic : Fair  Ambulation/Gait Training:  Distance (ft): 35 Feet (ft)  Assistive Device: Walker, rolling;Gait belt  Ambulation - Level of Assistance: Contact guard assistance                       Speed/Kenzie: Slow                 Pain Rating:  None noted    Activity Tolerance:   Fair    After treatment patient left in no apparent distress:   Sitting in chair, Call bell within reach, and Bed / chair alarm activated    COMMUNICATION/COLLABORATION:   The patients plan of care was discussed with: Registered nurse.      Saad Godwin PT, DPT   Time Calculation: 29 mins

## 2020-12-14 LAB
BACTERIA SPEC CULT: NORMAL
SERVICE CMNT-IMP: NORMAL

## 2020-12-14 PROCEDURE — 74011250636 HC RX REV CODE- 250/636: Performed by: INTERNAL MEDICINE

## 2020-12-14 PROCEDURE — 94760 N-INVAS EAR/PLS OXIMETRY 1: CPT

## 2020-12-14 PROCEDURE — 65660000000 HC RM CCU STEPDOWN

## 2020-12-14 PROCEDURE — 74011250637 HC RX REV CODE- 250/637: Performed by: FAMILY MEDICINE

## 2020-12-14 PROCEDURE — 77010033678 HC OXYGEN DAILY

## 2020-12-14 PROCEDURE — 74011000250 HC RX REV CODE- 250: Performed by: INTERNAL MEDICINE

## 2020-12-14 PROCEDURE — 74011000258 HC RX REV CODE- 258: Performed by: INTERNAL MEDICINE

## 2020-12-14 PROCEDURE — 99233 SBSQ HOSP IP/OBS HIGH 50: CPT | Performed by: FAMILY MEDICINE

## 2020-12-14 PROCEDURE — 74011250636 HC RX REV CODE- 250/636: Performed by: STUDENT IN AN ORGANIZED HEALTH CARE EDUCATION/TRAINING PROGRAM

## 2020-12-14 PROCEDURE — 74011250637 HC RX REV CODE- 250/637: Performed by: STUDENT IN AN ORGANIZED HEALTH CARE EDUCATION/TRAINING PROGRAM

## 2020-12-14 PROCEDURE — 74011000250 HC RX REV CODE- 250: Performed by: STUDENT IN AN ORGANIZED HEALTH CARE EDUCATION/TRAINING PROGRAM

## 2020-12-14 PROCEDURE — 74011250637 HC RX REV CODE- 250/637: Performed by: INTERNAL MEDICINE

## 2020-12-14 RX ORDER — LEVOFLOXACIN 750 MG/1
750 TABLET ORAL
Status: DISCONTINUED | OUTPATIENT
Start: 2020-12-16 | End: 2020-12-15 | Stop reason: HOSPADM

## 2020-12-14 RX ORDER — NYSTATIN 100000 [USP'U]/G
POWDER TOPICAL 2 TIMES DAILY
Status: DISCONTINUED | OUTPATIENT
Start: 2020-12-14 | End: 2020-12-15 | Stop reason: HOSPADM

## 2020-12-14 RX ADMIN — PROCHLORPERAZINE EDISYLATE 10 MG: 5 INJECTION INTRAMUSCULAR; INTRAVENOUS at 16:13

## 2020-12-14 RX ADMIN — DEXAMETHASONE 6 MG: 6 TABLET ORAL at 10:25

## 2020-12-14 RX ADMIN — LEVOTHYROXINE SODIUM 75 MCG: 0.07 TABLET ORAL at 05:28

## 2020-12-14 RX ADMIN — HEPARIN SODIUM 5000 UNITS: 5000 INJECTION INTRAVENOUS; SUBCUTANEOUS at 21:05

## 2020-12-14 RX ADMIN — NYSTATIN: 100000 POWDER TOPICAL at 10:23

## 2020-12-14 RX ADMIN — ZINC SULFATE 220 MG (50 MG) CAPSULE 1 CAPSULE: CAPSULE at 10:25

## 2020-12-14 RX ADMIN — HEPARIN SODIUM 5000 UNITS: 5000 INJECTION INTRAVENOUS; SUBCUTANEOUS at 13:14

## 2020-12-14 RX ADMIN — Medication 10 ML: at 21:05

## 2020-12-14 RX ADMIN — ATORVASTATIN CALCIUM 20 MG: 20 TABLET, FILM COATED ORAL at 10:25

## 2020-12-14 RX ADMIN — REMDESIVIR 100 MG: 100 INJECTION, POWDER, LYOPHILIZED, FOR SOLUTION INTRAVENOUS at 17:10

## 2020-12-14 RX ADMIN — NYSTATIN: 100000 POWDER TOPICAL at 18:52

## 2020-12-14 RX ADMIN — AMOXICILLIN 500 MG: 250 CAPSULE ORAL at 10:25

## 2020-12-14 RX ADMIN — Medication 10 ML: at 16:13

## 2020-12-14 RX ADMIN — OXYCODONE HYDROCHLORIDE AND ACETAMINOPHEN 500 MG: 500 TABLET ORAL at 18:55

## 2020-12-14 RX ADMIN — OXYCODONE HYDROCHLORIDE AND ACETAMINOPHEN 500 MG: 500 TABLET ORAL at 10:25

## 2020-12-14 RX ADMIN — LEVOFLOXACIN 750 MG: 5 INJECTION, SOLUTION INTRAVENOUS at 13:15

## 2020-12-14 RX ADMIN — Medication 10 ML: at 05:32

## 2020-12-14 RX ADMIN — HEPARIN SODIUM 5000 UNITS: 5000 INJECTION INTRAVENOUS; SUBCUTANEOUS at 05:29

## 2020-12-14 RX ADMIN — CEFTRIAXONE 2 G: 2 INJECTION, POWDER, FOR SOLUTION INTRAMUSCULAR; INTRAVENOUS at 10:23

## 2020-12-14 RX ADMIN — PANTOPRAZOLE SODIUM 40 MG: 40 TABLET, DELAYED RELEASE ORAL at 10:25

## 2020-12-14 NOTE — PROGRESS NOTES
Called and spoke with patient's son Roger Mendoza ([phone]) on her status and treatment plan going forward.       Cheli Lind DO  Family Medicine Resident

## 2020-12-14 NOTE — PROGRESS NOTES
[facility] FAMILY MEDICINE RESIDENCY PROGRAM  PROGRESS NOTE     14/92/8543  PCP: Mc Alexander MD     Assessment/Plan:     Mingo Anthony is a 66 y.o. female with a PMHx of Gladystine Ao is a 66 y.o. female with a PMH of COVID positivity, bladder cancer in 1999 s/p urostomy, HTN, hypokalemia, pancytopenia, B12 deficiency, rheumatoid arthritis, hypothyroidism, and GERD who is admitted for Sepsis 2/2 COVID infection vs UTI. Overnight Events: No acute events overnight. Currently on 2L NC O2 with sats in the mid 90s. Afebrile since 12/12. T-max 100.4 yesterday. Patient is refusing lab draws this morning.        Sepsis 2/2 COVID vs UTI: (3/4 SIRS POA: WBC 3.0, temp, HR). COVID + (12/5, 12/9). LA and procalcitonin wnl. Blood cx 12/9 no growth x 4 days, repeat blood cx 12/11 no growth x 1 day. CT chest with multiple small patchy groundglass infiltrates. Worsened infiltrates appreciated on CTA chest on 12/11. UCx: >100,000 colonies klebsiella and enterococcus faecalis. S/p Vanc and Cefepime x1 in ED. S/p CTX x 3 doses as well as doxycyline IV (12/9-12/12). Started on Levaquin 750 mg IV 12/12 to cover for Enterococcus CTX resumed and PO Amoxicillin added on 12/13 per ID recs. - ID following, plan to continue Levaquin IV, Ceftriaxone IV, and Amoxicillin PO to cover secondary bacterial PNA and UTI  - F/u final blood culture and repeat bcx from 12/11  - Tylenol 650mg q6h prn for fever  - Compazine 10mg IV q4h prn for N/V     COVID-19 Pneumonia: Tested positive on 12/5 outpatient, confirmed 12/9. Worsened infiltrates appreciated on CTA chest on 12/11. Procalcitonin <0.05 x2.  Started on Remdesivir and Decadron 6 mg PO daily by ID on 12/13.   - Pulm consulted, appreciate recs      - Goal sat >/=88%, supplemental O2 PRN      - Continue Remdesivir IV and Decadron PO  - ID consult, appreciate recs  - Trend daily D-dimer, CRP, LD, ferritin - pt refused labs this AM  - Atorvastatin 20mg daily, Vitamin C 500mg BID, Zinc 220mg daily  - Mucinex BID and Tessalon PRN  - Encourage incentive spirometry  - Droplet plus precautions    Complicated UTI: Hx of bladder cancer s/p urostomy. Hx of UTIs in the past. UCx grew klebsiella resistant to ampicillin and unasyn as well as enterococcus faecalis resistant to tetracycline.   - Continue Levaquin IV, Ceftriaxone IV, Amoxicillin PO, per ID recs    Elevated D-dimer: Up to 2.16, now trending down. CTA chest: No PE. LE duplex preliminary report no DVT/thrombophlebitis. D-Dimer likely elevated 2/2 acute phase reaction.     Chronic Pancytopenia: POA WBC 3.0 (BL < 3), Hgb 11.0 (BL 10), PLT 83 (). Hx of B12 deficiency. Folate level wnl. Followed by Dr. Nina Osborne. Continued fever is concerning in the setting of leukopenia, however this is likely related to ongoing COVID infection.   - Follow daily CBC     At risk LEATHA - Improved: POA Cr 1.73 (BL 1.0-1.25). Improved after IV hydration.   - Monitor with daily BMP    Transaminitis in the setting of Cirrhosis: AST mildly elevated here, wnl at baseline. Possibly related to ongoing inflammation from COVID. Cirrhotic appearing liver in CT abd.  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   - Follow up outpatient      HTN: Home HCTZ 25 mg daily and losartan 50 mg daily.  - BP meds have been held due to initial hypotension and elevated creatinine, will continue holding and adjust as needed     Hypothyroidism: TSH 0.97 in 10/2020. Home synthroid 75mcg daily  - Continue home meds     RA: Home Enbrel 50mg/ml every Thursday. Skipped last week due to COVID infection.  - Hold Enbrel while inpatient      Vitamin D deficiency: 1250 mcg every Tuesday  - Continue home meds     B12 Deficiency: 1000 mcg/ml every month.    - S/p dose of IM B12 as patient was due for her monthly injection   - F/u outpatient    Hx of Bladder Cancer: s/p urosotomy ~20 years. Followed by Dr. Yu Bravo.      Obesity: Body mass index is 32.28 kg/m². - Encourage lifestyle modification and further follow up with PCP outpatient. Rash: Erythematous rash noted on bilateral inner thighs and groin region, likely due to cutaneous candidiasis. - Nystatin powder BID     FEN/GI: Regular diet. Encourage PO intake. Activity: Ambulate with assistance. DVT prophylaxis: Heparin  GI prophylaxis:  None  Disposition: Plan to d/c to TBD. PT/OT consulted. POC: Jose Rae (daughter) [phone]     CODE STATUS:  Full Code    Genesis Villavicencio discussed with Dr. Tarun Castro     Subjective:   Pt was seen and examined at bedside by Dr. Venita Kayser. Afebrile and hemodynamically stable. Patient reports continued cough and fatigue. She states that she really wishes she could go home. Denies chest pain, SOB, nausea, vomiting, abdominal pain. Objective:   Physical examination  Patient Vitals for the past 24 hrs:   Temp Pulse Resp BP SpO2   20 1953 100.4 °F (38 °C) (!) 107 18 130/64 94 %   20 1556 99.1 °F (37.3 °C) (!) 111 18 (!) 120/57 93 %   20 1502 -- (!) 115 -- -- --   20 1131 99.5 °F (37.5 °C) (!) 118 18 (!) 140/63 92 %   20 0850 98.9 °F (37.2 °C) (!) 101 18 (!) 164/73 95 %   20 0701 -- 87 -- -- --   20 0345 97.8 °F (36.6 °C) 88 16 (!) 134/59 92 %   20 0051 98.1 °F (36.7 °C) 93 18 (!) 112/59 96 %   20 0003 -- (!) 105 -- -- --      Temp (24hrs), Av °F (37.2 °C), Min:97.8 °F (36.6 °C), Max:100.4 °F (38 °C)     O2 Flow Rate (L/min): 2 l/min   O2 Device: Nasal cannula    Date 20 - 20 - 20   Shift 1117-1439 24 Hour Total 4859-8310 2540-3807 24 Hour Total   INTAKE   P.O.  150 50  50     P. O.  150 50  50   Shift Total(mL/kg)  150(1.7) 50(0.6)  50(0.6)   OUTPUT   Urine(mL/kg/hr)  600 900(0.9)  900     Urine Voided  600        Urine Output (mL) (Urinary Ostomy 00 Right ;Mid;Lower  Abdomen)   900  900   Emesis/NG output          Emesis Occurrence(s)  1 x      Stool          Stool Occurrence(s)  2 x      Shift Total(mL/kg)  600(6.8) 900(10.2)  900(10.2)   NET  -450 -850  -850   Weight (kg) 88 88 88 88 88     *due to COVID-19 pandemic and limitations on patient contact the physical exam has been performed and reported by [facility] Medicine senior resident, Dr. Feng Mayen*    General:   Alert, cooperative, no acute distress   Head:   Atraumatic   Eyes:   Conjunctivae clear   ENT:  Oral mucosa normal   Neck:  Supple, trachea midline   Lungs: On 2L NC O2. Coarse breath sounds throughout lung fields, worse in apical regions bilaterally. Heart:   Normal rate, regular rhythm, no murmur. Abdomen:    Soft, non-tender, nondistended. Urostomy bag noted. Extremities:  No edema or calf tenderness    Pulses:  Symmetric all extremities   Skin:  Warm and dry. Erythematous rash noted on inner thighs and vulva. No discharge, open sores, or satellite lesions. Non-tender. Neurologic:  Alert and oriented x4   No focal deficits     Data Review:     CBC:  Recent Labs     12/12/20  0108 12/11/20  0520   WBC 2.4* 2.1*   HGB 9.4* 10.9*   HCT 27.3* 34.0*   PLT 88* 88*     Metabolic Panel:  Recent Labs     12/12/20  0108 12/11/20  0400    136   K 3.9 4.0   * 109*   CO2 20* 21   BUN 17 22*   CREA 1.11* 1.44*   GLU 84 86   CA 8.1* 8.1*   ALB 2.9* 3.0*   TBILI 0.4 0.5   ALT 29 32     Micro:  Lab Results   Component Value Date/Time    Culture result: NO GROWTH 1 DAY 12/12/2020 01:08 AM    Culture result: KLEBSIELLA OXYTOCA (A) 12/09/2020 12:41 AM    Culture result: ENTEROCOCCUS FAECALIS (A) 12/09/2020 12:41 AM     Imaging:  Ct Chest Wo Cont    Result Date: 12/9/2020  INDICATION: Shortness of breath COMPARISON: None CONTRAST: None. TECHNIQUE:  5 mm axial images were obtained through the chest. Coronal and sagittal reformats were generated.   CT dose reduction was achieved through use of a standardized protocol tailored for this examination and automatic exposure control for dose modulation. The absence of intravenous contrast reduces the sensitivity for evaluation of the mediastinum, george, vasculature, and upper abdominal organs. FINDINGS: CHEST WALL: No mass or axillary lymphadenopathy. THYROID: No nodule. MEDIASTINUM: No mass or lymphadenopathy. GEORGE: No mass or lymphadenopathy. THORACIC AORTA: No aneurysm. MAIN PULMONARY ARTERY: Normal in caliber. TRACHEA/BRONCHI: Patent. ESOPHAGUS: No wall thickening or dilatation. HEART: Normal in size. PLEURA: No effusion or pneumothorax. LUNGS: Multiple small groundglass bilateral infiltrates. Those are nonspecific, they can BE seen with atypical pneumonia. INCIDENTALLY IMAGED UPPER ABDOMEN: No significant abnormality in the incidentally imaged upper abdomen. BONES: No destructive bone lesion. IMPRESSION: Multiple small patchy groundglass infiltrates. Xr Chest Port    Result Date: 12/9/2020  Clinical indication: Chest pain. Portable AP upright view of the chest is obtained, comparison February 24, 2019. The  heart size is normal. There is no acute infiltrate. IMPRESSION: No acute changes.     Medications reviewed  Current Facility-Administered Medications   Medication Dose Route Frequency    cefTRIAXone (ROCEPHIN) 2 g in sterile water (preservative free) 20 mL IV syringe  2 g IntraVENous Q24H    amoxicillin (AMOXIL) capsule 500 mg  500 mg Oral Q12H    [START ON 12/14/2020] dexAMETHasone (DECADRON) tablet 6 mg  6 mg Oral DAILY    [START ON 12/14/2020] remdesivir 100 mg in 0.9% sodium chloride 250 mL IVPB  100 mg IntraVENous Q24H    levoFLOXacin (LEVAQUIN) 750 mg in D5W IVPB  750 mg IntraVENous Q24H    acetaminophen (TYLENOL) solution 650 mg  650 mg Oral Q4H PRN    diclofenac (VOLTAREN) 1 % topical gel 4 g  4 g Topical QID PRN    heparin (porcine) injection 5,000 Units  5,000 Units SubCUTAneous Q8H    lidocaine 4 % patch 1 Patch  1 Patch TransDERmal Q24H    sodium chloride (NS) flush 5-10 mL  5-10 mL IntraVENous PRN    sodium chloride (NS) flush 5-40 mL  5-40 mL IntraVENous Q8H    sodium chloride (NS) flush 5-40 mL  5-40 mL IntraVENous PRN    prochlorperazine (COMPAZINE) injection 10 mg  10 mg IntraVENous Q4H PRN    atorvastatin (LIPITOR) tablet 20 mg  20 mg Oral DAILY    zinc sulfate (ZINCATE) 220 (50) mg capsule 1 Cap  1 Cap Oral DAILY    ascorbic acid (vitamin C) (VITAMIN C) tablet 500 mg  500 mg Oral BID    [START ON 12/15/2020] ergocalciferol capsule 50,000 Units  50,000 Units Oral Q7D    levothyroxine (SYNTHROID) tablet 75 mcg  75 mcg Oral 6am    pantoprazole (PROTONIX) tablet 40 mg  40 mg Oral DAILY    guaiFENesin ER (MUCINEX) tablet 1,200 mg  1,200 mg Oral Q12H    benzonatate (TESSALON) capsule 100 mg  100 mg Oral TID PRN         Signed:   Svetlana Levi DO  Family Medicine Resident      Attending note: Attending note to follow. ..

## 2020-12-14 NOTE — PROGRESS NOTES
[address] [facility] FAMILY MEDICINE RESIDENCY PROGRAM  PROGRESS NOTE     66/57/9217  PCP: Noman Jean-Baptiste MD     Assessment/Plan:     Power Michel is a 66 y.o. female with a PMHx of Power Michel is a 66 y.o. female with a PMH of COVID positivity, bladder cancer in 1999 s/p urostomy, HTN, hypokalemia, pancytopenia, B12 deficiency, rheumatoid arthritis, hypothyroidism, and GERD who is admitted for Sepsis 2/2 COVID infection and UTI. Overnight Events: No acute events overnight. Patient continues to refuse blood draw for labs this morning.     Sepsis 2/2 COVID and UTI - Improving: COVID + (12/5, 12/9). UCx: >100,000 colonies klebsiella and enterococcus faecalis. CT chest with multiple small patchy groundglass infiltrates; worsened infiltrates appreciated on CTA chest on 12/11. Initially on Vanc and Cefepime, narrowed to CTX + Doxy, but subsequently was changed to Levaquin to cover for both PNA and UTI. Briefly was treated with an additional 2 days of CTX and amoxicillin PO per ID recs, however after re-evaluation antibiotics narrowed to Levaquin PO only. Blood cultures: 12/9 no growth x 5 days, 12/11 no growth x 3 days. - Continue Levaquin 750 mg PO q48h (renally dosed) to complete a 7 day course   - F/u final blood culture 12/11  - Tylenol 650mg q6h prn for fever     COVID-19 Pneumonia: Tested positive on 12/5 outpatient, confirmed 12/9. Worsened infiltrates appreciated on CTA chest on 12/11. Procalcitonin <0.05 x2, so superimposed bacterial PNA less likely.  Started on Remdesivir and Decadron 6 mg PO daily by ID on 12/13.   - Pulm consulted, appreciate recs      - Goal sat >/=88%, supplemental O2 PRN      - Continue Remdesivir IV and Decadron PO  - Trend daily D-dimer, CRP, LD, ferritin (patient has been refusing labs)  - Atorvastatin 20mg daily, Vitamin C 500mg BID, Zinc 220mg daily  - Mucinex BID and Tessalon PRN  - Encourage incentive spirometry  - Droplet plus precautions    Complicated UTI: Hx of bladder cancer s/p urostomy. Hx of UTIs in the past. UCx grew Klebsiella and Enterococcus. - Continue Levaquin PO as above    Elevated D-dimer: CTA chest: No PE. LE duplex preliminary report no DVT/thrombophlebitis. D-Dimer likely elevated 2/2 acute phase reaction.     Chronic Pancytopenia: POA WBC 3.0 (BL < 3), Hgb 11.0 (BL 10), PLT 83 (). Hx of B12 deficiency. Folate level wnl. Followed by Dr. Lamar Carvajal. Continued fever is concerning in the setting of leukopenia, however this is likely related to ongoing COVID infection.   - Follow daily CBC (patient has been refusing labs)     At risk LEATHA - Improved: POA Cr 1.73 (BL 1.0-1.25). Improved after IV hydration.   - Monitor with daily BMP (patient has been refusing labs)    Transaminitis in the setting of Cirrhosis: AST mildly elevated here, wnl at baseline. Possibly related to ongoing inflammation from COVID. Cirrhotic appearing liver in CT abd.  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   - Follow up outpatient      HTN: Home HCTZ 25 mg daily and losartan 50 mg daily.  - BP meds have been held due to initial hypotension and elevated creatinine, will continue holding and adjust as needed     Hypothyroidism: TSH 0.97 in 10/2020. Home synthroid 75mcg daily  - Continue home meds     RA: Home Enbrel 50mg/ml every Thursday. Skipped last week due to COVID infection.  - Hold Enbrel while inpatient      Vitamin D deficiency: 1250 mcg every Tuesday  - Continue home meds     B12 Deficiency: 1000 mcg/ml every month. - S/p dose of IM B12 as patient was due for her monthly injection   - F/u outpatient    Hx of Bladder Cancer: s/p urosotomy ~20 years. Followed by Dr. Jaime Orr.      Obesity: Body mass index is 32.28 kg/m². - Encourage lifestyle modification and further follow up with PCP outpatient.      Rash: Erythematous rash noted 12/14 on bilateral inner thighs and groin region, likely due to cutaneous candidiasis. - Nystatin powder BID     FEN/GI: Regular diet. Encourage PO intake. Activity: Ambulate with assistance. DVT prophylaxis: Heparin  GI prophylaxis:  None  Disposition: Plan to d/c to home. PT/OT consulted. POC: Roldan Todd (daughter) [phone]     CODE STATUS:  Full Code    Anjali Holley discussed with Dr. Pratima Gibson. Subjective:   Pt was seen and examined at bedside by Dr. Dominga Jackson (due to COVID positive status). Afebrile and hemodynamically stable. She is not feeling SOB but continues to deal with a cough. After discussion she is agreeable to labs. Denies chest pain, nausea, vomiting, abdominal pain. Objective:   Physical examination  Patient Vitals for the past 24 hrs:   Temp Pulse Resp BP SpO2   20 1044 98.4 °F (36.9 °C) (!) 107 20 (!) 144/63 92 %   20 0830 98.1 °F (36.7 °C) 97 20 (!) 144/64 93 %   20 0700 -- 93 -- -- --   20 0314 98 °F (36.7 °C) 96 20 129/68 93 %   20 2349 -- 97 -- -- --   20 2342 98.2 °F (36.8 °C) 99 20 (!) 123/58 92 %   20 1953 100.4 °F (38 °C) (!) 107 18 130/64 94 %   20 1556 99.1 °F (37.3 °C) (!) 111 18 (!) 120/57 93 %   20 1502 -- (!) 115 -- -- --   20 1131 99.5 °F (37.5 °C) (!) 118 18 (!) 140/63 92 %      Temp (24hrs), Av.8 °F (37.1 °C), Min:98 °F (36.7 °C), Max:100.4 °F (38 °C)     O2 Flow Rate (L/min): 2 l/min   O2 Device: Nasal cannula    Date 12/700 - 20 0659 20 07 - 12/15/20 0659   Shift [id number] 24 Hour Total 5430-5350 2150-0033 24 Hour Total   INTAKE   P.O. 50  50        P. O. 50  50      Shift Total(mL/kg) 50(0.6)  50(0.6)      OUTPUT   Urine(mL/kg/hr) 900(0.9) 450(0.4) 1350(0.6)        Urine Voided  450 450        Urine Output (mL) (Urinary Ostomy 00 Right ;Mid;Lower  Abdomen) 900  900      Stool           Stool Occurrence(s)  2 x 2 x      Shift Total(mL/kg) 900(10.2) 450(5.1) 1350(15.3)      NET -850 -450 -1300      Weight (kg) 88 88 88 88 88 88     *due to COVID-19 pandemic and limitations on patient contact the physical exam has been performed and reported by [facility] Medicine senior resident, Dr. Wilhelmena Nageotte*    General:   Alert, cooperative, no acute distress   Head:   Atraumatic   Eyes:   Conjunctivae clear   ENT:  Oral mucosa normal   Neck:  Supple, trachea midline   Lungs: On room air. No increased work of breathing or accessory muscle use. Coarse breath sounds throughout lung fields on the left, improved breath sounds and air movement on the right. Heart:   Normal rate, regular rhythm, no murmur. Abdomen:    Soft, non-tender, nondistended. Urostomy bag noted. Extremities:  No edema or calf tenderness    Pulses:  Symmetric all extremities   Skin:  Warm and dry. Neurologic:  Alert and oriented x4   No focal deficits     Data Review:     CBC:  Recent Labs     12/12/20  0108   WBC 2.4*   HGB 9.4*   HCT 27.3*   PLT 88*     Metabolic Panel:  Recent Labs     12/12/20  0108      K 3.9   *   CO2 20*   BUN 17   CREA 1.11*   GLU 84   CA 8.1*   ALB 2.9*   TBILI 0.4   ALT 29     Micro:  Lab Results   Component Value Date/Time    Culture result: NO GROWTH 2 DAYS 12/12/2020 01:08 AM    Culture result: KLEBSIELLA OXYTOCA (A) 12/09/2020 12:41 AM    Culture result: ENTEROCOCCUS FAECALIS (A) 12/09/2020 12:41 AM     Imaging:  Ct Chest Wo Cont    Result Date: 12/9/2020  INDICATION: Shortness of breath COMPARISON: None CONTRAST: None. TECHNIQUE:  5 mm axial images were obtained through the chest. Coronal and sagittal reformats were generated. CT dose reduction was achieved through use of a standardized protocol tailored for this examination and automatic exposure control for dose modulation. The absence of intravenous contrast reduces the sensitivity for evaluation of the mediastinum, tiffanie, vasculature, and upper abdominal organs.  FINDINGS: CHEST WALL: No mass or axillary lymphadenopathy. THYROID: No nodule. MEDIASTINUM: No mass or lymphadenopathy. GEORGE: No mass or lymphadenopathy. THORACIC AORTA: No aneurysm. MAIN PULMONARY ARTERY: Normal in caliber. TRACHEA/BRONCHI: Patent. ESOPHAGUS: No wall thickening or dilatation. HEART: Normal in size. PLEURA: No effusion or pneumothorax. LUNGS: Multiple small groundglass bilateral infiltrates. Those are nonspecific, they can BE seen with atypical pneumonia. INCIDENTALLY IMAGED UPPER ABDOMEN: No significant abnormality in the incidentally imaged upper abdomen. BONES: No destructive bone lesion. IMPRESSION: Multiple small patchy groundglass infiltrates. Xr Chest Port    Result Date: 12/9/2020  Clinical indication: Chest pain. Portable AP upright view of the chest is obtained, comparison February 24, 2019. The  heart size is normal. There is no acute infiltrate. IMPRESSION: No acute changes.     Medications reviewed  Current Facility-Administered Medications   Medication Dose Route Frequency    nystatin (MYCOSTATIN) 100,000 unit/gram powder   Topical BID    cefTRIAXone (ROCEPHIN) 2 g in sterile water (preservative free) 20 mL IV syringe  2 g IntraVENous Q24H    amoxicillin (AMOXIL) capsule 500 mg  500 mg Oral Q12H    dexAMETHasone (DECADRON) tablet 6 mg  6 mg Oral DAILY    remdesivir 100 mg in 0.9% sodium chloride 250 mL IVPB  100 mg IntraVENous Q24H    levoFLOXacin (LEVAQUIN) 750 mg in D5W IVPB  750 mg IntraVENous Q24H    acetaminophen (TYLENOL) solution 650 mg  650 mg Oral Q4H PRN    diclofenac (VOLTAREN) 1 % topical gel 4 g  4 g Topical QID PRN    heparin (porcine) injection 5,000 Units  5,000 Units SubCUTAneous Q8H    lidocaine 4 % patch 1 Patch  1 Patch TransDERmal Q24H    sodium chloride (NS) flush 5-10 mL  5-10 mL IntraVENous PRN    sodium chloride (NS) flush 5-40 mL  5-40 mL IntraVENous Q8H    sodium chloride (NS) flush 5-40 mL  5-40 mL IntraVENous PRN    prochlorperazine (COMPAZINE) injection 10 mg  10 mg IntraVENous Q4H PRN    atorvastatin (LIPITOR) tablet 20 mg  20 mg Oral DAILY    zinc sulfate (ZINCATE) 220 (50) mg capsule 1 Cap  1 Cap Oral DAILY    ascorbic acid (vitamin C) (VITAMIN C) tablet 500 mg  500 mg Oral BID    [START ON 12/15/2020] ergocalciferol capsule 50,000 Units  50,000 Units Oral Q7D    levothyroxine (SYNTHROID) tablet 75 mcg  75 mcg Oral 6am    pantoprazole (PROTONIX) tablet 40 mg  40 mg Oral DAILY    guaiFENesin ER (MUCINEX) tablet 1,200 mg  1,200 mg Oral Q12H    benzonatate (TESSALON) capsule 100 mg  100 mg Oral TID PRN         Signed:   Rafat Lopez DO  Family Medicine Resident      Attending note: Attending note to follow. ..

## 2020-12-14 NOTE — PROGRESS NOTES
ID  Cross Cover Note      Received call from Dr Zehra Domingo and discussed   Chart reviewed   Per discussion, patient does not have dysuria but has has ileal conduit and urine with Klebsiella and Enterococcus  She is tolearting Levaquin so far and has allergy to cipro listed as joint ache ( more of adverse effect if she had tendinopathy)  covid + and on remdesivir.  Stop remdesivir if crcl< 30 or LFT worsening ( ALT > 5x normal)  Does not have to completed full 5 day course if patient is other wise ready for discharge from her primary team's standpoint  CT chest wt ground glass and follow up wt repeat CT 6-8 weeks recommended to ensure improving   CT with  Pancreatic uncinate cystic  lesion with increase in size -- plans per primary team     Recommend a total 7 day course of Levaquin renally dosed and PO if able to take PO    Stopped amoxicillin and ceftriaxone    Will sign off    Please call if reconsult needed     David Soriano,   2:56 PM

## 2020-12-14 NOTE — PROGRESS NOTES
Spiritual Care Assessment/Progress Note  Guido Abel      NAME: Jerad Romero      MRN: [id number]  AGE: 66 y.o.  SEX: female  Yazidi Affiliation: Rastafarian   Language: English     12/14/2020     Total Time (in minutes): 13     Spiritual Assessment begun in [facility] 3 PROG CARE TELE 2 through conversation with:         [x]Patient        [] Family    [] Friend(s)        Reason for Consult: Initial/Spiritual assessment, patient floor     Spiritual beliefs: (Please include comment if needed)     [x] Identifies with a sneha tradition: Anabaptism        [] Supported by a sneha community:            [] Claims no spiritual orientation:           [] Seeking spiritual identity:                [] Adheres to an individual form of spirituality:           [] Not able to assess:                           Identified resources for coping:      [x] Prayer                               [] Music                  [] Guided Imagery     [] Family/friends                 [] Pet visits     [] Devotional reading                         [] Unknown     [] Other:                                            Interventions offered during this visit: (See comments for more details)    Patient Interventions: Affirmation of emotions/emotional suffering, Initial/Spiritual assessment, patient floor, Normalization of emotional/spiritual concerns, Prayer (assurance of), Affirmation of sneha, Coping skills reviewed/reinforced, Iconic (affirming the presence of God/Higher Power)           Plan of Care:     [] Support spiritual and/or cultural needs    [] Support AMD and/or advance care planning process      [] Support grieving process   [] Coordinate Rites and/or Rituals    [] Coordination with community clergy   [] No spiritual needs identified at this time   [] Detailed Plan of Care below (See Comments)  [] Make referral to Music Therapy  [] Make referral to Pet Therapy     [] Make referral to Addiction services  [] Make referral to [facility]  [] Make referral to Spiritual Care Partner  [] No future visits requested        [x] Follow up upon further referrals     Comments:  's visit was on [facility] unit. consulted with nurse who indicated he was going into pt's room.  called pt with assistance from nurse and engaged in conversation. She indicated she was not doing too well today. She indicated that not having family visit was hard to bear. She has however been in touch with family, and that has been helpful. Dea was also helpful in coping.  was mainly a listening presence, offering affirmation to thoughts and emotions. Pt was encouraged to take it moment by moment, and was also assured of 's prayer. She thanked  for the call. Visited by: Saranya palmer: [phone] ([phone])

## 2020-12-14 NOTE — PROGRESS NOTES
Shift change    Bedside and Verbal shift change report given to Juaquin Ross, RN(oncoming nurse) by Starr Berg RN (offgoing nurse). Report included the following information SBAR, Kardex and Recent Results. Shift Summary  0530 Attempted to draw pt. Labs pt. Refused. Called MD and made aware and   Pt has redden groin area no pain where the redness and pt stated that there is pain in her lower abdomen MD stated that they will be making rounds shortly and will take a look. Shift Change    Bedside and verbal shift change report given to Starr Berg RN (oncoming nurse) by Juaquin Ross RN (offgoing nurse). Report included the following information SBAR, Kardex and Recent Results.

## 2020-12-14 NOTE — PROGRESS NOTES
Bedside shift change report given to [facility] (oncoming nurse) by Frida Bee (offgoing nurse). Report included the following information SBAR, Kardex, ED Summary, Procedure Summary, Intake/Output, MAR, Recent Results and Cardiac Rhythm NSR.     0950 Patients son updated. Patients son requested the Doctors to give him an update. Family practice notified. Bedside shift change report given to Frida Bee (oncoming nurse) by [facility] (offgoing nurse). Report included the following information SBAR, Kardex, ED Summary, Procedure Summary, Intake/Output, MAR, Recent Results and Cardiac Rhythm NSR.

## 2020-12-14 NOTE — PROGRESS NOTES
Comprehensive Nutrition Assessment    Type and Reason for Visit: Initial, RD nutrition re-screen/LOS    Nutrition Recommendations/Plan:   1. Continue with Cardiac diet order. Consider liberalized diet order to Regular if PO intakes remain poor. 2. Will order strawberry Ensure Enlive BID for additional kcal/protein intake. Nutrition Assessment:      12/14: 65 yo female admitted for sepsis. PMhx: bladder CA, HTN, GERD. Class I obese per BMI of 32. Weight hx in EMR indicates weight loss of 4.5kg over last month (5% loss which is significant for time frame). Pt is COVID-19 +. Spoke with pt via phone. She states she normally has a good appetite at home and eats well. C/o decreased appetite since admission, stating she is not eating well. Ordered chicken noodle soup and ginger ale for dinner. Cardiac diet ordered with intakes documented as 0-25% meals. Pt asking for strawberry Ensure. States she sometimes drinks this at home. Spoke with RN who confirmed pt having poor PO intakes, states she was c/o sore throat and jaw. Was not able to discuss weight hx with pt as someone entered her room and she hung up with me. Labs reviewed. Meds: Vit C, zinc, Decadron, statin. Malnutrition Assessment:  Unable to fully assess at this time. Estimated Daily Nutrient Needs:  Energy (kcal): 1773(REE 1364 x AF 1.3); Weight Used for Energy Requirements: Current  Protein (g): 70-88(0.8-1gm/kg/d);  Weight Used for Protein Requirements: Current  Fluid (ml/day): 1773; Method Used for Fluid Requirements: 1 ml/kcal      Nutrition Related Findings:  Loose BM 12/13 via ostomy      Wounds:    None       Current Nutrition Therapies:  DIET CARDIAC Regular  DIET NUTRITIONAL SUPPLEMENTS Dinner; Ensure [employer]    Anthropometric Measures:  · Height:  5' 5\" (165.1 cm)  · Current Body Wt:  87.9 kg (193 lb 12.6 oz)   · Admission Body Wt:       · Usual Body Wt:        · Ideal Body Wt:   :      · Adjusted Body Weight:   ; Weight Adjustment for: No adjustment   · Adjusted BMI:       · BMI Category:  Obese class 1 (BMI 30.0-34. 9)       Nutrition Diagnosis:   · Inadequate protein-energy intake related to inadequate protein-energy intake as evidenced by intake 0-25%      Nutrition Interventions:   Food and/or Nutrient Delivery: Continue current diet, Start oral nutrition supplement  Nutrition Education and Counseling: No recommendations at this time  Coordination of Nutrition Care: Continue to monitor while inpatient    Goals:  Consume > 50% meals + ONS within next 3-4 days       Nutrition Monitoring and Evaluation:   Behavioral-Environmental Outcomes:    Food/Nutrient Intake Outcomes: Food and nutrient intake, Supplement intake  Physical Signs/Symptoms Outcomes: Biochemical data, GI status, Weight    Discharge Planning:    Continue current diet, Continue oral nutrition supplement     Electronically signed by Mary Lou Love RD on 12/14/2020     Contact: [phone]

## 2020-12-14 NOTE — PROGRESS NOTES
Name: YOLANDA HILL: [address]   : 1945 Admit Date: 2020   Phone:   Room: [facility]/19   PCP: Mc Alexander MD  MRN: [id number]   Date: 2020  Code: Full Code          Chart and notes reviewed. Data reviewed. I review the patient's current medications in the medical record at each encounter. I have evaluated and examined the patient. HPI:    2:43 PM       History was obtained from patient. I was asked by Roxana Layton MD to see Mingo Anthony in consultation for a chief complaint of COVID-19. History of Present Illness:  Ms. Luci Miramontes is a 67 yo woman with a history of former tobacco use, RA, bladder cancer, GERD, HTN, and hyperthyroidism who is admitted with COVID-19. She was initially diagnosed with COVID  and came in with progressive cough, nausea, diarrhea and myalgias. She has some shortness of breath and chest tightness after prolonged coughing fit. No sputum production. Additionally noted to have a UTI. She has not required O2. She has been having persistent fevers and tachycardia to the low 100's. Labs: WBC 2.1, d-dimer 2.16, cr 1.44, AST 57, ALT 32, strep UTI    Images:  CXR 12/10/20: mild bilateral airspace opacities    Interval History:    TMax 100.4  BP stable  Sats 93% on RA  Plts 88  Creat 1.11  ALT 29; stable  AST 56; stable  ; slightly up  D-dimer 1.49; better    ROS:  Reports feeling better today. Slept well overnight. Wants to go home.       Past Medical History:   Diagnosis Date    Arthritis     Cancer [facility])     bladder    Endocrine disease     hyperthyroid    Gastrointestinal disorder     GERD (gastroesophageal reflux disease)     Hypertension     Other ill-defined conditions(799.89)     hyperthyroid       Past Surgical History:   Procedure Laterality Date    HX GYN      HX HERNIA REPAIR      HX HIP REPLACEMENT Left     HX UROLOGICAL      stoma since 78       Family History   Problem Relation Age of Onset    Heart Disease Father     Cancer Father         Lung       Social History     Tobacco Use    Smoking status: Former Smoker     Packs/day: 1.00     Last attempt to quit: 7/3/1999     Years since quittin.4    Smokeless tobacco: Never Used   Substance Use Topics    Alcohol use: No       Allergies   Allergen Reactions    Bactrim [Sulfamethoprim] Nausea and Vomiting    Ciprofloxacin Other (comments)     Joint aches    Diclofenac Nausea Only    Sulfa (Sulfonamide Antibiotics) Nausea Only       Current Facility-Administered Medications   Medication Dose Route Frequency    nystatin (MYCOSTATIN) 100,000 unit/gram powder   Topical BID    cefTRIAXone (ROCEPHIN) 2 g in sterile water (preservative free) 20 mL IV syringe  2 g IntraVENous Q24H    amoxicillin (AMOXIL) capsule 500 mg  500 mg Oral Q12H    dexAMETHasone (DECADRON) tablet 6 mg  6 mg Oral DAILY    remdesivir 100 mg in 0.9% sodium chloride 250 mL IVPB  100 mg IntraVENous Q24H    levoFLOXacin (LEVAQUIN) 750 mg in D5W IVPB  750 mg IntraVENous Q24H    acetaminophen (TYLENOL) solution 650 mg  650 mg Oral Q4H PRN    diclofenac (VOLTAREN) 1 % topical gel 4 g  4 g Topical QID PRN    heparin (porcine) injection 5,000 Units  5,000 Units SubCUTAneous Q8H    lidocaine 4 % patch 1 Patch  1 Patch TransDERmal Q24H    sodium chloride (NS) flush 5-10 mL  5-10 mL IntraVENous PRN    sodium chloride (NS) flush 5-40 mL  5-40 mL IntraVENous Q8H    sodium chloride (NS) flush 5-40 mL  5-40 mL IntraVENous PRN    prochlorperazine (COMPAZINE) injection 10 mg  10 mg IntraVENous Q4H PRN    atorvastatin (LIPITOR) tablet 20 mg  20 mg Oral DAILY    zinc sulfate (ZINCATE) 220 (50) mg capsule 1 Cap  1 Cap Oral DAILY    ascorbic acid (vitamin C) (VITAMIN C) tablet 500 mg  500 mg Oral BID    [START ON 12/15/2020] ergocalciferol capsule 50,000 Units  50,000 Units Oral Q7D    levothyroxine (SYNTHROID) tablet 75 mcg  75 mcg Oral 6am    pantoprazole (PROTONIX) tablet 40 mg  40 mg Oral DAILY    guaiFENesin ER (MUCINEX) tablet 1,200 mg  1,200 mg Oral Q12H    benzonatate (TESSALON) capsule 100 mg  100 mg Oral TID PRN         REVIEW OF SYSTEMS   12 point ROS negative except as stated in the HPI. Physical Exam:   Visit Vitals  BP (!) 144/64 (BP 1 Location: Left arm, BP Patient Position: At rest)   Pulse 97   Temp 98.1 °F (36.7 °C)   Resp 20   Ht 5' 5\" (1.651 m)   Wt 88 kg (194 lb)   SpO2 93%   BMI 32.28 kg/m²       General:  Alert, cooperative, no distress, appears stated age. Head:  Normocephalic, without obvious abnormality, atraumatic. Eyes:  Conjunctivae/corneas clear. Throat: Lips, mucosa, and tongue normal.    Neck: Supple, symmetrical, trachea midline   Lungs:   Clear to auscultation bilaterally. Chest wall:  No tenderness or deformity. Heart:  Regular rate and rhythm, S1, S2 normal, no murmur, click, rub or gallop. Abdomen:   Soft, non-tender. Bowel sounds normal.   Extremities: Extremities normal, atraumatic, no cyanosis; trace LE edema   Pulses: 2+ and symmetric all extremities. Skin: Skin color, texture, turgor normal. No rashes or lesions       Neurologic: Grossly nonfocal       Lab Results   Component Value Date/Time    Sodium 137 12/12/2020 01:08 AM    Potassium 3.9 12/12/2020 01:08 AM    Chloride 109 (H) 12/12/2020 01:08 AM    CO2 20 (L) 12/12/2020 01:08 AM    BUN 17 12/12/2020 01:08 AM    Creatinine 1.11 (H) 12/12/2020 01:08 AM    Glucose 84 12/12/2020 01:08 AM    Calcium 8.1 (L) 12/12/2020 01:08 AM    Lactic acid 1.1 04/19/2018 01:20 AM       Lab Results   Component Value Date/Time    WBC 2.4 (L) 12/12/2020 01:08 AM    HGB 9.4 (L) 12/12/2020 01:08 AM    PLATELET 88 (L) 15/00/6883 01:08 AM    MCV 89.8 12/12/2020 01:08 AM       Lab Results   Component Value Date/Time    INR 1.1 12/09/2020 12:37 AM    aPTT 27.1 12/09/2020 12:37 AM    Alk.  phosphatase 33 (L) 12/12/2020 01:08 AM    Protein, total 6.8 12/12/2020 01:08 AM    Albumin 2.9 (L) 12/12/2020 01:08 AM Globulin 3.9 12/12/2020 01:08 AM       Lab Results   Component Value Date/Time    Iron 69 12/16/2019 08:39 AM    TIBC 273 12/16/2019 08:39 AM    Iron % saturation 25 12/16/2019 08:39 AM    Ferritin 446 (H) 12/12/2020 01:08 AM       Lab Results   Component Value Date/Time    Sed rate (ESR) 17 09/24/2019 08:12 AM    C-Reactive protein 7.00 (H) 12/12/2020 01:08 AM    TSH 0.97 10/26/2020 09:07 AM        No results found for: PH, PHI, PCO2, PCO2I, PO2, PO2I, HCO3, HCO3I, FIO2, FIO2I    Lab Results   Component Value Date/Time    CK-MB Index Cannot be calculated 12/09/2020 12:36 AM    Troponin-I, Qt. <0.05 12/09/2020 12:36 AM        Lab Results   Component Value Date/Time    Culture result: NO GROWTH 2 DAYS 12/12/2020 01:08 AM    Culture result: KLEBSIELLA OXYTOCA (A) 12/09/2020 12:41 AM    Culture result: ENTEROCOCCUS FAECALIS (A) 12/09/2020 12:41 AM       No results found for: TOXA1, RPR, HBCM, HBSAG, HAAB, HCAB1, HAAT, G6PD, CRYAC, HIVGT, HIVR, HIV1, HIV12, HIVPC, HIVRPI    No results found for: VANCT, CPK    Lab Results   Component Value Date/Time    Color YELLOW/STRAW 12/09/2020 12:41 AM    Appearance CLOUDY (A) 12/09/2020 12:41 AM    Specific gravity 1.020 04/21/2018 10:42 AM    pH (UA) 6.0 12/09/2020 12:41 AM    Protein 100 (A) 12/09/2020 12:41 AM    Glucose Negative 12/09/2020 12:41 AM    Ketone Negative 12/09/2020 12:41 AM    Bilirubin Negative 12/09/2020 12:41 AM    Blood MODERATE (A) 12/09/2020 12:41 AM    Urobilinogen 0.2 12/09/2020 12:41 AM    Nitrites Positive (A) 12/09/2020 12:41 AM    Leukocyte Esterase SMALL (A) 12/09/2020 12:41 AM    WBC 20-50 12/09/2020 12:41 AM    RBC 5-10 12/09/2020 12:41 AM    Bacteria 2+ (A) 12/09/2020 12:41 AM       IMPRESSION  · COVID-19  · Fevers  · UTI    PLAN  · Goal sats 88%, doing well on RA. Will check walking oximetry. · Doxy, ceftriaxone  · Zinc, Vitamin C  · Dexamethsone  · Heparin q8h, watch plts  · May need supplemental oxygen for ambulation at discharge.     Rafael Pepper Isak, NP

## 2020-12-14 NOTE — PROGRESS NOTES
Nutrition Note    Patient placed dinner order with diet office and requested Strawberry Ensure with dinner meal stating she drinks them at home and would like to continue. RD added preference to diet order.      Electronically signed by Gerardo Gtz RD, MS on 12/14/2020 at 1:51 PM  Contact: [phone] or via [address] [place] [address]

## 2020-12-15 VITALS
RESPIRATION RATE: 20 BRPM | TEMPERATURE: 98.1 F | BODY MASS INDEX: 32.32 KG/M2 | HEART RATE: 100 BPM | WEIGHT: 194 LBS | SYSTOLIC BLOOD PRESSURE: 139 MMHG | OXYGEN SATURATION: 93 % | DIASTOLIC BLOOD PRESSURE: 63 MMHG | HEIGHT: 65 IN

## 2020-12-15 LAB
ALBUMIN SERPL-MCNC: 3 G/DL (ref 3.5–5)
ALBUMIN/GLOB SERPL: 0.7 {RATIO} (ref 1.1–2.2)
ALP SERPL-CCNC: 40 U/L (ref 45–117)
ALT SERPL-CCNC: 32 U/L (ref 12–78)
ANION GAP SERPL CALC-SCNC: 10 MMOL/L (ref 5–15)
AST SERPL-CCNC: 57 U/L (ref 15–37)
BASOPHILS # BLD: 0 K/UL (ref 0–0.1)
BASOPHILS NFR BLD: 0 % (ref 0–1)
BILIRUB DIRECT SERPL-MCNC: 0.3 MG/DL (ref 0–0.2)
BILIRUB SERPL-MCNC: 0.6 MG/DL (ref 0.2–1)
BUN SERPL-MCNC: 34 MG/DL (ref 6–20)
BUN/CREAT SERPL: 28 (ref 12–20)
CALCIUM SERPL-MCNC: 8.7 MG/DL (ref 8.5–10.1)
CHLORIDE SERPL-SCNC: 113 MMOL/L (ref 97–108)
CO2 SERPL-SCNC: 17 MMOL/L (ref 21–32)
COMMENT, HOLDF: NORMAL
CREAT SERPL-MCNC: 1.22 MG/DL (ref 0.55–1.02)
CRP SERPL-MCNC: 6.23 MG/DL (ref 0–0.6)
D DIMER PPP FEU-MCNC: 1.2 MG/L FEU (ref 0–0.65)
DIFFERENTIAL METHOD BLD: ABNORMAL
EOSINOPHIL # BLD: 0 K/UL (ref 0–0.4)
EOSINOPHIL NFR BLD: 0 % (ref 0–7)
ERYTHROCYTE [DISTWIDTH] IN BLOOD BY AUTOMATED COUNT: 13 % (ref 11.5–14.5)
FERRITIN SERPL-MCNC: 402 NG/ML (ref 8–252)
GLOBULIN SER CALC-MCNC: 4.1 G/DL (ref 2–4)
GLUCOSE SERPL-MCNC: 112 MG/DL (ref 65–100)
HCT VFR BLD AUTO: 30.8 % (ref 35–47)
HGB BLD-MCNC: 10.6 G/DL (ref 11.5–16)
IMM GRANULOCYTES # BLD AUTO: 0.1 K/UL (ref 0–0.04)
IMM GRANULOCYTES NFR BLD AUTO: 1 % (ref 0–0.5)
LDH SERPL L TO P-CCNC: 499 U/L (ref 81–246)
LYMPHOCYTES # BLD: 0.6 K/UL (ref 0.8–3.5)
LYMPHOCYTES NFR BLD: 11 % (ref 12–49)
MCH RBC QN AUTO: 29.9 PG (ref 26–34)
MCHC RBC AUTO-ENTMCNC: 34.4 G/DL (ref 30–36.5)
MCV RBC AUTO: 86.8 FL (ref 80–99)
MONOCYTES # BLD: 0.3 K/UL (ref 0–1)
MONOCYTES NFR BLD: 6 % (ref 5–13)
NEUTS SEG # BLD: 4.1 K/UL (ref 1.8–8)
NEUTS SEG NFR BLD: 82 % (ref 32–75)
NRBC # BLD: 0 K/UL (ref 0–0.01)
NRBC BLD-RTO: 0 PER 100 WBC
PLATELET # BLD AUTO: 159 K/UL (ref 150–400)
PMV BLD AUTO: 10.4 FL (ref 8.9–12.9)
POTASSIUM SERPL-SCNC: 3.3 MMOL/L (ref 3.5–5.1)
PROT SERPL-MCNC: 7.1 G/DL (ref 6.4–8.2)
RBC # BLD AUTO: 3.55 M/UL (ref 3.8–5.2)
RBC MORPH BLD: ABNORMAL
SAMPLES BEING HELD,HOLD: NORMAL
SODIUM SERPL-SCNC: 140 MMOL/L (ref 136–145)
WBC # BLD AUTO: 5.1 K/UL (ref 3.6–11)

## 2020-12-15 PROCEDURE — 94760 N-INVAS EAR/PLS OXIMETRY 1: CPT

## 2020-12-15 PROCEDURE — 85025 COMPLETE CBC W/AUTO DIFF WBC: CPT

## 2020-12-15 PROCEDURE — 74011000250 HC RX REV CODE- 250: Performed by: INTERNAL MEDICINE

## 2020-12-15 PROCEDURE — 74011000258 HC RX REV CODE- 258: Performed by: INTERNAL MEDICINE

## 2020-12-15 PROCEDURE — 36415 COLL VENOUS BLD VENIPUNCTURE: CPT

## 2020-12-15 PROCEDURE — 97116 GAIT TRAINING THERAPY: CPT

## 2020-12-15 PROCEDURE — 99238 HOSP IP/OBS DSCHRG MGMT 30/<: CPT | Performed by: FAMILY MEDICINE

## 2020-12-15 PROCEDURE — 82728 ASSAY OF FERRITIN: CPT

## 2020-12-15 PROCEDURE — 74011250637 HC RX REV CODE- 250/637: Performed by: STUDENT IN AN ORGANIZED HEALTH CARE EDUCATION/TRAINING PROGRAM

## 2020-12-15 PROCEDURE — 80053 COMPREHEN METABOLIC PANEL: CPT

## 2020-12-15 PROCEDURE — 97530 THERAPEUTIC ACTIVITIES: CPT

## 2020-12-15 PROCEDURE — 83615 LACTATE (LD) (LDH) ENZYME: CPT

## 2020-12-15 PROCEDURE — 74011250636 HC RX REV CODE- 250/636: Performed by: INTERNAL MEDICINE

## 2020-12-15 PROCEDURE — 82248 BILIRUBIN DIRECT: CPT

## 2020-12-15 PROCEDURE — 86140 C-REACTIVE PROTEIN: CPT

## 2020-12-15 PROCEDURE — 85379 FIBRIN DEGRADATION QUANT: CPT

## 2020-12-15 RX ORDER — POTASSIUM CHLORIDE 750 MG/1
20 TABLET, FILM COATED, EXTENDED RELEASE ORAL
Status: COMPLETED | OUTPATIENT
Start: 2020-12-15 | End: 2020-12-15

## 2020-12-15 RX ORDER — LEVOFLOXACIN 750 MG/1
750 TABLET ORAL
Qty: 2 TAB | Refills: 0 | Status: SHIPPED | OUTPATIENT
Start: 2020-12-16 | End: 2020-12-19

## 2020-12-15 RX ORDER — DEXAMETHASONE 6 MG/1
6 TABLET ORAL DAILY
Qty: 8 TAB | Refills: 0 | Status: SHIPPED | OUTPATIENT
Start: 2020-12-15 | End: 2020-12-23

## 2020-12-15 RX ADMIN — ATORVASTATIN CALCIUM 20 MG: 20 TABLET, FILM COATED ORAL at 09:40

## 2020-12-15 RX ADMIN — PANTOPRAZOLE SODIUM 40 MG: 40 TABLET, DELAYED RELEASE ORAL at 09:40

## 2020-12-15 RX ADMIN — ERGOCALCIFEROL 50000 UNITS: 1.25 CAPSULE ORAL at 09:40

## 2020-12-15 RX ADMIN — POTASSIUM CHLORIDE 20 MEQ: 750 TABLET, FILM COATED, EXTENDED RELEASE ORAL at 13:19

## 2020-12-15 RX ADMIN — DEXAMETHASONE 6 MG: 6 TABLET ORAL at 09:40

## 2020-12-15 RX ADMIN — REMDESIVIR 100 MG: 100 INJECTION, POWDER, LYOPHILIZED, FOR SOLUTION INTRAVENOUS at 13:19

## 2020-12-15 RX ADMIN — HEPARIN SODIUM 5000 UNITS: 5000 INJECTION INTRAVENOUS; SUBCUTANEOUS at 13:19

## 2020-12-15 RX ADMIN — DICLOFENAC 4 G: 10 GEL TOPICAL at 09:41

## 2020-12-15 RX ADMIN — Medication 10 ML: at 13:19

## 2020-12-15 RX ADMIN — LEVOTHYROXINE SODIUM 75 MCG: 0.07 TABLET ORAL at 04:55

## 2020-12-15 RX ADMIN — ZINC SULFATE 220 MG (50 MG) CAPSULE 1 CAPSULE: CAPSULE at 09:40

## 2020-12-15 RX ADMIN — Medication 10 ML: at 04:59

## 2020-12-15 RX ADMIN — HEPARIN SODIUM 5000 UNITS: 5000 INJECTION INTRAVENOUS; SUBCUTANEOUS at 04:54

## 2020-12-15 RX ADMIN — NYSTATIN: 100000 POWDER TOPICAL at 09:40

## 2020-12-15 NOTE — DISCHARGE INSTRUCTIONS
HOME DISCHARGE INSTRUCTIONS    Power Michel / [id number] : 1945    Admission date: 2020 Discharge date: 12/15/2020     Please bring this form with you to show your care provider at your follow-up appointment. Primary care provider:  Noman Jean-Baptiste MD    Discharging provider:  Alma Hernandez DO  - Family Medicine Resident  Shayna Kumar MD - Family Medicine Attending      You have been admitted to the hospital with the following diagnoses:    ACUTE DIAGNOSES:  Sepsis ([address] [A41.9]     . . . . . . . . . . . . . . . . . . . . . . . . . . . . . . . . . . . . . . . . . . . . . . . . . . . . . . . . . . . . . . . . . . . . . . . .   You are well enough to be discharged from the hospital. However, you were diagnosed with COVID-19. PLEASE stay inside and self-quarantine for 10 days to prevent further spread of the coronavirus. . . . . . . . . . . . . . . . . . . . . . . . . . . . . . . . . . . . . . . . . . . . . . . . . . . . . . . . . . . . . . . . . . . . . . . . Librado Lin      FOLLOW-UP CARE RECOMMENDATIONS:    Appointments  Follow-up Information     Follow up With Specialties Details Why Contact Info    Noman Jean-Baptiste MD Family Medicine Go on 2020 [facility] follow up appointment with Dr. Kylie Apodaca on  at 10:30 AM - the office will call about 15 minutes prior to your appointment [address] [place] [address]      Lynette Heimlich, MD Hematology and Oncology, Internal Medicine, Hematology, Oncology Schedule an appointment as soon as possible for a visit  [address] [place] [address]  [phone]      [facility] [place] [address].  On 2020 Hospital follow up Telemedicine visit Friday, 20 @ 10:45 a.m. with Dr. Mable Smith [address] [place] [address]    [address] [place],Third Floor Urgent Care, In-Home Clinical Assessments  As needed Mobile [facility] That Comes To Your Home [email or web address] [email or web address]  [place]  [phone]           Follow-up tests needed:   - Repeat CMP outpatient to check liver function tests (AST was elevated while in the hospital)  - Repeat CBC to check blood cell counts   - Repeat CT scan of chest in 6-8 weeks to evaluate lungs and ensure improvement   - Follow up with Dr. Miriam Watkins regarding pancreatic cystic lesion     Pending test results: At the time of your discharge the following test results are still pending: Final blood culture (12/11 - preliminary result is no growth x 3 days). Please make sure you review these results with your outpatient follow-up provider(s). DIET/what to eat:  Regular Diet and Encourage fluids    ACTIVITY:  Activity as tolerated    Wound care: Not applicable     Equipment needed:  Home oxygen     Specific symptoms to watch for: chest pain, shortness of breath, fever, chills, nausea, vomiting, diarrhea, change in mentation, falling, weakness, bleeding. What to do if new or unexpected symptoms occur? If you experience any of the above symptoms (or should other concerns or questions arise after discharge) please call your primary care physician. Return to the emergency room if you cannot get hold of your doctor. · It is very important that you keep your follow-up appointment(s). · Please bring discharge papers, medication list (and/or medication bottles) to your follow-up appointments for review by your outpatient provider(s). · Please check the list of medications and be sure it includes every medication (even non-prescription medications) that your provider wants you to take. · It is important that you take the medication exactly as they are prescribed. · Keep your medication in the bottles provided by the pharmacist and keep a list of the medication names, dosages, and times to be taken in your wallet. · Do not take other medications without consulting your doctor.    · If you have any questions about your medications or other instructions, please talk to your nurse or care provider before you leave the hospital.      [facility]/ED Visit Follow-Up Instructions/Information    You may have an in home follow up visit set up with [facility] or may wish to contact [facility] to set-up a visit:    What are we? [facility] is an in-home urgent care service staffed with emergency trained medical teams. We come to your home in a vehicle stocked with medical supplies and technology. An ER physician is always available if needed. When? As a part of your hospital follow-up, an appointment has been/ or can be set up for us to come see you. Usually, this will be 24-72 hours after you leave the hospital or as needed. [facility] [employer] is open 7am-9pm, 7 days a week, 365 days a year, including holidays. Why? We know that you cannot always get to your doctor after being in the hospital and that your doctor is not always available when you need them. Once your workup is complete, we'll call in your prescriptions, update your family doctor, and handle billing with your insurance so you can focus on feeling better, faster without leaving home. How much? We accept most major health insurance plans, including Medicaid, Medicare, and Medicare Advantage [address] [place] [address], [facility], and [employer]. We also accept: credit, debit, health savings account (HSA), health reimbursement account (HRA) and flexible spending account (FSA) payments. [facility]'s prices compare to conventional urgent care facilities, but we bring the care to you. How to reach us? Getting care is easy- use our mobile vishnu ([employer]), website ([email or web address]) or call us [phone]. Information obtained by:     I understand that if any problems occur once I am at home I am to contact my physician. These instructions were explained to me and I had the opportunity to ask questions.     I understand and acknowledge receipt of the instructions indicated above. Physician's or R.N.'s Signature                                                                  Date/Time                                                                                                                                              Patient or Representative Signature                                                          Date/Time      Patient Education   Patient Education        10 Things to Do When You Have COVID-19    Stay home. Don't go to school, work, or public areas. And don't use public transportation, ride-shares, or taxis unless you have no choice. Leave your home only if you need to get medical care. But call the doctor's office first so they know you're coming. And wear a cloth face cover. Ask before leaving isolation. Talk with your doctor or other health professional about when it will be safe for you to leave isolation. Wear a cloth face cover when you are around other people. It can help stop the spread of the virus when you cough or sneeze. Limit contact with people in your home. If possible, stay in a separate bedroom and use a separate bathroom. Avoid contact with pets and other animals. If possible, have a friend or family member care for them while you're sick. Cover your mouth and nose with a tissue when you cough or sneeze. Then throw the tissue in the trash right away. Wash your hands often, especially after you cough or sneeze. Use soap and water, and scrub for at least 20 seconds. If soap and water aren't available, use an alcohol-based hand . Don't share personal household items. These include bedding, towels, cups and glasses, and eating utensils. Clean and disinfect your home every day.   Use household  or disinfectant wipes or sprays. Take special care to clean things that you grab with your hands. These include doorknobs, remote controls, phones, and handles on your refrigerator and microwave. And don't forget countertops, tabletops, bathrooms, and computer keyboards. Take acetaminophen (Tylenol) to relieve fever and body aches. Read and follow all instructions on the label. Current as of: July 10, 2020               Content Version: 12.6  © 2006-2020 [employer]. Care instructions adapted under license by [employer] (which disclaims liability or warranty for this information). If you have questions about a medical condition or this instruction, always ask your healthcare professional. [place] [address] any warranty or liability for your use of this information. Urinary Tract Infection in Women: Care Instructions  Your Care Instructions     A urinary tract infection, or UTI, is a general term for an infection anywhere between the kidneys and the urethra (where urine comes out). Most UTIs are bladder infections. They often cause pain or burning when you urinate. UTIs are caused by bacteria and can be cured with antibiotics. Be sure to complete your treatment so that the infection goes away. Follow-up care is a key part of your treatment and safety. Be sure to make and go to all appointments, and call your doctor if you are having problems. It's also a good idea to know your test results and keep a list of the medicines you take. How can you care for yourself at home? · Take your antibiotics as directed. Do not stop taking them just because you feel better. You need to take the full course of antibiotics. · Drink extra water and other fluids for the next day or two. This may help wash out the bacteria that are causing the infection.  (If you have kidney, heart, or liver disease and have to limit fluids, talk with your doctor before you increase your fluid intake.)  · Avoid drinks that are carbonated or have caffeine. They can irritate the bladder. · Urinate often. Try to empty your bladder each time. · To relieve pain, take a hot bath or lay a heating pad set on low over your lower belly or genital area. Never go to sleep with a heating pad in place. To prevent UTIs  · Drink plenty of water each day. This helps you urinate often, which clears bacteria from your system. (If you have kidney, heart, or liver disease and have to limit fluids, talk with your doctor before you increase your fluid intake.)  · Urinate when you need to. · Urinate right after you have sex. · Change sanitary pads often. · Avoid douches, bubble baths, feminine hygiene sprays, and other feminine hygiene products that have deodorants. · After going to the bathroom, wipe from front to back. When should you call for help? Call your doctor now or seek immediate medical care if:    · Symptoms such as fever, chills, nausea, or vomiting get worse or appear for the first time.     · You have new pain in your back just below your rib cage. This is called flank pain.     · There is new blood or pus in your urine.     · You have any problems with your antibiotic medicine. Watch closely for changes in your health, and be sure to contact your doctor if:    · You are not getting better after taking an antibiotic for 2 days.     · Your symptoms go away but then come back. Where can you learn more? Go to http://www.gray.com/  Enter M900 in the search box to learn more about \"Urinary Tract Infection in Women: Care Instructions. \"  Current as of: June 29, 2020               Content Version: 12.6  © 9339-6162 [employer]. Care instructions adapted under license by [employer] (which disclaims liability or warranty for this information).  If you have questions about a medical condition or this instruction, always ask your healthcare professional. [employer] disclaims any warranty or liability for your use of this information.

## 2020-12-15 NOTE — PROGRESS NOTES
4:29 PM  Call received from [facility] at [address]. They are delivering a concentrator to the home and have talked with patient's daughter who is going to be there to receive it from the porch and education over the phone. Coby Gaucher, RN aware and is coordinating with the son when he can come to provide transportation. Portable tank paperwork completed and placed by the door of the room. Coby Gaucher is going to get it signed for  to download into the system for [employer]. 4:08 PM  Call placed to obtain update on oxygen referral and delivery time. Informed that they are finishing things up now and will call the patient at the bedside and have it delivered. 3:02 PM  Call placed to [facility] who needed adjusted orders. Dr. Ramona Norman aware and writing them, then they will be sent to [facility]. 2:43 PM  PT note accessible and added to referral for O2    2:31 PM  Unable to access allscripts, Kellie Machado is assisting with same. 2:18 PM  Orders obtained for oxygen at home.   Portable brought back and available once approved,  Sending referral through Allscripts to [facility]    Discharge Location  Discharge Placement: Home with family assistance

## 2020-12-15 NOTE — PROGRESS NOTES
12/15/2020   3:25 PM  Updated O2 order sent to [facility] in [facility].   DANICA Russell       2:30 PM  Order for home O2 and supporting clinicals sent to [facility] in [facility], await response  DANICA Russell

## 2020-12-15 NOTE — PROGRESS NOTES
Name: YOLANDA HILL: [address]   : 1945 Admit Date: 2020   Phone:   Room: 871/00   PCP: Cassandra Givens MD  MRN: [id number]   Date: 12/15/2020  Code: Full Code          Chart and notes reviewed. Data reviewed. I review the patient's current medications in the medical record at each encounter. I have evaluated and examined the patient. HPI:    2:43 PM       History was obtained from patient. I was asked by Jarvis Lerma MD to see Washington Nguyen in consultation for a chief complaint of COVID-19. History of Present Illness:  Ms. Isaac Wang is a 67 yo woman with a history of former tobacco use, RA, bladder cancer, GERD, HTN, and hyperthyroidism who is admitted with COVID-19. She was initially diagnosed with COVID  and came in with progressive cough, nausea, diarrhea and myalgias. She has some shortness of breath and chest tightness after prolonged coughing fit. No sputum production. Additionally noted to have a UTI. She has not required O2. She has been having persistent fevers and tachycardia to the low 100's. Labs: WBC 2.1, d-dimer 2.16, cr 1.44, AST 57, ALT 32, strep UTI    Images:  CXR 12/10/20: mild bilateral airspace opacities    Interval History:    TMax 100.4  BP stable  Sats 91% on RA  No new labs today    ROS:  Reports feeling better. Wanting to go home. A little dizzy when standing up.       Past Medical History:   Diagnosis Date    Arthritis     Cancer [facility])     bladder    Endocrine disease     hyperthyroid    Gastrointestinal disorder     GERD (gastroesophageal reflux disease)     Hypertension     Other ill-defined conditions(799.89)     hyperthyroid       Past Surgical History:   Procedure Laterality Date    HX GYN      HX HERNIA REPAIR      HX HIP REPLACEMENT Left     HX UROLOGICAL      stoma since 78       Family History   Problem Relation Age of Onset    Heart Disease Father     Cancer Father         Lung       Social History Tobacco Use    Smoking status: Former Smoker     Packs/day: 1.00     Last attempt to quit: 7/3/1999     Years since quittin.4    Smokeless tobacco: Never Used   Substance Use Topics    Alcohol use: No       Allergies   Allergen Reactions    Bactrim [Sulfamethoprim] Nausea and Vomiting    Ciprofloxacin Other (comments)     Joint aches    Diclofenac Nausea Only    Sulfa (Sulfonamide Antibiotics) Nausea Only       Current Facility-Administered Medications   Medication Dose Route Frequency    nystatin (MYCOSTATIN) 100,000 unit/gram powder   Topical BID    [START ON 2020] levoFLOXacin (LEVAQUIN) tablet 750 mg  750 mg Oral Q48H    dexAMETHasone (DECADRON) tablet 6 mg  6 mg Oral DAILY    remdesivir 100 mg in 0.9% sodium chloride 250 mL IVPB  100 mg IntraVENous Q24H    acetaminophen (TYLENOL) solution 650 mg  650 mg Oral Q4H PRN    diclofenac (VOLTAREN) 1 % topical gel 4 g  4 g Topical QID PRN    heparin (porcine) injection 5,000 Units  5,000 Units SubCUTAneous Q8H    lidocaine 4 % patch 1 Patch  1 Patch TransDERmal Q24H    sodium chloride (NS) flush 5-10 mL  5-10 mL IntraVENous PRN    sodium chloride (NS) flush 5-40 mL  5-40 mL IntraVENous Q8H    sodium chloride (NS) flush 5-40 mL  5-40 mL IntraVENous PRN    prochlorperazine (COMPAZINE) injection 10 mg  10 mg IntraVENous Q4H PRN    atorvastatin (LIPITOR) tablet 20 mg  20 mg Oral DAILY    zinc sulfate (ZINCATE) 220 (50) mg capsule 1 Cap  1 Cap Oral DAILY    ascorbic acid (vitamin C) (VITAMIN C) tablet 500 mg  500 mg Oral BID    ergocalciferol capsule 50,000 Units  50,000 Units Oral Q7D    levothyroxine (SYNTHROID) tablet 75 mcg  75 mcg Oral 6am    pantoprazole (PROTONIX) tablet 40 mg  40 mg Oral DAILY    guaiFENesin ER (MUCINEX) tablet 1,200 mg  1,200 mg Oral Q12H    benzonatate (TESSALON) capsule 100 mg  100 mg Oral TID PRN         REVIEW OF SYSTEMS   12 point ROS negative except as stated in the HPI.       Physical Exam:   Visit Vitals  BP (!) 145/65 (BP 1 Location: Left arm, BP Patient Position: At rest)   Pulse (!) 108   Temp 97.7 °F (36.5 °C)   Resp 18   Ht 5' 5\" (1.651 m)   Wt 88 kg (194 lb)   SpO2 91%   BMI 32.28 kg/m²       General:  Alert, cooperative, no distress, appears stated age. Head:  Normocephalic, without obvious abnormality, atraumatic. Eyes:  Conjunctivae/corneas clear. Throat: Lips, mucosa, and tongue normal.    Neck: Supple, symmetrical, trachea midline   Lungs:   Clear to auscultation bilaterally. Chest wall:  No tenderness or deformity. Heart:  Regular rate and rhythm, S1, S2 normal, no murmur, click, rub or gallop. Abdomen:   Soft, non-tender. Bowel sounds normal.   Extremities: Extremities normal, atraumatic, no cyanosis; trace LE edema   Pulses: 2+ and symmetric all extremities. Skin: Skin color, texture, turgor normal. No rashes or lesions   Neurologic: Grossly nonfocal       Lab Results   Component Value Date/Time    Sodium 137 12/12/2020 01:08 AM    Potassium 3.9 12/12/2020 01:08 AM    Chloride 109 (H) 12/12/2020 01:08 AM    CO2 20 (L) 12/12/2020 01:08 AM    BUN 17 12/12/2020 01:08 AM    Creatinine 1.11 (H) 12/12/2020 01:08 AM    Glucose 84 12/12/2020 01:08 AM    Calcium 8.1 (L) 12/12/2020 01:08 AM    Lactic acid 1.1 04/19/2018 01:20 AM       Lab Results   Component Value Date/Time    WBC 2.4 (L) 12/12/2020 01:08 AM    HGB 9.4 (L) 12/12/2020 01:08 AM    PLATELET 88 (L) 54/74/4521 01:08 AM    MCV 89.8 12/12/2020 01:08 AM       Lab Results   Component Value Date/Time    INR 1.1 12/09/2020 12:37 AM    aPTT 27.1 12/09/2020 12:37 AM    Alk.  phosphatase 33 (L) 12/12/2020 01:08 AM    Protein, total 6.8 12/12/2020 01:08 AM    Albumin 2.9 (L) 12/12/2020 01:08 AM    Globulin 3.9 12/12/2020 01:08 AM       Lab Results   Component Value Date/Time    Iron 69 12/16/2019 08:39 AM    TIBC 273 12/16/2019 08:39 AM    Iron % saturation 25 12/16/2019 08:39 AM    Ferritin 446 (H) 12/12/2020 01:08 AM       Lab Results Component Value Date/Time    Sed rate (ESR) 17 09/24/2019 08:12 AM    C-Reactive protein 7.00 (H) 12/12/2020 01:08 AM    TSH 0.97 10/26/2020 09:07 AM        No results found for: PH, PHI, PCO2, PCO2I, PO2, PO2I, HCO3, HCO3I, FIO2, FIO2I    Lab Results   Component Value Date/Time    CK-MB Index Cannot be calculated 12/09/2020 12:36 AM    Troponin-I, Qt. <0.05 12/09/2020 12:36 AM        Lab Results   Component Value Date/Time    Culture result: NO GROWTH 3 DAYS 12/12/2020 01:08 AM    Culture result: KLEBSIELLA OXYTOCA (A) 12/09/2020 12:41 AM    Culture result: ENTEROCOCCUS FAECALIS (A) 12/09/2020 12:41 AM       No results found for: TOXA1, RPR, HBCM, HBSAG, HAAB, HCAB1, HAAT, G6PD, CRYAC, HIVGT, HIVR, HIV1, HIV12, HIVPC, HIVRPI    No results found for: VANCT, CPK    Lab Results   Component Value Date/Time    Color YELLOW/STRAW 12/09/2020 12:41 AM    Appearance CLOUDY (A) 12/09/2020 12:41 AM    Specific gravity 1.020 04/21/2018 10:42 AM    pH (UA) 6.0 12/09/2020 12:41 AM    Protein 100 (A) 12/09/2020 12:41 AM    Glucose Negative 12/09/2020 12:41 AM    Ketone Negative 12/09/2020 12:41 AM    Bilirubin Negative 12/09/2020 12:41 AM    Blood MODERATE (A) 12/09/2020 12:41 AM    Urobilinogen 0.2 12/09/2020 12:41 AM    Nitrites Positive (A) 12/09/2020 12:41 AM    Leukocyte Esterase SMALL (A) 12/09/2020 12:41 AM    WBC 20-50 12/09/2020 12:41 AM    RBC 5-10 12/09/2020 12:41 AM    Bacteria 2+ (A) 12/09/2020 12:41 AM       IMPRESSION  · COVID-19  · Fevers  · UTI    PLAN  · Goal sats 88%, doing well on RA. Will check walking oximetry today. · ABX per ID; on PO Levoflox  · Zinc, Vitamin C  · Dexamethsone for 10 days total  · Remdesivir  · Recheck CMP, CBC  · Heparin q8h, watch plts  · May need supplemental oxygen for ambulation at discharge. Okay from pulmonary standpoint for discharge on PO Dexamethasone if sent home today.     Terri Coello, NP

## 2020-12-15 NOTE — PROGRESS NOTES
Bedside and Verbal shift change report given to Kim Mitchell RN (oncoming nurse) by Sarita Torres RN (offgoing nurse). Report included the following information SBAR, Kardex, Intake/Output, MAR, Accordion and Recent Results.

## 2020-12-15 NOTE — PROGRESS NOTES
Bedside and Verbal shift change report given to 108 Oriana Mitchell RN (oncoming nurse) by Stoney Hinton RN (offgoing nurse). Report included the following information SBAR, Kardex, Intake/Output, MAR, Accordion and Recent Results. 1059 - 6 minute walk cmpleted. Pt sats 91% on room air at rest. Sats dropped to 86% when moving to the side of the bed, pt placed on 2L NC sats 88%, increased to 3 L sats 92%. Pt stood up sats 91%, pt stated they felt lightheaded, assisted back to bed. Sats 91%

## 2020-12-15 NOTE — PROGRESS NOTES
Shift change    Bedside and Verbal shift change report given to Gurdeep Norris RN(oncoming nurse) by [facility], RN (offgoing nurse). Report included the following information SBAR, Kardex and Recent Results. Shift Summary  Disaster Charting was initiated on 12/14/2020 as per Standard Operating Procedure     8558: Pt refused labs, notified MD on call    Shift Change    Bedside and verbal shift change report given to Lawrence Apodaca RN (oncoming nurse) by Gurdeep Norris RN (offgoing nurse). Report included the following information SBAR, Kardex and Recent Results.

## 2020-12-15 NOTE — PROGRESS NOTES
Problem: Mobility Impaired (Adult and Pediatric)  Goal: *Acute Goals and Plan of Care (Insert Text)  Description: FUNCTIONAL STATUS PRIOR TO ADMISSION: Patient was independent and active without use of DME.    HOME SUPPORT PRIOR TO ADMISSION: The patient lived with daughter but did not require assist.    Physical Therapy Goals  Initiated 12/9/2020  1. Patient will move from supine to sit and sit to supine , scoot up and down, and roll side to side in bed with independence within 7 day(s). 2.  Patient will transfer from bed to chair and chair to bed with independence using the least restrictive device within 7 day(s). 3.  Patient will perform sit to stand with independence within 7 day(s). 4.  Patient will ambulate with independence for 200 feet with the least restrictive device within 7 day(s). 5.  Patient will ascend/descend 4 stairs with  handrail(s) with independence within 7 day(s). Outcome: Progressing Towards Goal  Note:   PHYSICAL THERAPY TREATMENT  Patient: Brady Devine (86 y.o. female)  Date: 12/15/2020  Diagnosis: Sepsis ([facility] [A41.9]   Sepsis ([facility]       Precautions: Fall  Chart, physical therapy assessment, plan of care and goals were reviewed. ASSESSMENT  Patient continues with skilled PT services and is progressing towards goals. Patient received supine and agreeable to PT. Patient continues to be limited by very poor endurance and increased nausea requiring rest breaks between. Patient desaturates to 88% with activity. Patient overall SBA to CGA. Patient ambulated 40 feet and then required a seated rest break, attempted to ambulate again following rest break but only able to ambulate 20 more feet before requiring another seated rest break. .   Documentation for home O2:     ROOM AIR    AT REST   O2 SATS  94% HR  102   ROOM AIR WITH ACTIVITY 02 SATS  88% HR  120   (0    )LITERS OF 02 PATIENT LEFT COMFORTABLY  SITTING/SUPINE 02 SATS  92% HR  109       Current Level of Function Impacting Discharge (mobility/balance): SBA-CGA with RW     Other factors to consider for discharge: patient very low activity tolerance, unable to perform increased activity to assess true O2 needs due to her limited cardiovascular endurance         PLAN :  Patient continues to benefit from skilled intervention to address the above impairments. Continue treatment per established plan of care. to address goals. Recommendation for discharge: (in order for the patient to meet his/her long term goals)  Physical therapy at least 2 days/week in the home AND ensure assist and/or supervision for safety with family assist    This discharge recommendation:  A follow-up discussion with the attending provider and/or case management is planned    IF patient discharges home will need the following DME: to be determined (TBD)       SUBJECTIVE:   Patient stated Sugey Arciniega just can't eat still I am still nausious.     OBJECTIVE DATA SUMMARY:   Critical Behavior:  Neurologic State: Alert  Orientation Level: Oriented X4  Cognition: Appropriate decision making, Appropriate for age attention/concentration, Appropriate safety awareness, Follows commands  Safety/Judgement: Awareness of environment  Vitals:    12/15/20 1055 12/15/20 1347 12/15/20 1356 12/15/20 1403   BP: (!) 156/68      BP 1 Location: Left arm      BP Patient Position: At rest      Pulse: (!) 105 (!) 102 (!) 120 (!) 110   Resp: 18      Temp: 97.9 °F (36.6 °C)      SpO2: 90% 94% (!) 88% 92%   Weight:       Height:           Functional Mobility Training:  Bed Mobility:  Rolling: Stand-by assistance  Supine to Sit: Stand-by assistance              Transfers:  Sit to Stand: Contact guard assistance  Stand to Sit: Contact guard assistance        Bed to Chair: Contact guard assistance                    Balance:     Ambulation/Gait Training:  Distance (ft): 40 Feet (ft)     Ambulation - Level of Assistance: Contact guard assistance                       Speed/Kenzie: Slow Pain Rating:  None noted    Activity Tolerance:   Fair    After treatment patient left in no apparent distress:   Sitting in chair, Call bell within reach, and Bed / chair alarm activated    COMMUNICATION/COLLABORATION:   The patients plan of care was discussed with: Registered nurse.      Kirsty Barajas PT, DPT   Time Calculation: 36 mins

## 2020-12-16 ENCOUNTER — PATIENT OUTREACH (OUTPATIENT)
Dept: CASE MANAGEMENT | Age: 75
End: 2020-12-16

## 2020-12-16 ENCOUNTER — HOME HEALTH ADMISSION (OUTPATIENT)
Dept: HOME HEALTH SERVICES | Facility: HOME HEALTH | Age: 75
End: 2020-12-16

## 2020-12-16 DIAGNOSIS — D61.818 PANCYTOPENIA (HCC): Primary | ICD-10-CM

## 2020-12-16 DIAGNOSIS — N39.0 RECURRENT UTI: ICD-10-CM

## 2020-12-16 NOTE — PROGRESS NOTES
Patient was admitted to [place] [address] on 20  and discharged on 12/15/20 for Sepsis. Outreach made within 2 business days of discharge: Yes    Top Discharge Challenges to be reviewed by the provider   Additional needs identified to be addressed with provider yes  labs- Needs follow up labs:  Discussed COVID-19 related testing which was available at this time. Test results were Positve. Patient informed of results, if available? yes   Method of communication with provider : chart routing       Advance Care Planning:   Does patient have an Advance Directive:  yes; reviewed and current     Inpatient Readmission Risk score: Not available. Was this a readmission? no     Care Transition Nurse (CTN) contacted the family by telephone to perform post hospital discharge assessment. Verified name and  with family as identifiers. Provided introduction to self, and explanation of the CTN role. CTN reviewed discharge instructions, medical action plan and red flags with family who verbalized understanding. Family given an opportunity to ask questions and does not have any further questions or concerns at this time. The family agrees to contact the PCP office for questions related to their healthcare. Medication reconciliation was performed with daughter, who verbalizes understanding of administration of home medications. Advised obtaining a 90-day supply of all daily and as-needed medications. Referral to Pharm D needed: no     Home Health/Outpatient orders at discharge: home health care, PT and [place] [facility]: [facility]  Date of initial visit: Scheduled for 2020    Durable Medical Equipment ordered at discharge: [address] received: O2 portable tank and home concentrator. Covid Risk Education    Patient has following risk factors of: immunocompromised.  Education provided regarding infection prevention, and signs and symptoms of COVID-19 and when to seek medical attention with family who verbalized understanding. Discussed exposure protocols and quarantine From CDC: Are you at higher risk for severe illness?  and given an opportunity for questions and concerns. The family agrees to contact the COVID-19 hotline [phone] or PCP office for questions related to COVID-19. For more information on steps you can take to protect yourself, see CDC's How to Protect Yourself     Patient/family/caregiver given information for [facility] and agrees to enroll no. Discussed follow-up appointments. If no appointment was previously scheduled, appointment scheduling offered: Today PCP appointment was cancelled by provider and rescheduled for 12/17/20. [facility] follow up appointment(s):   Future Appointments   Date Time Provider Miguelito Avendano   12/17/2020  9:45 AM Ghazal Mohan MD [facility]   12/18/2020 To Be Determined Rich Cox RN [address] [facility]     [facility] follow up appointment(s): [facility] Dr[facility] Annabelle Driscoll 12/18/20 10:45. Daughter will call Dr. Jhonatan Waite office to make appointment. Plan for follow-up call in 5-7 days based on severity of symptoms and risk factors. CTN provided contact information for future needs. Goals Addressed                 This Visit's Progress       Post Hospitalization     Prevent complications post hospitalization. 12/16/2020 ACM spoke with daughter on phone. Patient lives with daughter. Daughter reports patient is doing ok. Denies SOB, chest pain, or fever. Patient is wearing her O2 and checking her saturation. Daughter did not know what the readings have been, patient was resting. She is eating and drinking without problems. Discussed with daughter need to increase water intake as much as possible instead of sodas.  Urostomy is draining urine but not clear and will discuss with PCP in am. Reviewed discharge instructions with daughter, she had no questions. Next outreach planned for week of 12/22/20.  PAC

## 2020-12-17 ENCOUNTER — TELEPHONE (OUTPATIENT)
Dept: ONCOLOGY | Age: 75
End: 2020-12-17

## 2020-12-17 ENCOUNTER — VIRTUAL VISIT (OUTPATIENT)
Dept: FAMILY MEDICINE CLINIC | Age: 75
End: 2020-12-17
Payer: MEDICARE

## 2020-12-17 ENCOUNTER — TELEPHONE (OUTPATIENT)
Dept: FAMILY MEDICINE CLINIC | Age: 75
End: 2020-12-17

## 2020-12-17 DIAGNOSIS — Z09 HOSPITAL DISCHARGE FOLLOW-UP: ICD-10-CM

## 2020-12-17 DIAGNOSIS — U07.1 COVID-19: Primary | ICD-10-CM

## 2020-12-17 DIAGNOSIS — Z86.19 HISTORY OF SEPSIS: ICD-10-CM

## 2020-12-17 PROBLEM — A41.9 SEPSIS (HCC): Status: RESOLVED | Noted: 2020-12-09 | Resolved: 2020-12-17

## 2020-12-17 PROCEDURE — 99441 PR PHYS/QHP TELEPHONE EVALUATION 5-10 MIN: CPT | Performed by: FAMILY MEDICINE

## 2020-12-17 NOTE — TELEPHONE ENCOUNTER
Pt had virtual today and family wants to talk further as pt is not able to get out of bed and having bowel movements on herself    Call and advise [phone] or [phone]

## 2020-12-17 NOTE — TELEPHONE ENCOUNTER
[address] at [facility] [address]  [phone]        12/17/20 10:05 AM Attempted to call patient via home number listed to check on her and how she was feeling. No answer, left message requesting return call. Provided office phone number as well. Per Dr. Carlos Mtz, would also like to schedule 8-week follow up around 02/18/21 or later, with labs one week prior. Lab order entered in computer. Will continue to monitor. 12/29/20 12:16 PM Attempted to call patient via home number listed. Spoke with patient's daughter, Antoine Ko. She states patient was admitted to [address] [place] [address] shortly after being discharged from [facility]. Admitted for fever, weakness, and SOB. Patient discharged yesterday and Antoine Ko states patient seems to be doing okay so far. Scheduled outpatient follow up for 02/19 at [address] Megan Hernandez aware patient should have labs done about one week prior. No further questions or concerns at this time.

## 2020-12-18 ENCOUNTER — HOME CARE VISIT (OUTPATIENT)
Dept: SCHEDULING | Facility: HOME HEALTH | Age: 75
End: 2020-12-18

## 2020-12-18 LAB
BACTERIA SPEC CULT: NORMAL
SERVICE CMNT-IMP: NORMAL

## 2020-12-23 NOTE — PROGRESS NOTES
Mingo Anthony is a 66 y.o. female, evaluated via audio-only technology on 12/17/2020 for Hospital Follow Up (Admitted to [facility] 12/9/20 - 12/15/20 for sepsis secondary to COVID-19 and UTI)    Assessment & Plan:   Diagnoses and all orders for this visit:    1. COVID-19: on O2, slowly improving. Complete Decadron as prescribed. 2. History of sepsis: resolved, has completed antibiotic therapy. 3. Hospital discharge follow-up    12  Subjective:   Admitted for COVID 19 pneumonia and UTI. Still using O2. Reports that she is still fatigued. Has been taking medication as prescribed. Denies fever, chest pain or discomfort. Urine has been clearer. Prior to Admission medications    Medication Sig Start Date End Date Taking? Authorizing Provider   dexAMETHasone (DECADRON) 6 mg tablet Take 1 Tab by mouth daily for 8 doses. 12/15/20 12/23/20 Yes Verenice Bruner MD   nystatin (MYCOSTATIN) powder Apply  to affected area four (4) times daily as needed. Use around stoma   Yes Provider, Historical   losartan (COZAAR) 50 mg tablet Take 1 Tab by mouth daily. Indications: high blood pressure 46/74/30  Yes Mc Alexander MD   hydroCHLOROthiazide (HYDRODIURIL) 25 mg tablet Take 1 Tab by mouth daily. 93/02/51  Yes Mc Alexander MD   levothyroxine (SYNTHROID) 75 mcg tablet TAKE 1 TAB BY MOUTH DAILY (BEFORE BREAKFAST). 17/82/42  Yes Mc Alexander MD   omeprazole (PRILOSEC) 20 mg capsule TAKE 1 CAPSULE BY MOUTH EVERY DAY 8/19/20  Yes Anatoliy Valadez MD   ergocalciferol (ERGOCALCIFEROL) 1,250 mcg (50,000 unit) capsule Take 1 Cap by mouth every seven (7) days. Indications: low vitamin D levels 9/37/84  Yes Mc Alexander MD   ENBREL MINI 50 mg/mL (1 mL) crtg Every Thursday. 12/13/19  Yes Provider, Historical   cyanocobalamin (VITAMIN B12) 1,000 mcg/mL injection 1 mL by SubCUTAneous route every thirty (30) days.  1/17/20  Yes Nannette Palafox MD     Allergies   Allergen Reactions    Bactrim [Sulfamethoprim] Nausea and Vomiting    Ciprofloxacin Other (comments)     Joint aches    Diclofenac Nausea Only    Sulfa (Sulfonamide Antibiotics) Nausea Only     Past Medical History:   Diagnosis Date    Arthritis     Cancer ([facility]     bladder    Endocrine disease     hyperthyroid    Gastrointestinal disorder     GERD (gastroesophageal reflux disease)     Hypertension     Other ill-defined conditions(799.89)     hyperthyroid     Social History     Tobacco Use    Smoking status: Former Smoker     Packs/day: 1.00     Quit date: 7/3/1999     Years since quittin.5    Smokeless tobacco: Never Used   Substance Use Topics    Alcohol use: No       ROS   Pertinent items noted in HPI      Patient-Reported Vitals 2020   Patient-Reported Weight 197lb        Yo London, who was evaluated through a patient-initiated, synchronous (real-time) audio only encounter, and/or her healthcare decision maker, is aware that it is a billable service, with coverage as determined by her insurance carrier. She provided verbal consent to proceed: Yes. She has not had a related appointment within my department in the past 7 days or scheduled within the next 24 hours.       Total Time: minutes: 7    Rajiv Etienne MD

## 2020-12-29 DIAGNOSIS — I10 ESSENTIAL HYPERTENSION: ICD-10-CM

## 2020-12-29 RX ORDER — LOSARTAN POTASSIUM AND HYDROCHLOROTHIAZIDE 12.5; 5 MG/1; MG/1
1 TABLET ORAL EVERY EVENING
Qty: 90 TAB | Refills: 1 | OUTPATIENT
Start: 2020-12-29

## 2021-01-03 ENCOUNTER — TELEPHONE (OUTPATIENT)
Dept: FAMILY MEDICINE CLINIC | Age: 76
End: 2021-01-03

## 2021-01-03 NOTE — TELEPHONE ENCOUNTER
Pt was discharged from [address] [place] [address] 12/28/20. She needs MARTITA visit. Please arrange appt.

## 2021-01-06 ENCOUNTER — VIRTUAL VISIT (OUTPATIENT)
Dept: FAMILY MEDICINE CLINIC | Age: 76
End: 2021-01-06
Payer: MEDICARE

## 2021-01-06 ENCOUNTER — TELEPHONE (OUTPATIENT)
Dept: FAMILY MEDICINE CLINIC | Age: 76
End: 2021-01-06

## 2021-01-06 DIAGNOSIS — R53.1 WEAKNESS GENERALIZED: ICD-10-CM

## 2021-01-06 DIAGNOSIS — J18.9 PNEUMONIA OF BOTH LUNGS DUE TO INFECTIOUS ORGANISM, UNSPECIFIED PART OF LUNG: Primary | ICD-10-CM

## 2021-01-06 DIAGNOSIS — D61.818 PANCYTOPENIA (HCC): ICD-10-CM

## 2021-01-06 DIAGNOSIS — M06.9 RHEUMATOID ARTHRITIS INVOLVING MULTIPLE SITES, UNSPECIFIED WHETHER RHEUMATOID FACTOR PRESENT (HCC): ICD-10-CM

## 2021-01-06 DIAGNOSIS — U07.1 COVID-19: ICD-10-CM

## 2021-01-06 DIAGNOSIS — Z09 HOSPITAL DISCHARGE FOLLOW-UP: ICD-10-CM

## 2021-01-06 PROCEDURE — 1111F DSCHRG MED/CURRENT MED MERGE: CPT | Performed by: FAMILY MEDICINE

## 2021-01-06 PROCEDURE — 1090F PRES/ABSN URINE INCON ASSESS: CPT | Performed by: FAMILY MEDICINE

## 2021-01-06 PROCEDURE — G8427 DOCREV CUR MEDS BY ELIG CLIN: HCPCS | Performed by: FAMILY MEDICINE

## 2021-01-06 PROCEDURE — G8756 NO BP MEASURE DOC: HCPCS | Performed by: FAMILY MEDICINE

## 2021-01-06 PROCEDURE — G8400 PT W/DXA NO RESULTS DOC: HCPCS | Performed by: FAMILY MEDICINE

## 2021-01-06 PROCEDURE — 1101F PT FALLS ASSESS-DOCD LE1/YR: CPT | Performed by: FAMILY MEDICINE

## 2021-01-06 PROCEDURE — G8432 DEP SCR NOT DOC, RNG: HCPCS | Performed by: FAMILY MEDICINE

## 2021-01-06 PROCEDURE — 99214 OFFICE O/P EST MOD 30 MIN: CPT | Performed by: FAMILY MEDICINE

## 2021-01-06 NOTE — TELEPHONE ENCOUNTER
----- Message from Perico Ram sent at 1/6/2021 12:05 PM EST -----  Regarding: Dr. Coy Canela Message/Vendor Calls    Caller's first and last name: Herb [facility]illo[facility]iarra [facility]      Reason for call:FYI      Callback required yes/no and why:no      Best contact number(s): [phone]      Details to clarify the request: FYI: We are unable to service in a timely manner. Service has been declined.       Perico Ram

## 2021-01-06 NOTE — TELEPHONE ENCOUNTER
Yuliya Weber, can you contact another [address] [place] [address] Way ? [facility] to provide services. Use [facility] health referral in system.

## 2021-01-06 NOTE — PROGRESS NOTES
Transitional Care Management Progress Note    Patient: Niall Latham  :   PCP: Иван Allen MD    Date of admission:   Date of discharge:     Patient was contacted by Transitional Care Management services within two days after her discharge: No. This encounter and supporting documentation was reviewed if available. Medication reconciliation was performed today (2021). Assessment/Plan:   Diagnoses and all orders for this visit:    1. Pneumonia of both lungs due to infectious organism, unspecified part of lung  -     REFERRAL TO HOME HEALTH    2. Hospital discharge follow-up  -     MD DISCHARGE MEDS RECONCILED W/ CURRENT OUTPATIENT MED LIST    3. Rheumatoid arthritis involving multiple sites, unspecified whether rheumatoid factor present ([facility] [address]  -     [address] [facility]    4. Pancytopenia ([facility] [address]  -     [address]    6. Weakness generalized  -     REFERRAL TO HOME HEALTH    FU in office 4 weeks. Will need FU CXR. Order Home Health for PT, OT  Use albuterol MDI PRN  On home O2. Subjective:   Niall Latham is a 66 y.o. female presenting today for follow-up after being discharged from [facility].  The discharge summary was reviewed or requested. The main problem requiring admission was pneumonia, COVID 19 infection, sepsis. Complications during admission: none    Interval history/Current status: stable    Admitting symptoms have: improved    Medications marked \"taking\" at this time:  Home Medications    Medication Sig Start Date End Date Taking? Authorizing Provider   losartan (COZAAR) 50 mg tablet Take 1 Tab by mouth daily. Indications: high blood pressure 48/52/05  Yes Иван Allen MD   hydroCHLOROthiazide (HYDRODIURIL) 25 mg tablet Take 1 Tab by mouth daily. 6114/  Yes Иван Allen MD   levothyroxine (SYNTHROID) 75 mcg tablet TAKE 1 TAB BY MOUTH DAILY (BEFORE BREAKFAST).  10/22/20  Yes Caden Hannon MD   omeprazole (PRILOSEC) 20 mg capsule TAKE 1 CAPSULE BY MOUTH EVERY DAY 8/19/20  Yes Vishnu Peoples MD   ergocalciferol (ERGOCALCIFEROL) 1,250 mcg (50,000 unit) capsule Take 1 Cap by mouth every seven (7) days. Indications: low vitamin D levels 1/41/83  Yes Caden Hannon MD   cyanocobalamin (VITAMIN B12) 1,000 mcg/mL injection 1 mL by SubCUTAneous route every thirty (30) days. 1/17/20  Yes Alfred Sahu MD   albuterol sulfate (PROAIR RESPICLICK) 90 mcg/actuation breath activated inhaler EVERY 4 HOURS AS NEEDED as needed for DYSPNEA    Provider, Historical   nystatin (MYCOSTATIN) powder Apply  to affected area four (4) times daily as needed. Use around stoma    Provider, Historical   ENBREL MINI 50 mg/mL (1 mL) crtg Every Thursday. 12/13/19   Provider, Historical        Review of Systems:  Respiratory ROS: positive for - shortness of breath. On home oxygen 2 LPM with good sats. Patient Active Problem List    Diagnosis Date Noted    History of bladder cancer 12/09/2020    Rheumatoid arthritis involving multiple sites ([facility] 06/29/2020    Pancytopenia ([facility] 10/01/2019    HTN (hypertension) 04/18/2018    Hypokalemia 04/18/2018    Anemia 04/18/2018    Acquired hypothyroidism 04/18/2018    GERD (gastroesophageal reflux disease) 04/18/2018     Current Outpatient Medications   Medication Sig Dispense Refill    losartan (COZAAR) 50 mg tablet Take 1 Tab by mouth daily. Indications: high blood pressure 90 Tab 1    hydroCHLOROthiazide (HYDRODIURIL) 25 mg tablet Take 1 Tab by mouth daily. 90 Tab 1    levothyroxine (SYNTHROID) 75 mcg tablet TAKE 1 TAB BY MOUTH DAILY (BEFORE BREAKFAST). 90 Tab 0    omeprazole (PRILOSEC) 20 mg capsule TAKE 1 CAPSULE BY MOUTH EVERY DAY 90 Cap 1    ergocalciferol (ERGOCALCIFEROL) 1,250 mcg (50,000 unit) capsule Take 1 Cap by mouth every seven (7) days.  Indications: low vitamin D levels 15 Cap 3    cyanocobalamin (VITAMIN B12) 1,000 mcg/mL injection 1 mL by SubCUTAneous route every thirty (30) days. 12 mL 3    albuterol sulfate (PROAIR RESPICLICK) 90 mcg/actuation breath activated inhaler EVERY 4 HOURS AS NEEDED as needed for DYSPNEA      nystatin (MYCOSTATIN) powder Apply  to affected area four (4) times daily as needed. Use around stoma      ENBREL MINI 50 mg/mL (1 mL) crtg Every Thursday.        Allergies   Allergen Reactions    Bactrim [Sulfamethoprim] Nausea and Vomiting    Ciprofloxacin Other (comments)     Joint aches    Diclofenac Nausea Only    Sulfa (Sulfonamide Antibiotics) Nausea Only     Past Medical History:   Diagnosis Date    Arthritis     Cancer ([facility]     bladder    Endocrine disease     hyperthyroid    Gastrointestinal disorder     GERD (gastroesophageal reflux disease)     Hypertension     Other ill-defined conditions(799.89)     hyperthyroid     Past Surgical History:   Procedure Laterality Date    HX GYN      HX HERNIA REPAIR      HX HIP REPLACEMENT Left     HX UROLOGICAL      stoma since 78     Family History   Problem Relation Age of Onset    Heart Disease Father     Cancer Father         Lung     Social History     Tobacco Use    Smoking status: Former Smoker     Packs/day: 1.00     Quit date: 7/3/1999     Years since quittin.5    Smokeless tobacco: Never Used   Substance Use Topics    Alcohol use: No          Objective:     Patient-Reported Vitals 2021   Patient-Reported Weight -   Patient-Reported SpO2 96   Patient-Reported Systolic  588   Patient-Reported Diastolic 66      General: alert, cooperative, no distress   Mental  status: normal mood, behavior, speech, dress, motor activity, and thought processes, able to follow commands   HENT: NCAT   Neck: no visualized mass   Resp: no respiratory distress   Neuro: no gross deficits   Skin: no discoloration or lesions of concern on visible areas   Psychiatric: normal affect, consistent with stated mood, no evidence of hallucinations     Additional exam findings: We discussed the expected course, resolution and complications of the diagnosis(es) in detail. Medication risks, benefits, costs, interactions, and alternatives were discussed as indicated. I advised her to contact the office if her condition worsens, changes or fails to improve as anticipated. She expressed understanding with the diagnosis(es) and plan. Margie Fuentes, who was evaluated through a synchronous (real-time) audio-video encounter and/or her healthcare decision maker, is aware that it is a billable service, with coverage as determined by her insurance carrier. She provided verbal consent to proceed: Yes, and patient identification was verified. It was conducted pursuant to the emergency declaration under the [address] [place] [address], [address] [place] [address] authority and the [employer] General Act. A caregiver was present when appropriate. Ability to conduct physical exam was limited. I was at home. The patient was at home.       Meliza Pineda MD

## 2021-01-07 ENCOUNTER — TELEPHONE (OUTPATIENT)
Dept: FAMILY MEDICINE CLINIC | Age: 76
End: 2021-01-07

## 2021-01-07 NOTE — TELEPHONE ENCOUNTER
[facility] called and stated they received order for this patient but they can not do it as they do not accept patients insurance.

## 2021-01-08 ENCOUNTER — PATIENT OUTREACH (OUTPATIENT)
Dept: CASE MANAGEMENT | Age: 76
End: 2021-01-08

## 2021-01-11 NOTE — PROGRESS NOTES
Goals        Post Hospitalization     Prevent complications post hospitalization. 1/8/2021 Unable to reach patient or daughter by phone. Message left requesting return phone call. [facility]    12/16/2020 ACM spoke with daughter on phone. Patient lives with daughter. Daughter reports patient is doing ok. Denies SOB, chest pain, or fever. Patient is wearing her O2 and checking her saturation. Daughter did not know what the readings have been, patient was resting. She is eating and drinking without problems. Discussed with daughter need to increase water intake as much as possible instead of sodas. Urostomy is draining urine but not clear and will discuss with PCP in am. Reviewed discharge instructions with daughter, she had no questions. Next outreach planned for week of 12/22/20.  PAC

## 2021-01-18 ENCOUNTER — TELEPHONE (OUTPATIENT)
Dept: FAMILY MEDICINE CLINIC | Age: 76
End: 2021-01-18

## 2021-01-18 NOTE — TELEPHONE ENCOUNTER
Call placed to pt. She is unaware of any FU made regarding pancreatic cystic lesion seen on CT scan. Will call back to speak with her daughter Paula Hernandez. Pt is followed by Dr Mathew Myers for pancytopenia. May need to se GI.

## 2021-01-18 NOTE — TELEPHONE ENCOUNTER
I spoke with [facility]. They are aware of pancreatic cyst since last year. It was found on CT done by Urology. She has name of Dr Juan Walker to call for appt but has not done so. Advised to set up appt with GI Dr Juan Walker. She is off O2. Good sats. Never heard from [address] [place] but she is getting around home OK. FU appt with me next week. Will need order CXR. Send to Dr Abdirahman Blount.

## 2021-01-19 NOTE — TELEPHONE ENCOUNTER
Anton Klein spoke with her daughter Paula Hernandez last night. They are doing fine without Home PT at this time. Cancel this order. Attending Attestation (For Attendings USE Only)... Attending Attestation (For Attendings USE Only)...

## 2021-01-25 ENCOUNTER — OFFICE VISIT (OUTPATIENT)
Dept: FAMILY MEDICINE CLINIC | Age: 76
End: 2021-01-25
Payer: MEDICARE

## 2021-01-25 VITALS
HEART RATE: 100 BPM | TEMPERATURE: 98 F | OXYGEN SATURATION: 95 % | SYSTOLIC BLOOD PRESSURE: 120 MMHG | RESPIRATION RATE: 20 BRPM | DIASTOLIC BLOOD PRESSURE: 68 MMHG | BODY MASS INDEX: 31.62 KG/M2 | HEIGHT: 65 IN | WEIGHT: 189.8 LBS

## 2021-01-25 DIAGNOSIS — Z79.899 ENCOUNTER FOR LONG-TERM CURRENT USE OF MEDICATION: ICD-10-CM

## 2021-01-25 DIAGNOSIS — J18.9 PNEUMONIA OF BOTH LUNGS DUE TO INFECTIOUS ORGANISM, UNSPECIFIED PART OF LUNG: Primary | ICD-10-CM

## 2021-01-25 DIAGNOSIS — U07.1 COVID-19: ICD-10-CM

## 2021-01-25 PROCEDURE — 99213 OFFICE O/P EST LOW 20 MIN: CPT | Performed by: FAMILY MEDICINE

## 2021-01-25 PROCEDURE — G8427 DOCREV CUR MEDS BY ELIG CLIN: HCPCS | Performed by: FAMILY MEDICINE

## 2021-01-25 PROCEDURE — G8400 PT W/DXA NO RESULTS DOC: HCPCS | Performed by: FAMILY MEDICINE

## 2021-01-25 PROCEDURE — 1090F PRES/ABSN URINE INCON ASSESS: CPT | Performed by: FAMILY MEDICINE

## 2021-01-25 PROCEDURE — G8754 DIAS BP LESS 90: HCPCS | Performed by: FAMILY MEDICINE

## 2021-01-25 PROCEDURE — G8752 SYS BP LESS 140: HCPCS | Performed by: FAMILY MEDICINE

## 2021-01-25 PROCEDURE — G8536 NO DOC ELDER MAL SCRN: HCPCS | Performed by: FAMILY MEDICINE

## 2021-01-25 PROCEDURE — 1101F PT FALLS ASSESS-DOCD LE1/YR: CPT | Performed by: FAMILY MEDICINE

## 2021-01-25 PROCEDURE — G8510 SCR DEP NEG, NO PLAN REQD: HCPCS | Performed by: FAMILY MEDICINE

## 2021-01-25 PROCEDURE — G8417 CALC BMI ABV UP PARAM F/U: HCPCS | Performed by: FAMILY MEDICINE

## 2021-01-25 NOTE — PROGRESS NOTES
Identified pt with two pt identifiers(name and ). Chief Complaint   Patient presents with   [facility] Follow Up     [facility] -15/2020  - she is getting better - takes time        Health Maintenance Due   Topic    COVID-19 Vaccine (1 of 2)    DTaP/Tdap/Td series (1 - Tdap)    Shingrix Vaccine Age 50> (1 of 2)    GLAUCOMA SCREENING Q2Y     Bone Densitometry (Dexa) Screening     Pneumococcal 65+ years (2 of 2 - PPSV23)    Medicare Yearly Exam        Wt Readings from Last 3 Encounters:   21 189 lb 12.8 oz (86.1 kg)   20 194 lb (88 kg)   10/29/20 203 lb 14.8 oz (92.5 kg)     Temp Readings from Last 3 Encounters:   21 98 °F (36.7 °C) (Oral)   12/15/20 98.1 °F (36.7 °C)   10/23/20 98.9 °F (37.2 °C) (Oral)     BP Readings from Last 3 Encounters:   21 120/68   12/15/20 139/63   10/29/20 (!) 178/83     Pulse Readings from Last 3 Encounters:   21 (!) 125   12/15/20 100   10/23/20 (!) 101         Learning Assessment:  :     Learning Assessment 2019   PRIMARY LEARNER Patient   PRIMARY LANGUAGE ENGLISH   LEARNER PREFERENCE PRIMARY DEMONSTRATION   ANSWERED BY patient   RELATIONSHIP SELF       Depression Screening:  :     3 most recent PHQ Screens 2021   Little interest or pleasure in doing things Not at all   Feeling down, depressed, irritable, or hopeless Several days   Total Score PHQ 2 1       Fall Risk Assessment:  :     Fall Risk Assessment, last 12 mths 2021   Able to walk? Yes   Fall in past 12 months? 0   Do you feel unsteady? 0   Are you worried about falling 0       Abuse Screening:  :     Abuse Screening Questionnaire 2021 2020 2020 3/7/2019 8/3/2018   Do you ever feel afraid of your partner? N N N N N   Are you in a relationship with someone who physically or mentally threatens you? N N N N N   Is it safe for you to go home?  Y Y Y Y Y       Coordination of Care Questionnaire:  :     1) Have you been to an emergency room, urgent care clinic since your last visit? no   Hospitalized since your last visit? no             2) Have you seen or consulted any other health care providers outside of [address] [place] [address] since your last visit? yes  Pulmonary - virtual (Include any pap smears or colon screenings in this section.)    3) Do you have an Advance Directive on file? yes  Are you interested in receiving information about Advance Directives? no    Patient is accompanied by daughter I have received verbal consent from Margie Fuentes to discuss any/all medical information while they are present in the room. Reviewed record in preparation for visit and have obtained necessary documentation. Medication reconciliation up to date and corrected with patient at this time.

## 2021-01-26 NOTE — PROGRESS NOTES
HISTORY OF PRESENT ILLNESS  Rena Salgado is a 66 y.o. female. HPI  FU bilateral PNA and COVID infection. She saw Pulmonary last week. Weaned off home O 2. Gets SOB with minimal exertion. Using CPAP and sleeps fine. Monitor O2 sat at home > 90% on RA. Denies CP. She still feels weak but is getting around fine. Use of cane. No home health was able to come. Review of Systems   Constitutional: Negative for fever. Respiratory: Positive for shortness of breath. Visit Vitals  /68 (BP 1 Location: Left arm, BP Patient Position: Sitting) Comment: manual   Pulse 100   Temp 98 °F (36.7 °C) (Oral)   Resp 20   Ht 5' 5\" (1.651 m)   Wt 189 lb 12.8 oz (86.1 kg)   SpO2 95%   BMI 31.58 kg/m²       Physical Exam  Vitals signs reviewed. Cardiovascular:      Rate and Rhythm: Regular rhythm. Tachycardia present. Heart sounds: Normal heart sounds. Pulmonary:      Effort: Pulmonary effort is normal.      Breath sounds: Normal breath sounds. No wheezing or rales. Neurological:      Mental Status: She is alert. ASSESSMENT and PLAN    ICD-10-CM ICD-9-CM    1. Pneumonia of both lungs due to infectious organism, unspecified part of lung  J18.9 483.8 XR CHEST PA LAT   2. Encounter for long-term current use of medication  Z79.899 V58.69 MAGNESIUM   3. COVID-19  U07.1 079.89      Order repeat CXR to FU PNA.

## 2021-01-27 ENCOUNTER — PATIENT OUTREACH (OUTPATIENT)
Dept: CASE MANAGEMENT | Age: 76
End: 2021-01-27

## 2021-01-27 NOTE — PROGRESS NOTES
Patient has graduated from the Transitions of Care Coordination  program on 1/27/2021. Patient/family has the ability to self-manage at this time Care management goals have been completed. Patient was not referred to the [facility] team for further management. Goals Addressed                 This Visit's Progress       Post Hospitalization     COMPLETED: Prevent complications post hospitalization. 1/27/2021 No further hospitalizations. Attended PCP visit. [facility]    1/8/2021 Unable to reach patient or daughter by phone. Message left requesting return phone call. [facility]    12/16/2020 ACM spoke with daughter on phone. Patient lives with daughter. Daughter reports patient is doing ok. Denies SOB, chest pain, or fever. Patient is wearing her O2 and checking her saturation. Daughter did not know what the readings have been, patient was resting. She is eating and drinking without problems. Discussed with daughter need to increase water intake as much as possible instead of sodas. Urostomy is draining urine but not clear and will discuss with PCP in am. Reviewed discharge instructions with daughter, she had no questions. Next outreach planned for week of 12/22/20. PAC            Patient has Care Transition Nurse's contact information for any further questions, concerns, or needs.   Patients upcoming visits:    Future Appointments   Date Time Provider Miguelito Avendano   2/19/2021 11:00 AM Niurka Palafox MD [facility]

## 2021-02-09 ENCOUNTER — HOSPITAL ENCOUNTER (OUTPATIENT)
Dept: LAB | Age: 76
Discharge: HOME | End: 2021-02-09
Payer: MEDICARE

## 2021-02-09 ENCOUNTER — HOSPITAL ENCOUNTER (OUTPATIENT)
Dept: GENERAL RADIOLOGY | Age: 76
Discharge: HOME | End: 2021-02-09
Attending: FAMILY MEDICINE
Payer: MEDICARE

## 2021-02-09 ENCOUNTER — TELEPHONE (OUTPATIENT)
Dept: FAMILY MEDICINE CLINIC | Age: 76
End: 2021-02-09

## 2021-02-09 DIAGNOSIS — Z79.899 ENCOUNTER FOR LONG-TERM CURRENT USE OF MEDICATION: ICD-10-CM

## 2021-02-09 DIAGNOSIS — J18.9 PNEUMONIA OF BOTH LUNGS DUE TO INFECTIOUS ORGANISM, UNSPECIFIED PART OF LUNG: ICD-10-CM

## 2021-02-09 DIAGNOSIS — E03.9 ACQUIRED HYPOTHYROIDISM: ICD-10-CM

## 2021-02-09 DIAGNOSIS — I50.9 CONGESTIVE HEART FAILURE, UNSPECIFIED HF CHRONICITY, UNSPECIFIED HEART FAILURE TYPE (HCC): Primary | ICD-10-CM

## 2021-02-09 DIAGNOSIS — K21.9 GASTROESOPHAGEAL REFLUX DISEASE, UNSPECIFIED WHETHER ESOPHAGITIS PRESENT: ICD-10-CM

## 2021-02-09 PROCEDURE — 85025 COMPLETE CBC W/AUTO DIFF WBC: CPT

## 2021-02-09 PROCEDURE — 36415 COLL VENOUS BLD VENIPUNCTURE: CPT

## 2021-02-09 PROCEDURE — 83735 ASSAY OF MAGNESIUM: CPT

## 2021-02-09 PROCEDURE — 80048 BASIC METABOLIC PNL TOTAL CA: CPT

## 2021-02-09 PROCEDURE — 71046 X-RAY EXAM CHEST 2 VIEWS: CPT

## 2021-02-09 RX ORDER — FUROSEMIDE 40 MG/1
40 TABLET ORAL DAILY
Qty: 30 TAB | Refills: 5 | Status: SHIPPED | OUTPATIENT
Start: 2021-02-09 | End: 2021-08-08

## 2021-02-09 RX ORDER — LEVOTHYROXINE SODIUM 75 UG/1
TABLET ORAL
Qty: 90 TAB | Refills: 1 | Status: SHIPPED | OUTPATIENT
Start: 2021-02-09 | End: 2021-09-07

## 2021-02-09 RX ORDER — OMEPRAZOLE 20 MG/1
CAPSULE, DELAYED RELEASE ORAL
Qty: 90 CAP | Refills: 1 | Status: SHIPPED | OUTPATIENT
Start: 2021-02-09 | End: 2021-08-08

## 2021-02-09 NOTE — TELEPHONE ENCOUNTER
I called pt. She is SOB with minimal exertion and has SOA. No cough or fever or CP. FU with Pulmonary Dr Christianne Anderson next week. Plan to bring CXR to show him. I will order Lasix 40 mg daily to take instead of HCTZ. FU with me in 2 weeks. Call Dr Christianne Anderson about CXR.

## 2021-02-10 ENCOUNTER — TELEPHONE (OUTPATIENT)
Dept: FAMILY MEDICINE CLINIC | Age: 76
End: 2021-02-10

## 2021-02-10 DIAGNOSIS — E53.8 VITAMIN B12 DEFICIENCY: ICD-10-CM

## 2021-02-10 DIAGNOSIS — R79.0 LOW MAGNESIUM LEVEL: Primary | ICD-10-CM

## 2021-02-10 LAB
BASOPHILS # BLD AUTO: 0 X10E3/UL (ref 0–0.2)
BASOPHILS NFR BLD AUTO: 1 %
BUN SERPL-MCNC: 18 MG/DL (ref 8–27)
BUN/CREAT SERPL: 17 (ref 12–28)
CALCIUM SERPL-MCNC: 9.3 MG/DL (ref 8.7–10.3)
CHLORIDE SERPL-SCNC: 107 MMOL/L (ref 96–106)
CO2 SERPL-SCNC: 21 MMOL/L (ref 20–29)
CREAT SERPL-MCNC: 1.06 MG/DL (ref 0.57–1)
EOSINOPHIL # BLD AUTO: 0.1 X10E3/UL (ref 0–0.4)
EOSINOPHIL NFR BLD AUTO: 4 %
ERYTHROCYTE [DISTWIDTH] IN BLOOD BY AUTOMATED COUNT: 14.4 % (ref 11.7–15.4)
GLUCOSE SERPL-MCNC: 105 MG/DL (ref 65–99)
HCT VFR BLD AUTO: 28 % (ref 34–46.6)
HGB BLD-MCNC: 9.4 G/DL (ref 11.1–15.9)
IMM GRANULOCYTES # BLD AUTO: 0 X10E3/UL (ref 0–0.1)
IMM GRANULOCYTES NFR BLD AUTO: 0 %
LYMPHOCYTES # BLD AUTO: 0.7 X10E3/UL (ref 0.7–3.1)
LYMPHOCYTES NFR BLD AUTO: 29 %
MAGNESIUM SERPL-MCNC: 1.4 MG/DL (ref 1.6–2.3)
MCH RBC QN AUTO: 30.7 PG (ref 26.6–33)
MCHC RBC AUTO-ENTMCNC: 33.6 G/DL (ref 31.5–35.7)
MCV RBC AUTO: 92 FL (ref 79–97)
MONOCYTES # BLD AUTO: 0.2 X10E3/UL (ref 0.1–0.9)
MONOCYTES NFR BLD AUTO: 6 %
NEUTROPHILS # BLD AUTO: 1.4 X10E3/UL (ref 1.4–7)
NEUTROPHILS NFR BLD AUTO: 60 %
PLATELET # BLD AUTO: 116 X10E3/UL (ref 150–450)
POTASSIUM SERPL-SCNC: 3.8 MMOL/L (ref 3.5–5.2)
RBC # BLD AUTO: 3.06 X10E6/UL (ref 3.77–5.28)
SODIUM SERPL-SCNC: 141 MMOL/L (ref 134–144)
WBC # BLD AUTO: 2.4 X10E3/UL (ref 3.4–10.8)

## 2021-02-10 RX ORDER — LANOLIN ALCOHOL/MO/W.PET/CERES
400 CREAM (GRAM) TOPICAL DAILY
Qty: 30 TAB | Refills: 5 | Status: SHIPPED | OUTPATIENT
Start: 2021-02-10 | End: 2021-08-09

## 2021-02-10 NOTE — TELEPHONE ENCOUNTER
I spoke with Dr Genna Thomas [facility]). He states this is all related to pt's recent COVID infectipn. Nothing new. Pt has FU to see him next week.

## 2021-02-10 NOTE — TELEPHONE ENCOUNTER
I spoke with pt low magnesium. I also advised her that I spoke with Dr Jessica Blankenship this afternoon about her CXR. He felt this is all consequence of COVID infection. Start on mag supplement.

## 2021-02-11 DIAGNOSIS — N39.0 RECURRENT UTI: ICD-10-CM

## 2021-02-11 DIAGNOSIS — D61.818 PANCYTOPENIA (HCC): ICD-10-CM

## 2021-02-11 RX ORDER — CYANOCOBALAMIN 1000 UG/ML
INJECTION, SOLUTION INTRAMUSCULAR; SUBCUTANEOUS
Qty: 3 ML | Refills: 15 | Status: SHIPPED | OUTPATIENT
Start: 2021-02-11 | End: 2022-04-21

## 2021-02-16 NOTE — PROGRESS NOTES
[employer]  Medical Oncology at [place]  [phone]    Hematology / Oncology Progress Note    Reason for Visit:   Niall Latham is a 64 y.o. female who is seen by synchronous (real-time) audio-video technology on 2/19/2021 for follow up of pancytopenia. History of Present Illness:   Ms. Jayne Santos is a 66 y.o. female with HTN, remote h/o bladder cancer who comes in for follow up of pancytopenia. She was recently hospitalized after p/w nausea and vomiting since taking Bactrim for UTI as well as fever 102, found to be pancytopenic. She has a h/o of bladder cancer (1999) and underwent radical cystectomy with ileal conduit. She was evaluated in the urology office for UTI and was started on macrobid, later switched to Bactrim. This medication seemed to cause n/v. Upon questioning, Ms. Jayne Santos denies any previous h/o anemia except when pregnant - at which time she used to take iron. She notes occasional blood in her urine but otherwise denies any other signs of bleeding. She was switched to Levaquin, tolerated this well with eventual resolution of fevers. Workup of pancytopenia revealed low VitB12 level, and patient was started on VitB12 injections. She completed daily injections x 7 (in and out of the hospital), and she is currently on weekly injections. She states she is having significant pain in her left hip. She recently underwent left hip replacement in March 2018, and she is following closely with orthopedics. She does state that her energy level has improved after discharge from the hospital.   Patient had briefly stopped the taking the B12 injections, then she restarted based on my instructions. She continues to feel fatigued despite restarting the B12 injections. She follows with Urology for recurrent UTI. She comes in today for review of bone marrow biopsy results. Interval History: Today, patient is seen for follow up of pancytopenia.  She was admitted from 12/9/2020 to 12/15/2020 for sepsis from COVID pneumonia and UTI. Completed course of Levaquin and course of decadron. She sees Dr[facility]. CXR ordered by PCP last week showed infiltration in R lung, but pt was not prescribed antibiotics. Pt states she does not have any cough, fevers, chills. Past Medical History:   Diagnosis Date    Arthritis     Cancer [facility])     bladder    Endocrine disease     hyperthyroid    Gastrointestinal disorder     GERD (gastroesophageal reflux disease)     Hypertension     Other ill-defined conditions(799.89)     hyperthyroid      Past Surgical History:   Procedure Laterality Date    HX GYN      HX HERNIA REPAIR      HX HIP REPLACEMENT Left     HX UROLOGICAL      stoma since 78      Social History     Tobacco Use    Smoking status: Former Smoker     Packs/day: 1.00     Quit date: 7/3/1999     Years since quittin.6    Smokeless tobacco: Never Used   Substance Use Topics    Alcohol use: No      Family History   Problem Relation Age of Onset    Heart Disease Father     Cancer Father         Lung     Current Outpatient Medications   Medication Sig    cyanocobalamin (VITAMIN B12) 1,000 mcg/mL injection TAKE 1 ML BY INTRAMUSCULAR ROUTE EVERY THIRTY (30) DAYS.  magnesium oxide (MAG-OX) 400 mg tablet Take 1 Tab by mouth daily. Indications: low amount of magnesium in the blood    furosemide (LASIX) 40 mg tablet Take 1 Tab by mouth daily.  omeprazole (PRILOSEC) 20 mg capsule TAKE 1 CAPSULE BY MOUTH EVERY DAY    levothyroxine (SYNTHROID) 75 mcg tablet TAKE 1 TAB BY MOUTH DAILY (BEFORE BREAKFAST).  apixaban (ELIQUIS) 5 mg tablet 5 mg two (2) times a day. Ends this week    albuterol sulfate (PROAIR RESPICLICK) 90 mcg/actuation breath activated inhaler EVERY 4 HOURS AS NEEDED as needed for DYSPNEA    nystatin (MYCOSTATIN) powder Apply  to affected area four (4) times daily as needed. Use around stoma    losartan (COZAAR) 50 mg tablet Take 1 Tab by mouth daily. Indications: high blood pressure    hydroCHLOROthiazide (HYDRODIURIL) 25 mg tablet Take 1 Tab by mouth daily.  ergocalciferol (ERGOCALCIFEROL) 1,250 mcg (50,000 unit) capsule Take 1 Cap by mouth every seven (7) days. Indications: low vitamin D levels    ENBREL MINI 50 mg/mL (1 mL) crtg Every Thursday. No current facility-administered medications for this visit. Allergies   Allergen Reactions    Bactrim [Sulfamethoprim] Nausea and Vomiting    Ciprofloxacin Other (comments)     Joint aches    Diclofenac Nausea Only    Sulfa (Sulfonamide Antibiotics) Nausea Only        Review of Systems: A complete review of systems was obtained, negative except as described above. Physical Exam:       Due to this being a TeleHealth evaluation, many elements of the physical examination are unable to be assessed. Constitutional: Appears well-developed and well-nourished in no apparent distress   Mental status: Alert and awake, Oriented to person/place/time, Able to follow commands  Eyes: EOM normal, Sclera normal, No visible ocular discharge  HENT: Normocephalic, atraumatic; Mouth/Throat: Moist mucous membranes, External Ears normal  Neck: No visualized mass  Pulmonary/Chest: Respiratory effort normal, No visualized signs of difficulty breathing or respiratory distress   Musculoskeletal: Normal gait with no signs of ataxia, Normal range of motion of neck  Neurological: No facial asymmetry (Cranial nerve 7 motor function), No gaze palsy  Skin: No significant exanthematous lesions or discoloration noted on facial skin  Psychiatric: Normal affect, normal judgment/insight. No hallucinations       Results:     Lab Results   Component Value Date/Time    WBC 2.4 (LL) 02/09/2021 08:58 AM    HGB 9.4 (L) 02/09/2021 08:58 AM    HCT 28.0 (L) 02/09/2021 08:58 AM    PLATELET 156 (L) 64/71/1052 08:58 AM    MCV 92 02/09/2021 08:58 AM    ABS.  NEUTROPHILS 1.4 02/09/2021 08:58 AM    Hemoglobin (POC) 11.9 01/03/2012 10:01 AM Hematocrit (POC) 35 2012 10:01 AM     Lab Results   Component Value Date/Time    Sodium 141 2021 08:58 AM    Potassium 3.8 2021 08:58 AM    Chloride 107 (H) 2021 08:58 AM    CO2 21 2021 08:58 AM    Glucose 105 (H) 2021 08:58 AM    BUN 18 2021 08:58 AM    Creatinine 1.06 (H) 2021 08:58 AM    GFR est AA 59 (L) 2021 08:58 AM    GFR est non-AA 51 (L) 2021 08:58 AM    Calcium 9.3 2021 08:58 AM    Sodium (POC) 143 2012 10:01 AM    Potassium (POC) 4.1 2012 10:01 AM    Chloride (POC) 105 2012 10:01 AM    Glucose (POC) 104 (H) 2018 11:18 AM    BUN (POC) 11 2012 10:01 AM    Creatinine (POC) 1.0 2012 10:01 AM    Calcium, ionized (POC) 1.14 2012 10:01 AM     Lab Results   Component Value Date/Time    Bilirubin, total 0.6 12/15/2020 10:48 AM    ALT (SGPT) 32 12/15/2020 10:48 AM    Alk. phosphatase 40 (L) 12/15/2020 10:48 AM    Protein, total 7.1 12/15/2020 10:48 AM    Albumin 3.0 (L) 12/15/2020 10:48 AM    Globulin 4.1 (H) 12/15/2020 10:48 AM     Lab Results   Component Value Date/Time    Iron 69 2019 08:39 AM    TIBC 273 2019 08:39 AM    Iron % saturation 25 2019 08:39 AM    Ferritin 402 (H) 12/15/2020 10:48 AM     Lab Results   Component Value Date/Time    Vitamin B12 442 10/26/2020 09:07 AM    Folate 14.1 12/10/2020 05:02 AM         Imagin2018 XR CHEST  IMPRESSION: No acute abnormality     2017 abd/pelvis CT:      FINDINGS:   Liver is nodular compatible with cirrhosis. The spleen is normal in size. The  gallbladder is not distended. The a pancreas does appear unremarkable. Ostomy  seen in the right lower quadrant. There is a small supraumbilical abdominal wall  hernia containing nondistended colon. There is no bowel wall thickening or  obstruction. Prior cystectomy. There is no free air or free fluid. No definite  adenopathy in the abdomen or pelvis. Large left renal cyst unchanged.  There is  no intrarenal calcification or obstruction. IMPRESSION: No acute findings. Prior surgery.     Assessment and Recommendations:   65 y/o female with hx of HTN, hypothyroidism, GERD, bladder cancer s/p urostomy,  L hip replacement (3/16) and hospital admission in 8/2018 for UTI admitted with nausea, vomiting and fever.     1. Pancytopenia: WBC and PLT continue to fluctuate up and down in relation to infections, and perhaps some influence from RA. Lab workup in hospital revealed Vitamin B12 def and anemia of chronic disease. Retic count showed an inappropriate response which can be related to Vit B12 def. TSH normal. The drop in PLT count from 150 to 74 could be from  medications or from the infection. She also has possible cirrhosis based on CT scan in 8/2017, but no risk factors for cirrhosis (no diabetes or alcohol use). Given the declining counts in all 3 cell lines, patient underwent bone marrow biopsy in 10/2018, and results were normal (normocellular marrow with trilineage hematopoiesis). I do not feel that Meloxicam is suppressing her blood counts. Current low WBC count is likely related to infection as well. -- Repeat labs in 3 months - will call pt with results. -- Follow up as needed. PCP and Rheumatology can monitor counts. -- Please refer back if ANC < 1.0, Hgb < 10, PLT < 100 or with any other questions. 2. Vitamin B12 deficiency: Now with normal levels after starting on VitB12 injections approx 8/2018.  -- Continue Vitamin B12 IM 1000mcg monthly - sent script at last visit. 3. Rheumatoid arthritis: Affects her wrists, hands, shoulders. Sees Dr. Ivon Dubois, but has not been on Embrel since early Dec 2020 due to infections.     4. Recurrent UTI: h/o of recurrent UTIs. Completed Levaquin in the past.      5. Fatigue: Likely related to moderate anemia. 6. Hx of bladder cancer (1999): S/p ileal conduit/ urostomy.  Sees Dr. Jelly Campbell in Urology.      7. S/p Left hip replacement: Surgery was in 3/2018. Improvement in left hip pain. She is following closely with orthopedics. I appreciate the opportunity to participate in Ms. Kady Gonzalez's care. I was in the office while conducting this encounter. The patient was at her at home    Consent:  She and/or her healthcare decision maker is aware that this patient-initiated Telehealth encounter is a billable service, with coverage as determined by her insurance carrier. She is aware that she may receive a bill and has provided verbal consent to proceed: Yes    Pursuant to the emergency declaration under the [address] and the [facility] waiver authority and the [employer] and [employer] Act, this Virtual  Visit was conducted, with patient's (and/or legal guardian's) consent, to reduce the patient's risk of exposure to COVID-19 and provide necessary medical care. Services were provided through a video synchronous discussion virtually to substitute for in-person visit.       Signed By: Toi Motta MD     February 19, 2021

## 2021-02-18 ENCOUNTER — TELEPHONE (OUTPATIENT)
Dept: ONCOLOGY | Age: 76
End: 2021-02-18

## 2021-02-19 ENCOUNTER — VIRTUAL VISIT (OUTPATIENT)
Dept: ONCOLOGY | Age: 76
End: 2021-02-19
Payer: MEDICARE

## 2021-02-19 DIAGNOSIS — N39.0 RECURRENT UTI: ICD-10-CM

## 2021-02-19 DIAGNOSIS — D61.818 PANCYTOPENIA (HCC): Primary | ICD-10-CM

## 2021-02-19 DIAGNOSIS — D63.8 ANEMIA OF CHRONIC DISEASE: ICD-10-CM

## 2021-02-19 DIAGNOSIS — E53.8 VITAMIN B12 DEFICIENCY: ICD-10-CM

## 2021-02-19 PROCEDURE — G8756 NO BP MEASURE DOC: HCPCS | Performed by: INTERNAL MEDICINE

## 2021-02-19 PROCEDURE — 1090F PRES/ABSN URINE INCON ASSESS: CPT | Performed by: INTERNAL MEDICINE

## 2021-02-19 PROCEDURE — G8428 CUR MEDS NOT DOCUMENT: HCPCS | Performed by: INTERNAL MEDICINE

## 2021-02-19 PROCEDURE — G8400 PT W/DXA NO RESULTS DOC: HCPCS | Performed by: INTERNAL MEDICINE

## 2021-02-19 PROCEDURE — G8432 DEP SCR NOT DOC, RNG: HCPCS | Performed by: INTERNAL MEDICINE

## 2021-02-19 PROCEDURE — 1101F PT FALLS ASSESS-DOCD LE1/YR: CPT | Performed by: INTERNAL MEDICINE

## 2021-02-19 PROCEDURE — 99214 OFFICE O/P EST MOD 30 MIN: CPT | Performed by: INTERNAL MEDICINE

## 2021-02-24 DIAGNOSIS — E55.9 VITAMIN D DEFICIENCY: ICD-10-CM

## 2021-02-24 RX ORDER — ERGOCALCIFEROL 1.25 MG/1
50000 CAPSULE ORAL
Qty: 15 CAP | Refills: 3 | Status: SHIPPED | OUTPATIENT
Start: 2021-02-24 | End: 2022-02-11

## 2021-03-04 ENCOUNTER — TELEPHONE (OUTPATIENT)
Dept: FAMILY MEDICINE CLINIC | Age: 76
End: 2021-03-04

## 2021-03-04 RX ORDER — ALBUTEROL SULFATE 90 UG/1
2 AEROSOL, METERED RESPIRATORY (INHALATION)
COMMUNITY
Start: 2020-12-06 | End: 2021-11-24 | Stop reason: ALTCHOICE

## 2021-03-04 NOTE — TELEPHONE ENCOUNTER
Patient has been sick all night and her stomach is hurting. Needs something for the pain in her stomach. She also has not had a bowel movement.   Please call Ida(daughter) at  [phone]

## 2021-04-17 DIAGNOSIS — I10 ESSENTIAL HYPERTENSION: ICD-10-CM

## 2021-04-17 RX ORDER — LOSARTAN POTASSIUM 50 MG/1
TABLET ORAL
Qty: 90 TAB | Refills: 1 | Status: SHIPPED | OUTPATIENT
Start: 2021-04-17 | End: 2021-09-18

## 2021-05-10 ENCOUNTER — OFFICE VISIT (OUTPATIENT)
Dept: FAMILY MEDICINE CLINIC | Age: 76
End: 2021-05-10
Payer: MEDICAID

## 2021-05-10 VITALS
WEIGHT: 193 LBS | BODY MASS INDEX: 32.15 KG/M2 | HEIGHT: 65 IN | SYSTOLIC BLOOD PRESSURE: 138 MMHG | TEMPERATURE: 97.8 F | HEART RATE: 97 BPM | RESPIRATION RATE: 18 BRPM | OXYGEN SATURATION: 97 % | DIASTOLIC BLOOD PRESSURE: 76 MMHG

## 2021-05-10 DIAGNOSIS — E03.9 ACQUIRED HYPOTHYROIDISM: ICD-10-CM

## 2021-05-10 DIAGNOSIS — R79.0 LOW MAGNESIUM LEVEL: ICD-10-CM

## 2021-05-10 DIAGNOSIS — B07.9 WART OF FACE: ICD-10-CM

## 2021-05-10 DIAGNOSIS — E55.9 VITAMIN D DEFICIENCY: ICD-10-CM

## 2021-05-10 DIAGNOSIS — Z79.899 ENCOUNTER FOR LONG-TERM CURRENT USE OF MEDICATION: ICD-10-CM

## 2021-05-10 DIAGNOSIS — D61.818 PANCYTOPENIA (HCC): ICD-10-CM

## 2021-05-10 DIAGNOSIS — D64.9 ANEMIA, UNSPECIFIED TYPE: ICD-10-CM

## 2021-05-10 DIAGNOSIS — I10 ESSENTIAL HYPERTENSION: Primary | ICD-10-CM

## 2021-05-10 DIAGNOSIS — E53.8 B12 DEFICIENCY: ICD-10-CM

## 2021-05-10 LAB
25(OH)D3 SERPL-MCNC: 71.1 NG/ML (ref 30–100)
ALBUMIN SERPL-MCNC: 4.1 G/DL (ref 3.5–5)
ALBUMIN/GLOB SERPL: 1 {RATIO} (ref 1.1–2.2)
ALP SERPL-CCNC: 69 U/L (ref 45–117)
ALT SERPL-CCNC: 24 U/L (ref 12–78)
ANION GAP SERPL CALC-SCNC: 9 MMOL/L (ref 5–15)
AST SERPL-CCNC: 25 U/L (ref 15–37)
BASOPHILS # BLD: 0 K/UL (ref 0–0.1)
BASOPHILS NFR BLD: 1 % (ref 0–1)
BILIRUB SERPL-MCNC: 0.8 MG/DL (ref 0.2–1)
BUN SERPL-MCNC: 25 MG/DL (ref 6–20)
BUN/CREAT SERPL: 19 (ref 12–20)
CALCIUM SERPL-MCNC: 9.6 MG/DL (ref 8.5–10.1)
CHLORIDE SERPL-SCNC: 106 MMOL/L (ref 97–108)
CO2 SERPL-SCNC: 24 MMOL/L (ref 21–32)
COMMENT, HOLDF: NORMAL
CREAT SERPL-MCNC: 1.34 MG/DL (ref 0.55–1.02)
DIFFERENTIAL METHOD BLD: ABNORMAL
EOSINOPHIL # BLD: 0.1 K/UL (ref 0–0.4)
EOSINOPHIL NFR BLD: 2 % (ref 0–7)
ERYTHROCYTE [DISTWIDTH] IN BLOOD BY AUTOMATED COUNT: 13.5 % (ref 11.5–14.5)
FERRITIN SERPL-MCNC: 53 NG/ML (ref 8–252)
GLOBULIN SER CALC-MCNC: 4 G/DL (ref 2–4)
GLUCOSE SERPL-MCNC: 99 MG/DL (ref 65–100)
HCT VFR BLD AUTO: 31.4 % (ref 35–47)
HGB BLD-MCNC: 10.3 G/DL (ref 11.5–16)
IMM GRANULOCYTES # BLD AUTO: 0 K/UL (ref 0–0.04)
IMM GRANULOCYTES NFR BLD AUTO: 0 % (ref 0–0.5)
IRON SATN MFR SERPL: 23 % (ref 20–50)
IRON SERPL-MCNC: 79 UG/DL (ref 35–150)
LYMPHOCYTES # BLD: 0.8 K/UL (ref 0.8–3.5)
LYMPHOCYTES NFR BLD: 28 % (ref 12–49)
MAGNESIUM SERPL-MCNC: 1.8 MG/DL (ref 1.6–2.4)
MCH RBC QN AUTO: 29.5 PG (ref 26–34)
MCHC RBC AUTO-ENTMCNC: 32.8 G/DL (ref 30–36.5)
MCV RBC AUTO: 90 FL (ref 80–99)
MONOCYTES # BLD: 0.3 K/UL (ref 0–1)
MONOCYTES NFR BLD: 9 % (ref 5–13)
NEUTS SEG # BLD: 1.6 K/UL (ref 1.8–8)
NEUTS SEG NFR BLD: 60 % (ref 32–75)
NRBC # BLD: 0 K/UL (ref 0–0.01)
NRBC BLD-RTO: 0 PER 100 WBC
PLATELET # BLD AUTO: 106 K/UL (ref 150–400)
PMV BLD AUTO: 12.3 FL (ref 8.9–12.9)
POTASSIUM SERPL-SCNC: 3.8 MMOL/L (ref 3.5–5.1)
PROT SERPL-MCNC: 8.1 G/DL (ref 6.4–8.2)
RBC # BLD AUTO: 3.49 M/UL (ref 3.8–5.2)
RBC MORPH BLD: ABNORMAL
SAMPLES BEING HELD,HOLD: NORMAL
SODIUM SERPL-SCNC: 139 MMOL/L (ref 136–145)
T4 FREE SERPL-MCNC: 1.2 NG/DL (ref 0.8–1.5)
TIBC SERPL-MCNC: 351 UG/DL (ref 250–450)
TSH SERPL DL<=0.05 MIU/L-ACNC: 0.61 UIU/ML (ref 0.36–3.74)
VIT B12 SERPL-MCNC: 834 PG/ML (ref 193–986)
WBC # BLD AUTO: 2.8 K/UL (ref 3.6–11)

## 2021-05-10 PROCEDURE — 99214 OFFICE O/P EST MOD 30 MIN: CPT | Performed by: FAMILY MEDICINE

## 2021-05-10 NOTE — PROGRESS NOTES
Identified pt with two pt identifiers(name and ). Chief Complaint   Patient presents with    Vitamin D Deficiency    Hypertension    Thyroid Problem    Labs     NPO since midnight    Medication Refill        Health Maintenance Due   Topic    Hepatitis C Screening     COVID-19 Vaccine (1)    DTaP/Tdap/Td series (1 - Tdap)    Shingrix Vaccine Age 50> (1 of 2)    Bone Densitometry (Dexa) Screening     Pneumococcal 65+ years (2 of 2 - PPSV23)    Medicare Yearly Exam        Wt Readings from Last 3 Encounters:   05/10/21 193 lb (87.5 kg)   21 189 lb 12.8 oz (86.1 kg)   20 194 lb (88 kg)     Temp Readings from Last 3 Encounters:   05/10/21 97.8 °F (36.6 °C) (Temporal)   21 98 °F (36.7 °C) (Oral)   12/15/20 98.1 °F (36.7 °C)     BP Readings from Last 3 Encounters:   05/10/21 138/76   21 120/68   12/15/20 139/63     Pulse Readings from Last 3 Encounters:   05/10/21 97   21 100   12/15/20 100         Learning Assessment:  :     Learning Assessment 5/10/2021 2019   PRIMARY LEARNER Patient Patient   BARRIERS PRIMARY LEARNER NONE -   CO-LEARNER CAREGIVER No -   PRIMARY LANGUAGE ENGLISH ENGLISH   LEARNER PREFERENCE PRIMARY LISTENING DEMONSTRATION     DEMONSTRATION -   ANSWERED BY patient patient   RELATIONSHIP SELF SELF       Depression Screening:  :     3 most recent PHQ Screens 2021   Little interest or pleasure in doing things Not at all   Feeling down, depressed, irritable, or hopeless Several days   Total Score PHQ 2 1       Fall Risk Assessment:  :     Fall Risk Assessment, last 12 mths 2021   Able to walk? Yes   Fall in past 12 months? 0   Do you feel unsteady? 0   Are you worried about falling 0       Abuse Screening:  :     Abuse Screening Questionnaire 2021 2020 2020 3/7/2019 8/3/2018   Do you ever feel afraid of your partner? N N N N N   Are you in a relationship with someone who physically or mentally threatens you?  N N N N N   Is it safe for you to go home? Y Y Y Y Y       Coordination of Care Questionnaire:  :     1) Have you been to an emergency room, urgent care clinic since your last visit? no   Hospitalized since your last visit? no             2) Have you seen or consulted any other health care providers outside of [address] [place] [address] since your last visit? no  (Include any pap smears or colon screenings in this section.)    3) Do you have an Advance Directive on file? yes  Are you interested in receiving information about Advance Directives? no    Patient is accompanied by self. Reviewed record in preparation for visit and have obtained necessary documentation. Medication reconciliation up to date and corrected with patient at this time.

## 2021-05-10 NOTE — PROGRESS NOTES
Subjective:     Carole Doshi is a 64 y.o. female who presents for follow up of hypertension, hyperlipidemia and hypothyroidism. Diet and Lifestyle: generally follows a low fat low cholesterol diet, generally follows a low sodium diet, sedentary, nonsmoker  Home BP Monitoring: is not measured at home    Cardiovascular ROS: taking medications as instructed, no medication side effects noted, no TIA's, no chest pain on exertion, no dyspnea on exertion, no swelling of ankles. New concerns: pt was admitted to [facility] in March for bowel obstruction. She is feeling better. Pt has a skin growth on L lower cheek that she wants frozen off. Patient Active Problem List    Diagnosis Date Noted    History of bladder cancer 12/09/2020    Rheumatoid arthritis involving multiple sites ([facility] [address] 06/29/2020    Pancytopenia ([facility] 10/01/2019    HTN (hypertension) 04/18/2018    Hypokalemia 04/18/2018    Anemia 04/18/2018    Acquired hypothyroidism 04/18/2018    GERD (gastroesophageal reflux disease) 04/18/2018     Current Outpatient Medications   Medication Sig Dispense Refill    losartan (COZAAR) 50 mg tablet TAKE 1 TABLET BY MOUTH EVERY DAY 90 Tab 1    ergocalciferol (ERGOCALCIFEROL) 1,250 mcg (50,000 unit) capsule TAKE 1 CAP BY MOUTH EVERY SEVEN (7) DAYS. INDICATIONS: LOW VITAMIN D LEVELS 15 Cap 3    cyanocobalamin (VITAMIN B12) 1,000 mcg/mL injection TAKE 1 ML BY INTRAMUSCULAR ROUTE EVERY THIRTY (30) DAYS. 3 mL 15    magnesium oxide (MAG-OX) 400 mg tablet Take 1 Tab by mouth daily. Indications: low amount of magnesium in the blood 30 Tab 5    furosemide (LASIX) 40 mg tablet Take 1 Tab by mouth daily. 30 Tab 5    omeprazole (PRILOSEC) 20 mg capsule TAKE 1 CAPSULE BY MOUTH EVERY DAY 90 Cap 1    levothyroxine (SYNTHROID) 75 mcg tablet TAKE 1 TAB BY MOUTH DAILY (BEFORE BREAKFAST). 90 Tab 1    nystatin (MYCOSTATIN) powder Apply  to affected area four (4) times daily as needed.  Use around stoma      ENBREL MINI 50 mg/mL (1 mL) crtg Every Thursday.  albuterol (PROVENTIL HFA, VENTOLIN HFA, PROAIR HFA) 90 mcg/actuation inhaler Take 2 Puffs by inhalation every four (4) hours as needed. PRN for Dyspnea       Allergies   Allergen Reactions    Trimethoprim Other (comments)    Bactrim [Sulfamethoprim] Nausea and Vomiting    Ciprofloxacin Other (comments)     Joint aches    Diclofenac Nausea Only    Sulfa (Sulfonamide Antibiotics) Nausea Only    Cephalexin Nausea and Vomiting     Past Medical History:   Diagnosis Date    Arthritis     Cancer ([facility] [address]     bladder    COVID-19 12/2020    Endocrine disease     hyperthyroid    Gastrointestinal disorder     GERD (gastroesophageal reflux disease)     Hypertension     Other ill-defined conditions(799.89)     hyperthyroid    Pneumonia 12/2020 12/2020     Past Surgical History:   Procedure Laterality Date    HX GYN      HX HERNIA REPAIR      HX HIP REPLACEMENT Left     HX UROLOGICAL      stoma since 78     Family History   Problem Relation Age of Onset    Heart Disease Father     Cancer Father         Lung             Review of Systems, additional:  Pertinent items are noted in HPI. Objective: There were no vitals taken for this visit. Appearance: alert, well appearing, and in no distress. General exam: CVS exam BP noted to be well controlled today in office, S1, S2 normal, no gallop, no murmur, chest clear, no JVD, no HSM, no edema. Abdomen: soft, non tender. + ostomy bag in place. Skin: verruca lesion on lower left cheek. Treated with cryotherapy spray. Lab review: orders written for new lab studies as appropriate; see orders. Assessment/Plan:     . Shannan Ellis ICD-10-CM ICD-9-CM    1. Essential hypertension  I10 401.9    2. Vitamin D deficiency  E55.9 268.9 VITAMIN D, 25 HYDROXY      VITAMIN D, 25 HYDROXY   3. Low magnesium level  R79.0 790.6    4.  Acquired hypothyroidism  E03.9 244.9 TSH 3RD GENERATION      T4, FREE      T4, FREE      TSH 3RD GENERATION   5. B12 deficiency  E53.8 266.2 VITAMIN B12      VITAMIN B12   6. Encounter for long-term current use of medication  Z79.899 V58.69 MAGNESIUM      METABOLIC PANEL, COMPREHENSIVE      METABOLIC PANEL, COMPREHENSIVE      MAGNESIUM   7. Anemia, unspecified type  D64.9 285.9 CBC WITH AUTOMATED DIFF      FERRITIN      IRON PROFILE      IRON PROFILE      FERRITIN      CBC WITH AUTOMATED DIFF   8. Wart of face  B07.9 078.10      Order labs. Cryo tx to wart on face. Request records from [facility]. She has FU appt with Dr Aggie Ruggiero coming up soon.

## 2021-05-11 ENCOUNTER — TELEPHONE (OUTPATIENT)
Dept: FAMILY MEDICINE CLINIC | Age: 76
End: 2021-05-11

## 2021-05-11 NOTE — TELEPHONE ENCOUNTER
----- Message from Amee Lam MD sent at 5/11/2021  2:37 PM EDT -----  Please request hospital records from [facility]. She had admission in March for small bowel obstruction. Thanks.

## 2021-05-13 ENCOUNTER — TELEPHONE (OUTPATIENT)
Dept: FAMILY MEDICINE CLINIC | Age: 76
End: 2021-05-13

## 2021-05-14 NOTE — PROGRESS NOTES
Good Vit B 12 and Vit D levels. Good thyroid level. Magnesium level is better. Iron levels good. Better hemoglobin but WBC still low. Follow up with Dr. Sue Siegel.

## 2021-05-14 NOTE — TELEPHONE ENCOUNTER
Call pt about labs:    Good Vit B 12 and Vit D levels. Good thyroid level. Magnesium level is better. Iron levels good. Better hemoglobin but WBC still low. Follow up with Dr. Paige Garay.

## 2021-05-19 DIAGNOSIS — D63.8 ANEMIA OF CHRONIC DISEASE: ICD-10-CM

## 2021-05-19 DIAGNOSIS — D61.818 PANCYTOPENIA (HCC): ICD-10-CM

## 2021-06-04 ENCOUNTER — VIRTUAL VISIT (OUTPATIENT)
Dept: ONCOLOGY | Age: 76
End: 2021-06-04
Payer: MEDICAID

## 2021-06-04 DIAGNOSIS — E53.8 DIETARY B12 DEFICIENCY: ICD-10-CM

## 2021-06-04 DIAGNOSIS — D61.818 PANCYTOPENIA (HCC): Primary | ICD-10-CM

## 2021-06-04 DIAGNOSIS — N39.0 RECURRENT UTI: ICD-10-CM

## 2021-06-04 DIAGNOSIS — D63.8 ANEMIA OF CHRONIC DISEASE: ICD-10-CM

## 2021-06-04 PROCEDURE — G8400 PT W/DXA NO RESULTS DOC: HCPCS | Performed by: INTERNAL MEDICINE

## 2021-06-04 PROCEDURE — 99214 OFFICE O/P EST MOD 30 MIN: CPT | Performed by: INTERNAL MEDICINE

## 2021-06-04 PROCEDURE — 1101F PT FALLS ASSESS-DOCD LE1/YR: CPT | Performed by: INTERNAL MEDICINE

## 2021-06-04 PROCEDURE — G8756 NO BP MEASURE DOC: HCPCS | Performed by: INTERNAL MEDICINE

## 2021-06-04 PROCEDURE — G8510 SCR DEP NEG, NO PLAN REQD: HCPCS | Performed by: INTERNAL MEDICINE

## 2021-06-04 PROCEDURE — 1090F PRES/ABSN URINE INCON ASSESS: CPT | Performed by: INTERNAL MEDICINE

## 2021-06-04 PROCEDURE — G8427 DOCREV CUR MEDS BY ELIG CLIN: HCPCS | Performed by: INTERNAL MEDICINE

## 2021-06-04 NOTE — PROGRESS NOTES
Chief Complaint   Patient presents with    Follow-up     Job Iglesias is a pleasant 64year old woman who presents as a follow up for pancytopenia.  She denies pain

## 2021-06-04 NOTE — PROGRESS NOTES
[id number] [facility] at [facility]  [phone]    Hematology / Oncology Progress Note    Reason for Visit:   Nisreen Rivera is a 64 y.o. female who is seen by synchronous (real-time) audio-video technology on 6/4/2021 for follow up of pancytopenia. History of Present Illness:   Ms. Sharma Babinski is a 64 y.o. female with HTN, remote h/o bladder cancer who comes in for follow up of pancytopenia. She was recently hospitalized after p/w nausea and vomiting since taking Bactrim for UTI as well as fever 102, found to be pancytopenic. She has a h/o of bladder cancer (1999) and underwent radical cystectomy with ileal conduit. She was evaluated in the urology office for UTI and was started on macrobid, later switched to Bactrim. This medication seemed to cause n/v. Upon questioning, Ms. Sharma Babinski denies any previous h/o anemia except when pregnant - at which time she used to take iron. She notes occasional blood in her urine but otherwise denies any other signs of bleeding. She was switched to Levaquin, tolerated this well with eventual resolution of fevers. Workup of pancytopenia revealed low VitB12 level, and patient was started on VitB12 injections. She completed daily injections x 7 (in and out of the hospital), and she is currently on weekly injections. She states she is having significant pain in her left hip. She recently underwent left hip replacement in March 2018, and she is following closely with orthopedics. She does state that her energy level has improved after discharge from the hospital.   Patient had briefly stopped the taking the B12 injections, then she restarted based on my instructions. She continues to feel fatigued despite restarting the B12 injections. She follows with Urology for recurrent UTI. She comes in today for review of bone marrow biopsy results. Interval History: Today, patient is seen for follow up of pancytopenia.  She was admitted to [address] [place] [address] for a bowel obstruction in 3/2021, treated with NG tube decompression, bowel rest, antibiotics. No recent UTI or antibiotics since March 2021. No cough, fevers, chills. PMHx: Rheumatoid, Bladder ca, h/o COVID19, GERD, HTN, Hyperthyroid  Review of Systems: A complete review of systems was obtained, negative except as described above. Physical Exam:       Due to this being a TeleHealth evaluation, many elements of the physical examination are unable to be assessed. Constitutional: Appears well-developed and well-nourished in no apparent distress   Mental status: Alert and awake, Oriented to person/place/time, Able to follow commands  Eyes: EOM normal, Sclera normal, No visible ocular discharge  HENT: Normocephalic, atraumatic; Mouth/Throat: Moist mucous membranes, External Ears normal  Neck: No visualized mass  Pulmonary/Chest: Respiratory effort normal, No visualized signs of difficulty breathing or respiratory distress   Musculoskeletal: Normal gait with no signs of ataxia, Normal range of motion of neck  Neurological: No facial asymmetry (Cranial nerve 7 motor function), No gaze palsy  Skin: No significant exanthematous lesions or discoloration noted on facial skin  Psychiatric: Normal affect, normal judgment/insight. No hallucinations       Results:     Lab Results   Component Value Date/Time    WBC 2.8 (L) 05/10/2021 01:26 PM    HGB 10.3 (L) 05/10/2021 01:26 PM    HCT 31.4 (L) 05/10/2021 01:26 PM    PLATELET 894 (L) 51/36/9293 01:26 PM    MCV 90.0 05/10/2021 01:26 PM    ABS.  NEUTROPHILS 1.6 (L) 05/10/2021 01:26 PM    Hemoglobin (POC) 11.9 01/03/2012 10:01 AM    Hematocrit (POC) 35 01/03/2012 10:01 AM     Lab Results   Component Value Date/Time    Sodium 139 05/10/2021 01:26 PM    Potassium 3.8 05/10/2021 01:26 PM    Chloride 106 05/10/2021 01:26 PM    CO2 24 05/10/2021 01:26 PM    Glucose 99 05/10/2021 01:26 PM    BUN 25 (H) 05/10/2021 01:26 PM    Creatinine 1.34 (H) 05/10/2021 01:26 PM    GFR est AA 46 (L) 05/10/2021 01:26 PM    GFR est non-AA 38 (L) 05/10/2021 01:26 PM    Calcium 9.6 05/10/2021 01:26 PM    Sodium (POC) 143 2012 10:01 AM    Potassium (POC) 4.1 2012 10:01 AM    Chloride (POC) 105 2012 10:01 AM    Glucose (POC) 104 (H) 2018 11:18 AM    BUN (POC) 11 2012 10:01 AM    Creatinine (POC) 1.0 2012 10:01 AM    Calcium, ionized (POC) 1.14 2012 10:01 AM     Lab Results   Component Value Date/Time    Bilirubin, total 0.8 05/10/2021 01:26 PM    ALT (SGPT) 24 05/10/2021 01:26 PM    Alk. phosphatase 69 05/10/2021 01:26 PM    Protein, total 8.1 05/10/2021 01:26 PM    Albumin 4.1 05/10/2021 01:26 PM    Globulin 4.0 05/10/2021 01:26 PM     Lab Results   Component Value Date/Time    Iron 79 05/10/2021 01:26 PM    TIBC 351 05/10/2021 01:26 PM    Iron % saturation 23 05/10/2021 01:26 PM    Ferritin 53 05/10/2021 01:26 PM     Lab Results   Component Value Date/Time    Vitamin B12 834 05/10/2021 01:26 PM    Folate 14.1 12/10/2020 05:02 AM         Imagin2018 XR CHEST  IMPRESSION: No acute abnormality     2017 abd/pelvis CT:      FINDINGS:   Liver is nodular compatible with cirrhosis. The spleen is normal in size. The  gallbladder is not distended. The a pancreas does appear unremarkable. Ostomy  seen in the right lower quadrant. There is a small supraumbilical abdominal wall  hernia containing nondistended colon. There is no bowel wall thickening or  obstruction. Prior cystectomy. There is no free air or free fluid. No definite  adenopathy in the abdomen or pelvis. Large left renal cyst unchanged. There is  no intrarenal calcification or obstruction. IMPRESSION: No acute findings. Prior surgery.     Assessment and Recommendations:   64 y.o. female with hx of HTN, hypothyroidism, GERD, bladder cancer s/p urostomy,  L hip replacement (3/16) and hospital admission in 2018 for UTI admitted with nausea, vomiting and fever.     1.  Pancytopenia: WBC and PLT continue to fluctuate up and down in relation to infections, and perhaps some influence from RA. Prior workup revealed Vitamin B12 def and anemia of chronic disease. TSH normal. CT in 8/2017 mentioned possible cirrhosis, but no risk factors (no diabetes or alcohol use). Bone marrow biopsy in 10/2018 showed normal results (normocellular marrow with trilineage hematopoiesis). No medication culprits. -- Repeat labs in 6 months   -- No need for repeat bone marrow biopsy unless ANC < 1.0, Hgb < 9, PLT < 80 without infection. 2. Vitamin B12 deficiency: Now with normal levels after starting on VitB12 injections approx 8/2018.  -- Continue Vitamin B12 IM 1000mcg monthly - sent script at last visit. 3. Rheumatoid arthritis: Affects her wrists, hands, shoulders. Sees Dr. Kris Blanchard and is on Embrel (restartd 3/1/21.)      4. Recurrent UTI: h/o of recurrent UTIs. Completed Levaquin in the past.      5. Fatigue: Likely related to moderate anemia. 6. Hx of bladder cancer (1999): S/p ileal conduit/ urostomy. Sees Dr. Annmarie Yeh in Urology.      7. S/p Left hip replacement: Surgery was in 3/2018. Improvement in left hip pain. She is following closely with orthopedics. I appreciate the opportunity to participate in Ms. Palak Gonzalez's care. Total physician time spent on this encounter was 30 minutes. The patient was evaluated through a synchronous (real-time) audio-video encounter. The patient (or guardian if applicable) is aware that this is a billable service. Verbal consent to proceed has been obtained within the past 12 months. The visit was conducted pursuant to the emergency declaration under the [facility], [address] [place] [address] authority and the [employer] Act. Patient identification was verified, and a caregiver was present when appropriate. The patient was located in a state where the provider was credentialed to provide care.       Signed By: Jane Quezada MD     June 4, 2021

## 2021-06-05 DIAGNOSIS — E53.8 DEFICIENCY OF OTHER SPECIFIED B GROUP VITAMINS: ICD-10-CM

## 2021-06-06 RX ORDER — SYRINGE WITH NEEDLE, 1 ML 25GX5/8"
SYRINGE, EMPTY DISPOSABLE MISCELLANEOUS
Qty: 12 SYRINGE | Refills: 3 | Status: SHIPPED | OUTPATIENT
Start: 2021-06-06 | End: 2022-07-05

## 2021-07-22 ENCOUNTER — TELEPHONE (OUTPATIENT)
Dept: ONCOLOGY | Age: 76
End: 2021-07-22

## 2021-08-07 DIAGNOSIS — K21.9 GASTROESOPHAGEAL REFLUX DISEASE, UNSPECIFIED WHETHER ESOPHAGITIS PRESENT: ICD-10-CM

## 2021-08-08 DIAGNOSIS — I50.9 CONGESTIVE HEART FAILURE, UNSPECIFIED HF CHRONICITY, UNSPECIFIED HEART FAILURE TYPE (HCC): ICD-10-CM

## 2021-08-08 RX ORDER — FUROSEMIDE 40 MG/1
TABLET ORAL
Qty: 90 TABLET | Refills: 1 | Status: SHIPPED | OUTPATIENT
Start: 2021-08-08 | End: 2021-11-24 | Stop reason: SDUPTHER

## 2021-08-08 RX ORDER — OMEPRAZOLE 20 MG/1
CAPSULE, DELAYED RELEASE ORAL
Qty: 90 CAPSULE | Refills: 1 | Status: SHIPPED | OUTPATIENT
Start: 2021-08-08 | End: 2021-11-24 | Stop reason: SDUPTHER

## 2021-08-09 DIAGNOSIS — R79.0 LOW MAGNESIUM LEVEL: ICD-10-CM

## 2021-08-09 RX ORDER — LANOLIN ALCOHOL/MO/W.PET/CERES
400 CREAM (GRAM) TOPICAL DAILY
Qty: 30 TABLET | Refills: 5 | Status: SHIPPED | OUTPATIENT
Start: 2021-08-09 | End: 2021-11-24 | Stop reason: SDUPTHER

## 2021-09-07 DIAGNOSIS — E03.9 ACQUIRED HYPOTHYROIDISM: ICD-10-CM

## 2021-09-07 RX ORDER — LEVOTHYROXINE SODIUM 75 UG/1
TABLET ORAL
Qty: 90 TABLET | Refills: 1 | Status: SHIPPED | OUTPATIENT
Start: 2021-09-07 | End: 2022-02-11

## 2021-09-18 DIAGNOSIS — I10 ESSENTIAL HYPERTENSION: ICD-10-CM

## 2021-09-18 RX ORDER — LOSARTAN POTASSIUM 50 MG/1
TABLET ORAL
Qty: 90 TABLET | Refills: 1 | Status: SHIPPED | OUTPATIENT
Start: 2021-09-18 | End: 2022-02-11

## 2021-11-24 ENCOUNTER — OFFICE VISIT (OUTPATIENT)
Dept: FAMILY MEDICINE CLINIC | Age: 76
End: 2021-11-24
Payer: MEDICAID

## 2021-11-24 VITALS
BODY MASS INDEX: 35.44 KG/M2 | OXYGEN SATURATION: 98 % | RESPIRATION RATE: 16 BRPM | HEART RATE: 87 BPM | DIASTOLIC BLOOD PRESSURE: 62 MMHG | HEIGHT: 63 IN | SYSTOLIC BLOOD PRESSURE: 136 MMHG | WEIGHT: 200 LBS | TEMPERATURE: 97.6 F

## 2021-11-24 DIAGNOSIS — R79.0 LOW MAGNESIUM LEVEL: ICD-10-CM

## 2021-11-24 DIAGNOSIS — Z78.0 POSTMENOPAUSAL STATE: ICD-10-CM

## 2021-11-24 DIAGNOSIS — Z23 ENCOUNTER FOR IMMUNIZATION: ICD-10-CM

## 2021-11-24 DIAGNOSIS — K21.9 GASTROESOPHAGEAL REFLUX DISEASE, UNSPECIFIED WHETHER ESOPHAGITIS PRESENT: ICD-10-CM

## 2021-11-24 DIAGNOSIS — E55.9 VITAMIN D DEFICIENCY: ICD-10-CM

## 2021-11-24 DIAGNOSIS — R26.89 POOR BALANCE: ICD-10-CM

## 2021-11-24 DIAGNOSIS — I50.9 CONGESTIVE HEART FAILURE, UNSPECIFIED HF CHRONICITY, UNSPECIFIED HEART FAILURE TYPE (HCC): ICD-10-CM

## 2021-11-24 DIAGNOSIS — E03.9 ACQUIRED HYPOTHYROIDISM: ICD-10-CM

## 2021-11-24 DIAGNOSIS — E53.8 B12 DEFICIENCY: ICD-10-CM

## 2021-11-24 DIAGNOSIS — Z11.59 NEED FOR HEPATITIS C SCREENING TEST: ICD-10-CM

## 2021-11-24 DIAGNOSIS — Z00.00 MEDICARE ANNUAL WELLNESS VISIT, SUBSEQUENT: Primary | ICD-10-CM

## 2021-11-24 DIAGNOSIS — D61.818 PANCYTOPENIA (HCC): ICD-10-CM

## 2021-11-24 DIAGNOSIS — I10 ESSENTIAL HYPERTENSION: ICD-10-CM

## 2021-11-24 DIAGNOSIS — Z79.899 ENCOUNTER FOR LONG-TERM CURRENT USE OF MEDICATION: ICD-10-CM

## 2021-11-24 DIAGNOSIS — Z12.31 ENCOUNTER FOR SCREENING MAMMOGRAM FOR MALIGNANT NEOPLASM OF BREAST: ICD-10-CM

## 2021-11-24 PROCEDURE — 99214 OFFICE O/P EST MOD 30 MIN: CPT | Performed by: FAMILY MEDICINE

## 2021-11-24 PROCEDURE — 90694 VACC AIIV4 NO PRSRV 0.5ML IM: CPT | Performed by: FAMILY MEDICINE

## 2021-11-24 RX ORDER — OMEPRAZOLE 20 MG/1
20 CAPSULE, DELAYED RELEASE ORAL DAILY
Qty: 90 CAPSULE | Refills: 1 | Status: SHIPPED | OUTPATIENT
Start: 2021-11-24 | End: 2022-06-08 | Stop reason: SDUPTHER

## 2021-11-24 RX ORDER — FUROSEMIDE 40 MG/1
40 TABLET ORAL DAILY
Qty: 90 TABLET | Refills: 1 | Status: SHIPPED | OUTPATIENT
Start: 2021-11-24 | End: 2022-06-08 | Stop reason: SDUPTHER

## 2021-11-24 RX ORDER — LANOLIN ALCOHOL/MO/W.PET/CERES
400 CREAM (GRAM) TOPICAL DAILY
Qty: 90 TABLET | Refills: 3 | Status: SHIPPED | OUTPATIENT
Start: 2021-11-24 | End: 2022-11-03

## 2021-11-24 RX ORDER — ZOSTER VACCINE RECOMBINANT, ADJUVANTED 50 MCG/0.5
KIT INTRAMUSCULAR
Qty: 0.5 ML | Refills: 1 | Status: SHIPPED | OUTPATIENT
Start: 2021-11-24 | End: 2022-06-08 | Stop reason: ALTCHOICE

## 2021-11-24 NOTE — PATIENT INSTRUCTIONS

## 2021-11-24 NOTE — PROGRESS NOTES
This is the Subsequent Medicare Annual Wellness Exam, performed 12 months or more after the Initial AWV or the last Subsequent AWV    I have reviewed the patient's medical history in detail and updated the computerized patient record. Assessment/Plan   Education and counseling provided:  Are appropriate based on today's review and evaluation  End-of-Life planning (with patient's consent)  Influenza Vaccine  Screening Mammography  Cardiovascular screening blood test  Bone mass measurement (DEXA)  Screening for glaucoma  Diabetes screening test    1. Medicare annual wellness visit, subsequent  2. Essential hypertension  -     LIPID PANEL; Future  3. Acquired hypothyroidism  -     TSH 3RD GENERATION; Future  -     T4, FREE; Future  4. Pancytopenia ([address]  5. Low magnesium level  -     magnesium oxide (MAG-OX) 400 mg tablet; Take 1 Tablet by mouth daily. Indications: low amount of magnesium in the blood, Normal, Disp-90 Tablet, R-3  6. Vitamin D deficiency  -     VITAMIN D, 25 HYDROXY; Future  7. Encounter for long-term current use of medication  -     CBC WITH AUTOMATED DIFF; Future  -     METABOLIC PANEL, COMPREHENSIVE; Future  -     MAGNESIUM; Future  8. B12 deficiency  -     VITAMIN B12; Future  9. Need for hepatitis C screening test  -     HEPATITIS C AB; Future  10. Encounter for screening mammogram for malignant neoplasm of breast  -     LOUISA MAMMO BI SCREENING INCL CAD; Future  11. Postmenopausal state  -     DEXA BONE DENSITY STUDY AXIAL; Future  12. Encounter for immunization  -     FLU (FLUAD QUAD INFLUENZA VACCINE,QUAD,ADJUVANTED)  -     ADMIN INFLUENZA VIRUS VAC  -     varicella-zoster recombinant, PF, (Shingrix, PF,) 50 mcg/0.5 mL susr injection; 0.5mL by IntraMUSCular route once now and then repeat in 2-6 months, Print, Disp-0.5 mL, R-1  13. Congestive heart failure, unspecified HF chronicity, unspecified heart failure type (HCC)  -     furosemide (LASIX) 40 mg tablet; Take 1 Tablet by mouth daily. , Normal, Disp-90 Tablet, R-1  14. Gastroesophageal reflux disease, unspecified whether esophagitis present  -     omeprazole (PRILOSEC) 20 mg capsule; Take 1 Capsule by mouth daily. , Normal, Disp-90 Capsule, R-1  15. Poor balance  -     REFERRAL TO PHYSICAL THERAPY       Depression Risk Factor Screening     3 most recent PHQ Screens 11/24/2021   Little interest or pleasure in doing things Not at all   Feeling down, depressed, irritable, or hopeless Not at all   Total Score PHQ 2 0       Alcohol Risk Screen    Do you average more than 1 drink per night or more than 7 drinks a week:  No    On any one occasion in the past three months have you have had more than 3 drinks containing alcohol:  No        Functional Ability and Level of Safety    Hearing: Hearing is good. Activities of Daily Living: The home contains: handrails and grab bars  Patient does total self care      Ambulation: with mild difficulty     Fall Risk:  Fall Risk Assessment, last 12 mths 6/4/2021   Able to walk? Yes   Fall in past 12 months? 0   Do you feel unsteady?  0   Are you worried about falling 0      Abuse Screen:  Patient is not abused       Cognitive Screening    Has your family/caregiver stated any concerns about your memory: no         Health Maintenance Due     Health Maintenance Due   Topic Date Due    COVID-19 Vaccine (1) Never done    DTaP/Tdap/Td series (1 - Tdap) Never done    Shingrix Vaccine Age 50> (1 of 2) Never done    Bone Densitometry (Dexa) Screening  Never done    Pneumococcal 65+ years (2 of 2 - PPSV23) 09/21/2020       Patient Care Team   Patient Care Team:  David Tompkins MD as PCP - General (Family Medicine)  David Tompkins MD as PCP - REHABILITATION [facility]  Sean Dance, MD (Urology)    History     Patient Active Problem List   Diagnosis Code    HTN (hypertension) I10    Hypokalemia E87.6    Anemia D64.9    Acquired hypothyroidism E03.9    GERD (gastroesophageal reflux disease) K21.9    Pancytopenia (HCC) D61.818    Rheumatoid arthritis involving multiple sites ([facility] M06.9    History of bladder cancer Z85.51     Past Medical History:   Diagnosis Date    Arthritis     Cancer ([facility]     bladder    COVID-19 12/2020    Endocrine disease     hyperthyroid    Gastrointestinal disorder     GERD (gastroesophageal reflux disease)     Hypertension     Other ill-defined conditions(799.89)     hyperthyroid    Pneumonia 12/2020 12/2020      Past Surgical History:   Procedure Laterality Date    HX GYN      HX HERNIA REPAIR      HX HIP REPLACEMENT Left     HX UROLOGICAL      stoma since 89     Current Outpatient Medications   Medication Sig Dispense Refill    varicella-zoster recombinant, PF, (Shingrix, PF,) 50 mcg/0.5 mL susr injection 0.5mL by IntraMUSCular route once now and then repeat in 2-6 months 0.5 mL 1    furosemide (LASIX) 40 mg tablet Take 1 Tablet by mouth daily. 90 Tablet 1    magnesium oxide (MAG-OX) 400 mg tablet Take 1 Tablet by mouth daily. Indications: low amount of magnesium in the blood 90 Tablet 3    omeprazole (PRILOSEC) 20 mg capsule Take 1 Capsule by mouth daily. 90 Capsule 1    losartan (COZAAR) 50 mg tablet TAKE 1 TABLET BY MOUTH EVERY DAY 90 Tablet 1    levothyroxine (SYNTHROID) 75 mcg tablet TAKE 1 TABLET BY MOUTH EVERY DAY BEFORE BREAKFAST 90 Tablet 1    BD Luer-Esau Syringe 3 mL 25 x 5/8\" syrg USE 1 SYRINGE EVERY MONTH FOR YOUR VITAMIN B INJECTIONS 12 Syringe 3    ergocalciferol (ERGOCALCIFEROL) 1,250 mcg (50,000 unit) capsule TAKE 1 CAP BY MOUTH EVERY SEVEN (7) DAYS. INDICATIONS: LOW VITAMIN D LEVELS 15 Cap 3    cyanocobalamin (VITAMIN B12) 1,000 mcg/mL injection TAKE 1 ML BY INTRAMUSCULAR ROUTE EVERY THIRTY (30) DAYS. 3 mL 15    nystatin (MYCOSTATIN) powder Apply  to affected area four (4) times daily as needed. Use around stoma      ENBREL MINI 50 mg/mL (1 mL) crtg Every Thursday.        Allergies   Allergen Reactions    Trimethoprim Other (comments)    Bactrim [Sulfamethoprim] Nausea and Vomiting    Ciprofloxacin Other (comments)     Joint aches    Diclofenac Nausea Only    Sulfa (Sulfonamide Antibiotics) Nausea Only    Cephalexin Nausea and Vomiting       Family History   Problem Relation Age of Onset    Heart Disease Father     Cancer Father         Lung     Social History     Tobacco Use    Smoking status: Former Smoker     Packs/day: 1.00     Quit date: 7/3/1999     Years since quittin.4    Smokeless tobacco: Never Used   Substance Use Topics    Alcohol use: No       Subjective:     Niall Latham is a 64 y.o. female who presents for follow up of hypertension and hyperlipidemia. Diet and Lifestyle: generally follows a low fat low cholesterol diet, generally follows a low sodium diet, sedentary, nonsmoker  Home BP Monitoring: is not measured at home    Cardiovascular ROS: taking medications as instructed, no medication side effects noted, no TIA's, no chest pain on exertion, no dyspnea on exertion, no swelling of ankles. New concerns: C/O poor balance, jaylin when she goes out of house. Patient Active Problem List    Diagnosis Date Noted    History of bladder cancer 2020    Rheumatoid arthritis involving multiple sites ([facility] 2020    Pancytopenia ([facility] 10/01/2019    HTN (hypertension) 2018    Hypokalemia 2018    Anemia 2018    Acquired hypothyroidism 2018    GERD (gastroesophageal reflux disease) 2018     Current Outpatient Medications   Medication Sig Dispense Refill    varicella-zoster recombinant, PF, (Shingrix, PF,) 50 mcg/0.5 mL susr injection 0.5mL by IntraMUSCular route once now and then repeat in 2-6 months 0.5 mL 1    furosemide (LASIX) 40 mg tablet Take 1 Tablet by mouth daily. 90 Tablet 1    magnesium oxide (MAG-OX) 400 mg tablet Take 1 Tablet by mouth daily.  Indications: low amount of magnesium in the blood 90 Tablet 3    omeprazole (PRILOSEC) 20 mg capsule Take 1 Capsule by mouth daily. 90 Capsule 1    losartan (COZAAR) 50 mg tablet TAKE 1 TABLET BY MOUTH EVERY DAY 90 Tablet 1    levothyroxine (SYNTHROID) 75 mcg tablet TAKE 1 TABLET BY MOUTH EVERY DAY BEFORE BREAKFAST 90 Tablet 1    BD Luer-Esau Syringe 3 mL 25 x 5/8\" syrg USE 1 SYRINGE EVERY MONTH FOR YOUR VITAMIN B INJECTIONS 12 Syringe 3    ergocalciferol (ERGOCALCIFEROL) 1,250 mcg (50,000 unit) capsule TAKE 1 CAP BY MOUTH EVERY SEVEN (7) DAYS. INDICATIONS: LOW VITAMIN D LEVELS 15 Cap 3    cyanocobalamin (VITAMIN B12) 1,000 mcg/mL injection TAKE 1 ML BY INTRAMUSCULAR ROUTE EVERY THIRTY (30) DAYS. 3 mL 15    nystatin (MYCOSTATIN) powder Apply  to affected area four (4) times daily as needed. Use around stoma      ENBREL MINI 50 mg/mL (1 mL) crtg Every Thursday. Allergies   Allergen Reactions    Trimethoprim Other (comments)    Bactrim [Sulfamethoprim] Nausea and Vomiting    Ciprofloxacin Other (comments)     Joint aches    Diclofenac Nausea Only    Sulfa (Sulfonamide Antibiotics) Nausea Only    Cephalexin Nausea and Vomiting     Past Medical History:   Diagnosis Date    Arthritis     Cancer ([facility]     bladder    COVID-19 2020    Endocrine disease     hyperthyroid    Gastrointestinal disorder     GERD (gastroesophageal reflux disease)     Hypertension     Other ill-defined conditions(799.89)     hyperthyroid    Pneumonia 2020     Past Surgical History:   Procedure Laterality Date    HX GYN      HX HERNIA REPAIR      HX HIP REPLACEMENT Left     HX UROLOGICAL      stoma since 78     Family History   Problem Relation Age of Onset    Heart Disease Father     Cancer Father         Lung     Social History     Tobacco Use    Smoking status: Former Smoker     Packs/day: 1.00     Quit date: 7/3/1999     Years since quittin.4    Smokeless tobacco: Never Used   Substance Use Topics    Alcohol use:  No             Review of Systems, additional:  Pertinent items are noted in HPI.    Objective:     Visit Vitals  /62 (BP 1 Location: Right arm, BP Patient Position: Sitting, BP Cuff Size: Adult)   Pulse 87   Temp 97.6 °F (36.4 °C) (Temporal)   Resp 16   Ht 5' 3\" (1.6 m)   Wt 200 lb (90.7 kg)   SpO2 98%   BMI 35.43 kg/m²     Appearance: alert, well appearing, and in no distress and overweight. General exam: CVS exam BP noted to be well controlled today in office, S1, S2 normal, no gallop, no murmur, chest clear, no JVD, no HSM, no edema. Lab review: orders written for new lab studies as appropriate; see orders. Assessment/Plan:     . [place] ICD-10-CM ICD-9-CM    1. Medicare annual wellness visit, subsequent  Z00.00 V70.0    2. Essential hypertension  I10 401.9 LIPID PANEL      LIPID PANEL   3. Acquired hypothyroidism  E03.9 244.9 TSH 3RD GENERATION      T4, FREE      T4, FREE      TSH 3RD GENERATION   4. Pancytopenia ([address]  D61.818 284.19    5. Low magnesium level  R79.0 790.6 magnesium oxide (MAG-OX) 400 mg tablet   6. Vitamin D deficiency  E55.9 268.9 VITAMIN D, 25 HYDROXY      VITAMIN D, 25 HYDROXY   7. Encounter for long-term current use of medication  Z79.899 V58.69 CBC WITH AUTOMATED DIFF      METABOLIC PANEL, COMPREHENSIVE      MAGNESIUM      MAGNESIUM      METABOLIC PANEL, COMPREHENSIVE      CBC WITH AUTOMATED DIFF   8. B12 deficiency  E53.8 266.2 VITAMIN B12      VITAMIN B12   9. Need for hepatitis C screening test  Z11.59 V73.89 HEPATITIS C AB      HEPATITIS C AB   10. Encounter for screening mammogram for malignant neoplasm of breast  Z12.31 V76.12 LOUISA MAMMO BI SCREENING INCL CAD   6. Postmenopausal state  Z78.0 V49.81 DEXA BONE DENSITY STUDY AXIAL   12. Encounter for immunization  Z23 V03.89 FLU (FLUAD QUAD INFLUENZA VACCINE,QUAD,ADJUVANTED)      ADMIN INFLUENZA VIRUS VAC      varicella-zoster recombinant, PF, (Shingrix, PF,) 50 mcg/0.5 mL susr injection   13.  Congestive heart failure, unspecified HF chronicity, unspecified heart failure type (HCC)  I50.9 428.0 furosemide (LASIX) 40 mg tablet   14. Gastroesophageal reflux disease, unspecified whether esophagitis present  K21.9 530.81 omeprazole (PRILOSEC) 20 mg capsule   15.  Poor balance  R26.89 781.99 REFERRAL TO PHYSICAL THERAPY     Marie Barr MD

## 2021-11-24 NOTE — PROGRESS NOTES
Identified pt with two pt identifiers(name and ). Chief Complaint   Patient presents with    Physical    Immunization/Injection     flu vaccine        Health Maintenance Due   Topic    Hepatitis C Screening     COVID-19 Vaccine (1)    DTaP/Tdap/Td series (1 - Tdap)    Shingrix Vaccine Age 49> (1 of 2)    Bone Densitometry (Dexa) Screening     Pneumococcal 65+ years (2 of 2 - PPSV23)    Medicare Yearly Exam     Flu Vaccine (1)       Wt Readings from Last 3 Encounters:   21 200 lb (90.7 kg)   05/10/21 193 lb (87.5 kg)   21 189 lb 12.8 oz (86.1 kg)     Temp Readings from Last 3 Encounters:   21 97.6 °F (36.4 °C) (Temporal)   05/10/21 97.8 °F (36.6 °C) (Temporal)   21 98 °F (36.7 °C) (Oral)     BP Readings from Last 3 Encounters:   21 136/62   05/10/21 138/76   21 120/68     Pulse Readings from Last 3 Encounters:   21 87   05/10/21 97   21 100         Learning Assessment:  :     Learning Assessment 5/10/2021 2019   PRIMARY LEARNER Patient Patient   BARRIERS PRIMARY LEARNER NONE -   Chika Primmer CAREGIVER No -   PRIMARY LANGUAGE ENGLISH ENGLISH   LEARNER PREFERENCE PRIMARY LISTENING DEMONSTRATION     DEMONSTRATION -   ANSWERED BY patient patient   RELATIONSHIP SELF SELF       Depression Screening:  :     3 most recent PHQ Screens 2021   Little interest or pleasure in doing things Not at all   Feeling down, depressed, irritable, or hopeless Not at all   Total Score PHQ 2 0       Fall Risk Assessment:  :     Fall Risk Assessment, last 12 mths 2021   Able to walk? Yes   Fall in past 12 months? 0   Do you feel unsteady? 0   Are you worried about falling 0       Abuse Screening:  :     Abuse Screening Questionnaire 2021 2021 2020 2020 3/7/2019 8/3/2018   Do you ever feel afraid of your partner? N N N N N N   Are you in a relationship with someone who physically or mentally threatens you?  N N N N N N   Is it safe for you to go home? Y Y Y Y Y Y       Coordination of Care Questionnaire:  :     1) Have you been to an emergency room, urgent care clinic since your last visit? no   Hospitalized since your last visit? no             2) Have you seen or consulted any other health care providers outside of [address] [place] [address] since your last visit? no  (Include any pap smears or colon screenings in this section.)    3) Do you have an Advance Directive on file? yes  Are you interested in receiving information about Advance Directives? no      Reviewed record in preparation for visit and have obtained necessary documentation.

## 2021-11-25 LAB
25(OH)D3 SERPL-MCNC: 84.4 NG/ML (ref 30–100)
ALBUMIN SERPL-MCNC: 3.8 G/DL (ref 3.5–5)
ALBUMIN/GLOB SERPL: 1 {RATIO} (ref 1.1–2.2)
ALP SERPL-CCNC: 79 U/L (ref 45–117)
ALT SERPL-CCNC: 25 U/L (ref 12–78)
ANION GAP SERPL CALC-SCNC: 7 MMOL/L (ref 5–15)
AST SERPL-CCNC: 25 U/L (ref 15–37)
BASOPHILS # BLD: 0 K/UL (ref 0–0.1)
BASOPHILS NFR BLD: 1 % (ref 0–1)
BILIRUB SERPL-MCNC: 0.6 MG/DL (ref 0.2–1)
BUN SERPL-MCNC: 28 MG/DL (ref 6–20)
BUN/CREAT SERPL: 20 (ref 12–20)
CALCIUM SERPL-MCNC: 9.3 MG/DL (ref 8.5–10.1)
CHLORIDE SERPL-SCNC: 107 MMOL/L (ref 97–108)
CHOLEST SERPL-MCNC: 158 MG/DL
CO2 SERPL-SCNC: 26 MMOL/L (ref 21–32)
CREAT SERPL-MCNC: 1.37 MG/DL (ref 0.55–1.02)
DIFFERENTIAL METHOD BLD: ABNORMAL
EOSINOPHIL # BLD: 0.1 K/UL (ref 0–0.4)
EOSINOPHIL NFR BLD: 2 % (ref 0–7)
ERYTHROCYTE [DISTWIDTH] IN BLOOD BY AUTOMATED COUNT: 13.1 % (ref 11.5–14.5)
GLOBULIN SER CALC-MCNC: 3.8 G/DL (ref 2–4)
GLUCOSE SERPL-MCNC: 132 MG/DL (ref 65–100)
HCT VFR BLD AUTO: 30.8 % (ref 35–47)
HCV AB SERPL QL IA: NONREACTIVE
HDLC SERPL-MCNC: 44 MG/DL
HDLC SERPL: 3.6 {RATIO} (ref 0–5)
HGB BLD-MCNC: 10.2 G/DL (ref 11.5–16)
IMM GRANULOCYTES # BLD AUTO: 0 K/UL (ref 0–0.04)
IMM GRANULOCYTES NFR BLD AUTO: 0 % (ref 0–0.5)
LDLC SERPL CALC-MCNC: 50.4 MG/DL (ref 0–100)
LYMPHOCYTES # BLD: 0.8 K/UL (ref 0.8–3.5)
LYMPHOCYTES NFR BLD: 27 % (ref 12–49)
MAGNESIUM SERPL-MCNC: 1.8 MG/DL (ref 1.6–2.4)
MCH RBC QN AUTO: 30.5 PG (ref 26–34)
MCHC RBC AUTO-ENTMCNC: 33.1 G/DL (ref 30–36.5)
MCV RBC AUTO: 92.2 FL (ref 80–99)
MONOCYTES # BLD: 0.2 K/UL (ref 0–1)
MONOCYTES NFR BLD: 8 % (ref 5–13)
NEUTS SEG # BLD: 1.8 K/UL (ref 1.8–8)
NEUTS SEG NFR BLD: 62 % (ref 32–75)
NRBC # BLD: 0 K/UL (ref 0–0.01)
NRBC BLD-RTO: 0 PER 100 WBC
PLATELET # BLD AUTO: 112 K/UL (ref 150–400)
PMV BLD AUTO: 11.9 FL (ref 8.9–12.9)
POTASSIUM SERPL-SCNC: 3.6 MMOL/L (ref 3.5–5.1)
PROT SERPL-MCNC: 7.6 G/DL (ref 6.4–8.2)
RBC # BLD AUTO: 3.34 M/UL (ref 3.8–5.2)
RBC MORPH BLD: ABNORMAL
SODIUM SERPL-SCNC: 140 MMOL/L (ref 136–145)
T4 FREE SERPL-MCNC: 1.1 NG/DL (ref 0.8–1.5)
TRIGL SERPL-MCNC: 318 MG/DL (ref ?–150)
TSH SERPL DL<=0.05 MIU/L-ACNC: 0.79 UIU/ML (ref 0.36–3.74)
VIT B12 SERPL-MCNC: 880 PG/ML (ref 193–986)
VLDLC SERPL CALC-MCNC: 63.6 MG/DL
WBC # BLD AUTO: 2.9 K/UL (ref 3.6–11)

## 2021-11-28 NOTE — PROGRESS NOTES
Labs show good Vit B 12 and Vit D levels. Normal thyroid level. Good magnesium. High triglyceride level. Need to cut back on sugars and starches and processed foods. Elevated sugar. Stable kidney function test. Blood cell counts show stable anemia and low white blood counts and platelets. Follow up with Dr. Italo Mitchell.

## 2021-11-29 ENCOUNTER — TELEPHONE (OUTPATIENT)
Dept: FAMILY MEDICINE CLINIC | Age: 76
End: 2021-11-29

## 2021-11-29 NOTE — TELEPHONE ENCOUNTER
----- Message from Nica Méndez MD sent at 11/28/2021  5:38 PM EST -----  Labs show good Vit B 12 and Vit D levels. Normal thyroid level. Good magnesium. High triglyceride level. Need to cut back on sugars and starches and processed foods. Elevated sugar. Stable kidney function test. Blood cell counts show stable anemia and low white blood counts and platelets. Follow up with Dr. Amanda Reynoso.

## 2021-12-01 NOTE — PROGRESS NOTES
[address]  Medical Oncology at [address] [place] [address]  [phone]    Hematology / Oncology Progress Note    Reason for Visit:   Jeromy Loaiza is a 64 y.o. female who is seen by synchronous (real-time) audio-video technology on 12/3/2021 for follow up of pancytopenia. History of Present Illness:   Ms. Lilia Thomas is a 64 y.o. female with HTN, remote h/o bladder cancer who comes in for follow up of pancytopenia. She was recently hospitalized after p/w nausea and vomiting since taking Bactrim for UTI as well as fever 102, found to be pancytopenic. She has a h/o of bladder cancer (1999) and underwent radical cystectomy with ileal conduit. She was evaluated in the urology office for UTI and was started on macrobid, later switched to Bactrim. This medication seemed to cause n/v. Upon questioning, Ms. Lilia Thomas denies any previous h/o anemia except when pregnant - at which time she used to take iron. She notes occasional blood in her urine but otherwise denies any other signs of bleeding. She was switched to Levaquin, tolerated this well with eventual resolution of fevers. Workup of pancytopenia revealed low VitB12 level, and patient was started on VitB12 injections. She completed daily injections x 7 (in and out of the hospital), and she is currently on weekly injections. She states she is having significant pain in her left hip. She recently underwent left hip replacement in March 2018, and she is following closely with orthopedics. She does state that her energy level has improved after discharge from the hospital.   Patient had briefly stopped the taking the B12 injections, then she restarted based on my instructions. She continues to feel fatigued despite restarting the B12 injections. She follows with Urology for recurrent UTI. She comes in today for review of bone marrow biopsy results. Interval History: Today, patient is seen for follow up of pancytopenia.  No recent UTI, other infections or antibiotics since March 2021. No cough, fevers, chills. No melena/hematochezia. She thinks she gets at least 60 oz of water daily. Still taking B12 injections once a month. Eating well. States her urine has strong smell and wants to follow up with Urology. PMHx: Rheumatoid, Bladder ca, h/o COVID19, GERD, HTN, Hyperthyroid  Review of Systems: A complete review of systems was obtained, negative except as described above. Physical Exam:       Due to this being a TeleHealth evaluation, many elements of the physical examination are unable to be assessed. Constitutional: Appears well-developed and well-nourished in no apparent distress   Mental status: Alert and awake, Oriented to person/place/time, Able to follow commands  Eyes: EOM normal, Sclera normal, No visible ocular discharge  HENT: Normocephalic, atraumatic; Mouth/Throat: Moist mucous membranes, External Ears normal  Neck: No visualized mass  Pulmonary/Chest: Respiratory effort normal, No visualized signs of difficulty breathing or respiratory distress   Musculoskeletal: Normal gait with no signs of ataxia, Normal range of motion of neck  Neurological: No facial asymmetry (Cranial nerve 7 motor function), No gaze palsy  Skin: No significant exanthematous lesions or discoloration noted on facial skin  Psychiatric: Normal affect, normal judgment/insight. No hallucinations       Results:     Lab Results   Component Value Date/Time    WBC 2.9 (L) 11/24/2021 02:51 PM    HGB 10.2 (L) 11/24/2021 02:51 PM    HCT 30.8 (L) 11/24/2021 02:51 PM    PLATELET 646 (L) 03/94/0369 02:51 PM    MCV 92.2 11/24/2021 02:51 PM    ABS.  NEUTROPHILS 1.8 11/24/2021 02:51 PM    Hemoglobin (POC) 11.9 01/03/2012 10:01 AM    Hematocrit (POC) 35 01/03/2012 10:01 AM     Lab Results   Component Value Date/Time    Sodium 140 11/24/2021 02:51 PM    Potassium 3.6 11/24/2021 02:51 PM    Chloride 107 11/24/2021 02:51 PM    CO2 26 11/24/2021 02:51 PM    Glucose 132 (H) 11/24/2021 02:51 PM    BUN 28 (H) 2021 02:51 PM    Creatinine 1.37 (H) 2021 02:51 PM    GFR est AA 45 (L) 2021 02:51 PM    GFR est non-AA 37 (L) 2021 02:51 PM    Calcium 9.3 2021 02:51 PM    Sodium (POC) 143 2012 10:01 AM    Potassium (POC) 4.1 2012 10:01 AM    Chloride (POC) 105 2012 10:01 AM    Glucose (POC) 104 (H) 2018 11:18 AM    BUN (POC) 11 2012 10:01 AM    Creatinine (POC) 1.0 2012 10:01 AM    Calcium, ionized (POC) 1.14 2012 10:01 AM     Lab Results   Component Value Date/Time    Bilirubin, total 0.6 2021 02:51 PM    ALT (SGPT) 25 2021 02:51 PM    Alk. phosphatase 79 2021 02:51 PM    Protein, total 7.6 2021 02:51 PM    Albumin 3.8 2021 02:51 PM    Globulin 3.8 2021 02:51 PM     Lab Results   Component Value Date/Time    Iron 79 05/10/2021 01:26 PM    TIBC 351 05/10/2021 01:26 PM    Iron % saturation 23 05/10/2021 01:26 PM    Ferritin 53 05/10/2021 01:26 PM     Lab Results   Component Value Date/Time    Vitamin B12 880 2021 02:51 PM    Folate 14.1 12/10/2020 05:02 AM         Imagin2018 XR CHEST  IMPRESSION: No acute abnormality     2017 abd/pelvis CT:      FINDINGS:   Liver is nodular compatible with cirrhosis. The spleen is normal in size. The  gallbladder is not distended. The a pancreas does appear unremarkable. Ostomy  seen in the right lower quadrant. There is a small supraumbilical abdominal wall  hernia containing nondistended colon. There is no bowel wall thickening or  obstruction. Prior cystectomy. There is no free air or free fluid. No definite  adenopathy in the abdomen or pelvis. Large left renal cyst unchanged. There is  no intrarenal calcification or obstruction. IMPRESSION: No acute findings.  Prior surgery.     Assessment and Recommendations:   64 y.o. female with hx of HTN, hypothyroidism, GERD, bladder cancer s/p urostomy,  L hip replacement (3/16) and hospital admission in 2018 for UTI admitted with nausea, vomiting and fever.     1. Pancytopenia:   WBC, Hgb and PLT counts are all low but stable to improved compared to prior. At times the counts go lower at times of infection. Perhaps some influence from RA. Prior workup revealed Vitamin B12 def and anemia of chronic disease. TSH normal. CT in 8/2017 mentioned possible cirrhosis, but no risk factors (no diabetes or alcohol use). Bone marrow biopsy in 10/2018 showed normal results (normocellular marrow with trilineage hematopoiesis). No medication culprits. -- Repeat labs in 6 months   -- No need for repeat bone marrow biopsy unless ANC < 1.0, Hgb < 9, PLT < 80 without infection. 2. Vitamin B12 deficiency: Now with normal levels after starting on VitB12 injections approx 8/2018.  -- Continue Vitamin B12 IM 1000mcg monthly - sent script at last visit. 3. Rheumatoid arthritis: Affects her wrists, hands, shoulders. Sees Dr. Sharan Garay and is on Embrel (restartd 3/1/21.)      4. Recurrent UTI: h/o of recurrent UTIs. Plans to follow up with Urology soon given strong smell in urine. 5. Fatigue: Likely related to moderate anemia. 6. Hx of bladder cancer (1999): S/p ileal conduit/ urostomy. Sees Dr. Yahaira Mayorga in Urology.      7. S/p Left hip replacement: Surgery was in 3/2018. Improvement in left hip pain. She is following closely with orthopedics. I appreciate the opportunity to participate in Ms. Mera Gonzalez's care. Total physician time spent on this encounter was 30 minutes. The patient was evaluated through a synchronous (real-time) audio-video encounter. The patient (or guardian if applicable) is aware that this is a billable service. Verbal consent to proceed has been obtained within the past 12 months. The visit was conducted pursuant to the emergency declaration under the [facility], [address] [place] [address] authority and the [employer] Act.   Patient identification was verified, and a caregiver was present when appropriate. The patient was located in a state where the provider was credentialed to provide care.         Signed By: Sal Junior MD     December 3, 2021

## 2021-12-03 ENCOUNTER — VIRTUAL VISIT (OUTPATIENT)
Dept: ONCOLOGY | Age: 76
End: 2021-12-03
Payer: MEDICAID

## 2021-12-03 DIAGNOSIS — E53.8 DIETARY B12 DEFICIENCY: ICD-10-CM

## 2021-12-03 DIAGNOSIS — D63.8 ANEMIA OF CHRONIC DISEASE: ICD-10-CM

## 2021-12-03 DIAGNOSIS — D61.818 PANCYTOPENIA (HCC): Primary | ICD-10-CM

## 2021-12-03 DIAGNOSIS — N39.0 RECURRENT UTI: ICD-10-CM

## 2021-12-03 PROCEDURE — 99214 OFFICE O/P EST MOD 30 MIN: CPT | Performed by: INTERNAL MEDICINE

## 2021-12-03 NOTE — PROGRESS NOTES
Kirsty Horowitz is a 64 y.o. female here for follow up of pancytopenia. Patient with complaints of pain that she relates to rheumatoid arthritis, rates as a 5 out of 10.

## 2021-12-22 ENCOUNTER — DOCUMENTATION ONLY (OUTPATIENT)
Dept: FAMILY MEDICINE CLINIC | Age: 76
End: 2021-12-22

## 2022-02-11 DIAGNOSIS — E03.9 ACQUIRED HYPOTHYROIDISM: ICD-10-CM

## 2022-02-11 DIAGNOSIS — I10 ESSENTIAL HYPERTENSION: ICD-10-CM

## 2022-02-11 DIAGNOSIS — E55.9 VITAMIN D DEFICIENCY: ICD-10-CM

## 2022-02-11 RX ORDER — ERGOCALCIFEROL 1.25 MG/1
50000 CAPSULE ORAL
Qty: 15 CAPSULE | Refills: 3 | Status: SHIPPED | OUTPATIENT
Start: 2022-02-11

## 2022-02-11 RX ORDER — LEVOTHYROXINE SODIUM 75 UG/1
TABLET ORAL
Qty: 90 TABLET | Refills: 1 | Status: SHIPPED | OUTPATIENT
Start: 2022-02-11 | End: 2022-06-08 | Stop reason: SDUPTHER

## 2022-02-11 RX ORDER — LOSARTAN POTASSIUM 50 MG/1
TABLET ORAL
Qty: 90 TABLET | Refills: 1 | Status: SHIPPED | OUTPATIENT
Start: 2022-02-11 | End: 2022-06-08 | Stop reason: SDUPTHER

## 2022-02-14 ENCOUNTER — HOSPITAL ENCOUNTER (OUTPATIENT)
Dept: MAMMOGRAPHY | Age: 77
Discharge: HOME | End: 2022-02-14
Attending: FAMILY MEDICINE
Payer: MEDICARE

## 2022-02-14 DIAGNOSIS — Z12.31 ENCOUNTER FOR SCREENING MAMMOGRAM FOR MALIGNANT NEOPLASM OF BREAST: ICD-10-CM

## 2022-02-14 DIAGNOSIS — Z78.0 POSTMENOPAUSAL STATE: ICD-10-CM

## 2022-02-14 PROCEDURE — 77080 DXA BONE DENSITY AXIAL: CPT

## 2022-02-14 PROCEDURE — 77067 SCR MAMMO BI INCL CAD: CPT

## 2022-02-18 NOTE — PROGRESS NOTES
Bone density test shows osteopenia. Make sure to take calcium supplement 1,200 mg daily. Continue on Vit D supplement as prescribed.

## 2022-02-21 ENCOUNTER — TELEPHONE (OUTPATIENT)
Dept: FAMILY MEDICINE CLINIC | Age: 77
End: 2022-02-21

## 2022-02-21 NOTE — TELEPHONE ENCOUNTER
----- Message from Sona Meng MD sent at 2/18/2022  6:22 PM EST -----  Bone density test shows osteopenia. Make sure to take calcium supplement 1,200 mg daily. Continue on Vit D supplement as prescribed.

## 2022-03-18 PROBLEM — I10 HTN (HYPERTENSION): Status: ACTIVE | Noted: 2018-04-18

## 2022-03-18 PROBLEM — E03.9 ACQUIRED HYPOTHYROIDISM: Status: ACTIVE | Noted: 2018-04-18

## 2022-03-19 PROBLEM — K21.9 GERD (GASTROESOPHAGEAL REFLUX DISEASE): Status: ACTIVE | Noted: 2018-04-18

## 2022-03-19 PROBLEM — E87.6 HYPOKALEMIA: Status: ACTIVE | Noted: 2018-04-18

## 2022-03-19 PROBLEM — Z85.51 HISTORY OF BLADDER CANCER: Status: ACTIVE | Noted: 2020-12-09

## 2022-03-19 PROBLEM — D64.9 ANEMIA: Status: ACTIVE | Noted: 2018-04-18

## 2022-03-20 PROBLEM — D61.818 PANCYTOPENIA (HCC): Status: ACTIVE | Noted: 2019-10-01

## 2022-03-20 PROBLEM — M06.9 RHEUMATOID ARTHRITIS INVOLVING MULTIPLE SITES (HCC): Status: ACTIVE | Noted: 2020-06-29

## 2022-04-20 NOTE — PROGRESS NOTES
[address] Dosing Services:     Consult for antibiotic dosing of Levofloxacin by Dr. Dandre Gaines  Indication:  UTI  Day of Therapy 3    Non-Kinetic Antimicrobial Dosing Regimen:   Current Regimen:  Levofloxacin 750 mg every 48 hours  Adjustment to therapy: Levofloxacin 750 mg every 24 hours per protocol CrCl>49    Cultures 4/21: Urine  4/18: Blood- NGTD- pending  4/18: Urine- contaminated  4/18 Resp (Nasopharyngeal): Negative   No detection of viruses   Serum Creatinine Lab Results   Component Value Date/Time    Creatinine 0.99 04/21/2018 04:07 AM    Creatinine (POC) 1.0 01/03/2012 10:01 AM         Creatinine Clearance Estimated Creatinine Clearance: 55.8 mL/min (based on Cr of 0.99).      Temp Temp: 98.3 °F (36.8 °C)       WBC Lab Results   Component Value Date/Time    WBC 1.8 (L) 04/21/2018 04:07 AM        H/H Lab Results   Component Value Date/Time    HGB 7.7 (L) 04/21/2018 04:07 AM        Platelets    Lab Results   Component Value Date/Time    PLATELET 96 (L) 67/45/1809 04:07 AM            Pharmacist Esther Scales Contact information: [phone] Initial Decision For Surgery?: No Discussion: Pt present for MM excision today, however, pt reports bx site on Right mid upper back has been oozing and painful. Site was evaluated and bacterial infection is present. (See visit findings for today.) \\nPt to call on Monday morning and let MAs know if site is doing better. If so, we will proceed with MM excision on Tuesday morning. Risk Assessment Explanation (Does Not Render In The Note): Clinical determination of the probability and/or consequences of an event, such as surgery. Clinical assessment of the level of risk is affected by the nature of the event under consideration for the patient. Modifier 57 is used to indicate an Evaluation and Management (E/M) service resulted in the initial decision to perform surgery either the day before a major surgery (90 day global) or the day of a major surgery.

## 2022-04-21 DIAGNOSIS — E53.8 VITAMIN B12 DEFICIENCY: ICD-10-CM

## 2022-04-21 RX ORDER — CYANOCOBALAMIN 1000 UG/ML
INJECTION, SOLUTION INTRAMUSCULAR; SUBCUTANEOUS
Qty: 3 ML | Refills: 15 | Status: SHIPPED | OUTPATIENT
Start: 2022-04-21

## 2022-05-24 ENCOUNTER — TELEPHONE (OUTPATIENT)
Dept: FAMILY MEDICINE CLINIC | Age: 77
End: 2022-05-24

## 2022-05-24 DIAGNOSIS — Z79.899 ENCOUNTER FOR LONG-TERM CURRENT USE OF MEDICATION: ICD-10-CM

## 2022-05-24 DIAGNOSIS — E03.9 ACQUIRED HYPOTHYROIDISM: ICD-10-CM

## 2022-05-24 DIAGNOSIS — E53.8 B12 DEFICIENCY: ICD-10-CM

## 2022-05-24 DIAGNOSIS — R79.0 LOW MAGNESIUM LEVEL: ICD-10-CM

## 2022-05-24 DIAGNOSIS — R73.9 HYPERGLYCEMIA: ICD-10-CM

## 2022-05-24 DIAGNOSIS — I10 ESSENTIAL HYPERTENSION: Primary | ICD-10-CM

## 2022-05-24 NOTE — TELEPHONE ENCOUNTER
----- Message from Arvin Perez sent at 5/24/2022 10:32 AM EDT -----  Subject: Referral Request    QUESTIONS   Reason for referral request? pt is requesting lab work to be done prio to   her appt with dr. Isis Crews on 6/8   Has the physician seen you for this condition before? Yes  Select a date? 2022-05-24  Select the Provider the patient wants to be referred to, if known (PCP or   Specialist)? Susan Jackson   Preferred Specialist (if applicable)? Do you already have an appointment scheduled? Yes  Select Scheduled Date? 2022-06-08  Select Scheduled Physician? Susan Jackson   Additional Information for Provider?   ---------------------------------------------------------------------------  --------------  [address]  What is the best way for the office to contact you? OK to leave message on   voicemail  Preferred Call Back Phone Number? [phone]  ---------------------------------------------------------------------------  --------------  SCRIPT ANSWERS  Relationship to Patient?  Self

## 2022-06-01 ENCOUNTER — TELEPHONE (OUTPATIENT)
Dept: ONCOLOGY | Age: 77
End: 2022-06-01

## 2022-06-01 NOTE — TELEPHONE ENCOUNTER
[address] at [facility]  [phone]        06/01/22 1:47 PM Attempted to call patient via home number listed to remind her of her upcoming appointment on Monday, 06/06, at 10:30 AM. Patient also due for repeat labs prior to appointment, if possible. No answer, left message of above. Provided office phone number for call back if patient has any additional questions. Gabapentin Counseling: I discussed with the patient the risks of gabapentin including but not limited to dizziness, somnolence, fatigue and ataxia.

## 2022-06-08 ENCOUNTER — OFFICE VISIT (OUTPATIENT)
Dept: FAMILY MEDICINE CLINIC | Age: 77
End: 2022-06-08
Payer: MEDICARE

## 2022-06-08 ENCOUNTER — TELEPHONE (OUTPATIENT)
Dept: FAMILY MEDICINE CLINIC | Age: 77
End: 2022-06-08

## 2022-06-08 ENCOUNTER — APPOINTMENT (OUTPATIENT)
Dept: FAMILY MEDICINE CLINIC | Age: 77
End: 2022-06-08

## 2022-06-08 VITALS
WEIGHT: 200 LBS | TEMPERATURE: 97.8 F | RESPIRATION RATE: 16 BRPM | SYSTOLIC BLOOD PRESSURE: 126 MMHG | DIASTOLIC BLOOD PRESSURE: 66 MMHG | OXYGEN SATURATION: 97 % | HEIGHT: 65 IN | BODY MASS INDEX: 33.32 KG/M2 | HEART RATE: 93 BPM

## 2022-06-08 DIAGNOSIS — R73.9 HYPERGLYCEMIA: ICD-10-CM

## 2022-06-08 DIAGNOSIS — I50.9 CONGESTIVE HEART FAILURE, UNSPECIFIED HF CHRONICITY, UNSPECIFIED HEART FAILURE TYPE (HCC): ICD-10-CM

## 2022-06-08 DIAGNOSIS — Z79.899 ENCOUNTER FOR LONG-TERM CURRENT USE OF MEDICATION: ICD-10-CM

## 2022-06-08 DIAGNOSIS — D61.818 PANCYTOPENIA (HCC): Primary | ICD-10-CM

## 2022-06-08 DIAGNOSIS — N18.30 STAGE 3 CHRONIC KIDNEY DISEASE, UNSPECIFIED WHETHER STAGE 3A OR 3B CKD (HCC): ICD-10-CM

## 2022-06-08 DIAGNOSIS — R79.0 LOW MAGNESIUM LEVEL: ICD-10-CM

## 2022-06-08 DIAGNOSIS — K21.9 GASTROESOPHAGEAL REFLUX DISEASE, UNSPECIFIED WHETHER ESOPHAGITIS PRESENT: ICD-10-CM

## 2022-06-08 DIAGNOSIS — E03.9 ACQUIRED HYPOTHYROIDISM: ICD-10-CM

## 2022-06-08 DIAGNOSIS — D61.818 PANCYTOPENIA (HCC): ICD-10-CM

## 2022-06-08 DIAGNOSIS — E53.8 B12 DEFICIENCY: ICD-10-CM

## 2022-06-08 DIAGNOSIS — I10 ESSENTIAL HYPERTENSION: ICD-10-CM

## 2022-06-08 DIAGNOSIS — M06.9 RHEUMATOID ARTHRITIS INVOLVING MULTIPLE SITES, UNSPECIFIED WHETHER RHEUMATOID FACTOR PRESENT (HCC): ICD-10-CM

## 2022-06-08 DIAGNOSIS — I10 ESSENTIAL HYPERTENSION: Primary | ICD-10-CM

## 2022-06-08 PROCEDURE — 1101F PT FALLS ASSESS-DOCD LE1/YR: CPT | Performed by: FAMILY MEDICINE

## 2022-06-08 PROCEDURE — 1090F PRES/ABSN URINE INCON ASSESS: CPT | Performed by: FAMILY MEDICINE

## 2022-06-08 PROCEDURE — 99214 OFFICE O/P EST MOD 30 MIN: CPT | Performed by: FAMILY MEDICINE

## 2022-06-08 PROCEDURE — G8752 SYS BP LESS 140: HCPCS | Performed by: FAMILY MEDICINE

## 2022-06-08 PROCEDURE — G8417 CALC BMI ABV UP PARAM F/U: HCPCS | Performed by: FAMILY MEDICINE

## 2022-06-08 PROCEDURE — G8754 DIAS BP LESS 90: HCPCS | Performed by: FAMILY MEDICINE

## 2022-06-08 PROCEDURE — G8427 DOCREV CUR MEDS BY ELIG CLIN: HCPCS | Performed by: FAMILY MEDICINE

## 2022-06-08 PROCEDURE — G8510 SCR DEP NEG, NO PLAN REQD: HCPCS | Performed by: FAMILY MEDICINE

## 2022-06-08 PROCEDURE — G8399 PT W/DXA RESULTS DOCUMENT: HCPCS | Performed by: FAMILY MEDICINE

## 2022-06-08 PROCEDURE — G8536 NO DOC ELDER MAL SCRN: HCPCS | Performed by: FAMILY MEDICINE

## 2022-06-08 PROCEDURE — 1123F ACP DISCUSS/DSCN MKR DOCD: CPT | Performed by: FAMILY MEDICINE

## 2022-06-08 RX ORDER — TRAMADOL HYDROCHLORIDE 50 MG/1
50 TABLET ORAL
Qty: 20 TABLET | Refills: 0 | Status: SHIPPED | OUTPATIENT
Start: 2022-06-08 | End: 2022-06-15

## 2022-06-08 RX ORDER — LEVOTHYROXINE SODIUM 75 UG/1
75 TABLET ORAL
Qty: 90 TABLET | Refills: 1 | Status: SHIPPED | OUTPATIENT
Start: 2022-06-08

## 2022-06-08 RX ORDER — LOSARTAN POTASSIUM 50 MG/1
50 TABLET ORAL DAILY
Qty: 90 TABLET | Refills: 1 | Status: SHIPPED | OUTPATIENT
Start: 2022-06-08

## 2022-06-08 RX ORDER — OMEPRAZOLE 20 MG/1
20 CAPSULE, DELAYED RELEASE ORAL DAILY
Qty: 90 CAPSULE | Refills: 1 | Status: SHIPPED | OUTPATIENT
Start: 2022-06-08

## 2022-06-08 RX ORDER — FUROSEMIDE 40 MG/1
40 TABLET ORAL DAILY
Qty: 90 TABLET | Refills: 1 | Status: SHIPPED | OUTPATIENT
Start: 2022-06-08

## 2022-06-08 NOTE — TELEPHONE ENCOUNTER
----- Message from [facility] [place] sent at 2022  4:57 PM EDT -----  Regarding: FW: Lab Notification: Samples unable to be obtained    ----- Message -----  From: Nazil Pastora: 2022   4:26 PM EDT  To: [address] [facility],[address], [facility] Nurse Pool  Subject: Lab Notification: Samples unable to be obtai#    We have been notified that samples could not be obtained for the below patient's most recent lab work. Please place new orders prior to the patient's return. If additional assistance is required, please contact Client Services at [phone]. Patient David Munoz   1945  Ordering Provider Dr Zac Gramajo    No blood collected for  orders.         Thank you,  [facility] Client Services

## 2022-06-08 NOTE — PROGRESS NOTES
Identified pt with two pt identifiers(name and ). Chief Complaint   Patient presents with    Physical     6 month     Back Pain     started this AM         Health Maintenance Due   Topic    COVID-19 Vaccine (1)    DTaP/Tdap/Td series (1 - Tdap)    Pneumococcal 65+ years (2 - PPSV23 or PCV20)       Wt Readings from Last 3 Encounters:   22 200 lb (90.7 kg)   21 200 lb (90.7 kg)   05/10/21 193 lb (87.5 kg)     Temp Readings from Last 3 Encounters:   22 97.8 °F (36.6 °C) (Temporal)   21 97.6 °F (36.4 °C) (Temporal)   05/10/21 97.8 °F (36.6 °C) (Temporal)     BP Readings from Last 3 Encounters:   22 126/66   21 136/62   05/10/21 138/76     Pulse Readings from Last 3 Encounters:   22 93   21 87   05/10/21 97         Learning Assessment:  :     Learning Assessment 5/10/2021 2019   PRIMARY LEARNER Patient Patient   BARRIERS PRIMARY LEARNER NONE -   CO-LEARNER CAREGIVER No -   PRIMARY LANGUAGE ENGLISH ENGLISH   LEARNER PREFERENCE PRIMARY LISTENING DEMONSTRATION     DEMONSTRATION -   ANSWERED BY patient patient   RELATIONSHIP SELF SELF       Depression Screening:  :     3 most recent PHQ Screens 2022   Little interest or pleasure in doing things Not at all   Feeling down, depressed, irritable, or hopeless Not at all   Total Score PHQ 2 0       Fall Risk Assessment:  :     Fall Risk Assessment, last 12 mths 2021   Able to walk? Yes   Fall in past 12 months? 0   Do you feel unsteady? 0   Are you worried about falling 0       Abuse Screening:  :     Abuse Screening Questionnaire 2022 2021 2021 2020 2020 3/7/2019 8/3/2018   Do you ever feel afraid of your partner? N N N N N N N   Are you in a relationship with someone who physically or mentally threatens you? N N N N N N N   Is it safe for you to go home?  Y Y Y Y Y Y Y       Coordination of Care Questionnaire:  :     1) Have you been to an emergency room, urgent care clinic since your last visit? no   Hospitalized since your last visit? no             2) Have you seen or consulted any other health care providers outside of [address] [place] [address] since your last visit? no  (Include any pap smears or colon screenings in this section.)    3) Do you have an Advance Directive on file? yes  Are you interested in receiving information about Advance Directives? no    Patient is accompanied by N/A I have received verbal consent from Janene Bliss to discuss any/all medical information while they are present in the room. 4.  For patients aged 39-70: Has the patient had a colonoscopy / FIT/ Cologuard? NA - based on age      If the patient is female:    11. For patients aged 41-77: Has the patient had a mammogram within the past 2 years? NA - based on age or sex      10. For patients aged 21-65: Has the patient had a pap smear?  NA - based on age or sex

## 2022-06-09 NOTE — PROGRESS NOTES
Subjective:     Deepti Joseph is a 76 y.o. female who presents for follow up of hypertension, hyperlipidemia, coronary artery disease, CHF and dysmetabolic syndrome X. Diet and Lifestyle: generally follows a low fat low cholesterol diet, generally follows a low sodium diet, sedentary  Home BP Monitoring: is not measured at home    Cardiovascular ROS: taking medications as instructed, no medication side effects noted, no TIA's, no chest pain on exertion, no dyspnea on exertion, no swelling of ankles. New concerns: C/O left sided back pain. Her arthritis on hands also bother her a lot. Pt has RA. On Enbrel per Dr Jaki Crowder ([facility]). She has CKD. Patient Active Problem List    Diagnosis Date Noted    Congestive heart failure, unspecified HF chronicity, unspecified heart failure type ([facility] 06/08/2022    Chronic renal disease, stage III 06/08/2022    History of bladder cancer 12/09/2020    Rheumatoid arthritis involving multiple sites ([facility] 06/29/2020    Pancytopenia ([facility] 10/01/2019    HTN (hypertension) 04/18/2018    Hypokalemia 04/18/2018    Anemia 04/18/2018    Acquired hypothyroidism 04/18/2018    GERD (gastroesophageal reflux disease) 04/18/2018     Current Outpatient Medications   Medication Sig Dispense Refill    traMADoL (ULTRAM) 50 mg tablet Take 1 Tablet by mouth every eight (8) hours as needed for Pain for up to 7 days. Max Daily Amount: 150 mg. Indications: pain 20 Tablet 0    losartan (COZAAR) 50 mg tablet Take 1 Tablet by mouth daily. 90 Tablet 1    furosemide (LASIX) 40 mg tablet Take 1 Tablet by mouth daily. 90 Tablet 1    levothyroxine (SYNTHROID) 75 mcg tablet Take 1 Tablet by mouth Daily (before breakfast). 90 Tablet 1    omeprazole (PRILOSEC) 20 mg capsule Take 1 Capsule by mouth daily. 90 Capsule 1    cyanocobalamin (VITAMIN B12) 1,000 mcg/mL injection INJECT 1 ML INTO THE MUSCLE EVERY THIRTY (30) DAYS.  3 mL 15    ergocalciferol (ERGOCALCIFEROL) 1,250 mcg (50,000 unit) capsule TAKE 1 CAP BY MOUTH EVERY SEVEN (7) DAYS. INDICATIONS: LOW VITAMIN D LEVELS 15 Capsule 3    magnesium oxide (MAG-OX) 400 mg tablet Take 1 Tablet by mouth daily. Indications: low amount of magnesium in the blood 90 Tablet 3    BD Luer-Esau Syringe 3 mL 25 x 5/8\" syrg USE 1 SYRINGE EVERY MONTH FOR YOUR VITAMIN B INJECTIONS 12 Syringe 3    ENBREL MINI 50 mg/mL (1 mL) crtg Every Thursday. Allergies   Allergen Reactions    Trimethoprim Other (comments)    Bactrim [Sulfamethoprim] Nausea and Vomiting    Ciprofloxacin Other (comments)     Joint aches    Diclofenac Nausea Only    Sulfa (Sulfonamide Antibiotics) Nausea Only    Cephalexin Nausea and Vomiting     Past Medical History:   Diagnosis Date    Arthritis     Cancer ([facility]     bladder    COVID-19 12/2020    Endocrine disease     hyperthyroid    Gastrointestinal disorder     GERD (gastroesophageal reflux disease)     Hypertension     Other ill-defined conditions(799.89)     hyperthyroid    Pneumonia 12/2020 12/2020     Past Surgical History:   Procedure Laterality Date    HX GYN      HX HERNIA REPAIR      HX HIP REPLACEMENT Left     HX UROLOGICAL      stoma since 99             Review of Systems, additional:  Pertinent items are noted in HPI. Objective:     Visit Vitals  /66 (BP 1 Location: Right arm, BP Patient Position: Sitting, BP Cuff Size: Adult)   Pulse 93   Temp 97.8 °F (36.6 °C) (Temporal)   Resp 16   Ht 5' 5\" (1.651 m)   Wt 200 lb (90.7 kg)   SpO2 97%   BMI 33.28 kg/m²     Appearance: alert, well appearing, and in no distress and overweight. General exam: CVS exam BP noted to be well controlled today in office, S1, S2 normal, no gallop, no murmur, chest clear, no JVD, no HSM, no edema. Lab review: orders written for new lab studies as appropriate; see orders. Assessment/Plan:     .  orders and follow up as documented in patient record. ICD-10-CM ICD-9-CM    1.  Essential hypertension  I10 401.9 losartan (COZAAR) 50 mg tablet      CANCELED: LIPID PANEL   2. Congestive heart failure, unspecified HF chronicity, unspecified heart failure type ([facility])  I50.9 428.0 furosemide (LASIX) 40 mg tablet   3. Pancytopenia ([address]  D61.818 284.19    4. Rheumatoid arthritis involving multiple sites, unspecified whether rheumatoid factor present ([facility])  M06.9 714.0 traMADoL (ULTRAM) 50 mg tablet      SED RATE (ESR)      C REACTIVE PROTEIN, QT   5. Stage 3 chronic kidney disease, unspecified whether stage 3a or 3b CKD ([facility])  N18.30 585.3    6. Acquired hypothyroidism  E03.9 244.9 levothyroxine (SYNTHROID) 75 mcg tablet      CANCELED: TSH 3RD GENERATION      CANCELED: T4, FREE   7. Gastroesophageal reflux disease, unspecified whether esophagitis present  K21.9 530.81 omeprazole (PRILOSEC) 20 mg capsule   8. B12 deficiency  E53.8 266.2 VITAMIN B12      CANCELED: VITAMIN B12   9. Encounter for long-term current use of medication  Z79.899 V58.69 CANCELED: CBC WITH AUTOMATED DIFF      CANCELED: METABOLIC PANEL, COMPREHENSIVE   10. Hyperglycemia  R73.9 790.29 HEMOGLOBIN A1C WITH EAG      CANCELED: HEMOGLOBIN A1C WITH EAG   11. Low magnesium level  R79.0 790.6 CANCELED: MAGNESIUM     Order labs. Trial Tramadol PRN for pain. Use with ES Tylenol PRN. Send copy labs to [facility] Dr Silvestre Lewis.

## 2022-06-09 NOTE — TELEPHONE ENCOUNTER
Called patient to verify that she did not get her labs done in office and see where she wants to have them done so she can either make an appointment to have them done in office or they can be faxed over to [facility]. Thank you. I had to leave a message for patient to return my call.

## 2022-06-10 ENCOUNTER — LAB ONLY (OUTPATIENT)
Dept: FAMILY MEDICINE CLINIC | Age: 77
End: 2022-06-10

## 2022-06-10 DIAGNOSIS — R79.0 LOW MAGNESIUM LEVEL: ICD-10-CM

## 2022-06-10 DIAGNOSIS — M06.9 RHEUMATOID ARTHRITIS INVOLVING MULTIPLE SITES, UNSPECIFIED WHETHER RHEUMATOID FACTOR PRESENT (HCC): ICD-10-CM

## 2022-06-10 DIAGNOSIS — Z79.899 ENCOUNTER FOR LONG-TERM CURRENT USE OF MEDICATION: ICD-10-CM

## 2022-06-10 DIAGNOSIS — E03.9 ACQUIRED HYPOTHYROIDISM: ICD-10-CM

## 2022-06-10 DIAGNOSIS — E53.8 B12 DEFICIENCY: ICD-10-CM

## 2022-06-10 DIAGNOSIS — R73.9 HYPERGLYCEMIA: ICD-10-CM

## 2022-06-11 LAB
ALBUMIN SERPL-MCNC: 4.1 G/DL (ref 3.5–5)
ALBUMIN/GLOB SERPL: 1.1 {RATIO} (ref 1.1–2.2)
ALP SERPL-CCNC: 70 U/L (ref 45–117)
ALT SERPL-CCNC: 23 U/L (ref 12–78)
ANION GAP SERPL CALC-SCNC: 9 MMOL/L (ref 5–15)
AST SERPL-CCNC: 29 U/L (ref 15–37)
BASOPHILS # BLD: 0 K/UL (ref 0–0.1)
BASOPHILS NFR BLD: 1 % (ref 0–1)
BILIRUB SERPL-MCNC: 0.7 MG/DL (ref 0.2–1)
BUN SERPL-MCNC: 28 MG/DL (ref 6–20)
BUN/CREAT SERPL: 19 (ref 12–20)
CALCIUM SERPL-MCNC: 9.5 MG/DL (ref 8.5–10.1)
CHLORIDE SERPL-SCNC: 106 MMOL/L (ref 97–108)
CO2 SERPL-SCNC: 24 MMOL/L (ref 21–32)
COMMENT, HOLDF: NORMAL
CREAT SERPL-MCNC: 1.51 MG/DL (ref 0.55–1.02)
CRP SERPL-MCNC: <0.29 MG/DL (ref 0–0.6)
DIFFERENTIAL METHOD BLD: ABNORMAL
EOSINOPHIL # BLD: 0.1 K/UL (ref 0–0.4)
EOSINOPHIL NFR BLD: 3 % (ref 0–7)
ERYTHROCYTE [DISTWIDTH] IN BLOOD BY AUTOMATED COUNT: 13.7 % (ref 11.5–14.5)
ERYTHROCYTE [SEDIMENTATION RATE] IN BLOOD: 51 MM/HR (ref 0–30)
EST. AVERAGE GLUCOSE BLD GHB EST-MCNC: 126 MG/DL
GLOBULIN SER CALC-MCNC: 3.8 G/DL (ref 2–4)
GLUCOSE SERPL-MCNC: 129 MG/DL (ref 65–100)
HBA1C MFR BLD: 6 % (ref 4–5.6)
HCT VFR BLD AUTO: 33.7 % (ref 35–47)
HGB BLD-MCNC: 11 G/DL (ref 11.5–16)
IMM GRANULOCYTES # BLD AUTO: 0 K/UL (ref 0–0.04)
IMM GRANULOCYTES NFR BLD AUTO: 1 % (ref 0–0.5)
LYMPHOCYTES # BLD: 0.7 K/UL (ref 0.8–3.5)
LYMPHOCYTES NFR BLD: 36 % (ref 12–49)
MAGNESIUM SERPL-MCNC: 2 MG/DL (ref 1.6–2.4)
MCH RBC QN AUTO: 31.4 PG (ref 26–34)
MCHC RBC AUTO-ENTMCNC: 32.6 G/DL (ref 30–36.5)
MCV RBC AUTO: 96.3 FL (ref 80–99)
MONOCYTES # BLD: 0.2 K/UL (ref 0–1)
MONOCYTES NFR BLD: 8 % (ref 5–13)
NEUTS SEG # BLD: 0.9 K/UL (ref 1.8–8)
NEUTS SEG NFR BLD: 51 % (ref 32–75)
NRBC # BLD: 0 K/UL (ref 0–0.01)
NRBC BLD-RTO: 0 PER 100 WBC
PLATELET # BLD AUTO: 104 K/UL (ref 150–400)
PMV BLD AUTO: 11.4 FL (ref 8.9–12.9)
POTASSIUM SERPL-SCNC: 3.7 MMOL/L (ref 3.5–5.1)
PROT SERPL-MCNC: 7.9 G/DL (ref 6.4–8.2)
RBC # BLD AUTO: 3.5 M/UL (ref 3.8–5.2)
RBC MORPH BLD: ABNORMAL
SAMPLES BEING HELD,HOLD: NORMAL
SODIUM SERPL-SCNC: 139 MMOL/L (ref 136–145)
T4 FREE SERPL-MCNC: 1.2 NG/DL (ref 0.8–1.5)
TSH SERPL DL<=0.05 MIU/L-ACNC: 1.22 UIU/ML (ref 0.36–3.74)
WBC # BLD AUTO: 1.9 K/UL (ref 3.6–11)

## 2022-06-15 ENCOUNTER — TELEPHONE (OUTPATIENT)
Dept: ONCOLOGY | Age: 77
End: 2022-06-15

## 2022-06-17 NOTE — PROGRESS NOTES
[address] [facility] at [facility]  [phone]    Hematology / Oncology Progress Note    Reason for Visit:   Rusty Galvan is a 76 y.o. female who for follow up of pancytopenia. History of Present Illness:   Ms. Nery Jung is a 76 y.o. female with HTN, remote h/o bladder cancer who comes in for follow up of pancytopenia. She was recently hospitalized after p/w nausea and vomiting since taking Bactrim for UTI as well as fever 102, found to be pancytopenic. She has a h/o of bladder cancer (1999) and underwent radical cystectomy with ileal conduit. She was evaluated in the urology office for UTI and was started on macrobid, later switched to Bactrim. This medication seemed to cause n/v. Upon questioning, Ms. Nery Jung denies any previous h/o anemia except when pregnant - at which time she used to take iron. She notes occasional blood in her urine but otherwise denies any other signs of bleeding. She was switched to Levaquin, tolerated this well with eventual resolution of fevers. Workup of pancytopenia revealed low VitB12 level, and patient was started on VitB12 injections. She completed daily injections x 7 (in and out of the hospital), and she is currently on weekly injections. She states she is having significant pain in her left hip. She recently underwent left hip replacement in March 2018, and she is following closely with orthopedics. She does state that her energy level has improved after discharge from the hospital.   Patient had briefly stopped the taking the B12 injections, then she restarted based on my instructions. She continues to feel fatigued despite restarting the B12 injections. She follows with Urology for recurrent UTI. She comes in today for review of bone marrow biopsy results. Interval History: Today, patient is seen for follow up of pancytopenia. No recent infections, known UTI or antibiotics since March 2021. No cough, fevers, chills. No melena/hematochezia.  She thinks she gets at least 60 oz of water daily. Still taking B12 injections once a month. Eating well. Urine still has strong smell. States her BP is lower than usual, but only has occasional lightheadedness. Pt was advised to start a diabetic diet based on recent labs. PMHx: Rheumatoid, Bladder ca, h/o COVID19, GERD, HTN, Hyperthyroid  Review of Systems: A complete review of systems was obtained, negative except as described above. Physical Exam:         Visit Vitals  BP 98/63 (BP 1 Location: Left upper arm, BP Patient Position: Sitting, BP Cuff Size: Large adult)   Pulse 97   Temp 98 °F (36.7 °C) (Oral)   Resp 18   Ht 5' 5\" (1.651 m)   Wt 202 lb 9.6 oz (91.9 kg)   SpO2 98%   BMI 33.71 kg/m²     ECOG PS: 0  General: no distress  Eyes: anicteric sclerae  HENT: oropharynx clear  Neck: supple  Lymphatic: no cervical, supraclavicular adenopathy  Respiratory: normal respiratory effort  CV: no peripheral edema  GI: soft, nontender, nondistended, no masses  Skin: no rashes; no ecchymoses; no petechiae        Results:     Lab Results   Component Value Date/Time    WBC 1.9 (L) 06/10/2022 09:04 AM    HGB 11.0 (L) 06/10/2022 09:04 AM    HCT 33.7 (L) 06/10/2022 09:04 AM    PLATELET 552 (L) 10/96/6133 09:04 AM    MCV 96.3 06/10/2022 09:04 AM    ABS.  NEUTROPHILS 0.9 (L) 06/10/2022 09:04 AM    Hemoglobin (POC) 11.9 01/03/2012 10:01 AM    Hematocrit (POC) 35 01/03/2012 10:01 AM     Lab Results   Component Value Date/Time    Sodium 139 06/10/2022 09:04 AM    Potassium 3.7 06/10/2022 09:04 AM    Chloride 106 06/10/2022 09:04 AM    CO2 24 06/10/2022 09:04 AM    Glucose 129 (H) 06/10/2022 09:04 AM    BUN 28 (H) 06/10/2022 09:04 AM    Creatinine 1.51 (H) 06/10/2022 09:04 AM    GFR est AA 40 (L) 06/10/2022 09:04 AM    GFR est non-AA 33 (L) 06/10/2022 09:04 AM    Calcium 9.5 06/10/2022 09:04 AM    Sodium (POC) 143 01/03/2012 10:01 AM    Potassium (POC) 4.1 01/03/2012 10:01 AM    Chloride (POC) 105 01/03/2012 10:01 AM    Glucose (POC) 104 (H) 2018 11:18 AM    BUN (POC) 11 2012 10:01 AM    Creatinine (POC) 1.0 2012 10:01 AM    Calcium, ionized (POC) 1.14 2012 10:01 AM     Lab Results   Component Value Date/Time    Bilirubin, total 0.7 06/10/2022 09:04 AM    ALT (SGPT) 23 06/10/2022 09:04 AM    Alk. phosphatase 70 06/10/2022 09:04 AM    Protein, total 7.9 06/10/2022 09:04 AM    Albumin 4.1 06/10/2022 09:04 AM    Globulin 3.8 06/10/2022 09:04 AM     Lab Results   Component Value Date/Time    Iron 79 05/10/2021 01:26 PM    TIBC 351 05/10/2021 01:26 PM    Iron % saturation 23 05/10/2021 01:26 PM    Ferritin 53 05/10/2021 01:26 PM     Lab Results   Component Value Date/Time    Vitamin B12 880 2021 02:51 PM    Folate 14.1 12/10/2020 05:02 AM         Imagin2018 XR CHEST  IMPRESSION: No acute abnormality     2017 abd/pelvis CT:      FINDINGS:   Liver is nodular compatible with cirrhosis. The spleen is normal in size. The  gallbladder is not distended. The a pancreas does appear unremarkable. Ostomy  seen in the right lower quadrant. There is a small supraumbilical abdominal wall  hernia containing nondistended colon. There is no bowel wall thickening or  obstruction. Prior cystectomy. There is no free air or free fluid. No definite  adenopathy in the abdomen or pelvis. Large left renal cyst unchanged. There is  no intrarenal calcification or obstruction. IMPRESSION: No acute findings. Prior surgery.     Assessment and Recommendations:   76 y.o. female with hx of HTN, hypothyroidism, GERD, bladder cancer s/p urostomy,  L hip replacement (3/16) and hospital admission in 2018 for UTI admitted with nausea, vomiting and fever.     1. Pancytopenia:   WBC count and ANC lower than her baseline, but no known infections. Given Hgb improved and PLT stable, will continue to monitor for now. At times the counts go lower at times of infection. Perhaps some influence from RA.  Prior workup revealed Vitamin B12 def and anemia of chronic disease. TSH normal. CT in 8/2017 mentioned possible cirrhosis, but no risk factors (no diabetes or alcohol use). Bone marrow biopsy in 10/2018 showed normal results (normocellular marrow with trilineage hematopoiesis). No medication culprits. -- Repeat labs in 6 months   -- No need for repeat bone marrow biopsy unless ANC < 1.0, Hgb < 9, PLT < 80 (persistently) without infection. 2. Vitamin B12 deficiency: Now with normal levels after starting on VitB12 injections approx 8/2018.  -- Continue Vitamin B12 IM 1000mcg monthly - sent script at last visit. 3. Rheumatoid arthritis: Affects her wrists, hands, shoulders. Sees Dr. Patrick Aguilar and is on Embrel (restarted 3/1/21.)      4. Recurrent UTI: h/o of recurrent UTIs. Plans to follow up with Urology soon given strong smell in urine. 5. CKD / HTN: Slightly higher Cr compared to baseline. Advised water intake, which she is already drinking. Given Cr up and BP lower than usual, advised follow up with PCP soon. Also advised skipping dose of Lasix if feeling lightheaded. 6. Hx of bladder cancer (1999): S/p ileal conduit/ urostomy. Sees Dr. Moshe Boyd in Urology.      7. S/p Left hip replacement: Surgery was in 3/2018. Improvement in left hip pain. She is following closely with orthopedics. I appreciate the opportunity to participate in Ms. Humphrey Gonzalez's care. I personally provided the service today.     Signed By: Theresia Bosworth, MD     June 20, 2022

## 2022-06-18 NOTE — PROGRESS NOTES
Call pt. Her sed rate is elevated likely from Rheumatoid arthritis. Send labs to [facility] Dr. Shagufta Pham. Sugar is running higher. Need to follow diabetic diet. Cult back on sugar and starches. Normal thyroid level. Stable kidney function. Low blood cell counts. She has follow up with Dr. Hadley Gee coming up.

## 2022-06-20 ENCOUNTER — OFFICE VISIT (OUTPATIENT)
Dept: ONCOLOGY | Age: 77
End: 2022-06-20
Payer: MEDICARE

## 2022-06-20 ENCOUNTER — TELEPHONE (OUTPATIENT)
Dept: FAMILY MEDICINE CLINIC | Age: 77
End: 2022-06-20

## 2022-06-20 VITALS
OXYGEN SATURATION: 98 % | HEIGHT: 65 IN | SYSTOLIC BLOOD PRESSURE: 98 MMHG | BODY MASS INDEX: 33.76 KG/M2 | WEIGHT: 202.6 LBS | TEMPERATURE: 98 F | RESPIRATION RATE: 18 BRPM | DIASTOLIC BLOOD PRESSURE: 63 MMHG | HEART RATE: 97 BPM

## 2022-06-20 DIAGNOSIS — I10 PRIMARY HYPERTENSION: ICD-10-CM

## 2022-06-20 DIAGNOSIS — D61.818 PANCYTOPENIA (HCC): Primary | ICD-10-CM

## 2022-06-20 DIAGNOSIS — E53.8 VITAMIN B12 DEFICIENCY: ICD-10-CM

## 2022-06-20 DIAGNOSIS — N39.0 RECURRENT UTI: ICD-10-CM

## 2022-06-20 DIAGNOSIS — D63.8 ANEMIA OF CHRONIC DISEASE: ICD-10-CM

## 2022-06-20 DIAGNOSIS — N18.31 STAGE 3A CHRONIC KIDNEY DISEASE (HCC): ICD-10-CM

## 2022-06-20 PROCEDURE — G8417 CALC BMI ABV UP PARAM F/U: HCPCS | Performed by: INTERNAL MEDICINE

## 2022-06-20 PROCEDURE — 1090F PRES/ABSN URINE INCON ASSESS: CPT | Performed by: INTERNAL MEDICINE

## 2022-06-20 PROCEDURE — 1101F PT FALLS ASSESS-DOCD LE1/YR: CPT | Performed by: INTERNAL MEDICINE

## 2022-06-20 PROCEDURE — G8752 SYS BP LESS 140: HCPCS | Performed by: INTERNAL MEDICINE

## 2022-06-20 PROCEDURE — G8427 DOCREV CUR MEDS BY ELIG CLIN: HCPCS | Performed by: INTERNAL MEDICINE

## 2022-06-20 PROCEDURE — G8754 DIAS BP LESS 90: HCPCS | Performed by: INTERNAL MEDICINE

## 2022-06-20 PROCEDURE — 99214 OFFICE O/P EST MOD 30 MIN: CPT | Performed by: INTERNAL MEDICINE

## 2022-06-20 PROCEDURE — 1123F ACP DISCUSS/DSCN MKR DOCD: CPT | Performed by: INTERNAL MEDICINE

## 2022-06-20 PROCEDURE — G8536 NO DOC ELDER MAL SCRN: HCPCS | Performed by: INTERNAL MEDICINE

## 2022-06-20 PROCEDURE — G8510 SCR DEP NEG, NO PLAN REQD: HCPCS | Performed by: INTERNAL MEDICINE

## 2022-06-20 PROCEDURE — G8399 PT W/DXA RESULTS DOCUMENT: HCPCS | Performed by: INTERNAL MEDICINE

## 2022-06-20 NOTE — LETTER
6/20/2022    Ms. Cheyenne Patiño [phone] [id number]      Dear Ej Cano reviewed your test results as listed below. Results are normal/stable unless otherwise noted. Results for orders placed or performed in visit on 06/10/22   C REACTIVE PROTEIN, QT   Result Value Ref Range    C-Reactive protein <0.29 0.00 - 0.60 mg/dL   SED RATE (ESR)   Result Value Ref Range    Sed rate, automated 51 (H) 0 - 30 mm/hr   HEMOGLOBIN A1C WITH EAG   Result Value Ref Range    Hemoglobin A1c 6.0 (H) 4.0 - 5.6 %    Est. average glucose 126 mg/dL   MAGNESIUM   Result Value Ref Range    Magnesium 2.0 1.6 - 2.4 mg/dL   T4, FREE   Result Value Ref Range    T4, Free 1.2 0.8 - 1.5 NG/DL   TSH 3RD GENERATION   Result Value Ref Range    TSH 1.22 0.36 - 7.53 uIU/mL   METABOLIC PANEL, COMPREHENSIVE   Result Value Ref Range    Sodium 139 136 - 145 mmol/L    Potassium 3.7 3.5 - 5.1 mmol/L    Chloride 106 97 - 108 mmol/L    CO2 24 21 - 32 mmol/L    Anion gap 9 5 - 15 mmol/L    Glucose 129 (H) 65 - 100 mg/dL    BUN 28 (H) 6 - 20 MG/DL    Creatinine 1.51 (H) 0.55 - 1.02 MG/DL    BUN/Creatinine ratio 19 12 - 20      GFR est AA 40 (L) >60 ml/min/1.73m2    GFR est non-AA 33 (L) >60 ml/min/1.73m2    Calcium 9.5 8.5 - 10.1 MG/DL    Bilirubin, total 0.7 0.2 - 1.0 MG/DL    ALT (SGPT) 23 12 - 78 U/L    AST (SGOT) 29 15 - 37 U/L    Alk.  phosphatase 70 45 - 117 U/L    Protein, total 7.9 6.4 - 8.2 g/dL    Albumin 4.1 3.5 - 5.0 g/dL    Globulin 3.8 2.0 - 4.0 g/dL    A-G Ratio 1.1 1.1 - 2.2     CBC WITH AUTOMATED DIFF   Result Value Ref Range    WBC 1.9 (L) 3.6 - 11.0 K/uL    RBC 3.50 (L) 3.80 - 5.20 M/uL    HGB 11.0 (L) 11.5 - 16.0 g/dL    HCT 33.7 (L) 35.0 - 47.0 %    MCV 96.3 80.0 - 99.0 FL    MCH 31.4 26.0 - 34.0 PG    MCHC 32.6 30.0 - 36.5 g/dL    RDW 13.7 11.5 - 14.5 %    PLATELET 118 (L) 574 - 400 K/uL    MPV 11.4 8.9 - 12.9 FL    NRBC 0.0 0  WBC    ABSOLUTE NRBC 0.00 0.00 - 0.01 K/uL    NEUTROPHILS 51 32 - 75 % LYMPHOCYTES 36 12 - 49 %    MONOCYTES 8 5 - 13 %    EOSINOPHILS 3 0 - 7 %    BASOPHILS 1 0 - 1 %    IMMATURE GRANULOCYTES 1 (H) 0.0 - 0.5 %    ABS. NEUTROPHILS 0.9 (L) 1.8 - 8.0 K/UL    ABS. LYMPHOCYTES 0.7 (L) 0.8 - 3.5 K/UL    ABS. MONOCYTES 0.2 0.0 - 1.0 K/UL    ABS. EOSINOPHILS 0.1 0.0 - 0.4 K/UL    ABS. BASOPHILS 0.0 0.0 - 0.1 K/UL    ABS. IMM. GRANS. 0.0 0.00 - 0.04 K/UL    DF SMEAR SCANNED      RBC COMMENTS MACROCYTOSIS  1+       SAMPLES BEING HELD   Result Value Ref Range    SAMPLES BEING HELD 1SST     COMMENT        Add-on orders for these samples will be processed based on acceptable specimen integrity and analyte stability, which may vary by analyte.        Please call me if you have any questions: [phone]        Sincerely,      Carlos Jerez MD

## 2022-06-20 NOTE — PROGRESS NOTES
Identified pt with two pt identifiers(name and ). Reviewed record in preparation for visit and have obtained necessary documentation. Chief Complaint   Patient presents with    Follow-up     6 mo, pancytopenia    Fatigue     pt reports fatigue always          Vitals:    22 1121   BP: 98/63   Pulse: 97   Resp: 18   Temp: 98 °F (36.7 °C)   TempSrc: Oral   SpO2: 98%   Weight: 202 lb 9.6 oz (91.9 kg)   Height: 5' 5\" (1.651 m)   PainSc:   6   PainLoc: Back       Pain Scale: 6/10        Coordination of Care Questionnaire:  :     1. Have you been to the ER, urgent care clinic since your last visit? Hospitalized since your last visit? No    2. Have you seen or consulted any other health care providers outside of the [address] [place] [address] since your last visit? Include any pap smears or colon screening.  No

## 2022-06-20 NOTE — TELEPHONE ENCOUNTER
----- Message from Aaron Contreras MD sent at 6/18/2022  1:13 PM EDT -----  Call pt. Her sed rate is elevated likely from Rheumatoid arthritis. Send labs to [facility] Dr. Manuelito Mooney. Sugar is running higher. Need to follow diabetic diet. Cult back on sugar and starches. Normal thyroid level. Stable kidney function. Low blood cell counts. She has follow up with Dr. Nahid Salguero coming up.

## 2022-07-05 DIAGNOSIS — E53.8 DEFICIENCY OF OTHER SPECIFIED B GROUP VITAMINS: ICD-10-CM

## 2022-07-05 RX ORDER — SYRINGE WITH NEEDLE, 1 ML 25GX5/8"
SYRINGE, EMPTY DISPOSABLE MISCELLANEOUS
Qty: 12 EACH | Refills: 3 | Status: SHIPPED | OUTPATIENT
Start: 2022-07-05

## 2022-08-03 ENCOUNTER — TELEPHONE (OUTPATIENT)
Dept: FAMILY MEDICINE CLINIC | Age: 77
End: 2022-08-03

## 2022-08-03 ENCOUNTER — VIRTUAL VISIT (OUTPATIENT)
Dept: FAMILY MEDICINE CLINIC | Age: 77
End: 2022-08-03
Payer: MEDICAID

## 2022-08-03 DIAGNOSIS — L98.9 SKIN LESION OF FACE: Primary | ICD-10-CM

## 2022-08-03 PROCEDURE — 1123F ACP DISCUSS/DSCN MKR DOCD: CPT | Performed by: FAMILY MEDICINE

## 2022-08-03 PROCEDURE — G8399 PT W/DXA RESULTS DOCUMENT: HCPCS | Performed by: FAMILY MEDICINE

## 2022-08-03 PROCEDURE — G8756 NO BP MEASURE DOC: HCPCS | Performed by: FAMILY MEDICINE

## 2022-08-03 PROCEDURE — 1090F PRES/ABSN URINE INCON ASSESS: CPT | Performed by: FAMILY MEDICINE

## 2022-08-03 PROCEDURE — G8427 DOCREV CUR MEDS BY ELIG CLIN: HCPCS | Performed by: FAMILY MEDICINE

## 2022-08-03 PROCEDURE — 1101F PT FALLS ASSESS-DOCD LE1/YR: CPT | Performed by: FAMILY MEDICINE

## 2022-08-03 PROCEDURE — 99212 OFFICE O/P EST SF 10 MIN: CPT | Performed by: FAMILY MEDICINE

## 2022-08-03 PROCEDURE — G8432 DEP SCR NOT DOC, RNG: HCPCS | Performed by: FAMILY MEDICINE

## 2022-08-03 RX ORDER — MUPIROCIN 20 MG/G
OINTMENT TOPICAL DAILY
Qty: 22 G | Refills: 0 | Status: SHIPPED | OUTPATIENT
Start: 2022-08-03

## 2022-08-03 NOTE — PROGRESS NOTES
Dash Magana is a 76 y.o. female who was seen by synchronous (real-time) audio-video technology on 8/3/2022 for No chief complaint on file. Assessment & Plan:   Diagnoses and all orders for this visit:    1. Skin lesion of face  -     REFERRAL TO DERMATOLOGY  -     mupirocin (BACTROBAN) 2 % ointment; Apply  to affected area daily. I spent at least 6 minutes on this visit with this established patient. 712  Subjective:       Prior to Admission medications    Medication Sig Start Date End Date Taking? Authorizing Provider   mupirocin (BACTROBAN) 2 % ointment Apply  to affected area daily. 3/0/68  Yes Natacha Feliciano MD   BD Luer-Esau Syringe 3 mL 25 x 5/8\" syrg USE 1 SYRINGE EVERY MONTH FOR YOUR VITAMIN B INJECTIONS 7/5/22   Mary Alice Palafox MD   losartan (COZAAR) 50 mg tablet Take 1 Tablet by mouth daily. 6/7/30   Natacha Feliciano MD   furosemide (LASIX) 40 mg tablet Take 1 Tablet by mouth daily. 4/4/61   Natacha Feliciano MD   levothyroxine (SYNTHROID) 75 mcg tablet Take 1 Tablet by mouth Daily (before breakfast). 7/1/80   Natacha Feliciano MD   omeprazole (PRILOSEC) 20 mg capsule Take 1 Capsule by mouth daily. 3/8/03   Natacha Feliciano MD   cyanocobalamin (VITAMIN B12) 1,000 mcg/mL injection INJECT 1 ML INTO THE MUSCLE EVERY THIRTY (30) DAYS. 4/21/22   Mary Alice Palafox MD   ergocalciferol (ERGOCALCIFEROL) 1,250 mcg (50,000 unit) capsule TAKE 1 CAP BY MOUTH EVERY SEVEN (7) DAYS. INDICATIONS: LOW VITAMIN D LEVELS 8/22/46   Natacha Feliciano MD   magnesium oxide (MAG-OX) 400 mg tablet Take 1 Tablet by mouth daily. Indications: low amount of magnesium in the blood 71/37/75   Natacha Feliciano MD   ENBREL MINI 50 mg/mL (1 mL) crtg Every Thursday.  12/13/19   Provider, Historical     Patient Active Problem List    Diagnosis Date Noted    Congestive heart failure, unspecified HF chronicity, unspecified [facility] 06/08/2022    Chronic renal disease, stage III 06/08/2022    History of bladder cancer 12/09/2020    Rheumatoid arthritis involving multiple sites ([facility] 06/29/2020    Pancytopenia ([facility] 10/01/2019    HTN (hypertension) 04/18/2018    Hypokalemia 04/18/2018    Anemia 04/18/2018    Acquired hypothyroidism 04/18/2018    GERD (gastroesophageal reflux disease) 04/18/2018     Current Outpatient Medications   Medication Sig Dispense Refill    mupirocin (BACTROBAN) 2 % ointment Apply  to affected area daily. 22 g 0    losartan (COZAAR) 50 mg tablet Take 1 Tablet by mouth daily. 90 Tablet 1    furosemide (LASIX) 40 mg tablet Take 1 Tablet by mouth daily. 90 Tablet 1    levothyroxine (SYNTHROID) 75 mcg tablet Take 1 Tablet by mouth Daily (before breakfast). 90 Tablet 1    omeprazole (PRILOSEC) 20 mg capsule Take 1 Capsule by mouth daily. 90 Capsule 1    cyanocobalamin (VITAMIN B12) 1,000 mcg/mL injection INJECT 1 ML INTO THE MUSCLE EVERY THIRTY (30) DAYS. 3 mL 15    ergocalciferol (ERGOCALCIFEROL) 1,250 mcg (50,000 unit) capsule TAKE 1 CAP BY MOUTH EVERY SEVEN (7) DAYS. INDICATIONS: LOW VITAMIN D LEVELS 15 Capsule 3    magnesium oxide (MAG-OX) 400 mg tablet Take 1 Tablet by mouth daily. Indications: low amount of magnesium in the blood 90 Tablet 3    ENBREL MINI 50 mg/mL (1 mL) crtg Every Thursday. BD Luer-Esau Syringe 3 mL 25 x 5/8\" syrg USE 1 SYRINGE EVERY MONTH FOR YOUR VITAMIN B INJECTIONS 12 Each 3     Allergies   Allergen Reactions    Trimethoprim Other (comments)    Bactrim [Sulfamethoprim] Nausea and Vomiting    Ciprofloxacin Other (comments)     Joint aches    Diclofenac Nausea Only    Sulfa (Sulfonamide Antibiotics) Nausea Only    Cephalexin Nausea and Vomiting       ROS  C/O changed skin lesion on forehead. Getting bigger and painful x few days. Redness.   Objective:     Patient-Reported Vitals 1/6/2021   Patient-Reported Weight -   Patient-Reported SpO2 96   Patient-Reported Systolic  620   Patient-Reported Diastolic 66      General: alert, cooperative, no distress   Mental  status: normal mood, behavior, speech, dress, motor activity, and thought processes, able to follow commands   HENT: NCAT   Neck: no visualized mass   Resp: no respiratory distress   Neuro: no gross deficits   Skin: Crater like lesion on left forehead, surrounding redness   Psychiatric: normal affect, consistent with stated mood, no evidence of hallucinations     Additional exam findings: We discussed the expected course, resolution and complications of the diagnosis(es) in detail. Medication risks, benefits, costs, interactions, and alternatives were discussed as indicated. I advised her to contact the office if her condition worsens, changes or fails to improve as anticipated. She expressed understanding with the diagnosis(es) and plan. Odette Acuna, was evaluated through a synchronous (real-time) audio-video encounter. The patient (or guardian if applicable) is aware that this is a billable service, which includes applicable co-pays. This Virtual Visit was conducted with patient's (and/or legal guardian's) consent. The visit was conducted pursuant to the emergency declaration under the [address] [place] [address], [address] [place] [address] [facility] authority and the [employer] General Act. Patient identification was verified, and a caregiver was present when appropriate. The patient was located at: Home: [id number] [id number]  The provider was located at:  Facility (Appt Department): [address] [place] [address]        Laura Chatman MD

## 2022-10-25 ENCOUNTER — TELEPHONE (OUTPATIENT)
Dept: PHARMACY | Age: 77
End: 2022-10-25

## 2022-11-03 DIAGNOSIS — R79.0 LOW MAGNESIUM LEVEL: ICD-10-CM

## 2022-11-03 RX ORDER — LANOLIN ALCOHOL/MO/W.PET/CERES
400 CREAM (GRAM) TOPICAL DAILY
Qty: 90 TABLET | Refills: 3 | Status: SHIPPED | OUTPATIENT
Start: 2022-11-03

## 2022-11-28 ENCOUNTER — OFFICE VISIT (OUTPATIENT)
Dept: FAMILY MEDICINE CLINIC | Age: 77
End: 2022-11-28
Payer: COMMERCIAL

## 2022-11-28 ENCOUNTER — LAB ONLY (OUTPATIENT)
Dept: FAMILY MEDICINE CLINIC | Age: 77
End: 2022-11-28

## 2022-11-28 VITALS
HEART RATE: 85 BPM | BODY MASS INDEX: 33.15 KG/M2 | RESPIRATION RATE: 18 BRPM | SYSTOLIC BLOOD PRESSURE: 136 MMHG | OXYGEN SATURATION: 97 % | DIASTOLIC BLOOD PRESSURE: 72 MMHG | WEIGHT: 199 LBS | HEIGHT: 65 IN | TEMPERATURE: 97.2 F

## 2022-11-28 DIAGNOSIS — Z23 NEEDS FLU SHOT: ICD-10-CM

## 2022-11-28 DIAGNOSIS — I50.9 CONGESTIVE HEART FAILURE, UNSPECIFIED HF CHRONICITY, UNSPECIFIED HEART FAILURE TYPE (HCC): ICD-10-CM

## 2022-11-28 DIAGNOSIS — Z23 ENCOUNTER FOR IMMUNIZATION: ICD-10-CM

## 2022-11-28 DIAGNOSIS — Z00.00 MEDICARE ANNUAL WELLNESS VISIT, SUBSEQUENT: Primary | ICD-10-CM

## 2022-11-28 DIAGNOSIS — R73.9 HYPERGLYCEMIA: ICD-10-CM

## 2022-11-28 DIAGNOSIS — E55.9 VITAMIN D DEFICIENCY: ICD-10-CM

## 2022-11-28 DIAGNOSIS — Z79.899 ENCOUNTER FOR LONG-TERM CURRENT USE OF MEDICATION: ICD-10-CM

## 2022-11-28 DIAGNOSIS — E03.9 ACQUIRED HYPOTHYROIDISM: ICD-10-CM

## 2022-11-28 DIAGNOSIS — H91.93 BILATERAL HEARING LOSS, UNSPECIFIED HEARING LOSS TYPE: ICD-10-CM

## 2022-11-28 DIAGNOSIS — K21.9 GASTROESOPHAGEAL REFLUX DISEASE, UNSPECIFIED WHETHER ESOPHAGITIS PRESENT: ICD-10-CM

## 2022-11-28 DIAGNOSIS — I10 ESSENTIAL HYPERTENSION: ICD-10-CM

## 2022-11-28 PROCEDURE — 90694 VACC AIIV4 NO PRSRV 0.5ML IM: CPT | Performed by: FAMILY MEDICINE

## 2022-11-28 PROCEDURE — G0439 PPPS, SUBSEQ VISIT: HCPCS | Performed by: FAMILY MEDICINE

## 2022-11-28 PROCEDURE — 90472 IMMUNIZATION ADMIN EACH ADD: CPT | Performed by: FAMILY MEDICINE

## 2022-11-28 PROCEDURE — 3078F DIAST BP <80 MM HG: CPT | Performed by: FAMILY MEDICINE

## 2022-11-28 PROCEDURE — 90471 IMMUNIZATION ADMIN: CPT | Performed by: FAMILY MEDICINE

## 2022-11-28 PROCEDURE — 1123F ACP DISCUSS/DSCN MKR DOCD: CPT | Performed by: FAMILY MEDICINE

## 2022-11-28 PROCEDURE — 90732 PPSV23 VACC 2 YRS+ SUBQ/IM: CPT | Performed by: FAMILY MEDICINE

## 2022-11-28 PROCEDURE — 3074F SYST BP LT 130 MM HG: CPT | Performed by: FAMILY MEDICINE

## 2022-11-28 PROCEDURE — 99214 OFFICE O/P EST MOD 30 MIN: CPT | Performed by: FAMILY MEDICINE

## 2022-11-28 RX ORDER — OMEPRAZOLE 20 MG/1
20 CAPSULE, DELAYED RELEASE ORAL DAILY
Qty: 90 CAPSULE | Refills: 1 | Status: SHIPPED | OUTPATIENT
Start: 2022-11-28

## 2022-11-28 RX ORDER — LOSARTAN POTASSIUM 50 MG/1
50 TABLET ORAL DAILY
Qty: 90 TABLET | Refills: 1 | Status: SHIPPED | OUTPATIENT
Start: 2022-11-28

## 2022-11-28 RX ORDER — FUROSEMIDE 40 MG/1
40 TABLET ORAL DAILY
Qty: 90 TABLET | Refills: 1 | Status: SHIPPED | OUTPATIENT
Start: 2022-11-28

## 2022-11-28 RX ORDER — LEVOTHYROXINE SODIUM 75 UG/1
75 TABLET ORAL
Qty: 90 TABLET | Refills: 1 | Status: SHIPPED | OUTPATIENT
Start: 2022-11-28

## 2022-11-28 NOTE — PROGRESS NOTES
Identified pt with two pt identifiers(name and ). Chief Complaint   Patient presents with    Follow-up    Labs     Not fasting, had ensure this morning    Ear Pain     Right ear is popping and they ringing        Health Maintenance Due   Topic    COVID-19 Vaccine (1)    DTaP/Tdap/Td series (1 - Tdap)    Pneumococcal 65+ years (2 - PPSV23 if available, else PCV20)    Flu Vaccine (1)    Medicare Yearly Exam        Wt Readings from Last 3 Encounters:   22 199 lb (90.3 kg)   22 202 lb 9.6 oz (91.9 kg)   22 200 lb (90.7 kg)     Temp Readings from Last 3 Encounters:   22 97.2 °F (36.2 °C) (Temporal)   22 98 °F (36.7 °C) (Oral)   22 97.8 °F (36.6 °C) (Temporal)     BP Readings from Last 3 Encounters:   22 136/72   22 98/63   22 126/66     Pulse Readings from Last 3 Encounters:   22 85   22 97   22 93         Learning Assessment:  :     Learning Assessment 5/10/2021 2019   PRIMARY LEARNER Patient Patient   BARRIERS PRIMARY LEARNER NONE -   CO-LEARNER CAREGIVER No -   PRIMARY LANGUAGE ENGLISH ENGLISH   LEARNER PREFERENCE PRIMARY LISTENING DEMONSTRATION     DEMONSTRATION -   ANSWERED BY patient patient   RELATIONSHIP SELF SELF       Depression Screening:  :     3 most recent PHQ Screens 2022   Little interest or pleasure in doing things Not at all   Feeling down, depressed, irritable, or hopeless Not at all   Total Score PHQ 2 0       Fall Risk Assessment:  :     Fall Risk Assessment, last 12 mths 2022   Able to walk? Yes   Fall in past 12 months? 0   Do you feel unsteady? 1   Are you worried about falling 1   Is the gait abnormal? 1   Number of falls in past 12 months 0       Abuse Screening:  :     Abuse Screening Questionnaire 2022 2022 2021 2021 2020 2020 3/7/2019   Do you ever feel afraid of your partner?  N N N N N N N   Are you in a relationship with someone who physically or mentally threatens you? N N N N N N N   Is it safe for you to go home? Y Y Y Y Y Y Y       Coordination of Care Questionnaire:  :     1) Have you been to an emergency room, urgent care clinic since your last visit? no   Hospitalized since your last visit? no             2) Have you seen or consulted any other health care providers outside of [address] [place] [address] since your last visit? no  (Include any pap smears or colon screenings in this section.)    3) Do you have an Advance Directive on file? yes  Are you interested in receiving information about Advance Directives? no    Patient is accompanied by N/A I have received verbal consent from Lisbeth Palacios to discuss any/all medical information while they are present in the room. 4.  For patients aged 39-70: Has the patient had a colonoscopy / FIT/ Cologuard? NA - based on age      If the patient is female:    11. For patients aged 41-77: Has the patient had a mammogram within the past 2 years? NA - based on age or sex      10. For patients aged 21-65: Has the patient had a pap smear?  NA - based on age or sex

## 2022-11-28 NOTE — PATIENT INSTRUCTIONS
Vaccine Information Statement    Influenza (Flu) Vaccine (Inactivated or Recombinant): What You Need to Know    Many vaccine information statements are available in Korean and other languages. See www.immunize.org/vis. Hojas de información sobre vacunas están disponibles en español y en muchos otros idiomas. Visite www.immunize.org/vis. 1. Why get vaccinated? Influenza vaccine can prevent influenza (flu). Flu is a contagious disease that spreads around the [facility] every year, usually between October and May. Anyone can get the flu, but it is more dangerous for some people. Infants and young children, people 72 years and older, pregnant people, and people with certain health conditions or a weakened immune system are at greatest risk of flu complications. Pneumonia, bronchitis, sinus infections, and ear infections are examples of flu-related complications. If you have a medical condition, such as heart disease, cancer, or diabetes, flu can make it worse. Flu can cause fever and chills, sore throat, muscle aches, fatigue, cough, headache, and runny or stuffy nose. Some people may have vomiting and diarrhea, though this is more common in children than adults. In an average year, thousands of people in the [facility] die from flu, and many more are hospitalized. Flu vaccine prevents millions of illnesses and flu-related visits to the doctor each year. 2. Influenza vaccines     CDC recommends everyone 6 months and older get vaccinated every flu season. Children 6 months through 6years of age may need 2 doses during a single flu season. Everyone else needs only 1 dose each flu season. It takes about 2 weeks for protection to develop after vaccination. There are many flu viruses, and they are always changing. Each year a new flu vaccine is made to protect against the influenza viruses believed to be likely to cause disease in the upcoming flu season.  Even when the vaccine doesnt exactly match these viruses, it may still provide some protection. Influenza vaccine does not cause flu. Influenza vaccine may be given at the same time as other vaccines. 3. Talk with your health care provider    Tell your vaccination provider if the person getting the vaccine:  Has had an allergic reaction after a previous dose of influenza vaccine, or has any severe, life-threatening allergies   Has ever had Guillain-Barré Syndrome (also called GBS)    In some cases, your health care provider may decide to postpone influenza vaccination until a future visit. Influenza vaccine can be administered at any time during pregnancy. People who are or will be pregnant during influenza season should receive inactivated influenza vaccine. People with minor illnesses, such as a cold, may be vaccinated. People who are moderately or severely ill should usually wait until they recover before getting influenza vaccine. Your health care provider can give you more information. 4. Risks of a vaccine reaction    Soreness, redness, and swelling where the shot is given, fever, muscle aches, and headache can happen after influenza vaccination. There may be a very small increased risk of Guillain-Barré Syndrome (GBS) after inactivated influenza vaccine (the flu shot). Jose Power children who get the flu shot along with pneumococcal vaccine (PCV13) and/or DTaP vaccine at the same time might be slightly more likely to have a seizure caused by fever. Tell your health care provider if a child who is getting flu vaccine has ever had a seizure. People sometimes faint after medical procedures, including vaccination. Tell your provider if you feel dizzy or have vision changes or ringing in the ears. As with any medicine, there is a very remote chance of a vaccine causing a severe allergic reaction, other serious injury, or death. 5. What if there is a serious problem?     An allergic reaction could occur after the vaccinated person leaves the clinic. If you see signs of a severe allergic reaction (hives, swelling of the face and throat, difficulty breathing, a fast heartbeat, dizziness, or weakness), call 9-1-1 and get the person to the nearest hospital.    For other signs that concern you, call your health care provider. Adverse reactions should be reported to the Vaccine Adverse Event Reporting System (VAERS). Your health care provider will usually file this report, or you can do it yourself. Visit the VAERS website at www.vaers. [facility].gov or call 5-983.996.9123. VAERS is only for reporting reactions, and VAERS staff members do not give medical advice. 6. The National Vaccine Injury Compensation Program    The [facility] Vaccine Injury Compensation Program (VICP) is a federal program that was created to compensate people who may have been injured by certain vaccines. Claims regarding alleged injury or death due to vaccination have a time limit for filing, which may be as short as two years. Visit the VICP website at www.[facility].gov/vaccinecompensation or call 7-347.121.9906 to learn about the program and about filing a claim. 7. How can I learn more? Ask your health care provider. Call your local or state health department. Visit the website of the Food and Drug Administration (FDA) for vaccine package inserts and additional information at www.fda.gov/vaccines-blood-biologics/vaccines. Contact the Centers for Disease Control and Prevention (CDC): Call 6-241.196.6673 (2-113-OIN-INFO) or  Visit CDCs influenza website at www.cdc.gov/flu. Vaccine Information Statement   Inactivated Influenza Vaccine   8/6/2021  [address] [place] [address] 491WO-07   Department of Health and Human Services  Centers for Disease Control and Prevention    Office Use Only      Medicare Wellness Visit, Female     The best way to live healthy is to have a lifestyle where you eat a well-balanced diet, exercise regularly, limit alcohol use, and quit all forms of tobacco/nicotine, if applicable. Regular preventive services are another way to keep healthy. Preventive services (vaccines, screening tests, monitoring & exams) can help personalize your care plan, which helps you manage your own care. Screening tests can find health problems at the earliest stages, when they are easiest to treat. [facility] follows the current, evidence-based guidelines published by the [facility] Darrel Tirado ([facility]) when recommending preventive services for our patients. Because we follow these guidelines, sometimes recommendations change over time as research supports it. (For example, mammograms used to be recommended annually. Even though Medicare will still pay for an annual mammogram, the newer guidelines recommend a mammogram every two years for women of average risk). Of course, you and your doctor may decide to screen more often for some diseases, based on your risk and your co-morbidities (chronic disease you are already diagnosed with). Preventive services for you include:  - Medicare offers their members a free annual wellness visit, which is time for you and your primary care provider to discuss and plan for your preventive service needs. Take advantage of this benefit every year!  -All adults over the age of 72 should receive the recommended pneumonia vaccines. Current USPSTF guidelines recommend a series of two vaccines for the best pneumonia protection.   -All adults should have a flu vaccine yearly and a tetanus vaccine every 10 years.   -All adults age 48 and older should receive the shingles vaccines (series of two vaccines).       -All adults age 38-68 who are overweight should have a diabetes screening test once every three years.   -All adults born between [birth year] and 1967 should be screened once for Hepatitis C.  -Other screening tests and preventive services for persons with diabetes include: an eye exam to screen for diabetic retinopathy, a kidney function test, a foot exam, and stricter control over your cholesterol.   -Cardiovascular screening for adults with routine risk involves an electrocardiogram (ECG) at intervals determined by your doctor.   -Colorectal cancer screenings should be done for adults age 54-65 with no increased risk factors for colorectal cancer. There are a number of acceptable methods of screening for this type of cancer. Each test has its own benefits and drawbacks. Discuss with your doctor what is most appropriate for you during your annual wellness visit. The different tests include: colonoscopy (considered the best screening method), a fecal occult blood test, a fecal DNA test, and sigmoidoscopy.    -A bone mass density test is recommended when a woman turns 65 to screen for osteoporosis. This test is only recommended one time, as a screening. Some providers will use this same test as a disease monitoring tool if you already have osteoporosis. -Breast cancer screenings are recommended every other year for women of normal risk, age 54-69.  -Cervical cancer screenings for women over age 72 are only recommended with certain risk factors.      Here is a list of your current Health Maintenance items (your personalized list of preventive services) with a due date:  Health Maintenance Due   Topic Date Due    COVID-19 Vaccine (1) Never done    DTaP/Tdap/Td  (1 - Tdap) Never done    Pneumococcal Vaccine (2 - PPSV23 if available, else PCV20) 09/21/2020    Yearly Flu Vaccine (1) 08/01/2022

## 2022-11-28 NOTE — PROGRESS NOTES
This is the Subsequent Medicare Annual Wellness Exam, performed 12 months or more after the Initial AWV or the last Subsequent AWV    I have reviewed the patient's medical history in detail and updated the computerized patient record. Assessment/Plan   Education and counseling provided:  Are appropriate based on today's review and evaluation  End-of-Life planning (with patient's consent)  Influenza Vaccine  Cardiovascular screening blood test  Screening for glaucoma  Diabetes screening test    1. Medicare annual wellness visit, subsequent  2. Essential hypertension  -     LIPID PANEL; Future  3. Acquired hypothyroidism  -     TSH 3RD GENERATION; Future  -     T4, FREE; Future  4. Hyperglycemia  -     HEMOGLOBIN A1C WITH EAG; Future  5. Encounter for long-term current use of medication  -     MAGNESIUM; Future  6. Vitamin D deficiency  -     VITAMIN D, 25 HYDROXY; Future  7. Needs flu shot  -     INFLUENZA, FLUAD, (AGE 65 Y+), IM, PF, 0.5 ML  8. Encounter for immunization  -     PNEUMOCOCCAL, PPSV23, (AGE 2 YRS+), SC/IM  -     ADMIN PNEUMOCOCCAL VACCINE       Depression Risk Factor Screening     3 most recent PHQ Screens 11/28/2022   Little interest or pleasure in doing things Not at all   Feeling down, depressed, irritable, or hopeless Not at all   Total Score PHQ 2 0       Alcohol & Drug Abuse Risk Screen    Do you average more than 1 drink per night or more than 7 drinks a week:  No    On any one occasion in the past three months have you have had more than 3 drinks containing alcohol:  No          Functional Ability and Level of Safety    Hearing: The patient needs further evaluation. Activities of Daily Living: The home contains: handrails and grab bars  Patient needs help with:  transportation      Ambulation: with mild difficulty     Fall Risk:  Fall Risk Assessment, last 12 mths 6/20/2022   Able to walk? Yes   Fall in past 12 months? 0   Do you feel unsteady?  1   Are you worried about falling 1 Is the gait abnormal? 1   Number of falls in past 12 months 0      Abuse Screen:  Patient is not abused       Cognitive Screening    Has your family/caregiver stated any concerns about your memory: no         Health Maintenance Due     Health Maintenance Due   Topic Date Due    COVID-19 Vaccine (1) Never done    DTaP/Tdap/Td series (1 - Tdap) Never done    Pneumococcal 65+ years (2 - PPSV23 if available, else PCV20) 09/21/2020    Flu Vaccine (1) 08/01/2022       Patient Care Team   Patient Care Team:  Danae Castillo MD as PCP - General (Family Medicine)  Danae Castillo MD as PCP - [facility] Provider  Brenda Bhakta MD (Urology)  Alex Wilder MD as Physician (Rheumatology Internal Medicine)  Srikanth Manrique MD as Physician (Hematology and Oncology)    History     Patient Active Problem List   Diagnosis Code    HTN (hypertension) I10    Hypokalemia E87.6    Anemia D64.9    Acquired hypothyroidism E03.9    GERD (gastroesophageal reflux disease) K21.9    Pancytopenia (HCC) D61.818    Rheumatoid arthritis involving multiple sites ([facility] M06.9    History of bladder cancer Z85.51    Congestive heart failure, unspecified HF chronicity, unspecified heart failure type ([facility] I50.9    Chronic renal disease, stage III N18.30     Past Medical History:   Diagnosis Date    Arthritis     Cancer ([facility]     bladder    COVID-19 12/2020    Endocrine disease     hyperthyroid    Gastrointestinal disorder     GERD (gastroesophageal reflux disease)     Hypertension     Other ill-defined conditions(799.89)     hyperthyroid    Pneumonia 12/2020 12/2020      Past Surgical History:   Procedure Laterality Date    HX GYN      HX HERNIA REPAIR      HX HIP REPLACEMENT Left     HX UROLOGICAL      stoma since 99     Current Outpatient Medications   Medication Sig Dispense Refill    magnesium oxide (MAG-OX) 400 mg tablet TAKE 1 TABLET BY MOUTH DAILY.  INDICATIONS: LOW AMOUNT OF MAGNESIUM IN THE BLOOD 90 Tablet 3    mupirocin (BACTROBAN) 2 % ointment Apply  to affected area daily. 22 g 0    BD Luer-Esau Syringe 3 mL 25 x 5/8\" syrg USE 1 SYRINGE EVERY MONTH FOR YOUR VITAMIN B INJECTIONS 12 Each 3    losartan (COZAAR) 50 mg tablet Take 1 Tablet by mouth daily. 90 Tablet 1    furosemide (LASIX) 40 mg tablet Take 1 Tablet by mouth daily. 90 Tablet 1    levothyroxine (SYNTHROID) 75 mcg tablet Take 1 Tablet by mouth Daily (before breakfast). 90 Tablet 1    omeprazole (PRILOSEC) 20 mg capsule Take 1 Capsule by mouth daily. 90 Capsule 1    cyanocobalamin (VITAMIN B12) 1,000 mcg/mL injection INJECT 1 ML INTO THE MUSCLE EVERY THIRTY (30) DAYS. 3 mL 15    ergocalciferol (ERGOCALCIFEROL) 1,250 mcg (50,000 unit) capsule TAKE 1 CAP BY MOUTH EVERY SEVEN (7) DAYS. INDICATIONS: LOW VITAMIN D LEVELS 15 Capsule 3    ENBREL MINI 50 mg/mL (1 mL) crtg Every Thursday. Allergies   Allergen Reactions    Trimethoprim Other (comments)    Bactrim [Sulfamethoprim] Nausea and Vomiting    Ciprofloxacin Other (comments)     Joint aches    Diclofenac Nausea Only    Sulfa (Sulfonamide Antibiotics) Nausea Only    Cephalexin Nausea and Vomiting       Family History   Problem Relation Age of Onset    Heart Disease Father     Cancer Father         Lung     Social History     Tobacco Use    Smoking status: Former     Packs/day: 1.00     Years: 25.00     Pack years: 25.00     Types: Cigarettes     Quit date: 7/3/1999     Years since quittin.4    Smokeless tobacco: Never   Substance Use Topics    Alcohol use: No     Subjective:     January Carey is a 76 y.o. female who presents for follow up of hypertension, hyperlipidemia, and hypothyroidism.     Diet and Lifestyle: generally follows a low fat low cholesterol diet, generally follows a low sodium diet, sedentary, nonsmoker  Home BP Monitoring: is not measured at home    Cardiovascular ROS: taking medications as instructed, no medication side effects noted, no TIA's, no chest pain on exertion, no dyspnea on exertion, no swelling of ankles. New concerns: due for labs. C/O poor hearing. Patient Active Problem List    Diagnosis Date Noted    Congestive heart failure, unspecified HF chronicity, unspecified heart failure type ([facility] 06/08/2022    Chronic renal disease, stage III 06/08/2022    History of bladder cancer 12/09/2020    Rheumatoid arthritis involving multiple sites ([facility] 06/29/2020    Pancytopenia ([facility] 10/01/2019    HTN (hypertension) 04/18/2018    Hypokalemia 04/18/2018    Anemia 04/18/2018    Acquired hypothyroidism 04/18/2018    GERD (gastroesophageal reflux disease) 04/18/2018     Current Outpatient Medications   Medication Sig Dispense Refill    omeprazole (PRILOSEC) 20 mg capsule Take 1 Capsule by mouth daily. 90 Capsule 1    furosemide (LASIX) 40 mg tablet Take 1 Tablet by mouth daily. 90 Tablet 1    losartan (COZAAR) 50 mg tablet Take 1 Tablet by mouth daily. 90 Tablet 1    levothyroxine (SYNTHROID) 75 mcg tablet Take 1 Tablet by mouth Daily (before breakfast). 90 Tablet 1    magnesium oxide (MAG-OX) 400 mg tablet TAKE 1 TABLET BY MOUTH DAILY. INDICATIONS: LOW AMOUNT OF MAGNESIUM IN THE BLOOD 90 Tablet 3    mupirocin (BACTROBAN) 2 % ointment Apply  to affected area daily. 22 g 0    BD Luer-Esau Syringe 3 mL 25 x 5/8\" syrg USE 1 SYRINGE EVERY MONTH FOR YOUR VITAMIN B INJECTIONS 12 Each 3    cyanocobalamin (VITAMIN B12) 1,000 mcg/mL injection INJECT 1 ML INTO THE MUSCLE EVERY THIRTY (30) DAYS. 3 mL 15    ergocalciferol (ERGOCALCIFEROL) 1,250 mcg (50,000 unit) capsule TAKE 1 CAP BY MOUTH EVERY SEVEN (7) DAYS. INDICATIONS: LOW VITAMIN D LEVELS 15 Capsule 3    ENBREL MINI 50 mg/mL (1 mL) crtg Every Thursday.        Allergies   Allergen Reactions    Trimethoprim Other (comments)    Bactrim [Sulfamethoprim] Nausea and Vomiting    Ciprofloxacin Other (comments)     Joint aches    Diclofenac Nausea Only    Sulfa (Sulfonamide Antibiotics) Nausea Only    Cephalexin Nausea and Vomiting             Review of Systems, additional:  Pertinent items are noted in HPI. Objective:     Visit Vitals  /72 (BP 1 Location: Right arm, BP Patient Position: Sitting, BP Cuff Size: Adult)   Pulse 85   Temp 97.2 °F (36.2 °C) (Temporal)   Resp 18   Ht 5' 5\" (1.651 m)   Wt 199 lb (90.3 kg)   SpO2 97%   BMI 33.12 kg/m²     Appearance: alert, well appearing, and in no distress and overweight. General exam: CVS exam BP noted to be well controlled today in office, S1, S2 normal, no gallop, no murmur, chest clear, no JVD, no HSM, no edema. Lab review: orders written for new lab studies as appropriate; see orders. Assessment/Plan:     Kiarra Arvizu ICD-10-CM ICD-9-CM    1. Medicare annual wellness visit, subsequent  Z00.00 V70.0       2. Essential hypertension  I10 401.9 LIPID PANEL      losartan (COZAAR) 50 mg tablet      3. Acquired hypothyroidism  E03.9 244.9 TSH 3RD GENERATION      T4, FREE      levothyroxine (SYNTHROID) 75 mcg tablet      4. Hyperglycemia  R73.9 790.29 HEMOGLOBIN A1C WITH EAG      5. Encounter for long-term current use of medication  Z79.899 V58.69 MAGNESIUM      6. Vitamin D deficiency  E55.9 268.9 VITAMIN D, 25 HYDROXY      7. Needs flu shot  Z23 V04.81 INFLUENZA, FLUAD, (AGE 65 Y+), IM, PF, 0.5 ML      8. Encounter for immunization  Z23 V03.89 PNEUMOCOCCAL, PPSV23, (AGE 2 YRS+), SC/IM      ADMIN PNEUMOCOCCAL VACCINE      9. Gastroesophageal reflux disease, unspecified whether esophagitis present  K21.9 530.81 omeprazole (PRILOSEC) 20 mg capsule      10. Congestive heart failure, unspecified HF chronicity, unspecified heart failure type (HCC)  I50.9 428.0 furosemide (LASIX) 40 mg tablet      11.  Bilateral hearing loss, unspecified hearing loss type  H91.93 389.9 REFERRAL TO ENT-OTOLARYNGOLOGY          Flakita Guo MD

## 2022-11-29 LAB
25(OH)D3 SERPL-MCNC: 78.4 NG/ML (ref 30–100)
CHOLEST SERPL-MCNC: 182 MG/DL
EST. AVERAGE GLUCOSE BLD GHB EST-MCNC: 111 MG/DL
HBA1C MFR BLD: 5.5 % (ref 4–5.6)
HDLC SERPL-MCNC: 48 MG/DL
HDLC SERPL: 3.8 {RATIO} (ref 0–5)
LDLC SERPL CALC-MCNC: 80.8 MG/DL (ref 0–100)
MAGNESIUM SERPL-MCNC: 1.8 MG/DL (ref 1.6–2.4)
T4 FREE SERPL-MCNC: 1.1 NG/DL (ref 0.8–1.5)
TRIGL SERPL-MCNC: 266 MG/DL (ref ?–150)
TSH SERPL DL<=0.05 MIU/L-ACNC: 0.89 UIU/ML (ref 0.36–3.74)
VLDLC SERPL CALC-MCNC: 53.2 MG/DL

## 2022-12-04 NOTE — PROGRESS NOTES
Good sugar control. Cholesterol numbers are stable. Normal thyroid level. Good Vit D level. Normal magnesium. No change in medicines. Follow up in 6 months.

## 2022-12-05 ENCOUNTER — TELEPHONE (OUTPATIENT)
Dept: FAMILY MEDICINE CLINIC | Age: 77
End: 2022-12-05

## 2022-12-05 NOTE — TELEPHONE ENCOUNTER
----- Message from Rob Gant MD sent at 12/4/2022  6:14 PM EST -----  Good sugar control. Cholesterol numbers are stable. Normal thyroid level. Good Vit D level. Normal magnesium. No change in medicines. Follow up in 6 months.

## 2023-01-19 NOTE — PROGRESS NOTES
[id number] [facility] at [facility]  [phone]    Hematology / Oncology Progress Note    Reason for Visit:   Joyce Masters is a 76 y.o. female who for follow up of pancytopenia. History of Present Illness:   Ms. Yadi Martin is a 76 y.o. female with HTN, remote h/o bladder cancer who comes in for follow up of pancytopenia. She was recently hospitalized after p/w nausea and vomiting since taking Bactrim for UTI as well as fever 102, found to be pancytopenic. She has a h/o of bladder cancer (1999) and underwent radical cystectomy with ileal conduit. She was evaluated in the urology office for UTI and was started on macrobid, later switched to Bactrim. This medication seemed to cause n/v. Upon questioning, Ms. Yadi Martin denies any previous h/o anemia except when pregnant - at which time she used to take iron. She notes occasional blood in her urine but otherwise denies any other signs of bleeding. She was switched to Levaquin, tolerated this well with eventual resolution of fevers. Workup of pancytopenia revealed low VitB12 level, and patient was started on VitB12 injections. She completed daily injections x 7 (in and out of the hospital), and she is currently on weekly injections. She states she is having significant pain in her left hip. She recently underwent left hip replacement in March 2018, and she is following closely with orthopedics. She does state that her energy level has improved after discharge from the hospital.   Patient had briefly stopped the taking the B12 injections, then she restarted based on my instructions. She continues to feel fatigued despite restarting the B12 injections. She follows with Urology for recurrent UTI. She comes in today for review of bone marrow biopsy results. Interval History: Today, patient is seen for follow up of pancytopenia. Pt states she had Covid infection in Dec 2022 with mild symptoms including fevers, low appetite. No recent UTI.  No melena/hematochezia. Continues on B12 injections. Had flu shot in late Nov 2022. PMHx: Rheumatoid, Bladder ca, h/o COVID19, GERD, HTN, Hyperthyroid  Review of Systems: A complete review of systems was obtained, negative except as described above. Physical Exam:         Visit Vitals  BP (!) 146/80 (BP 1 Location: Left upper arm, BP Patient Position: Sitting, BP Cuff Size: Adult)   Pulse 88   Temp 97.8 °F (36.6 °C) (Oral)   Resp 18   Ht 5' 5\" (1.651 m)   Wt 197 lb (89.4 kg)   SpO2 97%   BMI 32.78 kg/m²       ECOG PS: 0  General: no distress  Eyes: anicteric sclerae  HENT: oropharynx clear  Neck: supple  Lymphatic: no cervical, supraclavicular adenopathy  Respiratory: normal respiratory effort  CV: no peripheral edema  GI: soft, nontender, nondistended, no masses  Skin: no rashes; no ecchymoses; no petechiae        Results:     Lab Results   Component Value Date/Time    WBC 3.4 11/28/2022 03:20 PM    HGB 11.0 (L) 11/28/2022 03:20 PM    HCT 31.2 (L) 11/28/2022 03:20 PM    PLATELET 934 (L) 46/22/0845 03:20 PM    MCV 89 11/28/2022 03:20 PM    ABS.  NEUTROPHILS 2.0 11/28/2022 03:20 PM    Hemoglobin (POC) 11.9 01/03/2012 10:01 AM    Hematocrit (POC) 35 01/03/2012 10:01 AM     Lab Results   Component Value Date/Time    Sodium 142 11/28/2022 03:20 PM    Potassium 3.4 (L) 11/28/2022 03:20 PM    Chloride 103 11/28/2022 03:20 PM    CO2 22 11/28/2022 03:20 PM    Glucose 94 11/28/2022 03:20 PM    BUN 21 11/28/2022 03:20 PM    Creatinine 1.15 (H) 11/28/2022 03:20 PM    GFR est AA 40 (L) 06/10/2022 09:04 AM    GFR est non-AA 33 (L) 06/10/2022 09:04 AM    Calcium 9.7 11/28/2022 03:20 PM    Sodium (POC) 143 01/03/2012 10:01 AM    Potassium (POC) 4.1 01/03/2012 10:01 AM    Chloride (POC) 105 01/03/2012 10:01 AM    Glucose (POC) 104 (H) 04/22/2018 11:18 AM    BUN (POC) 11 01/03/2012 10:01 AM    Creatinine (POC) 1.0 01/03/2012 10:01 AM    Calcium, ionized (POC) 1.14 01/03/2012 10:01 AM     Lab Results   Component Value Date/Time Bilirubin, total 0.8 2022 03:20 PM    ALT (SGPT) 15 2022 03:20 PM    Alk. phosphatase 74 2022 03:20 PM    Protein, total 7.5 2022 03:20 PM    Albumin 4.7 2022 03:20 PM    Globulin 3.8 06/10/2022 09:04 AM     Lab Results   Component Value Date/Time    Iron 79 05/10/2021 01:26 PM    TIBC 351 05/10/2021 01:26 PM    Iron % saturation 23 05/10/2021 01:26 PM    Ferritin 53 05/10/2021 01:26 PM     Lab Results   Component Value Date/Time    Vitamin B12 880 2021 02:51 PM    Folate 14.1 12/10/2020 05:02 AM         Imagin2018 XR CHEST  IMPRESSION: No acute abnormality     2017 abd/pelvis CT:      FINDINGS:   Liver is nodular compatible with cirrhosis. The spleen is normal in size. The  gallbladder is not distended. The a pancreas does appear unremarkable. Ostomy  seen in the right lower quadrant. There is a small supraumbilical abdominal wall  hernia containing nondistended colon. There is no bowel wall thickening or  obstruction. Prior cystectomy. There is no free air or free fluid. No definite  adenopathy in the abdomen or pelvis. Large left renal cyst unchanged. There is  no intrarenal calcification or obstruction. IMPRESSION: No acute findings. Prior surgery. Assessment and Recommendations:   76 y.o. female with hx of HTN, hypothyroidism, GERD, bladder cancer s/p urostomy,  L hip replacement (3/16) and hospital admission in 2018 for UTI admitted with nausea, vomiting and fever. 1. Pancytopenia:   Labs from 2022 show normal WBC/ANC and improved Hgb/PLT counts. At times the counts go lower at times of infection. Perhaps some influence from RA. Prior workup revealed Vitamin B12 def and anemia of chronic disease. TSH normal. CT in 2017 mentioned possible cirrhosis, but no risk factors (no diabetes or alcohol use). Bone marrow biopsy in 10/2018 showed normal results (normocellular marrow with trilineage hematopoiesis). No medication culprits.    -- Repeat labs in 6 months   -- No need for repeat bone marrow biopsy unless ANC < 1.0, Hgb < 9, PLT < 80 (persistently) without infection. 2. Vitamin B12 deficiency: Now with normal levels after starting on VitB12 injections approx 8/2018.  -- Continue Vitamin B12 IM 1000mcg monthly    3. Rheumatoid arthritis: Affects her wrists, hands, shoulders. Sees Dr. Wong Wright and is on Embrel (restarted 3/1/21.)      4. Recurrent UTI: h/o of recurrent UTIs. Plans to follow up with Urology soon given strong smell in urine. 5. CKD / HTN: Stable and well controlled and current medications. 6. Hx of bladder cancer (1999): S/p ileal conduit/ urostomy. Sees Dr. Kirsty Phillips in Urology. 7. S/p Left hip replacement: Surgery was in 3/2018. Improvement in left hip pain. She is following closely with orthopedics. I appreciate the opportunity to participate in Ms. Andres Gonzalez's care. I personally provided the service today.     Signed By: Hemal Ambriz MD     January 20, 2023

## 2023-01-20 ENCOUNTER — OFFICE VISIT (OUTPATIENT)
Dept: ONCOLOGY | Age: 78
End: 2023-01-20
Payer: COMMERCIAL

## 2023-01-20 VITALS
BODY MASS INDEX: 32.82 KG/M2 | DIASTOLIC BLOOD PRESSURE: 80 MMHG | HEIGHT: 65 IN | HEART RATE: 88 BPM | TEMPERATURE: 97.8 F | RESPIRATION RATE: 18 BRPM | OXYGEN SATURATION: 97 % | SYSTOLIC BLOOD PRESSURE: 146 MMHG | WEIGHT: 197 LBS

## 2023-01-20 DIAGNOSIS — E53.8 VITAMIN B12 DEFICIENCY: ICD-10-CM

## 2023-01-20 DIAGNOSIS — D64.9 NORMOCYTIC ANEMIA, NOT DUE TO BLOOD LOSS: ICD-10-CM

## 2023-01-20 DIAGNOSIS — N18.31 STAGE 3A CHRONIC KIDNEY DISEASE (HCC): ICD-10-CM

## 2023-01-20 DIAGNOSIS — D69.6 THROMBOCYTOPENIA (HCC): Primary | ICD-10-CM

## 2023-01-20 NOTE — PROGRESS NOTES
1. Have you been to the ER, urgent care clinic since your last visit? Hospitalized since your last visit? No    2. Have you seen or consulted any other health care providers outside of the [address] [place] [address] since your last visit? Include any pap smears or colon screening.  No        Visit Vitals  BP (!) 146/80 (BP 1 Location: Left upper arm, BP Patient Position: Sitting, BP Cuff Size: Adult)   Pulse 88   Temp 97.8 °F (36.6 °C) (Oral)   Resp 18   Ht 5' 5\" (1.651 m)   Wt 197 lb (89.4 kg)   SpO2 97%   BMI 32.78 kg/m²            Chief Complaint   Patient presents with    Follow-up     Pancytopenia

## 2023-02-14 DIAGNOSIS — E55.9 VITAMIN D DEFICIENCY: ICD-10-CM

## 2023-02-14 RX ORDER — ERGOCALCIFEROL 1.25 MG/1
CAPSULE ORAL
Qty: 15 CAPSULE | Refills: 3 | Status: SHIPPED | OUTPATIENT
Start: 2023-02-14

## 2023-04-07 ENCOUNTER — OFFICE VISIT (OUTPATIENT)
Dept: FAMILY MEDICINE CLINIC | Age: 78
End: 2023-04-07

## 2023-04-07 ENCOUNTER — TELEPHONE (OUTPATIENT)
Dept: FAMILY MEDICINE CLINIC | Age: 78
End: 2023-04-07

## 2023-04-13 NOTE — MR AVS SNAPSHOT
[address] 
 
 
 [address] [facility], [address] [place] [address] 
[phone] Patient: Yue Alegre MRN: [id number] MOF:[id number] Visit Information Date & Time Provider Department Dept. Phone Encounter #  
 5/4/2018  1:00 PM MD Perri Pereznhaven [facility] at [facility] [id number] [phone] [id number] Follow-up Instructions Return in about 3 months (around 8/4/2018) for Leukopenia f/uYovanny. Your Appointments 8/3/2018 10:30 AM  
ESTABLISHED PATIENT with MD Fernanda Perezaveviviane [facility] at [facility] [address]) Appt Note: 3 mo yovanny blanchard [address] [facility], [address] [place] [address] [phone] [id number]  
[phone]  
  
   
 [address] [place] [address], [address] [place] [address] Upcoming Health Maintenance Date Due DTaP/Tdap/Td series (1 - Tdap) 2/18/1964 ZOSTER VACCINE AGE 60> 12/18/2002 GLAUCOMA SCREENING Q2Y 2/18/2008 Bone Densitometry (Dexa) Screening 2/18/2008 Pneumococcal 65+ High/Highest Risk (1 of 2 - PCV13) 2/18/2008 MEDICARE YEARLY EXAM 3/20/2018 Influenza Age 5 to Adult 8/1/2018 Allergies as of 5/4/2018  Review Complete On: 4/18/2018 By: Franklyn Durant, PHARMD  
  
 Severity Noted Reaction Type Reactions Bactrim [Sulfamethoprim]  04/21/2018    Nausea and Vomiting Ciprofloxacin  04/18/2018   Side Effect Other (comments) Joint aches Diclofenac  04/18/2018   Side Effect Nausea Only Sulfa (Sulfonamide Antibiotics)  01/03/2012    Nausea Only Current Immunizations  Never Reviewed No immunizations on file. Not reviewed this visit You Were Diagnosed With   
  
 Codes Comments Pancytopenia ([facility]    -  Primary ICD-10-CM: F54.850 ICD-9-CM: 284.19 Vitamin B12 deficiency (dietary) anemia     ICD-10-CM: D51.8 ICD-9-CM: 651. 1 Vitals BP Pulse Temp Resp Height(growth percentile) Weight(growth percentile) 100/44 99 97.6 °F (36.4 °C) (Oral) 18 5' 5\" (1.651 m) 198 lb 3.2 oz (89.9 kg) SpO2 BMI OB Status Smoking Status 99% 32.98 kg/m2 Hysterectomy Former Smoker Vitals History BMI and BSA Data Body Mass Index Body Surface Area 32.98 kg/m 2 2.03 m 2 Preferred Pharmacy Pharmacy Name Phone [id number] [address] [phone] Your Updated Medication List  
  
   
This list is accurate as of 5/4/18  1:56 PM.  Always use your most recent med list.  
  
  
  
  
 cyanocobalamin 1,000 mcg/mL injection Commonly known as:  VITAMIN B12  
1 mL by IntraMUSCular route every seven (7) days for 4 doses. HYDROcodone-acetaminophen 5-325 mg per tablet Commonly known as:  Lauren Pare Take 1 Tab by mouth every six (6) hours as needed for Pain. L. acidoph & paracasei- S therm- Bifido 8 billion cell Cap cap Commonly known as:  ALEXEI-Q/RISAQUAD Take 1 Cap by mouth daily. levothyroxine 75 mcg tablet Commonly known as:  SYNTHROID Take 75 mcg by mouth Daily (before breakfast). losartan-hydroCHLOROthiazide 50-12.5 mg per tablet Commonly known as:  HYZAAR Take 1 Tab by mouth every evening. PriLOSEC 20 mg capsule Generic drug:  omeprazole Take 20 mg by mouth daily. We Performed the Following CBC WITH AUTOMATED DIFF [02778 CPT(R)] Follow-up Instructions Return in about 3 months (around 8/4/2018) for Leukopenia f/u, Thorn. Introducing [facility]! Jyoti Boyle introduces Next 1 Interactive patient portal. Now you can access parts of your medical record, email your doctor's office, and request medication refills online. 1. In your internet browser, go to [email or web address] 2. Click on the First Time User? Click Here link in the Sign In box. You will see the New Member Sign Up page. 3. Enter your Next 1 Interactive Access Code exactly as it appears below.  You will not need to use this code after youve completed the sign-up process. If you do not sign up before the expiration date, you must request a new code. · [employer] Access Code: [email or web address] Expires: 7/17/2018  6:05 AM 
 
4. Enter the last four digits of your Social Security Number (xxxx) and Date of Birth (mm/dd/yyyy) as indicated and click Submit. You will be taken to the next sign-up page. 5. Create a [employer] ID. This will be your [employer] login ID and cannot be changed, so think of one that is secure and easy to remember. 6. Create a [employer] password. You can change your password at any time. 7. Enter your Password Reset Question and Answer. This can be used at a later time if you forget your password. 8. Enter your e-mail address. You will receive e-mail notification when new information is available in [address] 9. Click Sign Up. You can now view and download portions of your medical record. 10. Click the Download Summary menu link to download a portable copy of your medical information. If you have questions, please visit the Frequently Asked Questions section of the [employer] website. Remember, [employer] is NOT to be used for urgent needs. For medical emergencies, dial 911. Now available from your iPhone and Android! Please provide this summary of care documentation to your next provider. Your primary care clinician is listed as Ildefonso Duncan. If you have any questions after today's visit, please call [phone]. n/a

## 2023-04-22 DIAGNOSIS — D64.9 NORMOCYTIC ANEMIA, NOT DUE TO BLOOD LOSS: ICD-10-CM

## 2023-04-22 DIAGNOSIS — D69.6 THROMBOCYTOPENIA (HCC): Primary | ICD-10-CM

## 2023-04-23 DIAGNOSIS — D69.6 THROMBOCYTOPENIA (HCC): Primary | ICD-10-CM

## 2023-04-23 DIAGNOSIS — D64.9 NORMOCYTIC ANEMIA, NOT DUE TO BLOOD LOSS: ICD-10-CM

## 2023-05-16 DIAGNOSIS — I10 ESSENTIAL (PRIMARY) HYPERTENSION: ICD-10-CM

## 2023-05-16 RX ORDER — LOSARTAN POTASSIUM 50 MG/1
TABLET ORAL
Qty: 90 TABLET | Refills: 0 | Status: SHIPPED | OUTPATIENT
Start: 2023-05-16

## 2023-06-07 PROBLEM — K74.69 OTHER CIRRHOSIS OF LIVER (HCC): Status: ACTIVE | Noted: 2023-06-07

## 2023-06-27 DIAGNOSIS — E53.8 DEFICIENCY OF OTHER SPECIFIED B GROUP VITAMINS: ICD-10-CM

## 2023-06-28 RX ORDER — CYANOCOBALAMIN 1000 UG/ML
INJECTION, SOLUTION INTRAMUSCULAR; SUBCUTANEOUS
Qty: 3 ML | Refills: 15 | Status: SHIPPED | OUTPATIENT
Start: 2023-06-28

## 2023-07-15 DIAGNOSIS — I50.9 HEART FAILURE, UNSPECIFIED (HCC): ICD-10-CM

## 2023-07-17 ENCOUNTER — TELEPHONE (OUTPATIENT)
Age: 78
End: 2023-07-17

## 2023-07-17 RX ORDER — FUROSEMIDE 40 MG/1
TABLET ORAL
Qty: 90 TABLET | Refills: 1 | Status: SHIPPED | OUTPATIENT
Start: 2023-07-17 | End: 2023-07-21 | Stop reason: SDUPTHER

## 2023-07-17 NOTE — TELEPHONE ENCOUNTER
07/17/23 1:13 PM Attempted to call patient via home number listed to remind her to have her labs drawn prior to her upcoming appointment. No answer, left message of above. Provided office phone number for call back should patient have any additional questions.

## 2023-07-18 DIAGNOSIS — E53.8 DEFICIENCY OF OTHER SPECIFIED B GROUP VITAMINS: ICD-10-CM

## 2023-07-20 DIAGNOSIS — D64.9 NORMOCYTIC ANEMIA, NOT DUE TO BLOOD LOSS: ICD-10-CM

## 2023-07-20 DIAGNOSIS — D69.6 THROMBOCYTOPENIA (HCC): ICD-10-CM

## 2023-07-21 ENCOUNTER — OFFICE VISIT (OUTPATIENT)
Age: 78
End: 2023-07-21
Payer: MEDICAID

## 2023-07-21 ENCOUNTER — APPOINTMENT (OUTPATIENT)
Age: 78
End: 2023-07-21
Payer: MEDICAID

## 2023-07-21 VITALS
RESPIRATION RATE: 16 BRPM | TEMPERATURE: 98.9 F | DIASTOLIC BLOOD PRESSURE: 62 MMHG | BODY MASS INDEX: 32.82 KG/M2 | HEART RATE: 108 BPM | WEIGHT: 197 LBS | SYSTOLIC BLOOD PRESSURE: 120 MMHG | OXYGEN SATURATION: 95 % | HEIGHT: 65 IN

## 2023-07-21 DIAGNOSIS — R79.0 LOW MAGNESIUM LEVEL: ICD-10-CM

## 2023-07-21 DIAGNOSIS — Z79.899 ENCOUNTER FOR LONG-TERM (CURRENT) USE OF HIGH-RISK MEDICATION: ICD-10-CM

## 2023-07-21 DIAGNOSIS — I10 ESSENTIAL (PRIMARY) HYPERTENSION: Primary | ICD-10-CM

## 2023-07-21 DIAGNOSIS — R73.9 HYPERGLYCEMIA, UNSPECIFIED: ICD-10-CM

## 2023-07-21 DIAGNOSIS — K74.69 OTHER CIRRHOSIS OF LIVER (HCC): ICD-10-CM

## 2023-07-21 DIAGNOSIS — E03.9 HYPOTHYROIDISM, UNSPECIFIED TYPE: ICD-10-CM

## 2023-07-21 DIAGNOSIS — K21.9 GASTROESOPHAGEAL REFLUX DISEASE, UNSPECIFIED WHETHER ESOPHAGITIS PRESENT: ICD-10-CM

## 2023-07-21 DIAGNOSIS — I50.9 HEART FAILURE, UNSPECIFIED HF CHRONICITY, UNSPECIFIED HEART FAILURE TYPE (HCC): ICD-10-CM

## 2023-07-21 DIAGNOSIS — E53.8 VITAMIN B12 DEFICIENCY: ICD-10-CM

## 2023-07-21 DIAGNOSIS — E55.9 VITAMIN D DEFICIENCY, UNSPECIFIED: ICD-10-CM

## 2023-07-21 LAB
BASOPHILS # BLD: 0 K/UL (ref 0–0.1)
BASOPHILS NFR BLD: 1 % (ref 0–1)
DIFFERENTIAL METHOD BLD: ABNORMAL
EOSINOPHIL # BLD: 0.1 K/UL (ref 0–0.4)
EOSINOPHIL NFR BLD: 2 % (ref 0–7)
ERYTHROCYTE [DISTWIDTH] IN BLOOD BY AUTOMATED COUNT: 13.4 % (ref 11.5–14.5)
FERRITIN SERPL-MCNC: 74 NG/ML (ref 26–388)
HCT VFR BLD AUTO: 30.4 % (ref 35–47)
HGB BLD-MCNC: 10.3 G/DL (ref 11.5–16)
IMM GRANULOCYTES # BLD AUTO: 0 K/UL (ref 0–0.04)
IMM GRANULOCYTES NFR BLD AUTO: 0 % (ref 0–0.5)
IRON SATN MFR SERPL: 30 % (ref 20–50)
IRON SERPL-MCNC: 96 UG/DL (ref 35–150)
LYMPHOCYTES # BLD: 0.9 K/UL (ref 0.8–3.5)
LYMPHOCYTES NFR BLD: 29 % (ref 12–49)
MCH RBC QN AUTO: 31.4 PG (ref 26–34)
MCHC RBC AUTO-ENTMCNC: 33.9 G/DL (ref 30–36.5)
MCV RBC AUTO: 92.7 FL (ref 80–99)
MONOCYTES # BLD: 0.3 K/UL (ref 0–1)
MONOCYTES NFR BLD: 9 % (ref 5–13)
NEUTS SEG # BLD: 1.9 K/UL (ref 1.8–8)
NEUTS SEG NFR BLD: 59 % (ref 32–75)
NRBC # BLD: 0 K/UL (ref 0–0.01)
NRBC BLD-RTO: 0 PER 100 WBC
PLATELET # BLD AUTO: 106 K/UL (ref 150–400)
PMV BLD AUTO: 11.8 FL (ref 8.9–12.9)
RBC # BLD AUTO: 3.28 M/UL (ref 3.8–5.2)
TIBC SERPL-MCNC: 315 UG/DL (ref 250–450)
WBC # BLD AUTO: 3.2 K/UL (ref 3.6–11)

## 2023-07-21 PROCEDURE — 1123F ACP DISCUSS/DSCN MKR DOCD: CPT | Performed by: FAMILY MEDICINE

## 2023-07-21 PROCEDURE — 3074F SYST BP LT 130 MM HG: CPT | Performed by: FAMILY MEDICINE

## 2023-07-21 PROCEDURE — 99214 OFFICE O/P EST MOD 30 MIN: CPT | Performed by: FAMILY MEDICINE

## 2023-07-21 PROCEDURE — 3078F DIAST BP <80 MM HG: CPT | Performed by: FAMILY MEDICINE

## 2023-07-21 RX ORDER — MAGNESIUM OXIDE 400 MG/1
400 TABLET ORAL DAILY
Qty: 90 TABLET | Refills: 3 | Status: SHIPPED | OUTPATIENT
Start: 2023-07-21

## 2023-07-21 RX ORDER — FUROSEMIDE 40 MG/1
40 TABLET ORAL DAILY
Qty: 90 TABLET | Refills: 3 | Status: SHIPPED | OUTPATIENT
Start: 2023-07-21

## 2023-07-21 RX ORDER — ERGOCALCIFEROL 1.25 MG/1
CAPSULE ORAL
Qty: 12 CAPSULE | Refills: 3 | Status: SHIPPED | OUTPATIENT
Start: 2023-07-21

## 2023-07-21 RX ORDER — OMEPRAZOLE 20 MG/1
20 CAPSULE, DELAYED RELEASE ORAL DAILY
Qty: 90 CAPSULE | Refills: 3 | Status: SHIPPED | OUTPATIENT
Start: 2023-07-21

## 2023-07-21 RX ORDER — LOSARTAN POTASSIUM 50 MG/1
50 TABLET ORAL DAILY
Qty: 90 TABLET | Refills: 3 | Status: SHIPPED | OUTPATIENT
Start: 2023-07-21

## 2023-07-22 LAB
25(OH)D3 SERPL-MCNC: 70.6 NG/ML (ref 30–100)
ALBUMIN SERPL-MCNC: 4 G/DL (ref 3.5–5)
ALBUMIN/GLOB SERPL: 1 (ref 1.1–2.2)
ALP SERPL-CCNC: 66 U/L (ref 45–117)
ALT SERPL-CCNC: 25 U/L (ref 12–78)
ANION GAP SERPL CALC-SCNC: 9 MMOL/L (ref 5–15)
AST SERPL-CCNC: 31 U/L (ref 15–37)
BILIRUB SERPL-MCNC: 1.2 MG/DL (ref 0.2–1)
BUN SERPL-MCNC: 34 MG/DL (ref 6–20)
BUN/CREAT SERPL: 18 (ref 12–20)
CALCIUM SERPL-MCNC: 9.4 MG/DL (ref 8.5–10.1)
CHLORIDE SERPL-SCNC: 105 MMOL/L (ref 97–108)
CHOLEST SERPL-MCNC: 168 MG/DL
CO2 SERPL-SCNC: 24 MMOL/L (ref 21–32)
CREAT SERPL-MCNC: 1.87 MG/DL (ref 0.55–1.02)
GLOBULIN SER CALC-MCNC: 3.9 G/DL (ref 2–4)
GLUCOSE SERPL-MCNC: 122 MG/DL (ref 65–100)
HDLC SERPL-MCNC: 48 MG/DL
HDLC SERPL: 3.5 (ref 0–5)
LDLC SERPL CALC-MCNC: 70.8 MG/DL (ref 0–100)
MAGNESIUM SERPL-MCNC: 1.9 MG/DL (ref 1.6–2.4)
POTASSIUM SERPL-SCNC: 4 MMOL/L (ref 3.5–5.1)
PROT SERPL-MCNC: 7.9 G/DL (ref 6.4–8.2)
SODIUM SERPL-SCNC: 138 MMOL/L (ref 136–145)
T4 FREE SERPL-MCNC: 1.2 NG/DL (ref 0.8–1.5)
TRIGL SERPL-MCNC: 246 MG/DL
TSH SERPL DL<=0.05 MIU/L-ACNC: 1.02 UIU/ML (ref 0.36–3.74)
VIT B12 SERPL-MCNC: 657 PG/ML (ref 193–986)
VLDLC SERPL CALC-MCNC: 49.2 MG/DL

## 2023-07-25 ENCOUNTER — TELEPHONE (OUTPATIENT)
Age: 78
End: 2023-07-25

## 2023-07-25 DIAGNOSIS — N18.30 STAGE 3 CHRONIC KIDNEY DISEASE, UNSPECIFIED WHETHER STAGE 3A OR 3B CKD (HCC): Primary | ICD-10-CM

## 2023-07-25 DIAGNOSIS — E03.9 HYPOTHYROIDISM, UNSPECIFIED TYPE: Primary | ICD-10-CM

## 2023-07-25 RX ORDER — LEVOTHYROXINE SODIUM 0.07 MG/1
75 TABLET ORAL
Qty: 90 TABLET | Refills: 3 | Status: SHIPPED | OUTPATIENT
Start: 2023-07-25 | End: 2023-07-25 | Stop reason: SDUPTHER

## 2023-07-25 RX ORDER — LEVOTHYROXINE SODIUM 0.07 MG/1
75 TABLET ORAL
Qty: 90 TABLET | Refills: 3 | Status: SHIPPED | OUTPATIENT
Start: 2023-07-25

## 2023-07-26 ENCOUNTER — TELEPHONE (OUTPATIENT)
Age: 78
End: 2023-07-26

## 2023-07-26 NOTE — TELEPHONE ENCOUNTER
Called pt and relayed message. She understood. Gave her the number to scheduling in case they do not reach out to schedule US.

## 2023-07-26 NOTE — TELEPHONE ENCOUNTER
----- Message from Jostin Liu MD sent at 7/25/2023  7:04 PM EDT -----  Call pt. Her kidney test creatinine is much higher. Make sure to drink plenty of water. Do not take any antiinflammatories such as Ibuprofen or Naproxen. I want to recheck lab again in 2 weeks. Also want to order kidney ultrasound. Other labs are stable. Good cholesterol. Good Vit B 12. Normal thyroid level. Good Vit D level.

## 2023-07-31 ENCOUNTER — OFFICE VISIT (OUTPATIENT)
Age: 78
End: 2023-07-31
Payer: MEDICAID

## 2023-07-31 VITALS
OXYGEN SATURATION: 96 % | HEART RATE: 96 BPM | BODY MASS INDEX: 32.76 KG/M2 | SYSTOLIC BLOOD PRESSURE: 112 MMHG | TEMPERATURE: 97.8 F | RESPIRATION RATE: 16 BRPM | WEIGHT: 196.6 LBS | DIASTOLIC BLOOD PRESSURE: 69 MMHG | HEIGHT: 65 IN

## 2023-07-31 DIAGNOSIS — D64.9 ANEMIA, UNSPECIFIED TYPE: ICD-10-CM

## 2023-07-31 DIAGNOSIS — Z86.2 HISTORY OF ANEMIA DUE TO VITAMIN B12 DEFICIENCY: ICD-10-CM

## 2023-07-31 DIAGNOSIS — E53.8 DEFICIENCY OF OTHER SPECIFIED B GROUP VITAMINS: ICD-10-CM

## 2023-07-31 DIAGNOSIS — D69.6 THROMBOCYTOPENIA, UNSPECIFIED (HCC): ICD-10-CM

## 2023-07-31 DIAGNOSIS — D61.818 OTHER PANCYTOPENIA (HCC): Primary | ICD-10-CM

## 2023-07-31 PROCEDURE — 3078F DIAST BP <80 MM HG: CPT | Performed by: INTERNAL MEDICINE

## 2023-07-31 PROCEDURE — 3074F SYST BP LT 130 MM HG: CPT | Performed by: INTERNAL MEDICINE

## 2023-07-31 PROCEDURE — 99213 OFFICE O/P EST LOW 20 MIN: CPT | Performed by: INTERNAL MEDICINE

## 2023-07-31 PROCEDURE — 1123F ACP DISCUSS/DSCN MKR DOCD: CPT | Performed by: INTERNAL MEDICINE

## 2023-07-31 RX ORDER — CYANOCOBALAMIN 1000 UG/ML
INJECTION, SOLUTION INTRAMUSCULAR; SUBCUTANEOUS
Qty: 3 ML | Refills: 3 | Status: SHIPPED | OUTPATIENT
Start: 2023-07-31

## 2023-07-31 ASSESSMENT — PATIENT HEALTH QUESTIONNAIRE - PHQ9
2. FEELING DOWN, DEPRESSED OR HOPELESS: 0
SUM OF ALL RESPONSES TO PHQ QUESTIONS 1-9: 0
SUM OF ALL RESPONSES TO PHQ QUESTIONS 1-9: 0
1. LITTLE INTEREST OR PLEASURE IN DOING THINGS: 0
SUM OF ALL RESPONSES TO PHQ9 QUESTIONS 1 & 2: 0
SUM OF ALL RESPONSES TO PHQ QUESTIONS 1-9: 0
SUM OF ALL RESPONSES TO PHQ QUESTIONS 1-9: 0

## 2023-08-09 ENCOUNTER — HOSPITAL ENCOUNTER (OUTPATIENT)
Facility: HOSPITAL | Age: 78
Discharge: HOME | End: 2023-08-12
Attending: FAMILY MEDICINE
Payer: MEDICAID

## 2023-08-09 DIAGNOSIS — N18.30 STAGE 3 CHRONIC KIDNEY DISEASE, UNSPECIFIED WHETHER STAGE 3A OR 3B CKD (HCC): ICD-10-CM

## 2023-08-09 PROCEDURE — 76775 US EXAM ABDO BACK WALL LIM: CPT

## 2023-08-14 ENCOUNTER — TELEPHONE (OUTPATIENT)
Age: 78
End: 2023-08-14

## 2023-08-14 NOTE — TELEPHONE ENCOUNTER
----- Message from Wenceslao Pepe MD sent at 8/12/2023 10:14 PM EDT -----  Call pt to advise kidney ultrasound did not show any acute problems.

## 2023-09-06 ENCOUNTER — OFFICE VISIT (OUTPATIENT)
Age: 78
End: 2023-09-06
Payer: MEDICAID

## 2023-09-06 VITALS
DIASTOLIC BLOOD PRESSURE: 70 MMHG | HEART RATE: 91 BPM | RESPIRATION RATE: 20 BRPM | TEMPERATURE: 99.4 F | WEIGHT: 199 LBS | BODY MASS INDEX: 33.15 KG/M2 | HEIGHT: 65 IN | OXYGEN SATURATION: 97 % | SYSTOLIC BLOOD PRESSURE: 134 MMHG

## 2023-09-06 DIAGNOSIS — R94.4 ABNORMAL KIDNEY FUNCTION STUDY: Primary | ICD-10-CM

## 2023-09-06 DIAGNOSIS — Z23 NEEDS FLU SHOT: ICD-10-CM

## 2023-09-06 PROCEDURE — 1036F TOBACCO NON-USER: CPT | Performed by: FAMILY MEDICINE

## 2023-09-06 PROCEDURE — 99212 OFFICE O/P EST SF 10 MIN: CPT | Performed by: FAMILY MEDICINE

## 2023-09-06 PROCEDURE — G8417 CALC BMI ABV UP PARAM F/U: HCPCS | Performed by: FAMILY MEDICINE

## 2023-09-06 PROCEDURE — 3074F SYST BP LT 130 MM HG: CPT | Performed by: FAMILY MEDICINE

## 2023-09-06 PROCEDURE — G8399 PT W/DXA RESULTS DOCUMENT: HCPCS | Performed by: FAMILY MEDICINE

## 2023-09-06 PROCEDURE — 3078F DIAST BP <80 MM HG: CPT | Performed by: FAMILY MEDICINE

## 2023-09-06 PROCEDURE — 1123F ACP DISCUSS/DSCN MKR DOCD: CPT | Performed by: FAMILY MEDICINE

## 2023-09-06 PROCEDURE — G8427 DOCREV CUR MEDS BY ELIG CLIN: HCPCS | Performed by: FAMILY MEDICINE

## 2023-09-06 PROCEDURE — 90694 VACC AIIV4 NO PRSRV 0.5ML IM: CPT | Performed by: FAMILY MEDICINE

## 2023-09-06 PROCEDURE — 1090F PRES/ABSN URINE INCON ASSESS: CPT | Performed by: FAMILY MEDICINE

## 2023-09-06 PROCEDURE — PBSHW INFLUENZA, FLUAD, (AGE 65 Y+), IM, PF, 0.5 ML: Performed by: FAMILY MEDICINE

## 2023-09-06 SDOH — ECONOMIC STABILITY: FOOD INSECURITY: WITHIN THE PAST 12 MONTHS, THE FOOD YOU BOUGHT JUST DIDN'T LAST AND YOU DIDN'T HAVE MONEY TO GET MORE.: NEVER TRUE

## 2023-09-06 SDOH — ECONOMIC STABILITY: FOOD INSECURITY: WITHIN THE PAST 12 MONTHS, YOU WORRIED THAT YOUR FOOD WOULD RUN OUT BEFORE YOU GOT MONEY TO BUY MORE.: NEVER TRUE

## 2023-09-06 SDOH — ECONOMIC STABILITY: INCOME INSECURITY: HOW HARD IS IT FOR YOU TO PAY FOR THE VERY BASICS LIKE FOOD, HOUSING, MEDICAL CARE, AND HEATING?: NOT HARD AT ALL

## 2023-09-06 SDOH — ECONOMIC STABILITY: HOUSING INSECURITY
IN THE LAST 12 MONTHS, WAS THERE A TIME WHEN YOU DID NOT HAVE A STEADY PLACE TO SLEEP OR SLEPT IN A SHELTER (INCLUDING NOW)?: NO

## 2023-09-07 ENCOUNTER — TELEPHONE (OUTPATIENT)
Age: 78
End: 2023-09-07

## 2023-09-07 NOTE — TELEPHONE ENCOUNTER
----- Message from Vicki Emanuel sent at 9/7/2023 12:49 PM EDT -----  Subject: Message to Provider    QUESTIONS  Information for Provider? Patient is coming in tomorrow around 11 am for   her uranalysis   ---------------------------------------------------------------------------  --------------  [address]  [phone]; OK to leave message on voicemail  ---------------------------------------------------------------------------  --------------  SCRIPT ANSWERS  Relationship to Patient?  Self

## 2023-09-08 ENCOUNTER — NURSE ONLY (OUTPATIENT)
Age: 78
End: 2023-09-08

## 2023-09-08 DIAGNOSIS — N18.30 STAGE 3 CHRONIC KIDNEY DISEASE, UNSPECIFIED WHETHER STAGE 3A OR 3B CKD (HCC): ICD-10-CM

## 2023-09-08 DIAGNOSIS — R94.4 ABNORMAL KIDNEY FUNCTION STUDY: ICD-10-CM

## 2023-09-09 ENCOUNTER — TELEPHONE (OUTPATIENT)
Age: 78
End: 2023-09-09

## 2023-09-09 DIAGNOSIS — N30.00 ACUTE CYSTITIS WITHOUT HEMATURIA: Primary | ICD-10-CM

## 2023-09-09 LAB
ALBUMIN SERPL-MCNC: 3.9 G/DL (ref 3.5–5)
ANION GAP SERPL CALC-SCNC: 6 MMOL/L (ref 5–15)
APPEARANCE UR: ABNORMAL
BACTERIA URNS QL MICRO: ABNORMAL /HPF
BILIRUB UR QL: NEGATIVE
BUN SERPL-MCNC: 25 MG/DL (ref 6–20)
BUN/CREAT SERPL: 15 (ref 12–20)
CALCIUM SERPL-MCNC: 9.5 MG/DL (ref 8.5–10.1)
CHLORIDE SERPL-SCNC: 105 MMOL/L (ref 97–108)
CO2 SERPL-SCNC: 28 MMOL/L (ref 21–32)
COLOR UR: ABNORMAL
CREAT SERPL-MCNC: 1.72 MG/DL (ref 0.55–1.02)
EPITH CASTS URNS QL MICRO: ABNORMAL /LPF
GLUCOSE SERPL-MCNC: 158 MG/DL (ref 65–100)
GLUCOSE UR STRIP.AUTO-MCNC: NEGATIVE MG/DL
HGB UR QL STRIP: ABNORMAL
KETONES UR QL STRIP.AUTO: NEGATIVE MG/DL
LEUKOCYTE ESTERASE UR QL STRIP.AUTO: ABNORMAL
NITRITE UR QL STRIP.AUTO: NEGATIVE
PH UR STRIP: 8.5 (ref 5–8)
PHOSPHATE SERPL-MCNC: 3.3 MG/DL (ref 2.6–4.7)
POTASSIUM SERPL-SCNC: 3.9 MMOL/L (ref 3.5–5.1)
PROT UR STRIP-MCNC: 100 MG/DL
RBC #/AREA URNS HPF: ABNORMAL /HPF (ref 0–5)
SODIUM SERPL-SCNC: 139 MMOL/L (ref 136–145)
SP GR UR REFRACTOMETRY: 1.01 (ref 1–1.03)
UROBILINOGEN UR QL STRIP.AUTO: 0.2 EU/DL (ref 0.2–1)
WBC URNS QL MICRO: ABNORMAL /HPF (ref 0–4)

## 2023-09-09 RX ORDER — NITROFURANTOIN 25; 75 MG/1; MG/1
100 CAPSULE ORAL 2 TIMES DAILY
Qty: 20 CAPSULE | Refills: 0 | Status: SHIPPED | OUTPATIENT
Start: 2023-09-09 | End: 2023-09-19

## 2023-09-11 NOTE — TELEPHONE ENCOUNTER
Called patient and relayed message. She understood. She had switched pharmacy to [facility] so I told her to call [facility] and have them transfer her medication to [facility] .

## 2023-09-12 ENCOUNTER — TELEPHONE (OUTPATIENT)
Age: 78
End: 2023-09-12

## 2023-09-12 NOTE — TELEPHONE ENCOUNTER
Called patient and gave her lab results. She understood and will follow up in 3 months for lab visit.

## 2023-09-12 NOTE — TELEPHONE ENCOUNTER
----- Message from Wenceslao Pepe MD sent at 9/9/2023  6:56 PM EDT -----  Lab test show elevated blood sugar. Please limit sugars and starches in diet. Kidney test creatinine is slightly improved but remains above your baseline. Please make sure you are drinking plenty of water daily. Let us recheck labs in 3 months.

## 2024-01-18 PROBLEM — D69.6 THROMBOCYTOPENIA, UNSPECIFIED (HCC): Status: ACTIVE | Noted: 2024-01-18

## 2024-01-19 ENCOUNTER — OFFICE VISIT (OUTPATIENT)
Age: 79
End: 2024-01-19
Payer: MEDICAID

## 2024-01-19 ENCOUNTER — HOSPITAL ENCOUNTER (OUTPATIENT)
Dept: VASCULAR SURGERY | Facility: HOSPITAL | Age: 79
End: 2024-01-19
Attending: FAMILY MEDICINE
Payer: MEDICARE

## 2024-01-19 ENCOUNTER — NURSE ONLY (OUTPATIENT)
Age: 79
End: 2024-01-19
Payer: MEDICAID

## 2024-01-19 VITALS
HEART RATE: 87 BPM | TEMPERATURE: 97.2 F | RESPIRATION RATE: 20 BRPM | WEIGHT: 195 LBS | DIASTOLIC BLOOD PRESSURE: 72 MMHG | SYSTOLIC BLOOD PRESSURE: 140 MMHG | BODY MASS INDEX: 32.49 KG/M2 | HEIGHT: 65 IN | OXYGEN SATURATION: 98 %

## 2024-01-19 DIAGNOSIS — M79.89 RIGHT LEG SWELLING: ICD-10-CM

## 2024-01-19 DIAGNOSIS — R79.0 LOW MAGNESIUM LEVEL: ICD-10-CM

## 2024-01-19 DIAGNOSIS — E55.9 VITAMIN D DEFICIENCY, UNSPECIFIED: ICD-10-CM

## 2024-01-19 DIAGNOSIS — E03.9 HYPOTHYROIDISM, UNSPECIFIED TYPE: ICD-10-CM

## 2024-01-19 DIAGNOSIS — K74.69 OTHER CIRRHOSIS OF LIVER (HCC): ICD-10-CM

## 2024-01-19 DIAGNOSIS — I10 ESSENTIAL (PRIMARY) HYPERTENSION: ICD-10-CM

## 2024-01-19 DIAGNOSIS — D61.818 OTHER PANCYTOPENIA (HCC): ICD-10-CM

## 2024-01-19 DIAGNOSIS — E53.8 VITAMIN B12 DEFICIENCY: ICD-10-CM

## 2024-01-19 DIAGNOSIS — D69.6 THROMBOCYTOPENIA, UNSPECIFIED (HCC): ICD-10-CM

## 2024-01-19 DIAGNOSIS — R73.9 HYPERGLYCEMIA, UNSPECIFIED: ICD-10-CM

## 2024-01-19 DIAGNOSIS — Z79.899 ENCOUNTER FOR LONG-TERM (CURRENT) USE OF HIGH-RISK MEDICATION: ICD-10-CM

## 2024-01-19 DIAGNOSIS — I50.9 HEART FAILURE, UNSPECIFIED HF CHRONICITY, UNSPECIFIED HEART FAILURE TYPE (HCC): ICD-10-CM

## 2024-01-19 DIAGNOSIS — M06.9 RHEUMATOID ARTHRITIS, INVOLVING UNSPECIFIED SITE, UNSPECIFIED WHETHER RHEUMATOID FACTOR PRESENT (HCC): ICD-10-CM

## 2024-01-19 DIAGNOSIS — N18.30 STAGE 3 CHRONIC KIDNEY DISEASE, UNSPECIFIED WHETHER STAGE 3A OR 3B CKD (HCC): ICD-10-CM

## 2024-01-19 DIAGNOSIS — I10 PRIMARY HYPERTENSION: Primary | ICD-10-CM

## 2024-01-19 LAB
COMMENT:: NORMAL
ECHO BSA: 2.01 M2
SPECIMEN HOLD: NORMAL

## 2024-01-19 PROCEDURE — 3077F SYST BP >= 140 MM HG: CPT | Performed by: FAMILY MEDICINE

## 2024-01-19 PROCEDURE — 93971 EXTREMITY STUDY: CPT

## 2024-01-19 PROCEDURE — G8427 DOCREV CUR MEDS BY ELIG CLIN: HCPCS | Performed by: FAMILY MEDICINE

## 2024-01-19 PROCEDURE — 3078F DIAST BP <80 MM HG: CPT | Performed by: FAMILY MEDICINE

## 2024-01-19 PROCEDURE — 99214 OFFICE O/P EST MOD 30 MIN: CPT | Performed by: FAMILY MEDICINE

## 2024-01-19 PROCEDURE — G8417 CALC BMI ABV UP PARAM F/U: HCPCS | Performed by: FAMILY MEDICINE

## 2024-01-19 PROCEDURE — 1090F PRES/ABSN URINE INCON ASSESS: CPT | Performed by: FAMILY MEDICINE

## 2024-01-19 PROCEDURE — G8399 PT W/DXA RESULTS DOCUMENT: HCPCS | Performed by: FAMILY MEDICINE

## 2024-01-19 PROCEDURE — G8484 FLU IMMUNIZE NO ADMIN: HCPCS | Performed by: FAMILY MEDICINE

## 2024-01-19 PROCEDURE — 1036F TOBACCO NON-USER: CPT | Performed by: FAMILY MEDICINE

## 2024-01-19 PROCEDURE — 1123F ACP DISCUSS/DSCN MKR DOCD: CPT | Performed by: FAMILY MEDICINE

## 2024-01-19 ASSESSMENT — PATIENT HEALTH QUESTIONNAIRE - PHQ9
SUM OF ALL RESPONSES TO PHQ QUESTIONS 1-9: 0
SUM OF ALL RESPONSES TO PHQ9 QUESTIONS 1 & 2: 0
SUM OF ALL RESPONSES TO PHQ QUESTIONS 1-9: 0
2. FEELING DOWN, DEPRESSED OR HOPELESS: 0
1. LITTLE INTEREST OR PLEASURE IN DOING THINGS: 0

## 2024-01-19 ASSESSMENT — ENCOUNTER SYMPTOMS: ABDOMINAL PAIN: 1

## 2024-01-19 NOTE — PROGRESS NOTES
Tawana Nye (:  1945) is a 78 y.o. female,Established patient, here for evaluation of the following chief complaint(s):  Hypertension, Chronic Kidney Disease, Lab Collection (Fasting), and Swelling (Right ankle swelling that started over a month that is worse during day)        ASSESSMENT/PLAN:  1. Primary hypertension  2. Thrombocytopenia, unspecified (HCC)  3. Other pancytopenia (HCC)  4. Rheumatoid arthritis, involving unspecified site, unspecified whether rheumatoid factor present (HCC)  5. Other cirrhosis of liver (HCC)  6. Heart failure, unspecified HF chronicity, unspecified heart failure type (HCC)  7. Stage 3 chronic kidney disease, unspecified whether stage 3a or 3b CKD (HCC)  8. Hypothyroidism, unspecified type  -     TSH; Future  -     T4, Free; Future  9. Hyperglycemia, unspecified  -     Hemoglobin A1C; Future  10. Essential (primary) hypertension  -     Lipid Panel; Future  11. Vitamin D deficiency, unspecified  -     Vitamin D 25 Hydroxy; Future  12. Vitamin B12 deficiency  -     Vitamin B12; Future  13. Encounter for long-term (current) use of high-risk medication  -     Comprehensive Metabolic Panel; Future  -     CBC with Auto Differential; Future  14. Low magnesium level  -     Magnesium; Future  15. Right leg swelling  -     Vascular duplex lower extremity venous right; Future  Order labs.  Order right leg venous Doppler to rule out DVT.  Send copy of lab results to Dr. Earlene Kumari.      No follow-ups on file.      SUBJECTIVE/OBJECTIVE:  HPI    Follow-up chronic medical conditions including hypertension, pancytopenia, RA, CHF, CKD, hypothyroidism, vitamin D and B12 deficiency. Due for labs.    New problem today swelling right lower leg x 1 month.  It is somewhat tender.  Patient is taking Lasix daily.  Patient is scheduled for exploratory surgery by Dr. Earlene Kumari at [facility] .  She has been having trouble with her stoma.    Current Outpatient Medications

## 2024-01-19 NOTE — PROGRESS NOTES
Identified pt with two pt identifiers(name and ).    Chief Complaint   Patient presents with    Hypertension    Chronic Kidney Disease    Lab Collection     Fasting    Swelling     Right ankle swelling that started over a month that is worse during day        Health Maintenance Due   Topic    COVID-19 Vaccine (1)    Hepatitis A vaccine (1 of 2 - Risk 2-dose series)    DTaP/Tdap/Td vaccine (1 - Tdap)    Hepatitis B vaccine (1 of 3 - Risk 3-dose series)    Respiratory Syncytial Virus (RSV) Pregnant or age 60 yrs+ (1 - 1-dose 60+ series)       Wt Readings from Last 3 Encounters:   24 88.5 kg (195 lb)   23 90.3 kg (199 lb)   23 89.2 kg (196 lb 9.6 oz)     Temp Readings from Last 3 Encounters:   24 97.2 °F (36.2 °C) (Temporal)   23 99.4 °F (37.4 °C) (Temporal)   23 97.8 °F (36.6 °C) (Temporal)     BP Readings from Last 3 Encounters:   24 (!) 140/72   23 134/70   23 112/69     Pulse Readings from Last 3 Encounters:   24 87   23 91   23 96           Depression Screening:  :         2024    10:54 AM 2023     1:27 PM 2023    11:00 AM 2022     2:00 PM 2022    11:26 AM 2022     2:00 PM 2021    10:46 AM   PHQ-9 Questionaire   Little interest or pleasure in doing things 0 0 0 0 0 0 0   Feeling down, depressed, or hopeless 0 0 0 0 1 0 0   PHQ-9 Total Score 0 0 0 0 1 0 0        Fall Risk Assessment:  :         2023     1:27 PM   Fall Risk   2 or more falls in past year? no   Fall with injury in past year? no        Abuse Screening:  :          No data to display                 Coordination of Care Questionnaire:  :     \"Have you been to the ER, urgent care clinic since your last visit?  Hospitalized since your last visit?\"    NO    “Have you seen or consulted any other health care providers outside of [facility] since your last visit?”    NO    Dr Earlene Kumari

## 2024-01-20 LAB
25(OH)D3 SERPL-MCNC: 92.9 NG/ML (ref 30–100)
ALBUMIN SERPL-MCNC: 3.6 G/DL (ref 3.5–5)
ALBUMIN/GLOB SERPL: 0.9 (ref 1.1–2.2)
ALP SERPL-CCNC: 66 U/L (ref 45–117)
ALT SERPL-CCNC: 20 U/L (ref 12–78)
ANION GAP SERPL CALC-SCNC: 8 MMOL/L (ref 5–15)
AST SERPL-CCNC: 24 U/L (ref 15–37)
BASOPHILS # BLD: 0 K/UL (ref 0–0.1)
BASOPHILS NFR BLD: 1 % (ref 0–1)
BILIRUB SERPL-MCNC: 0.7 MG/DL (ref 0.2–1)
BUN SERPL-MCNC: 27 MG/DL (ref 6–20)
BUN/CREAT SERPL: 20 (ref 12–20)
CALCIUM SERPL-MCNC: 9.1 MG/DL (ref 8.5–10.1)
CHLORIDE SERPL-SCNC: 110 MMOL/L (ref 97–108)
CHOLEST SERPL-MCNC: 162 MG/DL
CO2 SERPL-SCNC: 23 MMOL/L (ref 21–32)
CREAT SERPL-MCNC: 1.38 MG/DL (ref 0.55–1.02)
DIFFERENTIAL METHOD BLD: ABNORMAL
EOSINOPHIL # BLD: 0.1 K/UL (ref 0–0.4)
EOSINOPHIL NFR BLD: 4 % (ref 0–7)
ERYTHROCYTE [DISTWIDTH] IN BLOOD BY AUTOMATED COUNT: 13.4 % (ref 11.5–14.5)
EST. AVERAGE GLUCOSE BLD GHB EST-MCNC: 111 MG/DL
GLOBULIN SER CALC-MCNC: 4.1 G/DL (ref 2–4)
GLUCOSE SERPL-MCNC: 112 MG/DL (ref 65–100)
HBA1C MFR BLD: 5.5 % (ref 4–5.6)
HCT VFR BLD AUTO: 30.9 % (ref 35–47)
HDLC SERPL-MCNC: 44 MG/DL
HDLC SERPL: 3.7 (ref 0–5)
HGB BLD-MCNC: 10.4 G/DL (ref 11.5–16)
IMM GRANULOCYTES # BLD AUTO: 0 K/UL (ref 0–0.04)
IMM GRANULOCYTES NFR BLD AUTO: 0 % (ref 0–0.5)
LDLC SERPL CALC-MCNC: 78.8 MG/DL (ref 0–100)
LYMPHOCYTES # BLD: 0.7 K/UL (ref 0.8–3.5)
LYMPHOCYTES NFR BLD: 30 % (ref 12–49)
MAGNESIUM SERPL-MCNC: 2.1 MG/DL (ref 1.6–2.4)
MCH RBC QN AUTO: 30.1 PG (ref 26–34)
MCHC RBC AUTO-ENTMCNC: 33.7 G/DL (ref 30–36.5)
MCV RBC AUTO: 89.3 FL (ref 80–99)
MONOCYTES # BLD: 0.2 K/UL (ref 0–1)
MONOCYTES NFR BLD: 8 % (ref 5–13)
NEUTS SEG # BLD: 1.3 K/UL (ref 1.8–8)
NEUTS SEG NFR BLD: 57 % (ref 32–75)
NRBC # BLD: 0 K/UL (ref 0–0.01)
NRBC BLD-RTO: 0 PER 100 WBC
PLATELET # BLD AUTO: 101 K/UL (ref 150–400)
PMV BLD AUTO: 11.9 FL (ref 8.9–12.9)
POTASSIUM SERPL-SCNC: 3.9 MMOL/L (ref 3.5–5.1)
PROT SERPL-MCNC: 7.7 G/DL (ref 6.4–8.2)
RBC # BLD AUTO: 3.46 M/UL (ref 3.8–5.2)
RBC MORPH BLD: ABNORMAL
SODIUM SERPL-SCNC: 141 MMOL/L (ref 136–145)
T4 FREE SERPL-MCNC: 1.1 NG/DL (ref 0.8–1.5)
TRIGL SERPL-MCNC: 196 MG/DL
TSH SERPL DL<=0.05 MIU/L-ACNC: 1.74 UIU/ML (ref 0.36–3.74)
VIT B12 SERPL-MCNC: 696 PG/ML (ref 193–986)
VLDLC SERPL CALC-MCNC: 39.2 MG/DL
WBC # BLD AUTO: 2.3 K/UL (ref 3.6–11)

## 2024-01-21 ENCOUNTER — TELEPHONE (OUTPATIENT)
Age: 79
End: 2024-01-21

## 2024-01-21 NOTE — TELEPHONE ENCOUNTER
Call patient.  Venous Doppler of right leg does not show blood clot.  She mentioned to me that she may be having surgery with Dr. Earlene Kumari at [facility] soon. Fax lab results to Dr. Kumari. Ask patient if she had CT scan of abdomen and pelvis ordered by Dr. Kumari.

## 2024-01-22 LAB — ECHO BSA: 2.01 M2

## 2024-01-22 PROCEDURE — 93971 EXTREMITY STUDY: CPT

## 2024-01-22 NOTE — TELEPHONE ENCOUNTER
Called and spoke with daughter Roseann. Gave her results. She said that they told her to call this week and schedule appt so they have not ordered CT of abdomen and pelvis yet and that they told them that they would let them know what they needed at appt.     Faxed over Venous doppler results to Dr. Earlene Kumari office.

## 2024-01-23 ENCOUNTER — TELEPHONE (OUTPATIENT)
Age: 79
End: 2024-01-23

## 2024-01-23 NOTE — TELEPHONE ENCOUNTER
Called patients daughter and relayed results, she understood. Also Faxed lab results to Dr. Kumari

## 2024-01-23 NOTE — TELEPHONE ENCOUNTER
----- Message from Leann Sweeney MD sent at 1/22/2024  5:58 PM EST -----  Lab results show stable anemia.  Stable kidney function.  Good cholesterol numbers.  Normal magnesium.  Normal thyroid level.  Good vitamin B12 level.  Good vitamin D level.  Normal blood sugar control.  Please fax labs to surgeon Dr. Earlene Kumari.

## 2024-01-30 ENCOUNTER — OFFICE VISIT (OUTPATIENT)
Age: 79
End: 2024-01-30
Payer: MEDICARE

## 2024-01-30 VITALS
RESPIRATION RATE: 18 BRPM | TEMPERATURE: 97.3 F | WEIGHT: 194 LBS | BODY MASS INDEX: 32.32 KG/M2 | SYSTOLIC BLOOD PRESSURE: 138 MMHG | DIASTOLIC BLOOD PRESSURE: 75 MMHG | OXYGEN SATURATION: 98 % | HEART RATE: 101 BPM | HEIGHT: 65 IN

## 2024-01-30 DIAGNOSIS — D69.6 THROMBOCYTOPENIA, UNSPECIFIED (HCC): ICD-10-CM

## 2024-01-30 DIAGNOSIS — Z85.51 HISTORY OF BLADDER CANCER: ICD-10-CM

## 2024-01-30 DIAGNOSIS — N18.31 STAGE 3A CHRONIC KIDNEY DISEASE (HCC): ICD-10-CM

## 2024-01-30 DIAGNOSIS — D61.818 OTHER PANCYTOPENIA (HCC): Primary | ICD-10-CM

## 2024-01-30 DIAGNOSIS — I10 ESSENTIAL (PRIMARY) HYPERTENSION: ICD-10-CM

## 2024-01-30 PROCEDURE — G8417 CALC BMI ABV UP PARAM F/U: HCPCS | Performed by: INTERNAL MEDICINE

## 2024-01-30 PROCEDURE — 1036F TOBACCO NON-USER: CPT | Performed by: INTERNAL MEDICINE

## 2024-01-30 PROCEDURE — 99214 OFFICE O/P EST MOD 30 MIN: CPT | Performed by: INTERNAL MEDICINE

## 2024-01-30 PROCEDURE — 3078F DIAST BP <80 MM HG: CPT | Performed by: INTERNAL MEDICINE

## 2024-01-30 PROCEDURE — 1123F ACP DISCUSS/DSCN MKR DOCD: CPT | Performed by: INTERNAL MEDICINE

## 2024-01-30 PROCEDURE — 1090F PRES/ABSN URINE INCON ASSESS: CPT | Performed by: INTERNAL MEDICINE

## 2024-01-30 PROCEDURE — 3075F SYST BP GE 130 - 139MM HG: CPT | Performed by: INTERNAL MEDICINE

## 2024-01-30 PROCEDURE — G8484 FLU IMMUNIZE NO ADMIN: HCPCS | Performed by: INTERNAL MEDICINE

## 2024-01-30 PROCEDURE — G8427 DOCREV CUR MEDS BY ELIG CLIN: HCPCS | Performed by: INTERNAL MEDICINE

## 2024-01-30 PROCEDURE — G8399 PT W/DXA RESULTS DOCUMENT: HCPCS | Performed by: INTERNAL MEDICINE

## 2024-01-30 ASSESSMENT — PATIENT HEALTH QUESTIONNAIRE - PHQ9
2. FEELING DOWN, DEPRESSED OR HOPELESS: 0
SUM OF ALL RESPONSES TO PHQ QUESTIONS 1-9: 0
SUM OF ALL RESPONSES TO PHQ9 QUESTIONS 1 & 2: 0
1. LITTLE INTEREST OR PLEASURE IN DOING THINGS: 0
SUM OF ALL RESPONSES TO PHQ QUESTIONS 1-9: 0

## 2024-01-30 NOTE — PROGRESS NOTES
Chief Complaint   Patient presents with    Follow-up           Vitals:    01/30/24 0936   BP: 138/75   Pulse: (!) 101   Resp: 18   Temp: 97.3 °F (36.3 °C)   SpO2: 98%      Patient reports upcoming exploratory abdominal surgery scheduled for Feb 9th      1. Have you been to the ER, urgent care clinic since your last visit?  Hospitalized since your last visit?  No  2. Have you seen or consulted any other health care providers outside of the [facility] since your last visit?  Include any pap smears or colon screening. Yes PCP, Earlene Michaels Surgeon    
2024    CO2 23 2024    GLUCOSE 112 (H) 2024    BUN 27 (H) 2024    CREATININE 1.38 (H) 2024    LABGLOM 39 (L) 2024    CALCIUM 9.1 2024    MG 2.1 2024    PHOS 3.3 2023     Lab Results   Component Value Date    BILITOT 0.7 2024    ALT 20 2024    AST 24 2024    ALKPHOS 66 2024    PROT 7.7 2024    LABALBU 3.6 2024    GLOB 4.1 (H) 2024     Lab Results   Component Value Date    IRON 96 2023    TIBC 315 2023    IRONPERSAT 30 2023    FERRITIN 74 2023    NJALSIQE11 696 2024    FOLATE 14.1 12/10/2020     2022    HEPCAB NONREACTIVE 2021     Lab Results   Component Value Date    INR 1.1 2020    PROTIME 11.4 (H) 2020    APTT 27.1 2020    DDIMER 1.20 (H) 12/15/2020    ESR 51 (H) 06/10/2022    CRP <0.29 06/10/2022    TSH 0.89 2022    GXB4NPW 1.74 2024       Imagin2018 XR CHEST  IMPRESSION: No acute abnormality     2017 abd/pelvis CT:      FINDINGS:   Liver is nodular compatible with cirrhosis. The spleen is normal in size. The  gallbladder is not distended. The a pancreas does appear unremarkable. Ostomy  seen in the right lower quadrant. There is a small supraumbilical abdominal wall  hernia containing nondistended colon. There is no bowel wall thickening or  obstruction. Prior cystectomy. There is no free air or free fluid. No definite  adenopathy in the abdomen or pelvis. Large left renal cyst unchanged. There is  no intrarenal calcification or obstruction.  IMPRESSION: No acute findings. Prior surgery.     Assessment and Recommendations:   78 y.o. female with hx of HTN, hypothyroidism, GERD, bladder cancer s/p urostomy,  L hip replacement (3/16) and hospital admission in 2018 for UTI admitted with nausea, vomiting and fever.     1. Pancytopenia:   Labs from 2022 show normal WBC/ANC and improved Hgb/PLT counts. At times the counts go

## 2024-02-22 ENCOUNTER — TELEPHONE (OUTPATIENT)
Age: 79
End: 2024-02-22

## 2024-02-22 NOTE — TELEPHONE ENCOUNTER
[facility] called requesting patient's Vascular US Duplex Lower Extremity Venous Right results.     Patient is having surgery in an hour and Anesthesiology needs results before doing surgery.    Faxed patient's US results to [phone].

## 2024-04-22 ENCOUNTER — APPOINTMENT (OUTPATIENT)
Facility: HOSPITAL | Age: 79
End: 2024-04-22
Payer: MEDICARE

## 2024-04-22 ENCOUNTER — TELEPHONE (OUTPATIENT)
Age: 79
End: 2024-04-22

## 2024-04-22 ENCOUNTER — HOSPITAL ENCOUNTER (INPATIENT)
Facility: HOSPITAL | Age: 79
LOS: 3 days | Discharge: HOME | End: 2024-04-25
Attending: STUDENT IN AN ORGANIZED HEALTH CARE EDUCATION/TRAINING PROGRAM | Admitting: STUDENT IN AN ORGANIZED HEALTH CARE EDUCATION/TRAINING PROGRAM
Payer: MEDICARE

## 2024-04-22 DIAGNOSIS — E87.20 LACTIC ACIDOSIS: ICD-10-CM

## 2024-04-22 DIAGNOSIS — N30.01 ACUTE CYSTITIS WITH HEMATURIA: ICD-10-CM

## 2024-04-22 DIAGNOSIS — E86.0 DEHYDRATION: Primary | ICD-10-CM

## 2024-04-22 PROBLEM — A41.9 SEPSIS (HCC): Status: ACTIVE | Noted: 2024-04-22

## 2024-04-22 LAB
ALBUMIN SERPL-MCNC: 3.4 G/DL (ref 3.5–5)
ALBUMIN/GLOB SERPL: 0.8 (ref 1.1–2.2)
ALP SERPL-CCNC: 64 U/L (ref 45–117)
ALT SERPL-CCNC: 20 U/L (ref 12–78)
ANION GAP SERPL CALC-SCNC: 14 MMOL/L (ref 5–15)
APPEARANCE UR: ABNORMAL
AST SERPL-CCNC: 37 U/L (ref 15–37)
BACTERIA URNS QL MICRO: ABNORMAL /HPF
BASOPHILS # BLD: 0 K/UL (ref 0–0.1)
BASOPHILS NFR BLD: 0 % (ref 0–1)
BILIRUB SERPL-MCNC: 0.9 MG/DL (ref 0.2–1)
BILIRUB UR QL: NEGATIVE
BUN SERPL-MCNC: 23 MG/DL (ref 6–20)
BUN/CREAT SERPL: 14 (ref 12–20)
CALCIUM SERPL-MCNC: 9.2 MG/DL (ref 8.5–10.1)
CHLORIDE SERPL-SCNC: 103 MMOL/L (ref 97–108)
CO2 SERPL-SCNC: 22 MMOL/L (ref 21–32)
COLOR UR: YELLOW
CREAT SERPL-MCNC: 1.64 MG/DL (ref 0.55–1.02)
DIFFERENTIAL METHOD BLD: ABNORMAL
EOSINOPHIL # BLD: 0 K/UL (ref 0–0.4)
EOSINOPHIL NFR BLD: 0 % (ref 0–7)
EPITH CASTS URNS QL MICRO: ABNORMAL /LPF
ERYTHROCYTE [DISTWIDTH] IN BLOOD BY AUTOMATED COUNT: 13.7 % (ref 11.5–14.5)
FLUAV RNA SPEC QL NAA+PROBE: NOT DETECTED
FLUBV RNA SPEC QL NAA+PROBE: NOT DETECTED
GLOBULIN SER CALC-MCNC: 4.5 G/DL (ref 2–4)
GLUCOSE SERPL-MCNC: 146 MG/DL (ref 65–100)
GLUCOSE UR STRIP.AUTO-MCNC: NEGATIVE MG/DL
HCT VFR BLD AUTO: 28 % (ref 35–47)
HGB BLD-MCNC: 9.4 G/DL (ref 11.5–16)
HGB UR QL STRIP: ABNORMAL
IMM GRANULOCYTES # BLD AUTO: 0 K/UL
IMM GRANULOCYTES NFR BLD AUTO: 0 %
KETONES UR QL STRIP.AUTO: NEGATIVE MG/DL
LACTATE BLD-SCNC: 2.51 MMOL/L (ref 0.4–2)
LACTATE BLD-SCNC: 3.9 MMOL/L (ref 0.4–2)
LEUKOCYTE ESTERASE UR QL STRIP.AUTO: ABNORMAL
LYMPHOCYTES # BLD: 0.1 K/UL (ref 0.8–3.5)
LYMPHOCYTES NFR BLD: 2 % (ref 12–49)
MCH RBC QN AUTO: 30.4 PG (ref 26–34)
MCHC RBC AUTO-ENTMCNC: 33.6 G/DL (ref 30–36.5)
MCV RBC AUTO: 90.6 FL (ref 80–99)
MONOCYTES # BLD: 0.2 K/UL (ref 0–1)
MONOCYTES NFR BLD: 3 % (ref 5–13)
NEUTS BAND NFR BLD MANUAL: 2 % (ref 0–6)
NEUTS SEG # BLD: 5.2 K/UL (ref 1.8–8)
NEUTS SEG NFR BLD: 93 % (ref 32–75)
NITRITE UR QL STRIP.AUTO: POSITIVE
NRBC # BLD: 0 K/UL (ref 0–0.01)
NRBC BLD-RTO: 0 PER 100 WBC
PH UR STRIP: 7 (ref 5–8)
PLATELET # BLD AUTO: 102 K/UL (ref 150–400)
PMV BLD AUTO: 11.8 FL (ref 8.9–12.9)
POTASSIUM SERPL-SCNC: 3.8 MMOL/L (ref 3.5–5.1)
PROCALCITONIN SERPL-MCNC: 1.2 NG/ML
PROT SERPL-MCNC: 7.9 G/DL (ref 6.4–8.2)
PROT UR STRIP-MCNC: 30 MG/DL
RBC # BLD AUTO: 3.09 M/UL (ref 3.8–5.2)
RBC #/AREA URNS HPF: ABNORMAL /HPF (ref 0–5)
RBC MORPH BLD: ABNORMAL
SARS-COV-2 RNA RESP QL NAA+PROBE: NOT DETECTED
SODIUM SERPL-SCNC: 139 MMOL/L (ref 136–145)
SP GR UR REFRACTOMETRY: 1.01 (ref 1–1.03)
TROPONIN I SERPL HS-MCNC: 10 NG/L (ref 0–51)
URINE CULTURE IF INDICATED: ABNORMAL
UROBILINOGEN UR QL STRIP.AUTO: 0.2 EU/DL (ref 0.2–1)
WBC # BLD AUTO: 5.5 K/UL (ref 3.6–11)
WBC URNS QL MICRO: ABNORMAL /HPF (ref 0–4)

## 2024-04-22 PROCEDURE — 87636 SARSCOV2 & INF A&B AMP PRB: CPT

## 2024-04-22 PROCEDURE — 87040 BLOOD CULTURE FOR BACTERIA: CPT

## 2024-04-22 PROCEDURE — 87077 CULTURE AEROBIC IDENTIFY: CPT

## 2024-04-22 PROCEDURE — 80053 COMPREHEN METABOLIC PANEL: CPT

## 2024-04-22 PROCEDURE — 87186 SC STD MICRODIL/AGAR DIL: CPT

## 2024-04-22 PROCEDURE — 6370000000 HC RX 637 (ALT 250 FOR IP): Performed by: STUDENT IN AN ORGANIZED HEALTH CARE EDUCATION/TRAINING PROGRAM

## 2024-04-22 PROCEDURE — 84484 ASSAY OF TROPONIN QUANT: CPT

## 2024-04-22 PROCEDURE — 36415 COLL VENOUS BLD VENIPUNCTURE: CPT

## 2024-04-22 PROCEDURE — 2580000003 HC RX 258

## 2024-04-22 PROCEDURE — 93005 ELECTROCARDIOGRAM TRACING: CPT | Performed by: STUDENT IN AN ORGANIZED HEALTH CARE EDUCATION/TRAINING PROGRAM

## 2024-04-22 PROCEDURE — 71045 X-RAY EXAM CHEST 1 VIEW: CPT

## 2024-04-22 PROCEDURE — 83605 ASSAY OF LACTIC ACID: CPT

## 2024-04-22 PROCEDURE — 2060000000 HC ICU INTERMEDIATE R&B

## 2024-04-22 PROCEDURE — 87154 CUL TYP ID BLD PTHGN 6+ TRGT: CPT

## 2024-04-22 PROCEDURE — 6360000002 HC RX W HCPCS: Performed by: STUDENT IN AN ORGANIZED HEALTH CARE EDUCATION/TRAINING PROGRAM

## 2024-04-22 PROCEDURE — 96360 HYDRATION IV INFUSION INIT: CPT

## 2024-04-22 PROCEDURE — 99285 EMERGENCY DEPT VISIT HI MDM: CPT

## 2024-04-22 PROCEDURE — 2580000003 HC RX 258: Performed by: STUDENT IN AN ORGANIZED HEALTH CARE EDUCATION/TRAINING PROGRAM

## 2024-04-22 PROCEDURE — 81001 URINALYSIS AUTO W/SCOPE: CPT

## 2024-04-22 PROCEDURE — 85025 COMPLETE CBC W/AUTO DIFF WBC: CPT

## 2024-04-22 PROCEDURE — 87086 URINE CULTURE/COLONY COUNT: CPT

## 2024-04-22 PROCEDURE — 84145 PROCALCITONIN (PCT): CPT

## 2024-04-22 RX ORDER — LIDOCAINE 4 G/G
2 PATCH TOPICAL DAILY
Status: DISCONTINUED | OUTPATIENT
Start: 2024-04-22 | End: 2024-04-25 | Stop reason: HOSPADM

## 2024-04-22 RX ORDER — LEVOTHYROXINE SODIUM 0.07 MG/1
75 TABLET ORAL
Status: DISCONTINUED | OUTPATIENT
Start: 2024-04-23 | End: 2024-04-25 | Stop reason: HOSPADM

## 2024-04-22 RX ORDER — MORPHINE SULFATE 2 MG/ML
2 INJECTION, SOLUTION INTRAMUSCULAR; INTRAVENOUS
Status: DISCONTINUED | OUTPATIENT
Start: 2024-04-22 | End: 2024-04-22

## 2024-04-22 RX ORDER — SODIUM CHLORIDE 0.9 % (FLUSH) 0.9 %
5-40 SYRINGE (ML) INJECTION PRN
Status: DISCONTINUED | OUTPATIENT
Start: 2024-04-22 | End: 2024-04-25 | Stop reason: HOSPADM

## 2024-04-22 RX ORDER — 0.9 % SODIUM CHLORIDE 0.9 %
500 INTRAVENOUS SOLUTION INTRAVENOUS ONCE
Status: COMPLETED | OUTPATIENT
Start: 2024-04-22 | End: 2024-04-22

## 2024-04-22 RX ORDER — SODIUM CHLORIDE 9 MG/ML
INJECTION, SOLUTION INTRAVENOUS CONTINUOUS
Status: DISCONTINUED | OUTPATIENT
Start: 2024-04-22 | End: 2024-04-23

## 2024-04-22 RX ORDER — 0.9 % SODIUM CHLORIDE 0.9 %
1000 INTRAVENOUS SOLUTION INTRAVENOUS ONCE
Status: COMPLETED | OUTPATIENT
Start: 2024-04-22 | End: 2024-04-22

## 2024-04-22 RX ORDER — ACETAMINOPHEN 500 MG
1000 TABLET ORAL
Status: COMPLETED | OUTPATIENT
Start: 2024-04-22 | End: 2024-04-22

## 2024-04-22 RX ORDER — SODIUM CHLORIDE 9 MG/ML
INJECTION, SOLUTION INTRAVENOUS PRN
Status: DISCONTINUED | OUTPATIENT
Start: 2024-04-22 | End: 2024-04-25 | Stop reason: HOSPADM

## 2024-04-22 RX ORDER — ENOXAPARIN SODIUM 100 MG/ML
30 INJECTION SUBCUTANEOUS DAILY
Status: DISCONTINUED | OUTPATIENT
Start: 2024-04-23 | End: 2024-04-24

## 2024-04-22 RX ORDER — 0.9 % SODIUM CHLORIDE 0.9 %
500 INTRAVENOUS SOLUTION INTRAVENOUS ONCE
Status: COMPLETED | OUTPATIENT
Start: 2024-04-22 | End: 2024-04-23

## 2024-04-22 RX ORDER — ACETAMINOPHEN 325 MG/1
650 TABLET ORAL EVERY 6 HOURS PRN
Status: DISCONTINUED | OUTPATIENT
Start: 2024-04-22 | End: 2024-04-25 | Stop reason: HOSPADM

## 2024-04-22 RX ORDER — FUROSEMIDE 40 MG/1
40 TABLET ORAL DAILY
Status: DISCONTINUED | OUTPATIENT
Start: 2024-04-23 | End: 2024-04-25 | Stop reason: HOSPADM

## 2024-04-22 RX ORDER — SODIUM CHLORIDE 0.9 % (FLUSH) 0.9 %
5-40 SYRINGE (ML) INJECTION EVERY 12 HOURS SCHEDULED
Status: DISCONTINUED | OUTPATIENT
Start: 2024-04-22 | End: 2024-04-25 | Stop reason: HOSPADM

## 2024-04-22 RX ORDER — LOSARTAN POTASSIUM 50 MG/1
50 TABLET ORAL DAILY
Status: DISCONTINUED | OUTPATIENT
Start: 2024-04-23 | End: 2024-04-25 | Stop reason: HOSPADM

## 2024-04-22 RX ORDER — ACETAMINOPHEN 650 MG/1
650 SUPPOSITORY RECTAL EVERY 6 HOURS PRN
Status: DISCONTINUED | OUTPATIENT
Start: 2024-04-22 | End: 2024-04-25 | Stop reason: HOSPADM

## 2024-04-22 RX ORDER — PANTOPRAZOLE SODIUM 40 MG/1
40 TABLET, DELAYED RELEASE ORAL
Status: DISCONTINUED | OUTPATIENT
Start: 2024-04-23 | End: 2024-04-25 | Stop reason: HOSPADM

## 2024-04-22 RX ADMIN — SODIUM CHLORIDE 500 ML: 9 INJECTION, SOLUTION INTRAVENOUS at 16:22

## 2024-04-22 RX ADMIN — SODIUM CHLORIDE 1000 ML: 9 INJECTION, SOLUTION INTRAVENOUS at 18:10

## 2024-04-22 RX ADMIN — ACETAMINOPHEN 1000 MG: 500 TABLET ORAL at 16:22

## 2024-04-22 RX ADMIN — SODIUM CHLORIDE 500 ML: 9 INJECTION, SOLUTION INTRAVENOUS at 23:09

## 2024-04-22 RX ADMIN — WATER 1000 MG: 1 INJECTION INTRAMUSCULAR; INTRAVENOUS; SUBCUTANEOUS at 18:11

## 2024-04-22 RX ADMIN — SODIUM CHLORIDE, PRESERVATIVE FREE 10 ML: 5 INJECTION INTRAVENOUS at 23:19

## 2024-04-22 NOTE — ED TRIAGE NOTES
Patient in to ED reporting she felt bad yesterday and today started with fever. Unknown sick contacts. Febrile on arrival. Reports nothing by mouth for 24 hours.

## 2024-04-22 NOTE — H&P
status discussed with the patient/caregivers.      Physical Exam    Physical Exam  General: No acute distress. Alert. Cooperative.   Head: Normocephalic. Atraumatic.   Eyes:              Conjunctiva pink. Sclera white.   Throat: Moist mucous membranes.     Neck: No JVD. No lymphadenopathy.   Respiratory: CTAB. No w/r/r/c. No accessory muscle use.   Cardiovascular: Regular Rhythm. 3/6 systolic murmur   GI: Suprapubic tenderness. No rebound/guarding. Non-distended. Ostomy site nonerythematous.    Extremities: Trace LE edema. Distal pulses intact.    Musculoskeletal: Full ROM in all extremities.    Skin: Warm, dry. No rashes.    Neuro: CN II-XII grossly intact. Strength 5/5 in all extremities.                 Assessment and Plan     Tawana Nye is a 79 y.o. female with PMH of bladder Ca with urostomy (since 1999), hypothyroidism, CHF, HTN who is admitted for severe sepsis likely 2/2 to UTI.    Severe sepsis: Likely secondary to UTI. 2/4 SIRS on arrival (temp and HR). LA of 3.9. CXR shows NAP. UA infectious. Received 1.5L bolus in ED and 1g of CTX for suspected UTI.   - Admit to telemetry, VS per unit protocol  - Caution with fluids given h/o CHF  - additional 500cc bolus given in the setting of persistent hypotension  - Continue MIVF at half  - continue CTX daily  - trend lactic q4h until resolution  - follow blood and urine cultures  - daily labs  - hold home antihypertensives in setting of hypotension  - consider abdominal CT if no clinical improvement    At risk JALEN in setting of stage 3 CKD: Estimated Creatinine Clearance: 29 mL/min (A) (based on SCr of 1.64 mg/dL (H)). POA creatinine of 1.64 (BL 1.2 - 1.4). Likely pre-renal due to poor PO intake.   - cautious rehydration with 1/2 MIVF  - Repeat creatinine in the morning. Would consider urine lytes if no improvement  -Avoid nephrotoxic medications    CHF: documented in PCP's notes as \"unspecified chronicity, unspecified heart failure type.\" Last ECHO in 2020 with  EF of 55-60%. Murmur on exam, chronic per pt. Euvolemic on exam. CXR NAP. On home Lasix 40mg  - Caution with fluids as above  -Hold home Lasix in setting of hypotension    Hypertension: Home Losartan 50mg and  Lasix 40mg daily.   - Hold home medications in setting of hypotension  - Will continue to monitor at this time and readjust as BP's trend.    Hypothyroidism: Last TSH below. On home levothyroxine 75mcg.   Lab Results   Component Value Date    TSH 0.89 11/28/2022    LWM0CKC 1.74 01/19/2024   - Continue home dose Levothyroxine daily  - Repeat TSH pending    GERD: chronic. On home prilosec 20mg daily  -Substitute protonix 40mg for home prilosec    FEN/GI - Regular diet. NS at 50 mL/hr.  Activity - Out of bed with assistance  DVT prophylaxis - Lovenox  GI prophylaxis - Protonix  Fall prophylaxis - Not indicated at this time.  Disposition - Admit to Telemetry. Plan to d/c to TBD. Consulting PT/OT  Code Status - Full, discussed with patient.  Next of Kin Name and Contact Roseann Nye (Child)  [phone] (Mobile)       Patient Tawana Nye will be discussed with Dr. Russ.    7:54 PM, 04/22/24  Yana Melendez DO  Family Medicine Resident       For Billing    Chief Complaint   Patient presents with    Fever    Headache    Nausea    Fatigue

## 2024-04-22 NOTE — TELEPHONE ENCOUNTER
Pt is currently in ER for eval.   Refill Approved    Rx renewed per Medication Renewal Policy. Medication was last renewed on .4/18/19    Gaviota Barakat, [facility] Triage/Med Refill 10/19/2019     Requested Prescriptions   Pending Prescriptions Disp Refills     levothyroxine (SYNTHROID, LEVOTHROID) 125 MCG tablet [Pharmacy Med Name: Levothyroxine Sodium Oral Tablet 125 MCG] 90 tablet 0     Sig: Take 1 tablet (125 mcg total) by mouth every morning.       Thyroid Hormones Protocol Passed - 10/19/2019  8:44 AM        Passed - Provider visit in past 12 months or next 3 months     Last office visit with prescriber/PCP: 11/7/2018 Collin Blanco MD OR same dept: 9/19/2019 Mary Ellen Salmon CNP OR same specialty: 9/19/2019 Mary Ellen Salmon CNP  Last physical: Visit date not found Last MTM visit: Visit date not found   Next visit within 3 mo: Visit date not found  Next physical within 3 mo: Visit date not found  Prescriber OR PCP: Collin Blanco MD  Last diagnosis associated with med order: 1. Hypothyroid  - levothyroxine (SYNTHROID, LEVOTHROID) 125 MCG tablet [Pharmacy Med Name: Levothyroxine Sodium Oral Tablet 125 MCG]; Take 1 tablet (125 mcg total) by mouth every morning.  Dispense: 90 tablet; Refill: 0    If protocol passes may refill for 12 months if within 3 months of last provider visit (or a total of 15 months).             Passed - TSH on file in past 12 months for patient age 12 & older     TSH   Date Value Ref Range Status   06/26/2019 0.72 0.30 - 5.00 uIU/mL Final

## 2024-04-22 NOTE — ED PROVIDER NOTES
EMERGENCY DEPARTMENT PHYSICIAN NOTE     Patient: Tawana Nye     Time of Service: 2024  3:59 PM     Chief complaint:   Chief Complaint   Patient presents with    Fever    Headache    Nausea    Fatigue        HISTORY:  Patient is a 79 y.o. female who presents to the emergency department with complaints of feeling poorly, fever at home.  Patient denies any significant cough.  Patient has ileostomy with normal output.  Patient significant tachycardic to the 130s.  Otherwise patient's vital signs are stable including her blood pressure.      Past Medical History:   Diagnosis Date    Arthritis     Cancer (HCC)     bladder    COVID-19 2020    Endocrine disease     hyperthyroid    Gastrointestinal disorder     GERD (gastroesophageal reflux disease)     Hypertension     Other ill-defined conditions(799.89)     hyperthyroid    Pneumonia 2020        Past Surgical History:   Procedure Laterality Date    CT BONE MARROW BIOPSY  10/8/2018    CT BONE MARROW BIOPSY 10/8/2018 [facility] RAD CT    GYN      HERNIA REPAIR      TOTAL HIP ARTHROPLASTY Left     UROLOGICAL SURGERY      stoma since         Family History   Problem Relation Age of Onset    Stroke Mother     Cancer Father         Lung    Heart Disease Father         Social History     Socioeconomic History    Marital status:      Spouse name: None    Number of children: None    Years of education: None    Highest education level: None   Tobacco Use    Smoking status: Former     Current packs/day: 0.00     Average packs/day: 1 pack/day for 25.0 years (25.0 ttl pk-yrs)     Types: Cigarettes     Start date: 7/3/1974     Quit date: 7/3/1999     Years since quittin.8    Smokeless tobacco: Never   Vaping Use    Vaping Use: Never used   Substance and Sexual Activity    Alcohol use: No    Drug use: Never     Social Determinants of Health     Financial Resource Strain: Low Risk  (2023)    Overall Financial Resource Strain (CARDIA)     Difficulty

## 2024-04-22 NOTE — ED NOTES
Patient reports feeling a little better. Eating ice chips, fluids almost complete. Patient has a urostomy, minimal urine at this time. Will recheck upon fluid completion and send ua.

## 2024-04-23 PROBLEM — Z98.890 HISTORY OF UROSTOMY: Status: ACTIVE | Noted: 2024-04-23

## 2024-04-23 PROBLEM — I50.32 CHRONIC HEART FAILURE WITH PRESERVED EJECTION FRACTION (HCC): Status: ACTIVE | Noted: 2024-04-23

## 2024-04-23 PROBLEM — E87.20 LACTIC ACIDOSIS: Status: ACTIVE | Noted: 2024-04-23

## 2024-04-23 PROBLEM — N39.0 SEPSIS SECONDARY TO UTI (HCC): Status: ACTIVE | Noted: 2024-04-22

## 2024-04-23 PROBLEM — E86.0 DEHYDRATION: Status: ACTIVE | Noted: 2024-04-23

## 2024-04-23 PROBLEM — I10 ESSENTIAL HYPERTENSION: Status: ACTIVE | Noted: 2024-04-23

## 2024-04-23 PROBLEM — N30.01 ACUTE CYSTITIS WITH HEMATURIA: Status: ACTIVE | Noted: 2024-04-23

## 2024-04-23 LAB
ACCESSION NUMBER, LLC1M: ABNORMAL
ACINETOBACTER CALCOAC BAUMANNII COMPLEX BY PCR: NOT DETECTED
ALBUMIN SERPL-MCNC: 3.2 G/DL (ref 3.5–5)
ALBUMIN/GLOB SERPL: 0.8 (ref 1.1–2.2)
ALP SERPL-CCNC: 57 U/L (ref 45–117)
ALT SERPL-CCNC: 18 U/L (ref 12–78)
ANION GAP SERPL CALC-SCNC: 7 MMOL/L (ref 5–15)
AST SERPL-CCNC: 28 U/L (ref 15–37)
B FRAGILIS DNA BLD POS QL NAA+NON-PROBE: NOT DETECTED
BACTERIA SPEC CULT: NORMAL
BASOPHILS # BLD: 0 K/UL (ref 0–0.1)
BASOPHILS NFR BLD: 0 % (ref 0–1)
BILIRUB SERPL-MCNC: 0.7 MG/DL (ref 0.2–1)
BIOFIRE TEST COMMENT: ABNORMAL
BLACTX-M ISLT/SPM QL: NOT DETECTED
BLAIMP ISLT/SPM QL: NOT DETECTED
BLAKPC BLD POS QL NAA+NON-PROBE: NOT DETECTED
BLAOXA-48-LIKE ISLT/SPM QL: NOT DETECTED
BLAVIM ISLT/SPM QL: NOT DETECTED
BUN SERPL-MCNC: 25 MG/DL (ref 6–20)
BUN/CREAT SERPL: 15 (ref 12–20)
C ALBICANS DNA BLD POS QL NAA+NON-PROBE: NOT DETECTED
C AURIS DNA BLD POS QL NAA+NON-PROBE: NOT DETECTED
C GATTII+NEOFOR DNA BLD POS QL NAA+N-PRB: NOT DETECTED
C GLABRATA DNA BLD POS QL NAA+NON-PROBE: NOT DETECTED
C KRUSEI DNA BLD POS QL NAA+NON-PROBE: NOT DETECTED
C PARAP DNA BLD POS QL NAA+NON-PROBE: NOT DETECTED
C TROPICLS DNA BLD POS QL NAA+NON-PROBE: NOT DETECTED
CALCIUM SERPL-MCNC: 8.8 MG/DL (ref 8.5–10.1)
CC UR VC: NORMAL
CHLORIDE SERPL-SCNC: 110 MMOL/L (ref 97–108)
CO2 SERPL-SCNC: 22 MMOL/L (ref 21–32)
COLISTIN RES MCR-1 ISLT/SPM QL: NOT DETECTED
COMMENT:: NORMAL
CREAT SERPL-MCNC: 1.67 MG/DL (ref 0.55–1.02)
DIFFERENTIAL METHOD BLD: ABNORMAL
E CLOAC COMP DNA BLD POS NAA+NON-PROBE: NOT DETECTED
E COLI DNA BLD POS QL NAA+NON-PROBE: NOT DETECTED
E FAECALIS DNA BLD POS QL NAA+NON-PROBE: NOT DETECTED
E FAECIUM DNA BLD POS QL NAA+NON-PROBE: NOT DETECTED
EKG ATRIAL RATE: 127 BPM
EKG DIAGNOSIS: NORMAL
EKG P AXIS: 68 DEGREES
EKG P-R INTERVAL: 130 MS
EKG Q-T INTERVAL: 326 MS
EKG QRS DURATION: 82 MS
EKG QTC CALCULATION (BAZETT): 473 MS
EKG R AXIS: -13 DEGREES
EKG T AXIS: 25 DEGREES
EKG VENTRICULAR RATE: 127 BPM
ENTEROBACTERALES DNA BLD POS NAA+N-PRB: DETECTED
EOSINOPHIL # BLD: 0 K/UL (ref 0–0.4)
EOSINOPHIL NFR BLD: 0 % (ref 0–7)
ERYTHROCYTE [DISTWIDTH] IN BLOOD BY AUTOMATED COUNT: 14 % (ref 11.5–14.5)
GLOBULIN SER CALC-MCNC: 4.2 G/DL (ref 2–4)
GLUCOSE SERPL-MCNC: 116 MG/DL (ref 65–100)
GP B STREP DNA BLD POS QL NAA+NON-PROBE: NOT DETECTED
HAEM INFLU DNA BLD POS QL NAA+NON-PROBE: NOT DETECTED
HCT VFR BLD AUTO: 28 % (ref 35–47)
HGB BLD-MCNC: 9.2 G/DL (ref 11.5–16)
IMM GRANULOCYTES # BLD AUTO: 0 K/UL (ref 0–0.04)
IMM GRANULOCYTES NFR BLD AUTO: 0 % (ref 0–0.5)
K OXYTOCA DNA BLD POS QL NAA+NON-PROBE: DETECTED
KLEBSIELLA SP DNA BLD POS QL NAA+NON-PRB: NOT DETECTED
KLEBSIELLA SP DNA BLD POS QL NAA+NON-PRB: NOT DETECTED
L MONOCYTOG DNA BLD POS QL NAA+NON-PROBE: NOT DETECTED
LACTATE SERPL-SCNC: 1.9 MMOL/L (ref 0.4–2)
LACTATE SERPL-SCNC: 3 MMOL/L (ref 0.4–2)
LYMPHOCYTES # BLD: 0.3 K/UL (ref 0.8–3.5)
LYMPHOCYTES NFR BLD: 6 % (ref 12–49)
MCH RBC QN AUTO: 29.5 PG (ref 26–34)
MCHC RBC AUTO-ENTMCNC: 32.9 G/DL (ref 30–36.5)
MCV RBC AUTO: 89.7 FL (ref 80–99)
MONOCYTES # BLD: 0.1 K/UL (ref 0–1)
MONOCYTES NFR BLD: 3 % (ref 5–13)
N MEN DNA BLD POS QL NAA+NON-PROBE: NOT DETECTED
NEUTS SEG # BLD: 4.4 K/UL (ref 1.8–8)
NEUTS SEG NFR BLD: 91 % (ref 32–75)
NRBC # BLD: 0 K/UL (ref 0–0.01)
NRBC BLD-RTO: 0 PER 100 WBC
P AERUGINOSA DNA BLD POS NAA+NON-PROBE: NOT DETECTED
PLATELET # BLD AUTO: 93 K/UL (ref 150–400)
PMV BLD AUTO: 11.4 FL (ref 8.9–12.9)
POTASSIUM SERPL-SCNC: 3.6 MMOL/L (ref 3.5–5.1)
PROT SERPL-MCNC: 7.4 G/DL (ref 6.4–8.2)
PROTEUS SP DNA BLD POS QL NAA+NON-PROBE: NOT DETECTED
RBC # BLD AUTO: 3.12 M/UL (ref 3.8–5.2)
RBC MORPH BLD: ABNORMAL
RESISTANT GENE NDM BY PCR: NOT DETECTED
RESISTANT GENE TARGETS: ABNORMAL
S AUREUS DNA BLD POS QL NAA+NON-PROBE: NOT DETECTED
S AUREUS+CONS DNA BLD POS NAA+NON-PROBE: NOT DETECTED
S EPIDERMIDIS DNA BLD POS QL NAA+NON-PRB: NOT DETECTED
S LUGDUNENSIS DNA BLD POS QL NAA+NON-PRB: NOT DETECTED
S MALTOPHILIA DNA BLD POS QL NAA+NON-PRB: NOT DETECTED
S MARCESCENS DNA BLD POS NAA+NON-PROBE: NOT DETECTED
S PNEUM DNA BLD POS QL NAA+NON-PROBE: NOT DETECTED
S PYO DNA BLD POS QL NAA+NON-PROBE: NOT DETECTED
SALMONELLA DNA BLD POS QL NAA+NON-PROBE: NOT DETECTED
SERVICE CMNT-IMP: NORMAL
SODIUM SERPL-SCNC: 139 MMOL/L (ref 136–145)
SPECIMEN HOLD: NORMAL
STREPTOCOCCUS DNA BLD POS NAA+NON-PROBE: NOT DETECTED
TSH SERPL DL<=0.05 MIU/L-ACNC: 0.6 UIU/ML (ref 0.36–3.74)
WBC # BLD AUTO: 4.8 K/UL (ref 3.6–11)

## 2024-04-23 PROCEDURE — 2060000000 HC ICU INTERMEDIATE R&B

## 2024-04-23 PROCEDURE — 6370000000 HC RX 637 (ALT 250 FOR IP)

## 2024-04-23 PROCEDURE — 97161 PT EVAL LOW COMPLEX 20 MIN: CPT

## 2024-04-23 PROCEDURE — 99223 1ST HOSP IP/OBS HIGH 75: CPT | Performed by: STUDENT IN AN ORGANIZED HEALTH CARE EDUCATION/TRAINING PROGRAM

## 2024-04-23 PROCEDURE — 97165 OT EVAL LOW COMPLEX 30 MIN: CPT

## 2024-04-23 PROCEDURE — 94761 N-INVAS EAR/PLS OXIMETRY MLT: CPT

## 2024-04-23 PROCEDURE — 83605 ASSAY OF LACTIC ACID: CPT

## 2024-04-23 PROCEDURE — 36415 COLL VENOUS BLD VENIPUNCTURE: CPT

## 2024-04-23 PROCEDURE — 6370000000 HC RX 637 (ALT 250 FOR IP): Performed by: STUDENT IN AN ORGANIZED HEALTH CARE EDUCATION/TRAINING PROGRAM

## 2024-04-23 PROCEDURE — 97535 SELF CARE MNGMENT TRAINING: CPT

## 2024-04-23 PROCEDURE — 93010 ELECTROCARDIOGRAM REPORT: CPT | Performed by: INTERNAL MEDICINE

## 2024-04-23 PROCEDURE — 80053 COMPREHEN METABOLIC PANEL: CPT

## 2024-04-23 PROCEDURE — 85025 COMPLETE CBC W/AUTO DIFF WBC: CPT

## 2024-04-23 PROCEDURE — 2580000003 HC RX 258

## 2024-04-23 PROCEDURE — 97116 GAIT TRAINING THERAPY: CPT

## 2024-04-23 PROCEDURE — 6360000002 HC RX W HCPCS

## 2024-04-23 PROCEDURE — 84443 ASSAY THYROID STIM HORMONE: CPT

## 2024-04-23 RX ORDER — ACETAMINOPHEN 500 MG
1000 TABLET ORAL ONCE
Status: COMPLETED | OUTPATIENT
Start: 2024-04-23 | End: 2024-04-23

## 2024-04-23 RX ORDER — SODIUM CHLORIDE, SODIUM LACTATE, POTASSIUM CHLORIDE, CALCIUM CHLORIDE 600; 310; 30; 20 MG/100ML; MG/100ML; MG/100ML; MG/100ML
INJECTION, SOLUTION INTRAVENOUS CONTINUOUS
Status: DISCONTINUED | OUTPATIENT
Start: 2024-04-23 | End: 2024-04-24

## 2024-04-23 RX ADMIN — SODIUM CHLORIDE, POTASSIUM CHLORIDE, SODIUM LACTATE AND CALCIUM CHLORIDE: 600; 310; 30; 20 INJECTION, SOLUTION INTRAVENOUS at 17:53

## 2024-04-23 RX ADMIN — SODIUM CHLORIDE, POTASSIUM CHLORIDE, SODIUM LACTATE AND CALCIUM CHLORIDE: 600; 310; 30; 20 INJECTION, SOLUTION INTRAVENOUS at 06:28

## 2024-04-23 RX ADMIN — WATER 1000 MG: 1 INJECTION INTRAMUSCULAR; INTRAVENOUS; SUBCUTANEOUS at 17:38

## 2024-04-23 RX ADMIN — LEVOTHYROXINE SODIUM 75 MCG: 0.07 TABLET ORAL at 06:25

## 2024-04-23 RX ADMIN — SODIUM CHLORIDE, PRESERVATIVE FREE 10 ML: 5 INJECTION INTRAVENOUS at 08:10

## 2024-04-23 RX ADMIN — ACETAMINOPHEN 650 MG: 325 TABLET ORAL at 01:14

## 2024-04-23 RX ADMIN — ACETAMINOPHEN 1000 MG: 500 TABLET ORAL at 09:36

## 2024-04-23 RX ADMIN — SODIUM CHLORIDE, PRESERVATIVE FREE 10 ML: 5 INJECTION INTRAVENOUS at 20:33

## 2024-04-23 RX ADMIN — PANTOPRAZOLE SODIUM 40 MG: 40 TABLET, DELAYED RELEASE ORAL at 06:25

## 2024-04-23 RX ADMIN — ENOXAPARIN SODIUM 30 MG: 100 INJECTION SUBCUTANEOUS at 08:10

## 2024-04-23 RX ADMIN — SODIUM CHLORIDE: 9 INJECTION, SOLUTION INTRAVENOUS at 00:14

## 2024-04-23 ASSESSMENT — PAIN DESCRIPTION - LOCATION
LOCATION: HEAD
LOCATION: BACK
LOCATION: HEAD

## 2024-04-23 ASSESSMENT — PAIN SCALES - GENERAL
PAINLEVEL_OUTOF10: 6
PAINLEVEL_OUTOF10: 3
PAINLEVEL_OUTOF10: 3
PAINLEVEL_OUTOF10: 0

## 2024-04-23 ASSESSMENT — PAIN SCALES - WONG BAKER: WONGBAKER_NUMERICALRESPONSE: NO HURT

## 2024-04-23 ASSESSMENT — ENCOUNTER SYMPTOMS
VOMITING: 1
SHORTNESS OF BREATH: 0
ABDOMINAL PAIN: 1

## 2024-04-23 ASSESSMENT — PAIN DESCRIPTION - PAIN TYPE: TYPE: CHRONIC PAIN

## 2024-04-23 ASSESSMENT — PAIN DESCRIPTION - ORIENTATION: ORIENTATION: LOWER

## 2024-04-23 ASSESSMENT — PAIN DESCRIPTION - DESCRIPTORS
DESCRIPTORS: THROBBING
DESCRIPTORS: ACHING
DESCRIPTORS: ACHING

## 2024-04-23 ASSESSMENT — PAIN DESCRIPTION - FREQUENCY: FREQUENCY: CONTINUOUS

## 2024-04-23 NOTE — ED NOTES
Patient going to 3rd floor after 4th floor refused r/t BP/MAP. Report called to Sara JENKINS. Left with H2H transport.

## 2024-04-23 NOTE — PLAN OF CARE
Problem: Physical Therapy - Adult  Goal: By Discharge: Performs mobility at highest level of function for planned discharge setting.  See evaluation for individualized goals.  Description: FUNCTIONAL STATUS PRIOR TO ADMISSION: Patient was independent without use of durable medical equipment within the home. She utilized an SPC in the community. No fall history in the past year     HOME SUPPORT PRIOR TO ADMISSION: The patient lived with her daughter but did not require assistance.    Physical Therapy Goals  Initiated 4/23/2024  1.  Patient will move from supine to sit and sit to supine, scoot up and down, and roll side to side in bed with independence within 7 day(s).    2.  Patient will perform sit to stand with modified independence within 7 day(s).  3.  Patient will transfer from bed to chair and chair to bed with modified independence using the least restrictive device within 7 day(s).  4.  Patient will ambulate with Mario for 200 feet with the least restrictive device within 7 day(s).   5.  Patient will ascend/descend 3 stairs with 2 handrail(s) with independence within 7 day(s).    Outcome: Progressing   PHYSICAL THERAPY EVALUATION    Patient: Tawana Nye (79 y.o. female)  Date: 4/23/2024  Primary Diagnosis: Dehydration [E86.0]  Lactic acidosis [E87.20]  Acute cystitis with hematuria [N30.01]  Sepsis (HCC) [A41.9]       Precautions: Restrictions/Precautions: Fall Risk                      ASSESSMENT :   DEFICITS/IMPAIRMENTS:   The patient is limited by mildly decreased activity tolerance, endurance, balance in the setting of hospital admission for severe sepsis with UTI. PMH significant for bladder cancer s/p resection and urostomy. Patient with soft, but stable orthostatics during evaluation as below. She tolerates hallway ambulation with SBA and RW with good balance with RW support. Without RW, patient reaching for increased external support. Recommend RW for all upright mobility at this time which patient

## 2024-04-23 NOTE — PLAN OF CARE
Problem: Occupational Therapy - Adult  Goal: By Discharge: Performs self-care activities at highest level of function for planned discharge setting.  See evaluation for individualized goals.  Description: FUNCTIONAL STATUS PRIOR TO ADMISSION:     ,  ,  ,  ,  ,  ,  ,  ,  ,  ,       HOME SUPPORT: Patient lived with daughter but didn't require assistance. Was independent without AD for mobility and independent for ADLs, daughter provided transportation to appointments.    Occupational Therapy Goals:  Initiated 4/23/2024  1.  Patient will perform upper body dressing and lower body dressing with Modified [place] within 7 day(s).  2.  Patient will perform grooming standing at sink without LOB or fatigue with Supervision within 7 day(s).  3.  Patient will perform toilet transfers with Modified [place]  within 7 day(s).  4.  Patient will perform all aspects of toileting with Modified [place] within 7 day(s).  5.  Patient will participate in upper extremity therapeutic exercise/activities with Modified [place] for 10 minutes within 7 day(s).    6.  Patient will utilize energy conservation techniques during functional activities with verbal and visual cues within 7 day(s).    Outcome: Progressing   OCCUPATIONAL THERAPY EVALUATION    Patient: Tawana Ney (79 y.o. female)  Date: 4/23/2024  Primary Diagnosis: Dehydration [E86.0]  Lactic acidosis [E87.20]  Acute cystitis with hematuria [N30.01]  Sepsis (HCC) [A41.9]         Precautions:                    ASSESSMENT :  The patient is limited by decreased functional mobility, independence in ADLs, high-level IADLs, strength, activity tolerance, endurance, balance, increased pain levels s/p admission for sepsis and dehydration.  Based on the impairments listed above patient with fair tolerance for session, at baseline she walks independently but is mildly unsteady without AD; RW provided with patient demonstrating much improved balance and functional  mobility. CGA for ambulation and transfers with mild dyspnea with mobility O2 stable on RA >95%. Anticipate with continued medical management HHOT vs none with continued family support will be appropriate at discharge.    Functional Outcome Measure:  The patient scored 21/24 on the [facility] outcome measure which is indicative of decreased likelihood for need for SNF/IPR at discharge.         PLAN :  Recommendations and Planned Interventions:   self care training, therapeutic activities, functional mobility training, balance training, therapeutic exercise, endurance activities, patient education, home safety training, and family training/education    Frequency/Duration: OT Plan of Care: 5 times/week    Recommendation for discharge: (in order for the patient to meet his/her long term goals): No skilled occupational therapy or Therapy 2x a week in the home    Other factors to consider for discharge: high risk for falls and concern for safely navigating or managing the home environment    IF patient discharges home will need the following DME: patient owns DME required for discharge       SUBJECTIVE:   Patient stated, “I have a walker and cane at home, I never.”  OBJECTIVE DATA SUMMARY:     Past Medical History:   Diagnosis Date    Arthritis     Cancer (HCC)     bladder    COVID-19 12/2020    Endocrine disease     hyperthyroid    Gastrointestinal disorder     GERD (gastroesophageal reflux disease)     Hypertension     Other ill-defined conditions(799.89)     hyperthyroid    Pneumonia 12/2020 12/2020     Past Surgical History:   Procedure Laterality Date    CT BONE MARROW BIOPSY  10/8/2018    CT BONE MARROW BIOPSY 10/8/2018 [facility] RAD CT    GYN      HERNIA REPAIR      TOTAL HIP ARTHROPLASTY Left     UROLOGICAL SURGERY      stoma since 99          Expanded or extensive additional review of patient history:   Social/Functional History  Lives With: Spouse  Type of Home: House  Home Layout: One level  Home Access: Stairs to

## 2024-04-23 NOTE — PROGRESS NOTES
Senior Resident Admission Note     CC: weakness    HPI:  Tawana Nye is a 79 y.o. female w/ a PMHX of bladder cancer with urostomy, CHF, CKD, anemia, hypothyroidism who presents to the ER complaining of weakness.     Patient and family report weakness onset 4/22/23 with fever per home health nurse. She notes x1 episode of vomiting in the morning. She reports reduced PO intake over the last x24 hours. Denies CP, SOB, cough, abdominal pain, diarrhea, constipation, or other symptoms at this time.    Chart reviewed. Patient seen, examined, and discussed with Dr. Melendez (PGY-1). See H&P for more details.    A/P: Admit to telemetry. Code status: full.    Severe sepsis in the setting of UTI: POA LA of 3.9, temp 102.5F, and HR of 128. Patient received 1.5L IVF in the ER. S/p CTX with evidence of UTI on UA. CXR NAP. Episodes of hypotension with MAPs maintained.  - admit to telemetry, VS per unit protocol  - caution with fluids given h/o CHF  - additional 500cc bolus given in the setting of persistent hypotension  - 1/2 MIVF  - continue CTX  - trend lactic q4h until resolution  - follow blood and urine cultures  - daily labs  - hold home antihypertensives  - consider abdominal CT if no clinical improvement    At risk JALEN: POA creatinine of 1.64 (BL 1.2 - 1.4). likely pre-renal due to poor PO intake  - cautious rehydration with 1/2 MIVF  - repeat am creatinine. Would consider urine lytes if no improvement    CHF: unclear chronicity/type. Last ECHO in 2020 with EF of 55-60%. Chronic murmur on exam. Euvolemic on exam. CXR NAP.  - caution with fluids as above      I agree with remaining assessment and plan as documented in Dr. Melendez's Full H&P.    Pt discussed with Dr. Yodit Russ (on-call attending physician).  Holden Westbrook MD

## 2024-04-23 NOTE — CARE COORDINATION
Care Management Initial Assessment Note:        04/23/24 0044   Discharge Planning   Patient expects to be discharged to: House   Services At/After Discharge   Transition of Care Consult (CM Consult) N/A   Mode of Transport at Discharge Other (see comment)  (Family)   Confirm Follow Up Transport Self     Patient with low readmission risk score of 13%. No identified CM needs at this time, but therapy evals are pending, so needs may arise.  Please consult CM for any discharge planning needs that may arise.   ______________________  Belgica HERRON, RN  Care Management  4/23/2024  2:06 PM

## 2024-04-23 NOTE — DISCHARGE SUMMARY
Serratia marcescens by PCR Not detected        Haemophilus Influenzae by PCR Not detected        Neisseria meningitidis by PCR Not detected        Pseudomonas aeruginosa Not detected        Stenotrophomonas maltophilia by PCR Not detected        Candida albicans by PCR Not detected        Candida auris by PCR Not detected        Candida glabrata Not detected        Candida krusei by PCR Not detected        Candida parapsilosis by PCR Not detected        Candida tropicalis by PCR Not detected        Cryptococcus neoformans/gattii by PCR Not detected        Resistant gene targets          Resistant gene ctx-m by PCR Not detected        Resistant gene imp by PCR Not detected        KPC (Carbapenem resistance gene) Not detected        Colistin Resistance mcr-1 gene by PCR Not detected        Resistant gene ndm by PCR Not detected        Resistant gene oxa-48-like by pcr Not detected        Resistant gene vim by PCR Not detected        [employer] test comment       False positive results may rarely occur. Correlate with clinical,epidemiologic, and other laboratory findings           Comment: Please see BCID Interpretation Guide in EPIC Links       Blood Culture 1 [[id number]]  (Abnormal) Collected: 04/22/24 1616    Order Status: Completed Specimen: Blood Updated: 04/25/24 0913     Special Requests --        LEFT  Antecubital       Culture       Klebsiella oxytoca GROWING IN 1 OF 2 BOTTLES DRAWN SITE=LAC REFER TO [id number] FOR SENSITIVITES          COVID-19 & Influenza Combo [[id number]] Collected: 04/22/24 1616    Order Status: Completed Specimen: Nasopharyngeal Updated: 04/22/24 1643     SARS-CoV-2, PCR Not detected        Comment: Not Detected results do not preclude SARS-CoV-2 infection and should not be used as the sole basis for patient management decisions.Results must be combined with clinical observations, patient history, and epidemiological information.        Rapid Influenza A By PCR Not detected        Rapid  falling, weakness, bleeding.    Follow up plans/appointments  Follow-up Information       Follow up With Specialties Details Why Contact Info    Leann Sweeney MD Family Medicine Schedule an appointment as soon as possible for a visit  [address]  [facility] [address] [phone]      Leann Sweeney MD Family Medicine   [address]  [facility] [address] [phone]      Home Health  Follow up  [facility] [address] [phone]             Xin Lam MD  Family Medicine Resident       For Billing    Chief Complaint   Patient presents with    Fever    Headache    Nausea    Fatigue

## 2024-04-23 NOTE — PROGRESS NOTES
Met with patient and family member at bedside. Patient reportedly just returned to bed to rest. Will follow up later today for PT evaluation as able and appropriate.   Jenny Penaloza, PT, DPT

## 2024-04-23 NOTE — PROGRESS NOTES
[address] [id number]   Office [phone]  Fax [phone]          Subjective / Objective     Subjective  Overnight Events: none- maintained maps >65    Patient seen and examined at bedside. Reports feeling well this AM, notes her biggest concern is being hungry for breakfast. Denies CP, SOB, N/V. Endorses urinary odor but no dysuria. Has chronic back pain that she reports is at her BL.     Respiratory:   ORA   Vitals:    04/23/24 0801   BP: (!) 111/56   Pulse: 83   Resp: 15   Temp: 98.4 °F (36.9 °C)   SpO2: 98%     Physical Examination:   General: No acute distress. Alert. Cooperative.   Head: Normocephalic. Atraumatic.   Eyes:              Conjunctiva pink. Sclera white.   Throat: Moist mucous membranes.     Neck: No JVD. No lymphadenopathy.   Respiratory: CTAB. No w/r/r/c. No accessory muscle use.   Cardiovascular: Regular Rhythm. 3/6 systolic murmur   GI: Suprapubic tenderness. No rebound/guarding. Non-distended. Ostomy site nonerythematous. No CVA tenderness.    Extremities: Trace LE edema. Distal pulses intact.    Musculoskeletal: Full ROM in all extremities. Lumbar spinal tenderness, reports at BL,   Skin: Warm, dry. No rashes.    Neuro: CN II-XII grossly intact. Strength 5/5 in all extremities.       I/O:  04/22 0701 - 04/23 0700  In: 1500   Out: 550 [Urine:550]  [facility]patient Medications  @[email or web address]  Current Facility-Administered Medications   Medication Dose Route Frequency    cefTRIAXone (ROCEPHIN) 1,000 mg in sterile water 10 mL IV syringe  1,000 mg IntraVENous Q24H    lactated ringers IV soln infusion   IntraVENous Continuous    lidocaine 4 % external patch 2 patch  2 patch TransDERmal Daily    levothyroxine (SYNTHROID) tablet 75 mcg  75 mcg Oral QAM AC    pantoprazole (PROTONIX) tablet 40 mg  40 mg Oral QAM AC    [Held by provider] furosemide (LASIX) tablet 40 mg  40 mg Oral Daily    [Held by provider] losartan (COZAAR) tablet 50 mg  50 mg Oral Daily    sodium chloride    For Billing    Chief Complaint   Patient presents with    Fever    Headache    Nausea    Fatigue

## 2024-04-24 LAB
ALBUMIN SERPL-MCNC: 2.8 G/DL (ref 3.5–5)
ALBUMIN/GLOB SERPL: 0.7 (ref 1.1–2.2)
ALP SERPL-CCNC: 56 U/L (ref 45–117)
ALT SERPL-CCNC: 15 U/L (ref 12–78)
ANION GAP SERPL CALC-SCNC: 7 MMOL/L (ref 5–15)
AST SERPL-CCNC: 34 U/L (ref 15–37)
BASOPHILS # BLD: 0 K/UL (ref 0–0.1)
BASOPHILS NFR BLD: 1 % (ref 0–1)
BILIRUB SERPL-MCNC: 0.3 MG/DL (ref 0.2–1)
BUN SERPL-MCNC: 19 MG/DL (ref 6–20)
BUN/CREAT SERPL: 14 (ref 12–20)
CALCIUM SERPL-MCNC: 8.9 MG/DL (ref 8.5–10.1)
CHLORIDE SERPL-SCNC: 112 MMOL/L (ref 97–108)
CO2 SERPL-SCNC: 22 MMOL/L (ref 21–32)
CREAT SERPL-MCNC: 1.33 MG/DL (ref 0.55–1.02)
DIFFERENTIAL METHOD BLD: ABNORMAL
EOSINOPHIL # BLD: 0.1 K/UL (ref 0–0.4)
EOSINOPHIL NFR BLD: 3 % (ref 0–7)
ERYTHROCYTE [DISTWIDTH] IN BLOOD BY AUTOMATED COUNT: 13.8 % (ref 11.5–14.5)
GLOBULIN SER CALC-MCNC: 4.2 G/DL (ref 2–4)
GLUCOSE SERPL-MCNC: 106 MG/DL (ref 65–100)
HCT VFR BLD AUTO: 27.6 % (ref 35–47)
HGB BLD-MCNC: 8.9 G/DL (ref 11.5–16)
IMM GRANULOCYTES # BLD AUTO: 0 K/UL
IMM GRANULOCYTES NFR BLD AUTO: 0 %
LYMPHOCYTES # BLD: 0.4 K/UL (ref 0.8–3.5)
LYMPHOCYTES NFR BLD: 23 % (ref 12–49)
MCH RBC QN AUTO: 29.4 PG (ref 26–34)
MCHC RBC AUTO-ENTMCNC: 32.2 G/DL (ref 30–36.5)
MCV RBC AUTO: 91.1 FL (ref 80–99)
MONOCYTES # BLD: 0.2 K/UL (ref 0–1)
MONOCYTES NFR BLD: 9 % (ref 5–13)
NEUTS SEG # BLD: 1.2 K/UL (ref 1.8–8)
NEUTS SEG NFR BLD: 64 % (ref 32–75)
NRBC # BLD: 0 K/UL (ref 0–0.01)
NRBC BLD-RTO: 0 PER 100 WBC
PLATELET # BLD AUTO: 83 K/UL (ref 150–400)
PMV BLD AUTO: 10.9 FL (ref 8.9–12.9)
POTASSIUM SERPL-SCNC: 3.7 MMOL/L (ref 3.5–5.1)
PROT SERPL-MCNC: 7 G/DL (ref 6.4–8.2)
RBC # BLD AUTO: 3.03 M/UL (ref 3.8–5.2)
RBC MORPH BLD: ABNORMAL
SODIUM SERPL-SCNC: 141 MMOL/L (ref 136–145)
WBC # BLD AUTO: 1.9 K/UL (ref 3.6–11)

## 2024-04-24 PROCEDURE — 97116 GAIT TRAINING THERAPY: CPT

## 2024-04-24 PROCEDURE — 94761 N-INVAS EAR/PLS OXIMETRY MLT: CPT

## 2024-04-24 PROCEDURE — 99233 SBSQ HOSP IP/OBS HIGH 50: CPT | Performed by: STUDENT IN AN ORGANIZED HEALTH CARE EDUCATION/TRAINING PROGRAM

## 2024-04-24 PROCEDURE — 80053 COMPREHEN METABOLIC PANEL: CPT

## 2024-04-24 PROCEDURE — 2580000003 HC RX 258

## 2024-04-24 PROCEDURE — 97535 SELF CARE MNGMENT TRAINING: CPT

## 2024-04-24 PROCEDURE — 6360000002 HC RX W HCPCS

## 2024-04-24 PROCEDURE — 2060000000 HC ICU INTERMEDIATE R&B

## 2024-04-24 PROCEDURE — 36415 COLL VENOUS BLD VENIPUNCTURE: CPT

## 2024-04-24 PROCEDURE — 6370000000 HC RX 637 (ALT 250 FOR IP)

## 2024-04-24 PROCEDURE — 6370000000 HC RX 637 (ALT 250 FOR IP): Performed by: STUDENT IN AN ORGANIZED HEALTH CARE EDUCATION/TRAINING PROGRAM

## 2024-04-24 PROCEDURE — 97530 THERAPEUTIC ACTIVITIES: CPT

## 2024-04-24 PROCEDURE — 85025 COMPLETE CBC W/AUTO DIFF WBC: CPT

## 2024-04-24 RX ORDER — CETIRIZINE HYDROCHLORIDE 10 MG/1
5 TABLET ORAL DAILY
Status: DISCONTINUED | OUTPATIENT
Start: 2024-04-24 | End: 2024-04-25 | Stop reason: HOSPADM

## 2024-04-24 RX ORDER — ENOXAPARIN SODIUM 100 MG/ML
40 INJECTION SUBCUTANEOUS DAILY
Status: DISCONTINUED | OUTPATIENT
Start: 2024-04-25 | End: 2024-04-25 | Stop reason: HOSPADM

## 2024-04-24 RX ADMIN — WATER 2000 MG: 1 INJECTION INTRAMUSCULAR; INTRAVENOUS; SUBCUTANEOUS at 12:17

## 2024-04-24 RX ADMIN — ENOXAPARIN SODIUM 30 MG: 100 INJECTION SUBCUTANEOUS at 09:57

## 2024-04-24 RX ADMIN — CETIRIZINE HYDROCHLORIDE 5 MG: 10 TABLET, FILM COATED ORAL at 09:57

## 2024-04-24 RX ADMIN — ACETAMINOPHEN 650 MG: 325 TABLET ORAL at 19:32

## 2024-04-24 RX ADMIN — LEVOTHYROXINE SODIUM 75 MCG: 0.07 TABLET ORAL at 06:13

## 2024-04-24 RX ADMIN — ACETAMINOPHEN 650 MG: 325 TABLET ORAL at 06:13

## 2024-04-24 RX ADMIN — PANTOPRAZOLE SODIUM 40 MG: 40 TABLET, DELAYED RELEASE ORAL at 06:13

## 2024-04-24 RX ADMIN — SODIUM CHLORIDE, PRESERVATIVE FREE 10 ML: 5 INJECTION INTRAVENOUS at 09:58

## 2024-04-24 RX ADMIN — SODIUM CHLORIDE, POTASSIUM CHLORIDE, SODIUM LACTATE AND CALCIUM CHLORIDE: 600; 310; 30; 20 INJECTION, SOLUTION INTRAVENOUS at 02:55

## 2024-04-24 RX ADMIN — SODIUM CHLORIDE, PRESERVATIVE FREE 10 ML: 5 INJECTION INTRAVENOUS at 19:32

## 2024-04-24 ASSESSMENT — PAIN SCALES - GENERAL: PAINLEVEL_OUTOF10: 2

## 2024-04-24 ASSESSMENT — PAIN DESCRIPTION - DESCRIPTORS: DESCRIPTORS: ACHING

## 2024-04-24 ASSESSMENT — PAIN DESCRIPTION - LOCATION
LOCATION: BACK
LOCATION: BACK

## 2024-04-24 NOTE — PROGRESS NOTES
[address] [place] [address]   Office [phone]  Fax [phone]          Subjective / Objective     Subjective  Overnight Events: none- maintained maps >65    Patient seen and examined at bedside. Reports feeling well this AM. Endorses mild chills overnight and mild suprapubic pain. Reported headaches & upper respiratory symptoms. Denies CP, SOB, N/V. Continues to have chronic back pain that she reports is at her BL.     Respiratory:   ORA   Vitals:    04/24/24 0751   BP: (!) 150/72   Pulse: 83   Resp: 14   Temp: 98.2 °F (36.8 °C)   SpO2: 96%     Physical Examination:   General: No acute distress. Alert. Cooperative.   Head: Normocephalic. Atraumatic.   Eyes:              Conjunctiva pink. Sclera white.   Throat: Moist mucous membranes.     Neck: No JVD. No lymphadenopathy.   Respiratory: CTAB. No w/r/r/c. No accessory muscle use.   Cardiovascular: Regular Rhythm. 3/6 systolic murmur   GI: Suprapubic tenderness with mild generalized tenderness. No rebound/guarding. Non-distended. Ostomy site nonerythematous. No CVA tenderness.    Extremities: Trace LE edema. Distal pulses intact.    Musculoskeletal: Full ROM in all extremities. Lumbar spinal tenderness, reports at BL,   Skin: Warm, dry. No rashes.    Neuro: CN II-XII grossly intact. Strength 5/5 in all extremities.       I/O:  04/23 0701 - 04/24 0700  In: 2518.1 [P.O.:360; I.V.:2158.1]  Out: 1100 [Urine:1100]  [facility] Medications  [email or web address]  Current Facility-Administered Medications   Medication Dose Route Frequency    cefTRIAXone (ROCEPHIN) 1,000 mg in sterile water 10 mL IV syringe  1,000 mg IntraVENous Q24H    lactated ringers IV soln infusion   IntraVENous Continuous    lidocaine 4 % external patch 2 patch  2 patch TransDERmal Daily    levothyroxine (SYNTHROID) tablet 75 mcg  75 mcg Oral QAM AC    pantoprazole (PROTONIX) tablet 40 mg  40 mg Oral QAM AC    [Held by provider] furosemide (LASIX) tablet 40 mg  40 mg Oral Daily    [Held  baseline 1.33 down from 1.67 yesterday.   - Avoid nephrotoxic agents      CHF: unclear chronicity/type. Last ECHO in 2020 with EF of 55-60%. Chronic murmur on exam. Euvolemic on exam. CXR NAP.  - CTM fluid status.   - Grade 3/6 systolic murmur    Congestion: upper respiratory congestion  - zyrtec 5mg qD  - continue to monitor    ?Hx of RA  Per chart review. Not on current medications.     FEN/GI - Regular diet.    Activity - Out of bed with assistance  DVT prophylaxis - Lovenox  GI prophylaxis - Protonix  Fall prophylaxis - Not indicated at this time.  Disposition - Plan to d/c to [facility]BD. Consulting PT/OT  Code Status - Full, discussed with patient.  Next of Kin Name and Contact Roseann Nye (Child)  [phone] (Mobile)        Patient discussed with Leslie Torres MD Helena Varys, MD Family medicine Resident       For Billing    Chief Complaint   Patient presents with    Fever    Headache    Nausea    Fatigue

## 2024-04-24 NOTE — PROGRESS NOTES
Spiritual Care Partner Volunteer visited patient at [facility] in [facility] INTERMED[facility]ARE UNIT on 4.23.24     Documented by:  Chaplain Gisella Sams MS, MDiv, [facility]  Spiritual Health Services  Paging service: [phone] (JILLIAN)

## 2024-04-24 NOTE — PLAN OF CARE
Problem: Physical Therapy - Adult  Goal: By Discharge: Performs mobility at highest level of function for planned discharge setting.  See evaluation for individualized goals.  Description: FUNCTIONAL STATUS PRIOR TO ADMISSION: Patient was independent without use of durable medical equipment within the home. She utilized an SPC in the community. No fall history in the past year     HOME SUPPORT PRIOR TO ADMISSION: The patient lived with her daughter but did not require assistance.    Physical Therapy Goals  Initiated 4/23/2024  1.  Patient will move from supine to sit and sit to supine, scoot up and down, and roll side to side in bed with independence within 7 day(s).    2.  Patient will perform sit to stand with modified independence within 7 day(s).  3.  Patient will transfer from bed to chair and chair to bed with modified independence using the least restrictive device within 7 day(s).  4.  Patient will ambulate with Mario for 200 feet with the least restrictive device within 7 day(s).   5.  Patient will ascend/descend 3 stairs with 2 handrail(s) with independence within 7 day(s).    Outcome: Progressing   PHYSICAL THERAPY TREATMENT    Patient: Tawana Nye (79 y.o. female)  Date: 4/24/2024  Diagnosis: Dehydration [E86.0]  Lactic acidosis [E87.20]  Acute cystitis with hematuria [N30.01]  Sepsis (HCC) [A41.9] Sepsis secondary to UTI (HCC)      Precautions: Fall Risk                      ASSESSMENT:  Patient continues to benefit from skilled PT services and is progressing towards goals. Patient this morning with good participation and tolerance to physical therapy session emphasizing gait training. Patient tolerates increased ambulation distance of 90ft with SBA and RW with good balance throughout. Noted increased right hand swelling and encouraged hand opening/closing to address. Patient reporting baseline history of arthritis which she was supposed to see her rheumatologist for yesterday. SpO2 95% and heart  rate 108 bpm after ambulation. Recommend HH upon d/c.          PLAN:  Patient continues to benefit from skilled intervention to address the above impairments.  Continue treatment per established plan of care.    Recommend with staff: assistx1 with SBA and RW; out of bed for all meals       Recommendation for discharge: (in order for the patient to meet his/her long term goals): Therapy 2x a week in the home    Other factors to consider for discharge: no additional factors    IF patient discharges home will need the following DME: patient owns DME required for discharge       SUBJECTIVE:   Patient stated, \"my legs feel weak.\" Re: with ambulation    OBJECTIVE DATA SUMMARY:   Patient received supine in bed and was agreeable to participate in PT session.   Patient was cleared by nursing to participate in PT session.   No family present during session.       Critical Behavior:  Orientation  Overall Orientation Status: Within Normal Limits  Orientation Level: Oriented X4  Cognition  Overall Cognitive Status: WNL    Functional Mobility Training:  Bed Mobility:  Bed Mobility Training  Rolling: Modified independent  Supine to Sit: Modified independent  Scooting: Modified independent  Transfers:  Transfer Training  Transfer Training: Yes  Sit to Stand: Stand-by assistance  Stand to Sit: Stand-by assistance  Bed to Chair: Stand-by assistance  Balance:  Balance  Sitting: Intact  Standing: With support  Standing - Static: Fair  Standing - Dynamic: Fair   Ambulation/Gait Training:     Gait  Gait Training: Yes  Overall Level of Assistance: Stand-by assistance  Distance (ft): 90 Feet  Assistive Device: Walker, rolling;Gait belt  Step Length: Left shortened;Right shortened  Gait Abnormalities: Decreased step clearance;Trunk sway increased  Neuro Re-Education:                    Pain Rating:  Patient reporting right hand pain; noted edema and RN alerted    Pain Intervention(s):       Activity Tolerance:   Good, tolerates ADLS without

## 2024-04-24 NOTE — CARE COORDINATION
Care Management Progress Note:      04/24/24 1112   Condition of Participation: Discharge Planning   The Patient and/or Patient Representative was provided with a Choice of Provider? Patient   The Patient and/Or Patient Representative agree with the Discharge Plan? Yes   Freedom of Choice list was provided with basic dialogue that supports the patient's individualized plan of care/goals, treatment preferences, and shares the quality data associated with the providers?  Yes     Patient with recs for HH, CM consult for HH therapy @ dc. CM met with patient at bedside to discuss recs. Patient with no hx of HH in past, no preference as to provider. CM sent mass referrals out as patient has a CCC[facility] dual plan. CM awaiting acceptance. EDOD is 4/25 pending Cx results, family will transport.  ______________________  Belgica HERRON, ALICE  Care Management  4/24/2024  11:14 AM   Care Management Progress Note:   [facility] able to accept patient for HH therapy services. AVS updated to reflect. CM updated patient at bedside.  ______________________  Belgica HERRON RN  Care Management  4/24/2024  11:41 AM

## 2024-04-24 NOTE — PROGRESS NOTES
0700: Bedside and Verbal shift change report given to Víctor RN (oncoming nurse) by Bryant RN (offgoing nurse). Report included the following information Nurse Handoff Report, Adult Overview, Intake/Output, MAR, Recent Results, and Cardiac Rhythm NSR .    1900: Bedside and Verbal shift change report given to Bryant RN (oncoming nurse) by Víctor RN (offgoing nurse). Report included the following information Nurse Handoff Report, Adult Overview, Intake/Output, MAR, Recent Results, and Cardiac Rhythm NSR .

## 2024-04-24 NOTE — PLAN OF CARE
Problem: Safety - Adult  Goal: Free from fall injury  4/23/2024 2200 by Bryant De Leon RN  Outcome: Progressing  4/23/2024 0807 by Gisella Last RN  Outcome: Progressing     Problem: Pain  Goal: Verbalizes/displays adequate comfort level or baseline comfort level  4/23/2024 2200 by Bryant De Leon RN  Outcome: Progressing  4/23/2024 0807 by Gisella Last RN  Outcome: Progressing     Problem: Skin/Tissue Integrity  Goal: Absence of new skin breakdown  Description: 1.  Monitor for areas of redness and/or skin breakdown  2.  Assess vascular access sites hourly  3.  Every 4-6 hours minimum:  Change oxygen saturation probe site  4.  Every 4-6 hours:  If on nasal continuous positive airway pressure, respiratory therapy assess nares and determine need for appliance change or resting period.  Outcome: Progressing     Problem: ABCDS Injury Assessment  Goal: Absence of physical injury  Outcome: Progressing

## 2024-04-24 NOTE — PROGRESS NOTES
[facility] Lovenox Dosing 04/24/24  Lovenox dose change per protocol  Physician: DR Torres    [facility] Protocol  Enoxaparin prophylaxis dosing (medically ill, surgical patients)   Patient Weight (kg)     50 and below 51 - 100 101 - 150 151 - 174 175 or greater         Estimated CrCl  (ml/min) 30 or greater   30 mg SUBQ daily   40 mg SUBQ daily (or 30 mg BID for ortho) 30 mg SUBQ BID  40 mg SUBQ BID 60mg SUBQ BID      15-29 UFH 5000 units SUBQ BID   30 mg SUBQ daily 30 mg SUBQ daily 40 mg SUBQ daily   60 mg SUBQ daily      Less than 15 or Dialysis UFH 5000 units SUBQ BID   UFH 5000 units SUBQ TID UFH 7500 units SUBQ TID     Estimated Creatinine Clearance: 37 mL/min (A) (based on SCr of 1.33 mg/dL (H)).  Current dose: Lovenox 30 mg SC daily  Recommendation: Lovenox 40 mg SC daily    Thank you

## 2024-04-24 NOTE — PLAN OF CARE
Problem: Occupational Therapy - Adult  Goal: By Discharge: Performs self-care activities at highest level of function for planned discharge setting.  See evaluation for individualized goals.  Description: FUNCTIONAL STATUS PRIOR TO ADMISSION:     ,  ,  ,  ,  ,  ,  ,  ,  ,  ,       HOME SUPPORT: Patient lived with daughter but didn't require assistance. Was independent without AD for mobility and independent for ADLs, daughter provided transportation to appointments.    Occupational Therapy Goals:  Initiated 4/23/2024  1.  Patient will perform upper body dressing and lower body dressing with Modified [place] within 7 day(s).  2.  Patient will perform grooming standing at sink without LOB or fatigue with Supervision within 7 day(s).  3.  Patient will perform toilet transfers with Modified [place]  within 7 day(s).  4.  Patient will perform all aspects of toileting with Modified [place] within 7 day(s).  5.  Patient will participate in upper extremity therapeutic exercise/activities with Modified [place] for 10 minutes within 7 day(s).    6.  Patient will utilize energy conservation techniques during functional activities with verbal and visual cues within 7 day(s).    Outcome: Progressing   OCCUPATIONAL THERAPY TREATMENT  Patient: Tawana Nye (79 y.o. female)  Date: 4/24/2024  Primary Diagnosis: Dehydration [E86.0]  Lactic acidosis [E87.20]  Acute cystitis with hematuria [N30.01]  Sepsis (HCC) [A41.9]       Precautions: Fall Risk                Chart, occupational therapy assessment, plan of care, and goals were reviewed.    ASSESSMENT  Patient continues to benefit from skilled OT services and is progressing towards goals. Ms. Nye was received in the chair agreeable to activity.  She performed multiple sit to stand transfers, mobilized to/from the bathroom, toilet transfers, and dynamic standing tasks with SBA.  Patient engaged in LB dressing seated EOB, all aspects of toileting, and

## 2024-04-24 NOTE — PLAN OF CARE
Problem: Safety - Adult  Goal: Free from fall injury  Outcome: Progressing     Problem: Discharge Planning  Goal: Discharge to home or other facility with appropriate resources  Outcome: Progressing  Flowsheets (Taken 4/24/2024 0751)  Discharge to home or other facility with appropriate resources: Identify barriers to discharge with patient and caregiver     Problem: Pain  Goal: Verbalizes/displays adequate comfort level or baseline comfort level  Outcome: Progressing     Problem: Chronic Conditions and Co-morbidities  Goal: Patient's chronic conditions and co-morbidity symptoms are monitored and maintained or improved  Outcome: Progressing  Flowsheets (Taken 4/24/2024 0751)  Care Plan - Patient's Chronic Conditions and Co-Morbidity Symptoms are Monitored and Maintained or Improved: Monitor and assess patient's chronic conditions and comorbid symptoms for stability, deterioration, or improvement     Problem: Skin/Tissue Integrity  Goal: Absence of new skin breakdown  Description: 1.  Monitor for areas of redness and/or skin breakdown  2.  Assess vascular access sites hourly  3.  Every 4-6 hours minimum:  Change oxygen saturation probe site  4.  Every 4-6 hours:  If on nasal continuous positive airway pressure, respiratory therapy assess nares and determine need for appliance change or resting period.  Outcome: Progressing     Problem: Physical Therapy - Adult  Goal: By Discharge: Performs mobility at highest level of function for planned discharge setting.  See evaluation for individualized goals.  Description: FUNCTIONAL STATUS PRIOR TO ADMISSION: Patient was independent without use of durable medical equipment within the home. She utilized an SPC in the community. No fall history in the past year     HOME SUPPORT PRIOR TO ADMISSION: The patient lived with her daughter but did not require assistance.    Physical Therapy Goals  Initiated 4/23/2024  1.  Patient will move from supine to sit and sit to supine, scoot

## 2024-04-25 ENCOUNTER — TELEPHONE (OUTPATIENT)
Age: 79
End: 2024-04-25

## 2024-04-25 VITALS
DIASTOLIC BLOOD PRESSURE: 64 MMHG | HEIGHT: 65 IN | HEART RATE: 82 BPM | TEMPERATURE: 98.2 F | SYSTOLIC BLOOD PRESSURE: 126 MMHG | RESPIRATION RATE: 15 BRPM | WEIGHT: 195.3 LBS | OXYGEN SATURATION: 99 % | BODY MASS INDEX: 32.54 KG/M2

## 2024-04-25 LAB
ALBUMIN SERPL-MCNC: 2.6 G/DL (ref 3.5–5)
ALBUMIN/GLOB SERPL: 0.7 (ref 1.1–2.2)
ALP SERPL-CCNC: 54 U/L (ref 45–117)
ALT SERPL-CCNC: 16 U/L (ref 12–78)
ANION GAP SERPL CALC-SCNC: 7 MMOL/L (ref 5–15)
AST SERPL-CCNC: 34 U/L (ref 15–37)
BACTERIA SPEC CULT: ABNORMAL
BASOPHILS # BLD: 0 K/UL (ref 0–0.1)
BASOPHILS NFR BLD: 1 % (ref 0–1)
BILIRUB SERPL-MCNC: 0.4 MG/DL (ref 0.2–1)
BUN SERPL-MCNC: 15 MG/DL (ref 6–20)
BUN/CREAT SERPL: 12 (ref 12–20)
CALCIUM SERPL-MCNC: 8.7 MG/DL (ref 8.5–10.1)
CHLORIDE SERPL-SCNC: 112 MMOL/L (ref 97–108)
CO2 SERPL-SCNC: 22 MMOL/L (ref 21–32)
CREAT SERPL-MCNC: 1.25 MG/DL (ref 0.55–1.02)
DIFFERENTIAL METHOD BLD: ABNORMAL
EOSINOPHIL # BLD: 0.1 K/UL (ref 0–0.4)
EOSINOPHIL NFR BLD: 4 % (ref 0–7)
ERYTHROCYTE [DISTWIDTH] IN BLOOD BY AUTOMATED COUNT: 14 % (ref 11.5–14.5)
GLOBULIN SER CALC-MCNC: 4 G/DL (ref 2–4)
GLUCOSE SERPL-MCNC: 102 MG/DL (ref 65–100)
HCT VFR BLD AUTO: 25.8 % (ref 35–47)
HGB BLD-MCNC: 8.6 G/DL (ref 11.5–16)
IMM GRANULOCYTES # BLD AUTO: 0 K/UL (ref 0–0.04)
IMM GRANULOCYTES NFR BLD AUTO: 0 % (ref 0–0.5)
LYMPHOCYTES # BLD: 0.6 K/UL (ref 0.8–3.5)
LYMPHOCYTES NFR BLD: 35 % (ref 12–49)
MCH RBC QN AUTO: 30.1 PG (ref 26–34)
MCHC RBC AUTO-ENTMCNC: 33.3 G/DL (ref 30–36.5)
MCV RBC AUTO: 90.2 FL (ref 80–99)
MONOCYTES # BLD: 0.1 K/UL (ref 0–1)
MONOCYTES NFR BLD: 8 % (ref 5–13)
NEUTS SEG # BLD: 0.9 K/UL (ref 1.8–8)
NEUTS SEG NFR BLD: 52 % (ref 32–75)
NRBC # BLD: 0 K/UL (ref 0–0.01)
NRBC BLD-RTO: 0 PER 100 WBC
PLATELET # BLD AUTO: 83 K/UL (ref 150–400)
PLATELET COMMENT: ABNORMAL
PMV BLD AUTO: 10.9 FL (ref 8.9–12.9)
POTASSIUM SERPL-SCNC: 3.8 MMOL/L (ref 3.5–5.1)
PROT SERPL-MCNC: 6.6 G/DL (ref 6.4–8.2)
RBC # BLD AUTO: 2.86 M/UL (ref 3.8–5.2)
RBC MORPH BLD: ABNORMAL
SERVICE CMNT-IMP: ABNORMAL
SERVICE CMNT-IMP: ABNORMAL
SODIUM SERPL-SCNC: 141 MMOL/L (ref 136–145)
WBC # BLD AUTO: 1.7 K/UL (ref 3.6–11)

## 2024-04-25 PROCEDURE — 97535 SELF CARE MNGMENT TRAINING: CPT

## 2024-04-25 PROCEDURE — 6370000000 HC RX 637 (ALT 250 FOR IP)

## 2024-04-25 PROCEDURE — 6370000000 HC RX 637 (ALT 250 FOR IP): Performed by: STUDENT IN AN ORGANIZED HEALTH CARE EDUCATION/TRAINING PROGRAM

## 2024-04-25 PROCEDURE — 6360000002 HC RX W HCPCS

## 2024-04-25 PROCEDURE — 36415 COLL VENOUS BLD VENIPUNCTURE: CPT

## 2024-04-25 PROCEDURE — 94761 N-INVAS EAR/PLS OXIMETRY MLT: CPT

## 2024-04-25 PROCEDURE — 99238 HOSP IP/OBS DSCHRG MGMT 30/<: CPT | Performed by: STUDENT IN AN ORGANIZED HEALTH CARE EDUCATION/TRAINING PROGRAM

## 2024-04-25 PROCEDURE — 6360000002 HC RX W HCPCS: Performed by: STUDENT IN AN ORGANIZED HEALTH CARE EDUCATION/TRAINING PROGRAM

## 2024-04-25 PROCEDURE — 80053 COMPREHEN METABOLIC PANEL: CPT

## 2024-04-25 PROCEDURE — 2580000003 HC RX 258

## 2024-04-25 PROCEDURE — 85025 COMPLETE CBC W/AUTO DIFF WBC: CPT

## 2024-04-25 RX ORDER — L. ACIDOPHILUS/L.BULGARICUS 100MM CELL
1 GRANULES IN PACKET (EA) ORAL 2 TIMES DAILY
Qty: 20 EACH | Refills: 0 | Status: SHIPPED | OUTPATIENT
Start: 2024-04-25 | End: 2024-04-25

## 2024-04-25 RX ORDER — LEVOFLOXACIN 750 MG/1
750 TABLET, FILM COATED ORAL DAILY
Qty: 11 TABLET | Refills: 0 | Status: SHIPPED | OUTPATIENT
Start: 2024-04-25 | End: 2024-04-25

## 2024-04-25 RX ORDER — ONDANSETRON 4 MG/1
4 TABLET, ORALLY DISINTEGRATING ORAL EVERY 12 HOURS PRN
Qty: 10 TABLET | Refills: 0 | Status: SHIPPED | OUTPATIENT
Start: 2024-04-25 | End: 2024-04-30

## 2024-04-25 RX ORDER — SULFAMETHOXAZOLE AND TRIMETHOPRIM 800; 160 MG/1; MG/1
1 TABLET ORAL 2 TIMES DAILY
Qty: 22 TABLET | Refills: 0 | Status: SHIPPED | OUTPATIENT
Start: 2024-04-25 | End: 2024-04-25 | Stop reason: HOSPADM

## 2024-04-25 RX ORDER — L. ACIDOPHILUS/L.BULGARICUS 100MM CELL
1 GRANULES IN PACKET (EA) ORAL 2 TIMES DAILY
Qty: 20 EACH | Refills: 0 | Status: SHIPPED | OUTPATIENT
Start: 2024-04-25 | End: 2024-05-05

## 2024-04-25 RX ORDER — ONDANSETRON 4 MG/1
4 TABLET, ORALLY DISINTEGRATING ORAL EVERY 12 HOURS PRN
Qty: 10 TABLET | Refills: 0 | Status: SHIPPED | OUTPATIENT
Start: 2024-04-25 | End: 2024-04-25

## 2024-04-25 RX ORDER — LEVOFLOXACIN 750 MG/1
750 TABLET, FILM COATED ORAL DAILY
Qty: 11 TABLET | Refills: 0 | Status: SHIPPED | OUTPATIENT
Start: 2024-04-25 | End: 2024-05-06

## 2024-04-25 RX ADMIN — ENOXAPARIN SODIUM 40 MG: 100 INJECTION SUBCUTANEOUS at 09:25

## 2024-04-25 RX ADMIN — LEVOTHYROXINE SODIUM 75 MCG: 0.07 TABLET ORAL at 06:14

## 2024-04-25 RX ADMIN — PANTOPRAZOLE SODIUM 40 MG: 40 TABLET, DELAYED RELEASE ORAL at 06:14

## 2024-04-25 RX ADMIN — WATER 2000 MG: 1 INJECTION INTRAMUSCULAR; INTRAVENOUS; SUBCUTANEOUS at 12:10

## 2024-04-25 RX ADMIN — ACETAMINOPHEN 650 MG: 325 TABLET ORAL at 03:09

## 2024-04-25 RX ADMIN — CETIRIZINE HYDROCHLORIDE 5 MG: 10 TABLET, FILM COATED ORAL at 09:26

## 2024-04-25 RX ADMIN — SODIUM CHLORIDE, PRESERVATIVE FREE 10 ML: 5 INJECTION INTRAVENOUS at 09:26

## 2024-04-25 NOTE — PLAN OF CARE
Problem: Occupational Therapy - Adult  Goal: By Discharge: Performs self-care activities at highest level of function for planned discharge setting.  See evaluation for individualized goals.  Description: FUNCTIONAL STATUS PRIOR TO ADMISSION:     ,  ,  ,  ,  ,  ,  ,  ,  ,  ,       HOME SUPPORT: Patient lived with daughter but didn't require assistance. Was independent without AD for mobility and independent for ADLs, daughter provided transportation to appointments.    Occupational Therapy Goals:  Initiated 4/23/2024  1.  Patient will perform upper body dressing and lower body dressing with Modified [place] within 7 day(s).  2.  Patient will perform grooming standing at sink without LOB or fatigue with Supervision within 7 day(s).  3.  Patient will perform toilet transfers with Modified [place]  within 7 day(s).  4.  Patient will perform all aspects of toileting with Modified [place] within 7 day(s).  5.  Patient will participate in upper extremity therapeutic exercise/activities with Modified [place] for 10 minutes within 7 day(s).    6.  Patient will utilize energy conservation techniques during functional activities with verbal and visual cues within 7 day(s).    Outcome: Progressing   OCCUPATIONAL THERAPY TREATMENT  Patient: Tawana Nye (79 y.o. female)  Date: 4/25/2024  Primary Diagnosis: Dehydration [E86.0]  Lactic acidosis [E87.20]  Acute cystitis with hematuria [N30.01]  Sepsis (HCC) [A41.9]       Precautions: Fall Risk                Chart, occupational therapy assessment, plan of care, and goals were reviewed.    ASSESSMENT  Patient continues to benefit from skilled OT services and is progressing towards goals. Patient able to don pullover shirt with set-up, used reacher to don underwear and pants after removing socks with use of sock aide. Feet too edematous to comfortably wear socks so opted for socks. Needed min assist to don. She stood multiple times during tasks with stand by  Care  Education Method: Verbal  Barriers to Learning: None  Education Outcome: Verbalized understanding    Thank you for this referral.  REKHA Mondragon/L  Minutes: 35

## 2024-04-25 NOTE — PLAN OF CARE
Problem: Safety - Adult  Goal: Free from fall injury  4/24/2024 2228 by Bryant De Leon RN  Outcome: Progressing  4/24/2024 1533 by Víctor Will RN  Outcome: Progressing     Problem: Pain  Goal: Verbalizes/displays adequate comfort level or baseline comfort level  4/24/2024 2228 by Bryant De Leon RN  Outcome: Progressing  4/24/2024 1533 by Víctor Will RN  Outcome: Progressing     Problem: Skin/Tissue Integrity  Goal: Absence of new skin breakdown  Description: 1.  Monitor for areas of redness and/or skin breakdown  2.  Assess vascular access sites hourly  3.  Every 4-6 hours minimum:  Change oxygen saturation probe site  4.  Every 4-6 hours:  If on nasal continuous positive airway pressure, respiratory therapy assess nares and determine need for appliance change or resting period.  4/24/2024 2228 by Bryant De Leon RN  Outcome: Progressing  4/24/2024 1533 by Víctor Will RN  Outcome: Progressing

## 2024-04-25 NOTE — CARE COORDINATION
Care Management Discharge Note:      04/25/24 1232   Discharge Planning   Patient expects to be discharged to: House   Services At/After Discharge   Transition of Care Consult (CM Consult) Home Health   Services At/After Discharge OT;PT  (HH therapy with [facility])   Mode of Transport at Discharge Other (see comment)  (family)   Confirm Follow Up Transport Self     Patient with dc orders. Patient set up with [facility] for HH therapy at dc, AVS updated to reflect. Second IM letter given to patient, signed copy on chart. Patient family will transport at time of dc.  ______________________  Belgica HERRON, RN  Care Management  4/25/2024  12:34 PM

## 2024-04-25 NOTE — PROGRESS NOTES
[id number] [id number]   Office [phone]  Fax [phone]          Subjective / Objective     Subjective  Overnight Events: none    Patient seen and examined at bedside. Reports feeling well this AM. Endorses mild  suprapubic pain. Denies CP, SOB, N/V. Tolerating PO  Respiratory:   ORA   Vitals:    04/25/24 0745   BP: (!) 149/70   Pulse: 77   Resp: 14   Temp: 98.1 °F (36.7 °C)   SpO2: 98%     Physical Examination:   General: No acute distress. Alert. Cooperative.   Head: Normocephalic. Atraumatic.   Eyes:              Conjunctiva pink. Sclera white.   Throat: Moist mucous membranes.     Neck: No JVD. No lymphadenopathy.   Respiratory: CTAB. No w/r/r/c. No accessory muscle use.   Cardiovascular: Regular Rhythm. 3/6 systolic murmur   GI: Suprapubic tenderness with mild generalized tenderness. No rebound/guarding. Non-distended. Ostomy site nonerythematous. No CVA tenderness.    Extremities: Trace LE edema. Distal pulses intact.    Musculoskeletal: Full ROM in all extremities. Lumbar spinal tenderness, reports at BL,   Skin: Warm, dry. No rashes.    Neuro: CN II-XII grossly intact. Strength 5/5 in all extremities.       I/O:  04/24 0701 - 04/25 0700  In: 220 [P.O.:220]  Out: 1300 [Urine:1300]  Inpatient Medications  @[email or web address]  Current Facility-Administered Medications   Medication Dose Route Frequency    cetirizine (ZYRTEC) tablet 5 mg  5 mg Oral Daily    cefTRIAXone (ROCEPHIN) 2,000 mg in sterile water 20 mL IV syringe  2,000 mg IntraVENous Q24H    enoxaparin (LOVENOX) injection 40 mg  40 mg SubCUTAneous Daily    lidocaine 4 % external patch 2 patch  2 patch TransDERmal Daily    levothyroxine (SYNTHROID) tablet 75 mcg  75 mcg Oral QAM AC    pantoprazole (PROTONIX) tablet 40 mg  40 mg Oral QAM AC    [Held by provider] furosemide (LASIX) tablet 40 mg  40 mg Oral Daily    [Held by provider] losartan (COZAAR) tablet 50 mg  50 mg Oral Daily    sodium chloride flush 0.9 % injection 5-40 mL   revealed Vitamin B12 def and anemia of chronic disease. Per 7/2023 note, No need for repeat bone marrow biopsy unless ANC < 1.0, Hgb < 9, PLT < 80 (persistently) without infection.   -CTM OP      CHF: unclear chronicity/type. Last ECHO in 2020 with EF of 55-60%. Chronic murmur on exam. Euvolemic on exam. CXR NAP.  - CTM fluid status.   - Grade 3/6 systolic murmur    Congestion: upper respiratory congestion  - zyrtec 5mg qD  - continue to monitor    ?Hx of RA  Per chart review. Not on current medications.     FEN/GI - Regular diet.    Activity - Out of bed with assistance  DVT prophylaxis - Lovenox  GI prophylaxis - Protonix  Fall prophylaxis - Not indicated at this time.  Disposition - Plan to d/c to home. Consulting PT/OT  Code Status - Full, discussed with patient.  Next of Kin Name and Contact Roseann Nye (Child)  [phone] (Mobile)        Patient discussed with Leslie Torres MD Abigail R Hewitt, MD Family medicine Resident       For Billing    Chief Complaint   Patient presents with    Fever    Headache    Nausea    Fatigue

## 2024-04-25 NOTE — TELEPHONE ENCOUNTER
----- Message from Pretty Millan sent at 4/25/2024 11:29 AM EDT -----  Subject: Message to Provider    QUESTIONS  Information for Provider? Maci from [facility] is calling to let you know this   patient is in the Hospital  ---------------------------------------------------------------------------  --------------  CALL BACK INFO  [phone]; OK to leave message on voicemail  ---------------------------------------------------------------------------  --------------  SCRIPT ANSWERS  Relationship to Patient? Covered Entity  Covered Entity Type? Health Insurance?  Representative Name? Maci

## 2024-04-25 NOTE — DISCHARGE INSTRUCTIONS
HOME DISCHARGE INSTRUCTIONS    Tawana Nye / [id number] : 1945    Admission date: 2024 Discharge date: 2024     Please bring this form with you to show your care provider at your follow-up appointment.    Primary care provider:  Leann Sweeney MD    Discharging provider:  Xin Lam MD  - Family Medicine Resident  Leslie Torres MD - Family Medicine Attending      You have been admitted to the hospital with the following diagnoses:    ACUTE DIAGNOSES:  Dehydration [E86.0]  Lactic acidosis [E87.20]  Acute cystitis with hematuria [N30.01]  Sepsis (HCC) [A41.9]    You were admitted for a urinary tract infection that likely spread to your bloodstream. You were treated with antibiotics and were stable to discharge home on oral antibiotics. If the antibiotics make you nauseous, you can take zofran as needed.   . . . . . . . . . . . . . . . . . . . . . . . . . . . . . . . . . . . . . . . . . . . . . . . . . . . . . . . . . . . . . . . . . . . . . . . .   FOLLOW-UP CARE RECOMMENDATIONS:    Appointments  Future Appointments   Date Time Provider Department Center   2024 10:00 AM Leann Sweeney MD [facility] BS AMB   8/15/2024  9:30 AM Misti Hartley MD [facility] AMB       Follow-up tests needed: none    Pending test results:   At the time of your discharge the following test results are still pending: none.   Please make sure you review these results with your outpatient follow-up provider(s).    DIET/what to eat:  cardiac diet    ACTIVITY:  activity as tolerated    Wound care: regular ostomy changes    Equipment needed:  none    Specific symptoms to watch for: chest pain, shortness of breath, fever, chills, nausea, vomiting, diarrhea, change in mentation, falling, weakness, bleeding.     What to do if new or unexpected symptoms occur?    If you experience any of the above symptoms (or should other concerns or questions arise after discharge) please call your primary care physician. Return

## 2024-05-01 ENCOUNTER — NURSE ONLY (OUTPATIENT)
Age: 79
End: 2024-05-01

## 2024-05-01 ENCOUNTER — OFFICE VISIT (OUTPATIENT)
Age: 79
End: 2024-05-01

## 2024-05-01 VITALS
DIASTOLIC BLOOD PRESSURE: 56 MMHG | SYSTOLIC BLOOD PRESSURE: 110 MMHG | TEMPERATURE: 98.7 F | BODY MASS INDEX: 31.49 KG/M2 | WEIGHT: 189 LBS | HEART RATE: 91 BPM | OXYGEN SATURATION: 97 % | HEIGHT: 65 IN | RESPIRATION RATE: 20 BRPM

## 2024-05-01 DIAGNOSIS — Z09 HOSPITAL DISCHARGE FOLLOW-UP: Primary | ICD-10-CM

## 2024-05-01 DIAGNOSIS — M06.9 RHEUMATOID ARTHRITIS INVOLVING MULTIPLE SITES, UNSPECIFIED WHETHER RHEUMATOID FACTOR PRESENT (HCC): ICD-10-CM

## 2024-05-01 DIAGNOSIS — D61.818 OTHER PANCYTOPENIA (HCC): ICD-10-CM

## 2024-05-01 DIAGNOSIS — A41.9 SEPSIS WITHOUT ACUTE ORGAN DYSFUNCTION, DUE TO UNSPECIFIED ORGANISM (HCC): ICD-10-CM

## 2024-05-01 DIAGNOSIS — R01.1 HEART MURMUR: ICD-10-CM

## 2024-05-01 DIAGNOSIS — N18.30 STAGE 3 CHRONIC KIDNEY DISEASE, UNSPECIFIED WHETHER STAGE 3A OR 3B CKD (HCC): ICD-10-CM

## 2024-05-01 NOTE — PROGRESS NOTES
Post-Discharge Transitional Care  Follow Up      Tawana Nye   YOB: 1945    Date of Office Visit:  5/1/2024  Date of Hospital Admission: 4/22/24  Date of Hospital Discharge: 4/25/24  Risk of hospital readmission (high >=14%. Medium >=10%) :Readmission Risk Score: 14      Care management risk score Rising risk (score 2-5) and Complex Care (Scores >=6): No Risk Score On File     Non face to face  following discharge, date last encounter closed (first attempt may have been earlier): 04/26/2024    Call initiated 2 business days of discharge: Yes    ASSESSMENT/PLAN:   Hospital discharge follow-up  -     DE DISCHARGE MEDS RECONCILED W/ CURRENT OUTPATIENT MED LIST  Sepsis without acute organ dysfunction, due to unspecified organism (HCC)  Other pancytopenia (HCC)  -     CBC with Auto Differential; Future  Heart murmur  -     Echo (TTE) complete (PRN contrast/bubble/strain/3D); Future    Finish course of Levaquin.  Recommend take it in the morning instead of evening.  She can try melatonin to help with sleep.  Recheck CBC.  Order echocardiogram to investigate loud heart murmur.      Medical Decision Making: moderate complexity  No follow-ups on file.           Subjective:   HPI:  Follow up of Hospital problems/diagnosis(es): sepsis (blood cx+ Klebsiella oxytoca, Enterobacteriaceae), pancytopenia, RA, CKD.    Inpatient course: Discharge summary reviewed- see chart.    Interval history/Current status: she still feels weak. Poor sleep. Taking Levaquin for 11 days. No fever.       Patient Active Problem List   Diagnosis    Acquired hypothyroidism    HTN (hypertension)    History of bladder cancer    GERD (gastroesophageal reflux disease)    Anemia    Hypokalemia    Pancytopenia (HCC)    Rheumatoid arthritis involving multiple sites (HCC)    Congestive heart failure, unspecified HF chronicity, unspecified heart failure type (HCC)    Chronic renal disease, stage III (HCC)    Other cirrhosis of liver (HCC)

## 2024-05-02 ENCOUNTER — HOSPITAL ENCOUNTER (OUTPATIENT)
Facility: HOSPITAL | Age: 79
Discharge: HOME | End: 2024-05-04
Attending: FAMILY MEDICINE
Payer: MEDICARE

## 2024-05-02 ENCOUNTER — TELEPHONE (OUTPATIENT)
Age: 79
End: 2024-05-02

## 2024-05-02 DIAGNOSIS — R01.1 HEART MURMUR: ICD-10-CM

## 2024-05-02 DIAGNOSIS — I35.0 AORTIC VALVE STENOSIS, ETIOLOGY OF CARDIAC VALVE DISEASE UNSPECIFIED: ICD-10-CM

## 2024-05-02 DIAGNOSIS — R01.1 HEART MURMUR: Primary | ICD-10-CM

## 2024-05-02 LAB
BASOPHILS # BLD: 0 K/UL (ref 0–0.1)
BASOPHILS NFR BLD: 1 % (ref 0–1)
DIFFERENTIAL METHOD BLD: ABNORMAL
ECHO AV AREA PEAK VELOCITY: 1.8 CM2
ECHO AV AREA PEAK VELOCITY: 1.8 CM2
ECHO AV AREA VTI: 1.8 CM2
ECHO AV MEAN GRADIENT: 10 MMHG
ECHO AV MEAN VELOCITY: 1.5 M/S
ECHO AV PEAK GRADIENT: 18 MMHG
ECHO AV PEAK VELOCITY: 2.2 M/S
ECHO AV VTI: 44.2 CM
ECHO LV FRACTIONAL SHORTENING: 22 % (ref 28–44)
ECHO LV INTERNAL DIMENSION DIASTOLIC: 2.7 CM (ref 3.9–5.3)
ECHO LV INTERNAL DIMENSION SYSTOLIC: 2.1 CM
ECHO LV IVSD: 1 CM (ref 0.6–0.9)
ECHO LV MASS 2D: 76.2 G (ref 67–162)
ECHO LV POSTERIOR WALL DIASTOLIC: 1.1 CM (ref 0.6–0.9)
ECHO LV RELATIVE WALL THICKNESS RATIO: 0.81
ECHO LVOT AREA: 3.5 CM2
ECHO LVOT AV VTI INDEX: 0.55
ECHO LVOT DIAM: 2.1 CM
ECHO LVOT MEAN GRADIENT: 3 MMHG
ECHO LVOT PEAK GRADIENT: 5 MMHG
ECHO LVOT PEAK GRADIENT: 5 MMHG
ECHO LVOT PEAK VELOCITY: 1.2 M/S
ECHO LVOT PEAK VELOCITY: 1.2 M/S
ECHO LVOT SV: 84.1 ML
ECHO LVOT VTI: 24.3 CM
ECHO MV A VELOCITY: 1.55 M/S
ECHO MV E VELOCITY: 0.9 M/S
ECHO MV E/A RATIO: 0.58
ECHO RV TAPSE: 2.3 CM (ref 1.7–?)
ECHO TV REGURGITANT MAX VELOCITY: 2.53 M/S
ECHO TV REGURGITANT PEAK GRADIENT: 26 MMHG
EOSINOPHIL # BLD: 0 K/UL (ref 0–0.4)
EOSINOPHIL NFR BLD: 1 % (ref 0–7)
ERYTHROCYTE [DISTWIDTH] IN BLOOD BY AUTOMATED COUNT: 14.3 % (ref 11.5–14.5)
HCT VFR BLD AUTO: 27.4 % (ref 35–47)
HGB BLD-MCNC: 9.2 G/DL (ref 11.5–16)
IMM GRANULOCYTES # BLD AUTO: 0 K/UL (ref 0–0.04)
IMM GRANULOCYTES NFR BLD AUTO: 0 % (ref 0–0.5)
LYMPHOCYTES # BLD: 0.7 K/UL (ref 0.8–3.5)
LYMPHOCYTES NFR BLD: 22 % (ref 12–49)
MCH RBC QN AUTO: 30.3 PG (ref 26–34)
MCHC RBC AUTO-ENTMCNC: 33.6 G/DL (ref 30–36.5)
MCV RBC AUTO: 90.1 FL (ref 80–99)
MONOCYTES # BLD: 0.3 K/UL (ref 0–1)
MONOCYTES NFR BLD: 11 % (ref 5–13)
NEUTS SEG # BLD: 2.1 K/UL (ref 1.8–8)
NEUTS SEG NFR BLD: 65 % (ref 32–75)
NRBC # BLD: 0 K/UL (ref 0–0.01)
NRBC BLD-RTO: 0 PER 100 WBC
PLATELET # BLD AUTO: 109 K/UL (ref 150–400)
PMV BLD AUTO: 12.1 FL (ref 8.9–12.9)
RBC # BLD AUTO: 3.04 M/UL (ref 3.8–5.2)
RBC MORPH BLD: ABNORMAL
WBC # BLD AUTO: 3.1 K/UL (ref 3.6–11)

## 2024-05-02 PROCEDURE — 93306 TTE W/DOPPLER COMPLETE: CPT

## 2024-05-02 NOTE — TELEPHONE ENCOUNTER
Referral to specialist ordered.  Please give office number to pt to call for appt.  Fax any related office note and labs.  Thank you.

## 2024-05-03 ENCOUNTER — TELEPHONE (OUTPATIENT)
Age: 79
End: 2024-05-03

## 2024-05-03 NOTE — TELEPHONE ENCOUNTER
----- Message from Leann Sweeney MD sent at 5/2/2024  4:54 PM EDT -----  Advise pt the echo shows good pumping action of heart. She has some mild valve narrowing and thickening. I want to refer her to a cardiologist for follow up.

## 2024-05-23 PROBLEM — E86.0 DEHYDRATION: Status: RESOLVED | Noted: 2024-04-23 | Resolved: 2024-05-23

## 2024-05-30 ENCOUNTER — OFFICE VISIT (OUTPATIENT)
Age: 79
End: 2024-05-30
Payer: MEDICARE

## 2024-05-30 VITALS
HEIGHT: 65 IN | OXYGEN SATURATION: 99 % | BODY MASS INDEX: 29.82 KG/M2 | SYSTOLIC BLOOD PRESSURE: 136 MMHG | HEART RATE: 96 BPM | WEIGHT: 179 LBS | RESPIRATION RATE: 16 BRPM | DIASTOLIC BLOOD PRESSURE: 80 MMHG

## 2024-05-30 DIAGNOSIS — I10 PRIMARY HYPERTENSION: ICD-10-CM

## 2024-05-30 DIAGNOSIS — I35.0 NONRHEUMATIC AORTIC VALVE STENOSIS: Primary | ICD-10-CM

## 2024-05-30 PROCEDURE — G8399 PT W/DXA RESULTS DOCUMENT: HCPCS | Performed by: INTERNAL MEDICINE

## 2024-05-30 PROCEDURE — 1090F PRES/ABSN URINE INCON ASSESS: CPT | Performed by: INTERNAL MEDICINE

## 2024-05-30 PROCEDURE — 3079F DIAST BP 80-89 MM HG: CPT | Performed by: INTERNAL MEDICINE

## 2024-05-30 PROCEDURE — 1036F TOBACCO NON-USER: CPT | Performed by: INTERNAL MEDICINE

## 2024-05-30 PROCEDURE — G8427 DOCREV CUR MEDS BY ELIG CLIN: HCPCS | Performed by: INTERNAL MEDICINE

## 2024-05-30 PROCEDURE — 99204 OFFICE O/P NEW MOD 45 MIN: CPT | Performed by: INTERNAL MEDICINE

## 2024-05-30 PROCEDURE — G8417 CALC BMI ABV UP PARAM F/U: HCPCS | Performed by: INTERNAL MEDICINE

## 2024-05-30 PROCEDURE — 3075F SYST BP GE 130 - 139MM HG: CPT | Performed by: INTERNAL MEDICINE

## 2024-05-30 PROCEDURE — 1123F ACP DISCUSS/DSCN MKR DOCD: CPT | Performed by: INTERNAL MEDICINE

## 2024-05-30 NOTE — PROGRESS NOTES
had concerns including Follow-Up from Hospital ([facility] 4/22-4/25/24).    Vitals:    05/30/24 1125   BP: 136/80   Site: Left Upper Arm   Position: Sitting   Pulse: 96   Resp: 16   SpO2: 99%   Weight: 81.2 kg (179 lb)   Height: 1.651 m (5' 5\")        Chest pain No    Refills No        1. Have you been to the ER, urgent care clinic since your last visit? yes      Hospitalized since your last visit? yes       Where?  [facility]         Date? 4/2024

## 2024-05-30 NOTE — PROGRESS NOTES
Reason to see patient: Mild AS.     Referring: Leann Sweeney MD    HPI: Tawana Nye is a 79 y.o. female with past medical history significant for hypertension, hypothyroidism, history of bladder cancer is here for evaluation.  In April 2024 she was admitted to the hospital with UTI and sepsis and an echo done at that time demonstrated mild aortic stenosis with a mean gradient of 10 mmHg.  She has been referred for further evaluation.  From symptom standpoint she does not have any symptoms of chest pain, shortness of breath, palpitations, lightheadedness, dizziness, presyncope or syncope.    Plan:    1.  Mild aortic valve stenosis: This is likely degenerative valve disease.  Mean gradient is 10 mmHg.  Will repeat an echocardiogram again in 1 year for surveillance.  Continue to control blood pressure.    2.  Hypertension: Blood pressure is controlled.  Continue losartan and furosemide.    3.  History of bladder cancer with bladder stoma: She is taking furosemide for decreased urine output in past.  Continue furosemide.    4.  Hypothyroidism: Continue Synthroid.    5. See Dr. Teague in 1 yr with same day Echo for AS.         ATTENTION:   This medical record was transcribed using an electronic medical records/speech recognition system.  Although proofread, it may and can contain electronic, spelling and other errors.  Corrections may be executed at a later time.  Please feel free to contact us for any clarifications as needed.      Past Medical History:   Diagnosis Date    Arthritis     Cancer (HCC)     bladder    COVID-19 12/2020    Endocrine disease     hyperthyroid    Gastrointestinal disorder     GERD (gastroesophageal reflux disease)     Hypertension     Other ill-defined conditions(799.89)     hyperthyroid    Pneumonia 12/2020 12/2020            Past Surgical History:   Procedure Laterality Date    CT BONE MARROW BIOPSY  10/8/2018    CT BONE MARROW BIOPSY 10/8/2018 [facility] RAD CT    GYN      HERNIA REPAIR

## 2024-05-30 NOTE — PROGRESS NOTES
Received VO from Dr. Ant Teague:    See Dr. Teague in 1 yr with same day Echo for AS.

## 2024-07-07 DIAGNOSIS — E53.8 DEFICIENCY OF OTHER SPECIFIED B GROUP VITAMINS: ICD-10-CM

## 2024-07-09 RX ORDER — CYANOCOBALAMIN 1000 UG/ML
INJECTION, SOLUTION INTRAMUSCULAR; SUBCUTANEOUS
Qty: 3 ML | Refills: 0 | Status: SHIPPED | OUTPATIENT
Start: 2024-07-09

## 2024-07-13 DIAGNOSIS — E53.8 DEFICIENCY OF OTHER SPECIFIED B GROUP VITAMINS: ICD-10-CM

## 2024-07-15 RX ORDER — SYRINGE WITH NEEDLE, 1 ML 25GX5/8"
SYRINGE, EMPTY DISPOSABLE MISCELLANEOUS
Qty: 3 EACH | Refills: 0 | Status: SHIPPED | OUTPATIENT
Start: 2024-07-15

## 2024-07-19 ENCOUNTER — OFFICE VISIT (OUTPATIENT)
Age: 79
End: 2024-07-19
Payer: MEDICARE

## 2024-07-19 ENCOUNTER — LAB (OUTPATIENT)
Age: 79
End: 2024-07-19
Payer: MEDICARE

## 2024-07-19 VITALS
WEIGHT: 191 LBS | RESPIRATION RATE: 20 BRPM | BODY MASS INDEX: 31.82 KG/M2 | DIASTOLIC BLOOD PRESSURE: 68 MMHG | HEART RATE: 87 BPM | SYSTOLIC BLOOD PRESSURE: 128 MMHG | HEIGHT: 65 IN | OXYGEN SATURATION: 97 % | TEMPERATURE: 98.6 F

## 2024-07-19 DIAGNOSIS — Z79.899 ENCOUNTER FOR LONG-TERM (CURRENT) USE OF HIGH-RISK MEDICATION: ICD-10-CM

## 2024-07-19 DIAGNOSIS — M06.9 RHEUMATOID ARTHRITIS INVOLVING MULTIPLE SITES, UNSPECIFIED WHETHER RHEUMATOID FACTOR PRESENT (HCC): ICD-10-CM

## 2024-07-19 DIAGNOSIS — D75.839 THROMBOCYTOSIS, UNSPECIFIED: ICD-10-CM

## 2024-07-19 DIAGNOSIS — E03.9 ACQUIRED HYPOTHYROIDISM: ICD-10-CM

## 2024-07-19 DIAGNOSIS — E53.8 DEFICIENCY OF OTHER SPECIFIED B GROUP VITAMINS: ICD-10-CM

## 2024-07-19 DIAGNOSIS — I50.32 CHRONIC HEART FAILURE WITH PRESERVED EJECTION FRACTION (HCC): ICD-10-CM

## 2024-07-19 DIAGNOSIS — N18.30 STAGE 3 CHRONIC KIDNEY DISEASE, UNSPECIFIED WHETHER STAGE 3A OR 3B CKD (HCC): ICD-10-CM

## 2024-07-19 DIAGNOSIS — I10 PRIMARY HYPERTENSION: ICD-10-CM

## 2024-07-19 DIAGNOSIS — R73.9 HYPERGLYCEMIA, UNSPECIFIED: ICD-10-CM

## 2024-07-19 DIAGNOSIS — I10 ESSENTIAL (PRIMARY) HYPERTENSION: ICD-10-CM

## 2024-07-19 DIAGNOSIS — Z00.00 MEDICARE ANNUAL WELLNESS VISIT, SUBSEQUENT: Primary | ICD-10-CM

## 2024-07-19 DIAGNOSIS — D46.Z OTHER MYELODYSPLASTIC SYNDROMES (HCC): ICD-10-CM

## 2024-07-19 DIAGNOSIS — Z98.890 HISTORY OF UROSTOMY: ICD-10-CM

## 2024-07-19 DIAGNOSIS — E55.9 VITAMIN D DEFICIENCY, UNSPECIFIED: ICD-10-CM

## 2024-07-19 DIAGNOSIS — D69.6 THROMBOCYTOPENIA, UNSPECIFIED (HCC): ICD-10-CM

## 2024-07-19 DIAGNOSIS — R79.0 LOW MAGNESIUM LEVEL: ICD-10-CM

## 2024-07-19 DIAGNOSIS — D61.818 PANCYTOPENIA (HCC): ICD-10-CM

## 2024-07-19 DIAGNOSIS — K21.9 GASTROESOPHAGEAL REFLUX DISEASE, UNSPECIFIED WHETHER ESOPHAGITIS PRESENT: ICD-10-CM

## 2024-07-19 DIAGNOSIS — Z85.51 HISTORY OF BLADDER CANCER: ICD-10-CM

## 2024-07-19 DIAGNOSIS — H91.90 HEARING LOSS, UNSPECIFIED HEARING LOSS TYPE, UNSPECIFIED LATERALITY: ICD-10-CM

## 2024-07-19 DIAGNOSIS — I35.0 AORTIC VALVE STENOSIS, ETIOLOGY OF CARDIAC VALVE DISEASE UNSPECIFIED: ICD-10-CM

## 2024-07-19 DIAGNOSIS — E03.9 HYPOTHYROIDISM, UNSPECIFIED TYPE: ICD-10-CM

## 2024-07-19 DIAGNOSIS — I50.9 HEART FAILURE, UNSPECIFIED HF CHRONICITY, UNSPECIFIED HEART FAILURE TYPE (HCC): ICD-10-CM

## 2024-07-19 PROCEDURE — 99214 OFFICE O/P EST MOD 30 MIN: CPT | Performed by: FAMILY MEDICINE

## 2024-07-19 RX ORDER — MAGNESIUM OXIDE 400 MG/1
400 TABLET ORAL DAILY
Qty: 90 TABLET | Refills: 3 | Status: SHIPPED | OUTPATIENT
Start: 2024-07-19

## 2024-07-19 RX ORDER — ERGOCALCIFEROL 1.25 MG/1
CAPSULE ORAL
Qty: 12 CAPSULE | Refills: 3 | Status: SHIPPED | OUTPATIENT
Start: 2024-07-19

## 2024-07-19 RX ORDER — LEVOTHYROXINE SODIUM 0.07 MG/1
75 TABLET ORAL
Qty: 90 TABLET | Refills: 3 | Status: SHIPPED | OUTPATIENT
Start: 2024-07-19

## 2024-07-19 RX ORDER — LOSARTAN POTASSIUM 50 MG/1
50 TABLET ORAL DAILY
Qty: 90 TABLET | Refills: 3 | Status: SHIPPED | OUTPATIENT
Start: 2024-07-19

## 2024-07-19 RX ORDER — OMEPRAZOLE 20 MG/1
20 CAPSULE, DELAYED RELEASE ORAL DAILY
Qty: 90 CAPSULE | Refills: 3 | Status: SHIPPED | OUTPATIENT
Start: 2024-07-19

## 2024-07-19 RX ORDER — FUROSEMIDE 40 MG/1
40 TABLET ORAL DAILY
Qty: 90 TABLET | Refills: 3 | Status: SHIPPED | OUTPATIENT
Start: 2024-07-19

## 2024-07-19 RX ORDER — SYRINGE WITH NEEDLE, 1 ML 25GX5/8"
SYRINGE, EMPTY DISPOSABLE MISCELLANEOUS
Qty: 3 EACH | Refills: 0 | OUTPATIENT
Start: 2024-07-19

## 2024-07-19 ASSESSMENT — PATIENT HEALTH QUESTIONNAIRE - PHQ9
7. TROUBLE CONCENTRATING ON THINGS, SUCH AS READING THE NEWSPAPER OR WATCHING TELEVISION: NOT AT ALL
2. FEELING DOWN, DEPRESSED OR HOPELESS: NOT AT ALL
3. TROUBLE FALLING OR STAYING ASLEEP: NOT AT ALL
6. FEELING BAD ABOUT YOURSELF - OR THAT YOU ARE A FAILURE OR HAVE LET YOURSELF OR YOUR FAMILY DOWN: NOT AT ALL
4. FEELING TIRED OR HAVING LITTLE ENERGY: NOT AT ALL
SUM OF ALL RESPONSES TO PHQ QUESTIONS 1-9: 0
1. LITTLE INTEREST OR PLEASURE IN DOING THINGS: NOT AT ALL
SUM OF ALL RESPONSES TO PHQ9 QUESTIONS 1 & 2: 0
8. MOVING OR SPEAKING SO SLOWLY THAT OTHER PEOPLE COULD HAVE NOTICED. OR THE OPPOSITE, BEING SO FIGETY OR RESTLESS THAT YOU HAVE BEEN MOVING AROUND A LOT MORE THAN USUAL: NOT AT ALL
10. IF YOU CHECKED OFF ANY PROBLEMS, HOW DIFFICULT HAVE THESE PROBLEMS MADE IT FOR YOU TO DO YOUR WORK, TAKE CARE OF THINGS AT HOME, OR GET ALONG WITH OTHER PEOPLE: NOT DIFFICULT AT ALL
9. THOUGHTS THAT YOU WOULD BE BETTER OFF DEAD, OR OF HURTING YOURSELF: NOT AT ALL
SUM OF ALL RESPONSES TO PHQ QUESTIONS 1-9: 0
SUM OF ALL RESPONSES TO PHQ QUESTIONS 1-9: 0
5. POOR APPETITE OR OVEREATING: NOT AT ALL
SUM OF ALL RESPONSES TO PHQ QUESTIONS 1-9: 0

## 2024-07-19 ASSESSMENT — LIFESTYLE VARIABLES
HOW OFTEN DO YOU HAVE A DRINK CONTAINING ALCOHOL: NEVER
HOW MANY STANDARD DRINKS CONTAINING ALCOHOL DO YOU HAVE ON A TYPICAL DAY: PATIENT DOES NOT DRINK

## 2024-07-19 ASSESSMENT — ENCOUNTER SYMPTOMS: RESPIRATORY NEGATIVE: 1

## 2024-07-19 NOTE — PATIENT INSTRUCTIONS
make healthy food choices. Try to eat a variety of fruits, vegetables, whole grains, lean proteins, and low-fat dairy products. This can help you get the right balance of nutrients, including vitamins and minerals. Small changes add up over time. You can start by adding one healthy food to your meals each day.    Try to make half your plate fruits and vegetables, one-fourth whole grains, and one-fourth lean proteins. Try including dairy with your meals.   Eat more fruits and vegetables. Try to have them with most meals and snacks.   Foods for healthy eating        Fruits   These can be fresh, frozen, canned, or dried.  Try to choose whole fruit rather than fruit juice.  Eat a variety of colors.        Vegetables   These can be fresh, frozen, canned, or dried.  Beans, peas, and lentils count too.        Whole grains   Choose whole-grain breads, cereals, and noodles.  Try brown rice.        Lean proteins   These can include lean meat, poultry, fish, and eggs.  You can also have tofu, beans, peas, lentils, nuts, and seeds.        Dairy   Try milk, yogurt, and cheese.  Choose low-fat or fat-free when you can.  If you need to, use lactose-free milk or fortified plant-based milk products, such as soy milk.        Water   Drink water when you're thirsty.  Limit sugar-sweetened drinks, including soda, fruit drinks, and sports drinks.  Where can you learn more?  Go to https://www.[employer].net/patientEd and enter T756 to learn more about \"Eating Healthy Foods: Care Instructions.\"  Current as of: September 20, 2023  Content Version: 14.1  © 9928-5063 [employer].   Care instructions adapted under license by [employer]. If you have questions about a medical condition or this instruction, always ask your healthcare professional. [employer] disclaims any warranty or liability for your use of this information.           Starting a Weight Loss Plan: Care Instructions  Overview     It can be a

## 2024-07-19 NOTE — PROGRESS NOTES
Identified pt with two pt identifiers(name and ).    Chief Complaint   Patient presents with    Medicare AWV    Lab Collection     Fasting        Health Maintenance Due   Topic    COVID-19 Vaccine (1)    Hepatitis A vaccine (1 of 2 - Risk 2-dose series)    DTaP/Tdap/Td vaccine (1 - Tdap)    Hepatitis B vaccine (1 of 3 - Risk 3-dose series)    Respiratory Syncytial Virus (RSV) Pregnant or age 60 yrs+ (1 - 1-dose 60+ series)    Annual Wellness Visit (Medicare Advantage)        Wt Readings from Last 3 Encounters:   24 86.6 kg (191 lb)   24 81.2 kg (179 lb)   24 85.7 kg (189 lb)     Temp Readings from Last 3 Encounters:   24 98.6 °F (37 °C) (Temporal)   24 98.7 °F (37.1 °C) (Temporal)   24 98.2 °F (36.8 °C) (Oral)     BP Readings from Last 3 Encounters:   24 128/68   24 136/80   24 (!) 110/56     Pulse Readings from Last 3 Encounters:   24 87   24 96   24 91           Depression Screening:  :         2024    10:10 AM 2024     9:39 AM 2024    10:54 AM 2023     1:27 PM 2023    11:00 AM 2022     2:00 PM 2022    11:26 AM   PHQ-9 Questionaire   Little interest or pleasure in doing things 0 0 0 0 0 0 0   Feeling down, depressed, or hopeless 0 0 0 0 0 0 1   Trouble falling or staying asleep, or sleeping too much 0         Feeling tired or having little energy 0         Poor appetite or overeating 0         Feeling bad about yourself - or that you are a failure or have let yourself or your family down 0         Trouble concentrating on things, such as reading the newspaper or watching television 0         Moving or speaking so slowly that other people could have noticed. Or the opposite - being so fidgety or restless that you have been moving around a lot more than usual 0         Thoughts that you would be better off dead, or of hurting yourself in some way 0         PHQ-9 Total Score 0 0 0 0 0 0 1   If you checked off 
swelling or tenderness of extremities  Neurologic: speech normal and gait abnormal- use cane to ambulate            Allergies   Allergen Reactions    Trimethoprim Other (See Comments)    Amitriptyline Dizziness or Vertigo    Cephalexin Nausea And Vomiting    Ciprofloxacin Other (See Comments) and Nausea And Vomiting     Joint aches    Diclofenac Nausea Only and Nausea And Vomiting    Sulfa Antibiotics Nausea Only and Nausea And Vomiting    Sulfamethoxazole-Trimethoprim Nausea And Vomiting     Prior to Visit Medications    Medication Sig Taking? Authorizing Provider   ergocalciferol (ERGOCALCIFEROL) 1.25 MG (45349 UT) capsule TAKE 1 CAPSULE BY MOUTH EVERY SEVEN 7 DAYS. INDICATIONS: LOW VITAMIN D LEVELS Yes Leann Sweeney MD   furosemide (LASIX) 40 MG tablet Take 1 tablet by mouth daily Yes Leann Sweeney MD   levothyroxine (SYNTHROID) 75 MCG tablet Take 1 tablet by mouth every morning (before breakfast) For low thyroid Yes Leann Sweeney MD   losartan (COZAAR) 50 MG tablet Take 1 tablet by mouth daily Yes Leann Sweeney MD   magnesium oxide (MAG-OX) 400 MG tablet Take 1 tablet by mouth daily Yes Leann Sweeney MD   omeprazole (PRILOSEC) 20 MG delayed release capsule Take 1 capsule by mouth daily Yes Leann Sweeney MD   SYRINGE-NEEDLE, DISP, 3 ML (B-D 3CC LUER-CEASAR SYR 25GX5/8\") 25G X 5/8\" 3 ML MISC USE 1 SYRINGE MONTHLY FOR VITAMIN B INJECTIONS Yes Rolanda Francis APRN - NP   cyanocobalamin 1000 MCG/ML injection INJECT 1 ML INTRAMUSCULARLY EVERY 30 DAYS Yes Rolanda Francis APRN - NP   Etanercept (ENBREL MINI) 50 MG/ML SOCT Every Thursday. Yes Automatic Reconciliation, Ar       CareTeam (Including outside providers/suppliers regularly involved in providing care):   Patient Care Team:  Leann Sweeney MD as PCP - General  Leann Sweeney MD as PCP - Empaneled Provider  Rimma Renteria MD as Physician  Misti Hartley MD as Physician      Reviewed and updated this visit:  Tobacco  Allergies

## 2024-07-20 LAB
ALBUMIN SERPL-MCNC: 3.9 G/DL (ref 3.5–5)
ALBUMIN/GLOB SERPL: 1 (ref 1.1–2.2)
ALP SERPL-CCNC: 70 U/L (ref 45–117)
ALT SERPL-CCNC: 23 U/L (ref 12–78)
ANION GAP SERPL CALC-SCNC: 6 MMOL/L (ref 5–15)
AST SERPL-CCNC: 35 U/L (ref 15–37)
BASOPHILS # BLD: 0 K/UL (ref 0–0.1)
BASOPHILS NFR BLD: 1 % (ref 0–1)
BILIRUB SERPL-MCNC: 0.8 MG/DL (ref 0.2–1)
BUN SERPL-MCNC: 29 MG/DL (ref 6–20)
BUN/CREAT SERPL: 18 (ref 12–20)
CALCIUM SERPL-MCNC: 9.3 MG/DL (ref 8.5–10.1)
CHLORIDE SERPL-SCNC: 109 MMOL/L (ref 97–108)
CHOLEST SERPL-MCNC: 166 MG/DL
CO2 SERPL-SCNC: 24 MMOL/L (ref 21–32)
COMMENT:: NORMAL
CREAT SERPL-MCNC: 1.58 MG/DL (ref 0.55–1.02)
DIFFERENTIAL METHOD BLD: ABNORMAL
EOSINOPHIL # BLD: 0 K/UL (ref 0–0.4)
EOSINOPHIL NFR BLD: 1 % (ref 0–7)
ERYTHROCYTE [DISTWIDTH] IN BLOOD BY AUTOMATED COUNT: 14.7 % (ref 11.5–14.5)
EST. AVERAGE GLUCOSE BLD GHB EST-MCNC: 103 MG/DL
FERRITIN SERPL-MCNC: 60 NG/ML (ref 26–388)
GLOBULIN SER CALC-MCNC: 3.9 G/DL (ref 2–4)
GLUCOSE SERPL-MCNC: 114 MG/DL (ref 65–100)
HBA1C MFR BLD: 5.2 % (ref 4–5.6)
HCT VFR BLD AUTO: 28.9 % (ref 35–47)
HDLC SERPL-MCNC: 50 MG/DL
HDLC SERPL: 3.3 (ref 0–5)
HGB BLD-MCNC: 9.8 G/DL (ref 11.5–16)
IMM GRANULOCYTES # BLD AUTO: 0 K/UL (ref 0–0.04)
IMM GRANULOCYTES NFR BLD AUTO: 1 % (ref 0–0.5)
IRON SATN MFR SERPL: 34 % (ref 20–50)
IRON SERPL-MCNC: 109 UG/DL (ref 35–150)
LDLC SERPL CALC-MCNC: 74.6 MG/DL (ref 0–100)
LYMPHOCYTES # BLD: 0.6 K/UL (ref 0.8–3.5)
LYMPHOCYTES NFR BLD: 28 % (ref 12–49)
MAGNESIUM SERPL-MCNC: 1.7 MG/DL (ref 1.6–2.4)
MCH RBC QN AUTO: 31.4 PG (ref 26–34)
MCHC RBC AUTO-ENTMCNC: 33.9 G/DL (ref 30–36.5)
MCV RBC AUTO: 92.6 FL (ref 80–99)
MONOCYTES # BLD: 0.1 K/UL (ref 0–1)
MONOCYTES NFR BLD: 7 % (ref 5–13)
NEUTS SEG # BLD: 1.4 K/UL (ref 1.8–8)
NEUTS SEG NFR BLD: 62 % (ref 32–75)
NRBC # BLD: 0 K/UL (ref 0–0.01)
NRBC BLD-RTO: 0 PER 100 WBC
PLATELET # BLD AUTO: 82 K/UL (ref 150–400)
PMV BLD AUTO: 12.3 FL (ref 8.9–12.9)
POTASSIUM SERPL-SCNC: 4.1 MMOL/L (ref 3.5–5.1)
PROT SERPL-MCNC: 7.8 G/DL (ref 6.4–8.2)
RBC # BLD AUTO: 3.12 M/UL (ref 3.8–5.2)
RBC MORPH BLD: ABNORMAL
SODIUM SERPL-SCNC: 139 MMOL/L (ref 136–145)
SPECIMEN HOLD: NORMAL
T4 FREE SERPL-MCNC: 1 NG/DL (ref 0.8–1.5)
TIBC SERPL-MCNC: 316 UG/DL (ref 250–450)
TRIGL SERPL-MCNC: 207 MG/DL
TSH SERPL DL<=0.05 MIU/L-ACNC: 1.19 UIU/ML (ref 0.36–3.74)
VLDLC SERPL CALC-MCNC: 41.4 MG/DL
WBC # BLD AUTO: 2.1 K/UL (ref 3.6–11)

## 2024-07-22 ENCOUNTER — TELEPHONE (OUTPATIENT)
Age: 79
End: 2024-07-22

## 2024-07-22 DIAGNOSIS — N18.32 STAGE 3B CHRONIC KIDNEY DISEASE (HCC): Primary | ICD-10-CM

## 2024-07-22 DIAGNOSIS — I10 PRIMARY HYPERTENSION: ICD-10-CM

## 2024-07-22 DIAGNOSIS — Z85.51 HISTORY OF BLADDER CANCER: ICD-10-CM

## 2024-07-22 DIAGNOSIS — Z98.890 HISTORY OF UROSTOMY: ICD-10-CM

## 2024-07-22 NOTE — TELEPHONE ENCOUNTER
I placed referral for Nephrology.  Abn kidney function may be related to Urology issues. I don't have any notes from [facility].  Can we request recent office visits and any imaging?    Referral to specialist ordered.  Please give office number to pt to call for appt.  Fax any related office note and labs.  Thank you.

## 2024-07-22 NOTE — TELEPHONE ENCOUNTER
----- Message from Leann Sweeney MD sent at 7/21/2024  3:18 PM EDT -----  Labs show good cholesterol numbers.  Stable anemia.  Low white blood cell count.  Low platelet levels.  Normal iron and ferritin level.  Follow-up with Dr. Hartley.  Stable abnormal kidney function. Does she have follow up with Nephrology (kidney clinic)? Elevated blood sugar, but normal A1C level indicates good sugar control.  Good magnesium level.

## 2024-07-22 NOTE — TELEPHONE ENCOUNTER
Called and spoke with patients daughter Roseann about patients lab results. She will schedule appointment with nephrology.

## 2024-07-23 NOTE — TELEPHONE ENCOUNTER
I called patients daughter and left  for her to return call to office. I requested notes from [facility].

## 2024-07-31 DIAGNOSIS — R79.0 LOW MAGNESIUM LEVEL: ICD-10-CM

## 2024-07-31 RX ORDER — MAGNESIUM OXIDE TAB 400 MG (241.3 MG ELEMENTAL MG) 400 (241.3 MG) MG
400 TAB ORAL DAILY
Qty: 90 TABLET | Refills: 0 | OUTPATIENT
Start: 2024-07-31

## 2024-08-05 DIAGNOSIS — D61.818 OTHER PANCYTOPENIA (HCC): ICD-10-CM

## 2024-08-05 DIAGNOSIS — N18.31 STAGE 3A CHRONIC KIDNEY DISEASE (HCC): ICD-10-CM

## 2024-09-02 NOTE — PROGRESS NOTES
[facility]  Medical Oncology [facility] [place]  [phone]    Hematology / Oncology Progress Note    Reason for Visit:   Tawana Nye is a 79 y.o. female who for follow up of pancytopenia.    History of Present Illness:   Ms. Nye is a 79 y.o. female with HTN, remote h/o bladder cancer who comes in for follow up of pancytopenia. Has a h/o of bladder cancer (1999) and underwent radical cystectomy with ileal conduit. Workup of pancytopenia revealed low VitB12 level. Patient had briefly stopped the taking the B12 injections, then she restarted based on my instructions.     Interval History:  Today, patient is seen for follow up of pancytopenia. Continues with B12 injections once monthly. Denies melena/hematochezia.   She was hospitalized in April 2024 for UTI and sepsis. She was treated with IV antibiotics, followed by Levaquin at home. States she underwent exploratory surgery with Dr. Earlene Kumari to repair urostomy site (removed scar tissue) in Feb 2024. States the stoma has gotten smaller and is slightly bleeding again. Pt states she is following closely with Dr. Kumari.     PMHx: Rheumatoid, Bladder ca, h/o COVID19, GERD, HTN, Hyperthyroid  Review of Systems: A complete review of systems was obtained, negative except as described above.    Physical Exam:     BP (!) 164/87 (Site: Left Upper Arm, Position: Sitting, Cuff Size: Medium Adult)   Pulse (!) 101   Temp 97.3 °F (36.3 °C) (Temporal)   Resp 16   Ht 1.651 m (5' 5\")   Wt 87 kg (191 lb 12.8 oz)   SpO2 94%   BMI 31.92 kg/m²     ECOG PS: 0  General: no distress  Eyes: anicteric sclerae  HENT: oropharynx clear  Neck: supple  Lymphatic: no cervical, supraclavicular adenopathy  Respiratory: normal respiratory effort  CV: no peripheral edema  GI: soft, nontender, nondistended, no masses  Skin: no rashes; no ecchymoses; no petechiae  : Urostomy in place        Results:     Lab Results   Component Value Date    WBC 2.1 (L) 07/19/2024    HGB 9.8 (L)

## 2024-09-03 ENCOUNTER — OFFICE VISIT (OUTPATIENT)
Age: 79
End: 2024-09-03
Payer: MEDICARE

## 2024-09-03 VITALS
OXYGEN SATURATION: 94 % | HEIGHT: 65 IN | WEIGHT: 191.8 LBS | DIASTOLIC BLOOD PRESSURE: 87 MMHG | BODY MASS INDEX: 31.96 KG/M2 | SYSTOLIC BLOOD PRESSURE: 164 MMHG | TEMPERATURE: 97.3 F | HEART RATE: 101 BPM | RESPIRATION RATE: 16 BRPM

## 2024-09-03 DIAGNOSIS — I10 ESSENTIAL (PRIMARY) HYPERTENSION: ICD-10-CM

## 2024-09-03 DIAGNOSIS — Z85.51 HISTORY OF BLADDER CANCER: ICD-10-CM

## 2024-09-03 DIAGNOSIS — D63.1 ANEMIA IN STAGE 3A CHRONIC KIDNEY DISEASE (HCC): ICD-10-CM

## 2024-09-03 DIAGNOSIS — D69.6 THROMBOCYTOPENIA, UNSPECIFIED (HCC): ICD-10-CM

## 2024-09-03 DIAGNOSIS — D61.818 OTHER PANCYTOPENIA (HCC): Primary | ICD-10-CM

## 2024-09-03 DIAGNOSIS — N18.31 STAGE 3A CHRONIC KIDNEY DISEASE (HCC): ICD-10-CM

## 2024-09-03 DIAGNOSIS — N18.31 ANEMIA IN STAGE 3A CHRONIC KIDNEY DISEASE (HCC): ICD-10-CM

## 2024-09-03 PROCEDURE — 99214 OFFICE O/P EST MOD 30 MIN: CPT | Performed by: INTERNAL MEDICINE

## 2024-09-03 ASSESSMENT — PATIENT HEALTH QUESTIONNAIRE - PHQ9
SUM OF ALL RESPONSES TO PHQ9 QUESTIONS 1 & 2: 0
1. LITTLE INTEREST OR PLEASURE IN DOING THINGS: NOT AT ALL
SUM OF ALL RESPONSES TO PHQ QUESTIONS 1-9: 0
2. FEELING DOWN, DEPRESSED OR HOPELESS: NOT AT ALL
SUM OF ALL RESPONSES TO PHQ QUESTIONS 1-9: 0

## 2024-09-03 NOTE — PROGRESS NOTES
Chief Complaint   Patient presents with    Follow-up           Vitals:    09/03/24 1043   BP: (!) 164/87   Pulse: (!) 101   Resp: 16   Temp: 97.3 °F (36.3 °C)   SpO2: 94%            1. Have you been to the ER, urgent care clinic since your last visit?  Hospitalized since your last visit?  Yes [facility] Abdominal surgery  2. Have you seen or consulted any other health care providers outside of the [facility] since your last visit?  Include any pap smears or colon screening. Yes Cardiologist

## 2024-09-27 NOTE — PROGRESS NOTES
Subjective:      Josette Arnold is a 66 y.o. female here with complaint of dry cough which is typically worse late in the afternoon and night. Onset was 3 days ago. Associated with chest congestion, chest tightness, diarrhea a that the onset which has resolved, feeling short of breath with talking, sore throat, hoarseness, head congestion and pressure. No known sick contacts. Evaluation to date: none   Treatment to date: cough drops      Current Outpatient Medications   Medication Sig Dispense Refill    cyanocobalamin (VITAMIN B12) 1,000 mcg/mL injection TAKE 1 ML BY INTRAMUSCULAR ROUTE EVERY THIRTY (30) DAYS. 12 mL 3    losartan-hydroCHLOROthiazide (HYZAAR) 50-12.5 mg per tablet Take 1 Tab by mouth every evening. Indications: high blood pressure 90 Tab 1    levothyroxine (SYNTHROID) 75 mcg tablet Take 1 Tab by mouth Daily (before breakfast). Indications: hypothyroidism 90 Tab 1    ergocalciferol (ERGOCALCIFEROL) 50,000 unit capsule Take 1 Cap by mouth every seven (7) days. 5    Syringe, Disposable, 3 mL syrg Use 1 syringe every month for your Vitamin B injections 12 Syringe 3    Safety Needles 25 x 5/8 \" ndle Use 1 syringe every month for your Vitamin B injections 12 Pen Needle 3    L. acidoph & paracasei- S therm- Bifido (ALEXEI-Q/RISAQUAD) 8 billion cell cap cap Take 1 Cap by mouth daily. 7 Cap 0    omeprazole (PRILOSEC) 20 mg capsule Take 20 mg by mouth daily.          Allergies   Allergen Reactions    Bactrim [Sulfamethoprim] Nausea and Vomiting    Ciprofloxacin Other (comments)     Joint aches    Diclofenac Nausea Only    Sulfa (Sulfonamide Antibiotics) Nausea Only       Past Medical History:   Diagnosis Date    Arthritis     Cancer ([facility] [address]     bladder    Endocrine disease     hyperthyroid    Gastrointestinal disorder     GERD (gastroesophageal reflux disease)     Hypertension     Other ill-defined conditions(669.89)     hyperthyroid       Social History     Tobacco Use    Smoking status: Former Smoker     Packs/day: 1.00     Last attempt to quit: 7/3/1999     Years since quittin.4    Smokeless tobacco: Never Used   Substance Use Topics    Alcohol use: No          Review of Systems  Pertinent items are noted in HPI. Objective:     Visit Vitals  /78 (BP 1 Location: Right arm, BP Patient Position: Sitting) Comment: Manual   Pulse 78   Temp 98.7 °F (37.1 °C) (Oral) Comment: .   Resp 18   Ht 5' 5\" (1.651 m)   Wt 192 lb (87.1 kg)   SpO2 98%   BMI 31.95 kg/m²      General appearance - alert, mildly ill appearing, and in no distress  Eyes - pupils equal and reactive, extraocular eye movements intact, sclera anicteric  Ears - bilateral TM's and external ear canals normal  Nose - mucosal congestion, mucosal erythema and sinus tenderness noted maxillary and frontal sinuses   Oropharyngx - mucous membranes moist, pharynx normal without lesions and erythematous  Neck - supple, no significant adenopathy  Chest - clear to auscultation, no wheezes, rales or rhonchi, symmetric air entry, no tachypnea, retractions or cyanosis  Heart - normal rate, regular rhythm, normal S1, S2, no murmurs, rubs, clicks or gallops    Assessment/Plan:   Marvel Mann is a 66 y.o. female seen for:     1. URI with cough and congestion  - azithromycin (ZITHROMAX) 250 mg tablet; Take 2 tablets today, then take 1 tablet daily  Dispense: 6 Tab; Refill: 0  - Symptomatic therapy suggested: push fluids, rest, use acetaminophen, ibuprofen, cough suppressant of choice prn and apply heat to sinuses prn.     2. Acute rhinosinusitis: as above. I have discussed the diagnosis with the patient and the intended plan as seen in the above orders. The patient has received an after-visit summary and questions were answered concerning future plans. I have discussed medication side effects and warnings with the patient as well. Patient verbalizes understanding of plan of care and denies further questions or concerns at this time.  Informed patient to return to the office if symptoms worsen or if new symptoms arise. Follow-up and Dispositions    · Return if symptoms worsen or fail to improve. 71

## 2024-10-29 DIAGNOSIS — E53.8 DEFICIENCY OF OTHER SPECIFIED B GROUP VITAMINS: ICD-10-CM

## 2024-10-29 RX ORDER — SYRINGE WITH NEEDLE, 1 ML 25GX5/8"
SYRINGE, EMPTY DISPOSABLE MISCELLANEOUS
Qty: 3 EACH | Refills: 0 | Status: SHIPPED | OUTPATIENT
Start: 2024-10-29

## 2024-11-01 RX ORDER — CYANOCOBALAMIN 1000 UG/ML
INJECTION, SOLUTION INTRAMUSCULAR; SUBCUTANEOUS
Qty: 3 ML | Refills: 3 | Status: SHIPPED | OUTPATIENT
Start: 2024-11-01

## 2025-02-02 NOTE — PROGRESS NOTES
Tawana Nye (:  1945) is a 79 y.o. female,Established patient, here for evaluation of the following chief complaint(s):  Lab Collection (Fasting), Hypertension (Patient is here for follow up visit), and Discuss Medications (Patient would like to discuss Iron. She stopped taking due to constipation. )        ASSESSMENT/PLAN:  1. Primary hypertension  -     Lipid Panel; Future  2. Chronic heart failure with preserved ejection fraction (HCC)  -     Lipid Panel; Future  3. Acquired hypothyroidism  -     TSH; Future  -     T4, Free; Future  4. Stage 3a chronic kidney disease (HCC)  -     Albumin/Creatinine Ratio, Urine; Future  -     Comprehensive Metabolic Panel; Future  5. Pancytopenia (HCC)  6. Hyperglycemia  -     Hemoglobin A1C; Future  7. Low magnesium level  -     Magnesium; Future  8. Vitamin D deficiency, unspecified  9. B12 deficiency  -     Vitamin B12; Future  10. Encounter for long-term (current) use of high-risk medication  -     Comprehensive Metabolic Panel; Future  11. Needs flu shot  -     Influenza, FLUAD Trivalent, (age 65 y+), IM, Preservative Free, 0.5mL  12. Age-related cataract of both eyes, unspecified age-related cataract type  -     External Referral To Ophthalmology  13. Viral URI    Order labs.  She will follow-up with hematology.  Refer to ophthalmology for cataracts.  Flu vaccine given today.  No follow-ups on file.      SUBJECTIVE/OBJECTIVE:  HPI  Follow-up hypertension, CKD, hypothyroidism.  Patient has had URI symptoms since .  Still has occasional cough.  Clear nasal drainage. No longer has fever. She did not test for COVID.  Her eye exam at [facility] eye clinic revealed she has cataracts.  Needs referral to ophthalmologist.  Patient is due for labs.    Current Outpatient Medications   Medication Sig Dispense Refill    cyanocobalamin 1000 MCG/ML injection INJECT 1 ML INTRAMUSCULARLY ONCE EVERY MONTH 3 mL 3    SYRINGE-NEEDLE, DISP, 3 ML (B-D 3CC LUER-CEASAR SYR

## 2025-02-03 ENCOUNTER — OFFICE VISIT (OUTPATIENT)
Age: 80
End: 2025-02-03
Payer: MEDICARE

## 2025-02-03 ENCOUNTER — LAB (OUTPATIENT)
Age: 80
End: 2025-02-03
Payer: MEDICARE

## 2025-02-03 VITALS
DIASTOLIC BLOOD PRESSURE: 68 MMHG | TEMPERATURE: 98 F | HEART RATE: 90 BPM | SYSTOLIC BLOOD PRESSURE: 138 MMHG | OXYGEN SATURATION: 98 % | BODY MASS INDEX: 31.32 KG/M2 | WEIGHT: 188 LBS | HEIGHT: 65 IN | RESPIRATION RATE: 16 BRPM

## 2025-02-03 DIAGNOSIS — D61.818 OTHER PANCYTOPENIA (HCC): ICD-10-CM

## 2025-02-03 DIAGNOSIS — N18.31 ANEMIA IN STAGE 3A CHRONIC KIDNEY DISEASE (HCC): ICD-10-CM

## 2025-02-03 DIAGNOSIS — D61.818 PANCYTOPENIA (HCC): ICD-10-CM

## 2025-02-03 DIAGNOSIS — I50.32 CHRONIC HEART FAILURE WITH PRESERVED EJECTION FRACTION (HCC): ICD-10-CM

## 2025-02-03 DIAGNOSIS — R73.9 HYPERGLYCEMIA: ICD-10-CM

## 2025-02-03 DIAGNOSIS — I10 PRIMARY HYPERTENSION: Primary | ICD-10-CM

## 2025-02-03 DIAGNOSIS — D63.1 ANEMIA IN STAGE 3A CHRONIC KIDNEY DISEASE (HCC): ICD-10-CM

## 2025-02-03 DIAGNOSIS — Z79.899 ENCOUNTER FOR LONG-TERM (CURRENT) USE OF HIGH-RISK MEDICATION: ICD-10-CM

## 2025-02-03 DIAGNOSIS — N18.31 STAGE 3A CHRONIC KIDNEY DISEASE (HCC): ICD-10-CM

## 2025-02-03 DIAGNOSIS — Z23 NEEDS FLU SHOT: ICD-10-CM

## 2025-02-03 DIAGNOSIS — E55.9 VITAMIN D DEFICIENCY, UNSPECIFIED: ICD-10-CM

## 2025-02-03 DIAGNOSIS — R79.0 LOW MAGNESIUM LEVEL: ICD-10-CM

## 2025-02-03 DIAGNOSIS — E53.8 B12 DEFICIENCY: ICD-10-CM

## 2025-02-03 DIAGNOSIS — E03.9 ACQUIRED HYPOTHYROIDISM: ICD-10-CM

## 2025-02-03 DIAGNOSIS — J06.9 VIRAL URI: ICD-10-CM

## 2025-02-03 DIAGNOSIS — I10 PRIMARY HYPERTENSION: ICD-10-CM

## 2025-02-03 DIAGNOSIS — H25.9 AGE-RELATED CATARACT OF BOTH EYES, UNSPECIFIED AGE-RELATED CATARACT TYPE: ICD-10-CM

## 2025-02-03 LAB — SPECIMEN HOLD: NORMAL

## 2025-02-03 PROCEDURE — 90653 IIV ADJUVANT VACCINE IM: CPT | Performed by: FAMILY MEDICINE

## 2025-02-03 PROCEDURE — 99214 OFFICE O/P EST MOD 30 MIN: CPT | Performed by: FAMILY MEDICINE

## 2025-02-03 RX ORDER — FERROUS SULFATE 325(65) MG
325 TABLET ORAL
COMMUNITY

## 2025-02-03 SDOH — ECONOMIC STABILITY: FOOD INSECURITY: WITHIN THE PAST 12 MONTHS, THE FOOD YOU BOUGHT JUST DIDN'T LAST AND YOU DIDN'T HAVE MONEY TO GET MORE.: NEVER TRUE

## 2025-02-03 SDOH — ECONOMIC STABILITY: FOOD INSECURITY: WITHIN THE PAST 12 MONTHS, YOU WORRIED THAT YOUR FOOD WOULD RUN OUT BEFORE YOU GOT MONEY TO BUY MORE.: NEVER TRUE

## 2025-02-03 ASSESSMENT — PATIENT HEALTH QUESTIONNAIRE - PHQ9
7. TROUBLE CONCENTRATING ON THINGS, SUCH AS READING THE NEWSPAPER OR WATCHING TELEVISION: NOT AT ALL
10. IF YOU CHECKED OFF ANY PROBLEMS, HOW DIFFICULT HAVE THESE PROBLEMS MADE IT FOR YOU TO DO YOUR WORK, TAKE CARE OF THINGS AT HOME, OR GET ALONG WITH OTHER PEOPLE: NOT DIFFICULT AT ALL
5. POOR APPETITE OR OVEREATING: NOT AT ALL
6. FEELING BAD ABOUT YOURSELF - OR THAT YOU ARE A FAILURE OR HAVE LET YOURSELF OR YOUR FAMILY DOWN: NOT AT ALL
1. LITTLE INTEREST OR PLEASURE IN DOING THINGS: NOT AT ALL
8. MOVING OR SPEAKING SO SLOWLY THAT OTHER PEOPLE COULD HAVE NOTICED. OR THE OPPOSITE, BEING SO FIGETY OR RESTLESS THAT YOU HAVE BEEN MOVING AROUND A LOT MORE THAN USUAL: NOT AT ALL
4. FEELING TIRED OR HAVING LITTLE ENERGY: NOT AT ALL
SUM OF ALL RESPONSES TO PHQ QUESTIONS 1-9: 0
2. FEELING DOWN, DEPRESSED OR HOPELESS: NOT AT ALL
SUM OF ALL RESPONSES TO PHQ QUESTIONS 1-9: 0
SUM OF ALL RESPONSES TO PHQ9 QUESTIONS 1 & 2: 0
3. TROUBLE FALLING OR STAYING ASLEEP: NOT AT ALL
SUM OF ALL RESPONSES TO PHQ QUESTIONS 1-9: 0
SUM OF ALL RESPONSES TO PHQ QUESTIONS 1-9: 0
9. THOUGHTS THAT YOU WOULD BE BETTER OFF DEAD, OR OF HURTING YOURSELF: NOT AT ALL

## 2025-02-03 NOTE — PROGRESS NOTES
Identified pt with two pt identifiers(name and )    Chief Complaint   Patient presents with    Lab Collection     Fasting    Hypertension     Patient is here for follow up visit    Discuss Medications     Patient would like to discuss Iron. She stopped taking due to constipation.         Health Maintenance Due   Topic    Hepatitis A vaccine (1 of 2 - Risk 2-dose series)    DTaP/Tdap/Td vaccine (1 - Tdap)    Hepatitis B vaccine (1 of 3 - Risk 3-dose series)    Respiratory Syncytial Virus (RSV) Pregnant or age 60 yrs+ (1 - 1-dose 75+ series)    Flu vaccine (1)    COVID-19 Vaccine ( -  season)    Annual Wellness Visit (Medicare Advantage)        Wt Readings from Last 3 Encounters:   25 85.3 kg (188 lb)   24 87 kg (191 lb 12.8 oz)   24 86.6 kg (191 lb)     Temp Readings from Last 3 Encounters:   25 98 °F (36.7 °C) (Temporal)   24 97.3 °F (36.3 °C) (Temporal)   24 98.6 °F (37 °C) (Temporal)     BP Readings from Last 3 Encounters:   25 138/68   24 (!) 164/87   24 128/68     Pulse Readings from Last 3 Encounters:   25 90   24 (!) 101   24 87           Depression Screening:  :         2/3/2025    11:13 AM 9/3/2024    10:45 AM 2024    10:10 AM 2024     9:39 AM 2024    10:54 AM 2023     1:27 PM 2023    11:00 AM   PHQ-9 Questionaire   Little interest or pleasure in doing things 0 0 0 0 0 0 0   Feeling down, depressed, or hopeless 0 0 0 0 0 0 0   Trouble falling or staying asleep, or sleeping too much 0  0       Feeling tired or having little energy 0  0       Poor appetite or overeating 0  0       Feeling bad about yourself - or that you are a failure or have let yourself or your family down 0  0       Trouble concentrating on things, such as reading the newspaper or watching television 0  0       Moving or speaking so slowly that other people could have noticed. Or the opposite - being so fidgety or restless that you

## 2025-02-04 LAB
ALBUMIN SERPL-MCNC: 4 G/DL (ref 3.5–5)
ALBUMIN/GLOB SERPL: 1 (ref 1.1–2.2)
ALP SERPL-CCNC: 66 U/L (ref 45–117)
ALT SERPL-CCNC: 21 U/L (ref 12–78)
ANION GAP SERPL CALC-SCNC: 9 MMOL/L (ref 2–12)
AST SERPL-CCNC: 30 U/L (ref 15–37)
BASOPHILS # BLD: 0.03 K/UL (ref 0–0.1)
BASOPHILS NFR BLD: 0.9 % (ref 0–1)
BILIRUB SERPL-MCNC: 0.7 MG/DL (ref 0.2–1)
BUN SERPL-MCNC: 42 MG/DL (ref 6–20)
BUN/CREAT SERPL: 26 (ref 12–20)
CALCIUM SERPL-MCNC: 10.2 MG/DL (ref 8.5–10.1)
CHLORIDE SERPL-SCNC: 105 MMOL/L (ref 97–108)
CHOLEST SERPL-MCNC: 177 MG/DL
CO2 SERPL-SCNC: 24 MMOL/L (ref 21–32)
CREAT SERPL-MCNC: 1.64 MG/DL (ref 0.55–1.02)
CREAT UR-MCNC: 93.2 MG/DL
DIFFERENTIAL METHOD BLD: ABNORMAL
EOSINOPHIL # BLD: 0.06 K/UL (ref 0–0.4)
EOSINOPHIL NFR BLD: 1.8 % (ref 0–7)
ERYTHROCYTE [DISTWIDTH] IN BLOOD BY AUTOMATED COUNT: 13.2 % (ref 11.5–14.5)
EST. AVERAGE GLUCOSE BLD GHB EST-MCNC: 108 MG/DL
FERRITIN SERPL-MCNC: 80 NG/ML (ref 26–388)
GLOBULIN SER CALC-MCNC: 3.9 G/DL (ref 2–4)
GLUCOSE SERPL-MCNC: 108 MG/DL (ref 65–100)
HBA1C MFR BLD: 5.4 % (ref 4–5.6)
HCT VFR BLD AUTO: 31.3 % (ref 35–47)
HDLC SERPL-MCNC: 57 MG/DL
HDLC SERPL: 3.1 (ref 0–5)
HGB BLD-MCNC: 10.6 G/DL (ref 11.5–16)
IMM GRANULOCYTES # BLD AUTO: 0.01 K/UL (ref 0–0.04)
IMM GRANULOCYTES NFR BLD AUTO: 0.3 % (ref 0–0.5)
LDLC SERPL CALC-MCNC: 81 MG/DL (ref 0–100)
LYMPHOCYTES # BLD: 0.71 K/UL (ref 0.8–3.5)
LYMPHOCYTES NFR BLD: 21.5 % (ref 12–49)
MAGNESIUM SERPL-MCNC: 2.1 MG/DL (ref 1.6–2.4)
MCH RBC QN AUTO: 32.3 PG (ref 26–34)
MCHC RBC AUTO-ENTMCNC: 33.9 G/DL (ref 30–36.5)
MCV RBC AUTO: 95.4 FL (ref 80–99)
MICROALBUMIN UR-MCNC: 2.67 MG/DL
MICROALBUMIN/CREAT UR-RTO: 29 MG/G (ref 0–30)
MONOCYTES # BLD: 0.23 K/UL (ref 0–1)
MONOCYTES NFR BLD: 7 % (ref 5–13)
NEUTS SEG # BLD: 2.26 K/UL (ref 1.8–8)
NEUTS SEG NFR BLD: 68.5 % (ref 32–75)
NRBC # BLD: 0 K/UL (ref 0–0.01)
NRBC BLD-RTO: 0 PER 100 WBC
PLATELET # BLD AUTO: 104 K/UL (ref 150–400)
PMV BLD AUTO: 12.4 FL (ref 8.9–12.9)
POTASSIUM SERPL-SCNC: 4.4 MMOL/L (ref 3.5–5.1)
PROT SERPL-MCNC: 7.9 G/DL (ref 6.4–8.2)
RBC # BLD AUTO: 3.28 M/UL (ref 3.8–5.2)
RBC MORPH BLD: ABNORMAL
SODIUM SERPL-SCNC: 138 MMOL/L (ref 136–145)
T4 FREE SERPL-MCNC: 1.3 NG/DL (ref 0.8–1.5)
TRIGL SERPL-MCNC: 195 MG/DL
TSH SERPL DL<=0.05 MIU/L-ACNC: 0.34 UIU/ML (ref 0.36–3.74)
VIT B12 SERPL-MCNC: 491 PG/ML (ref 193–986)
VLDLC SERPL CALC-MCNC: 39 MG/DL
WBC # BLD AUTO: 3.3 K/UL (ref 3.6–11)

## 2025-02-04 ASSESSMENT — ENCOUNTER SYMPTOMS: COUGH: 1

## 2025-02-07 ENCOUNTER — TELEPHONE (OUTPATIENT)
Age: 80
End: 2025-02-07

## 2025-02-07 NOTE — TELEPHONE ENCOUNTER
----- Message from Dr. Leann Sweeney MD sent at 2/5/2025  9:42 PM EST -----  Lab results show good thyroid level.  Good blood sugar control.  Stable chronic kidney disease.  Borderline elevated calcium.  Normal magnesium.  Normal Vit B 12 level. Stable cholesterol numbers. Normal urine protein test. Continue current medicines.   Please fax lab results to [facility].

## 2025-02-10 LAB
BASOPHILS # BLD: 0.03 K/UL (ref 0–0.1)
BASOPHILS NFR BLD: 0.9 % (ref 0–1)
DIFFERENTIAL METHOD BLD: ABNORMAL
EOSINOPHIL # BLD: 0.06 K/UL (ref 0–0.4)
EOSINOPHIL NFR BLD: 1.8 % (ref 0–7)
ERYTHROCYTE [DISTWIDTH] IN BLOOD BY AUTOMATED COUNT: 13.2 % (ref 11.5–14.5)
HCT VFR BLD AUTO: 31.3 % (ref 35–47)
HGB BLD-MCNC: 10.6 G/DL (ref 11.5–16)
IMM GRANULOCYTES # BLD AUTO: 0.01 K/UL (ref 0–0.04)
IMM GRANULOCYTES NFR BLD AUTO: 0.3 % (ref 0–0.5)
IRON SATN MFR SERPL: 24 % (ref 20–50)
IRON SERPL-MCNC: 72 UG/DL (ref 35–150)
LYMPHOCYTES # BLD: 0.71 K/UL (ref 0.8–3.5)
LYMPHOCYTES NFR BLD: 21.5 % (ref 12–49)
MCH RBC QN AUTO: 32.3 PG (ref 26–34)
MCHC RBC AUTO-ENTMCNC: 33.9 G/DL (ref 30–36.5)
MCV RBC AUTO: 95.4 FL (ref 80–99)
MONOCYTES # BLD: 0.23 K/UL (ref 0–1)
MONOCYTES NFR BLD: 7 % (ref 5–13)
NEUTS SEG # BLD: 2.26 K/UL (ref 1.8–8)
NEUTS SEG NFR BLD: 68.5 % (ref 32–75)
NRBC # BLD: 0 K/UL (ref 0–0.01)
NRBC BLD-RTO: 0 PER 100 WBC
PLATELET # BLD AUTO: 104 K/UL (ref 150–400)
PMV BLD AUTO: 12.4 FL (ref 8.9–12.9)
RBC # BLD AUTO: 3.28 M/UL (ref 3.8–5.2)
RBC MORPH BLD: ABNORMAL
SPECIMEN HOLD: NORMAL
TIBC SERPL-MCNC: 306 UG/DL (ref 250–450)
WBC # BLD AUTO: 3.3 K/UL (ref 3.6–11)

## 2025-02-18 DIAGNOSIS — E53.8 DEFICIENCY OF OTHER SPECIFIED B GROUP VITAMINS: ICD-10-CM

## 2025-02-19 RX ORDER — SYRINGE WITH NEEDLE, 1 ML 25GX5/8"
SYRINGE, EMPTY DISPOSABLE MISCELLANEOUS
Qty: 3 EACH | Refills: 3 | Status: SHIPPED | OUTPATIENT
Start: 2025-02-19

## 2025-03-04 ENCOUNTER — OFFICE VISIT (OUTPATIENT)
Age: 80
End: 2025-03-04
Payer: MEDICARE

## 2025-03-04 VITALS
TEMPERATURE: 97.7 F | OXYGEN SATURATION: 98 % | HEART RATE: 89 BPM | DIASTOLIC BLOOD PRESSURE: 77 MMHG | HEIGHT: 65 IN | WEIGHT: 182 LBS | SYSTOLIC BLOOD PRESSURE: 136 MMHG | BODY MASS INDEX: 30.32 KG/M2 | RESPIRATION RATE: 16 BRPM

## 2025-03-04 DIAGNOSIS — N18.31 ANEMIA IN STAGE 3A CHRONIC KIDNEY DISEASE (HCC): ICD-10-CM

## 2025-03-04 DIAGNOSIS — D63.1 ANEMIA IN STAGE 3A CHRONIC KIDNEY DISEASE (HCC): ICD-10-CM

## 2025-03-04 DIAGNOSIS — E53.8 DEFICIENCY OF OTHER SPECIFIED B GROUP VITAMINS: ICD-10-CM

## 2025-03-04 DIAGNOSIS — Z85.51 HISTORY OF BLADDER CANCER: ICD-10-CM

## 2025-03-04 DIAGNOSIS — D61.818 OTHER PANCYTOPENIA (HCC): Primary | ICD-10-CM

## 2025-03-04 DIAGNOSIS — Z98.890 HISTORY OF UROSTOMY: ICD-10-CM

## 2025-03-04 PROCEDURE — 3075F SYST BP GE 130 - 139MM HG: CPT | Performed by: NURSE PRACTITIONER

## 2025-03-04 PROCEDURE — 1090F PRES/ABSN URINE INCON ASSESS: CPT | Performed by: NURSE PRACTITIONER

## 2025-03-04 PROCEDURE — 1160F RVW MEDS BY RX/DR IN RCRD: CPT | Performed by: NURSE PRACTITIONER

## 2025-03-04 PROCEDURE — 1123F ACP DISCUSS/DSCN MKR DOCD: CPT | Performed by: NURSE PRACTITIONER

## 2025-03-04 PROCEDURE — 3078F DIAST BP <80 MM HG: CPT | Performed by: NURSE PRACTITIONER

## 2025-03-04 PROCEDURE — 1126F AMNT PAIN NOTED NONE PRSNT: CPT | Performed by: NURSE PRACTITIONER

## 2025-03-04 PROCEDURE — 99214 OFFICE O/P EST MOD 30 MIN: CPT | Performed by: NURSE PRACTITIONER

## 2025-03-04 PROCEDURE — 1036F TOBACCO NON-USER: CPT | Performed by: NURSE PRACTITIONER

## 2025-03-04 PROCEDURE — G8417 CALC BMI ABV UP PARAM F/U: HCPCS | Performed by: NURSE PRACTITIONER

## 2025-03-04 PROCEDURE — 1159F MED LIST DOCD IN RCRD: CPT | Performed by: NURSE PRACTITIONER

## 2025-03-04 PROCEDURE — G8427 DOCREV CUR MEDS BY ELIG CLIN: HCPCS | Performed by: NURSE PRACTITIONER

## 2025-03-04 PROCEDURE — G8399 PT W/DXA RESULTS DOCUMENT: HCPCS | Performed by: NURSE PRACTITIONER

## 2025-03-04 ASSESSMENT — PATIENT HEALTH QUESTIONNAIRE - PHQ9
SUM OF ALL RESPONSES TO PHQ QUESTIONS 1-9: 0
2. FEELING DOWN, DEPRESSED OR HOPELESS: NOT AT ALL
SUM OF ALL RESPONSES TO PHQ QUESTIONS 1-9: 0
1. LITTLE INTEREST OR PLEASURE IN DOING THINGS: NOT AT ALL

## 2025-03-04 NOTE — PROGRESS NOTES
[facility] [place]  Medical Oncology at [place]  [phone]    Hematology / Oncology Progress Note    Reason for Visit:   Tawana Nye is a 80 y.o. female who for follow up of pancytopenia.    History of Present Illness:   Ms. Nye is a 80 y.o. female with HTN, remote h/o bladder cancer who comes in for follow up of pancytopenia. Has a h/o of bladder cancer (1999) and underwent radical cystectomy with ileal conduit. Workup of pancytopenia revealed low VitB12 level. Patient had briefly stopped the taking the B12 injections, then she restarted based on my instructions.     Interval History:  Today, patient is seen for follow up of pancytopenia. Continues with B12 injections once monthly.     She reports feeling okay.  Fatigue continues. She reports urinary leakage around her stoma, has urology appt next month.  Scant bleeding/oozing persists from stoma.  Denies additional sites of bleeding.  Denies melena/hematochezia. Denies N/V/D/C.  Started on oral iron by nephrology.      She is accompanied by her daughter today.       PMHx: Rheumatoid, Bladder ca, h/o COVID19, GERD, HTN, Hyperthyroid  Review of Systems: A complete review of systems was obtained, negative except as described above.    Physical Exam:     /77   Pulse 89   Temp 97.7 °F (36.5 °C) (Temporal)   Resp 16   Ht 1.651 m (5' 5\")   Wt 82.6 kg (182 lb)   SpO2 98%   BMI 30.29 kg/m²     ECOG PS: 0  General: no distress  Eyes: anicteric sclerae  HENT: oropharynx clear  Neck: supple  Lymphatic: no cervical, supraclavicular adenopathy  Respiratory: normal respiratory effort  CV: no peripheral edema  GI: soft, nontender, nondistended, no masses  Skin: no rashes; no ecchymoses; no petechiae  : Urostomy in place        Results:     Lab Results   Component Value Date    WBC 3.3 (L) 02/03/2025    HGB 10.6 (L) 02/03/2025    HCT 31.3 (L) 02/03/2025     (L) 02/03/2025    MCV 95.4 02/03/2025    NEUTROABS 2.26 02/03/2025     Lab

## 2025-03-04 NOTE — PROGRESS NOTES
Tawana Nye is a 80 y.o. female follow up for         1. Have you been to the ER, urgent care clinic since your last visit?  Hospitalized since your last visit?{no    2. Have you seen or consulted any other health care providers outside of the [facility] since your last visit?  Include any pap smears or colon screening. PCP

## 2025-04-17 ENCOUNTER — TELEPHONE (OUTPATIENT)
Age: 80
End: 2025-04-17

## 2025-04-17 NOTE — TELEPHONE ENCOUNTER
I called daughter and schedule pre op appointment for upcoming cataract surgery. Pre op form is in folder to be completed.

## 2025-05-07 ENCOUNTER — OFFICE VISIT (OUTPATIENT)
Age: 80
End: 2025-05-07
Payer: MEDICARE

## 2025-05-07 VITALS
SYSTOLIC BLOOD PRESSURE: 130 MMHG | OXYGEN SATURATION: 97 % | DIASTOLIC BLOOD PRESSURE: 59 MMHG | HEIGHT: 65 IN | WEIGHT: 188.2 LBS | RESPIRATION RATE: 14 BRPM | BODY MASS INDEX: 31.36 KG/M2 | TEMPERATURE: 98.2 F | HEART RATE: 89 BPM

## 2025-05-07 DIAGNOSIS — N18.31 STAGE 3A CHRONIC KIDNEY DISEASE (HCC): ICD-10-CM

## 2025-05-07 DIAGNOSIS — I35.0 AORTIC VALVE STENOSIS, ETIOLOGY OF CARDIAC VALVE DISEASE UNSPECIFIED: ICD-10-CM

## 2025-05-07 DIAGNOSIS — Z01.818 PREOPERATIVE EXAMINATION: Primary | ICD-10-CM

## 2025-05-07 DIAGNOSIS — Z85.51 HISTORY OF BLADDER CANCER: ICD-10-CM

## 2025-05-07 DIAGNOSIS — H26.9 CATARACT, UNSPECIFIED CATARACT TYPE, UNSPECIFIED LATERALITY: ICD-10-CM

## 2025-05-07 DIAGNOSIS — Z98.890 HISTORY OF UROSTOMY: ICD-10-CM

## 2025-05-07 DIAGNOSIS — I10 PRIMARY HYPERTENSION: ICD-10-CM

## 2025-05-07 DIAGNOSIS — I50.32 CHRONIC HEART FAILURE WITH PRESERVED EJECTION FRACTION (HCC): ICD-10-CM

## 2025-05-07 PROCEDURE — 99213 OFFICE O/P EST LOW 20 MIN: CPT | Performed by: FAMILY MEDICINE

## 2025-05-07 PROCEDURE — G8427 DOCREV CUR MEDS BY ELIG CLIN: HCPCS | Performed by: FAMILY MEDICINE

## 2025-05-07 PROCEDURE — G8399 PT W/DXA RESULTS DOCUMENT: HCPCS | Performed by: FAMILY MEDICINE

## 2025-05-07 PROCEDURE — 3078F DIAST BP <80 MM HG: CPT | Performed by: FAMILY MEDICINE

## 2025-05-07 PROCEDURE — 3075F SYST BP GE 130 - 139MM HG: CPT | Performed by: FAMILY MEDICINE

## 2025-05-07 PROCEDURE — G2211 COMPLEX E/M VISIT ADD ON: HCPCS | Performed by: FAMILY MEDICINE

## 2025-05-07 PROCEDURE — 1126F AMNT PAIN NOTED NONE PRSNT: CPT | Performed by: FAMILY MEDICINE

## 2025-05-07 PROCEDURE — 1036F TOBACCO NON-USER: CPT | Performed by: FAMILY MEDICINE

## 2025-05-07 PROCEDURE — 1090F PRES/ABSN URINE INCON ASSESS: CPT | Performed by: FAMILY MEDICINE

## 2025-05-07 PROCEDURE — 1123F ACP DISCUSS/DSCN MKR DOCD: CPT | Performed by: FAMILY MEDICINE

## 2025-05-07 PROCEDURE — G8417 CALC BMI ABV UP PARAM F/U: HCPCS | Performed by: FAMILY MEDICINE

## 2025-05-07 PROCEDURE — 1160F RVW MEDS BY RX/DR IN RCRD: CPT | Performed by: FAMILY MEDICINE

## 2025-05-07 PROCEDURE — 1159F MED LIST DOCD IN RCRD: CPT | Performed by: FAMILY MEDICINE

## 2025-05-07 RX ORDER — OFLOXACIN 3 MG/ML
SOLUTION/ DROPS OPHTHALMIC
COMMUNITY
Start: 2025-04-16

## 2025-05-07 RX ORDER — LANOLIN ALCOHOL/MO/W.PET/CERES
CREAM (GRAM) TOPICAL
COMMUNITY
End: 2025-05-07 | Stop reason: CLARIF

## 2025-05-07 RX ORDER — KETOROLAC TROMETHAMINE 5 MG/ML
SOLUTION OPHTHALMIC
COMMUNITY
Start: 2025-04-16

## 2025-05-07 RX ORDER — PREDNISOLONE ACETATE 10 MG/ML
SUSPENSION/ DROPS OPHTHALMIC
COMMUNITY
Start: 2025-04-16

## 2025-05-07 ASSESSMENT — PATIENT HEALTH QUESTIONNAIRE - PHQ9
SUM OF ALL RESPONSES TO PHQ QUESTIONS 1-9: 0
SUM OF ALL RESPONSES TO PHQ QUESTIONS 1-9: 0
2. FEELING DOWN, DEPRESSED OR HOPELESS: NOT AT ALL
1. LITTLE INTEREST OR PLEASURE IN DOING THINGS: NOT AT ALL
SUM OF ALL RESPONSES TO PHQ QUESTIONS 1-9: 0
SUM OF ALL RESPONSES TO PHQ QUESTIONS 1-9: 0

## 2025-05-07 NOTE — PROGRESS NOTES
Tawana Nye is a 80 y.o. female    Chief Complaint   Patient presents with    Pre-op Exam         Health Maintenance Due   Topic Date Due    Hepatitis A vaccine (1 of 2 - Risk 2-dose series) Never done    DTaP/Tdap/Td vaccine (1 - Tdap) Never done    Hepatitis B vaccine (1 of 3 - Risk 3-dose series) Never done    Respiratory Syncytial Virus (RSV) Pregnant or age 60 yrs+ (1 - 1-dose 75+ series) Never done    COVID-19 Vaccine (1 - 2024-25 season) Never done    Annual Wellness Visit (Medicare Advantage)  01/01/2025         \"Have you been to the ER, urgent care clinic since your last visit?  Hospitalized since your last visit?\"    NO    “Have you seen or consulted any other health care providers outside of [facility] since your last visit?”    NO

## 2025-05-07 NOTE — PROGRESS NOTES
Subjective:      Tawnaa Nye is a 80 y.o. female who presents to the office today for a preoperative consultation at the request of surgeon Dr. Rene Wray who plans on performing cataract removal on May 13 and May 22.  This consultation is requested for the specific conditions prompting preoperative evaluation (i.e. because of potential affect on operative risk): HTN, CHF, CKD, AV stenosis.  Planned anesthesia is Local and IV sedation.  The patient has the following known anesthesia issues: none  Patient has a bleeding risk of : no recent abnormal bleeding.  Past Medical History:   Diagnosis Date    Arthritis     Cancer (HCC)     bladder    COVID-19 12/2020    Endocrine disease     hyperthyroid    Gastrointestinal disorder     GERD (gastroesophageal reflux disease)     Hypertension     Other ill-defined conditions(799.89)     hyperthyroid    Pneumonia 12/2020 12/2020     Past Surgical History:   Procedure Laterality Date    ABDOMEN SURGERY  02/2024    CT BIOPSY BONE MARROW  10/08/2018    CT BONE MARROW BIOPSY 10/8/2018 [facility] RAD CT    GYN      HERNIA REPAIR      TOTAL HIP ARTHROPLASTY Left     UROLOGICAL SURGERY      stoma since 99     Current Outpatient Medications   Medication Sig Dispense Refill    prednisoLONE acetate (PRED FORTE) 1 % ophthalmic suspension INSTILL 1 DROP 4 TIMES DAILY INTO EYE AFTER SURGERY FOR 1 WEEK, THEN TAPER AS DIRECTED      ofloxacin (OCUFLOX) 0.3 % solution INSTILL 1 DROP 4 TIMES DAILY INTO SURGICAL EYE. START 2 DAYS PRIOR TO SURGERY.      ketorolac (ACULAR) 0.5 % ophthalmic solution INSTILL 1 DROP 4 TIMES DAILY INTO SURGICAL EYE. START 2 DAYS PRIOR TO SURGERY.      SYRINGE-NEEDLE, DISP, 3 ML (B-D 3CC LUER-CEASAR SYR 25GX5/8\") 25G X 5/8\" 3 ML MISC USE 1 SYRINGE ONCE A MONTH FOR VITAMIN B INJECTIONS 3 each 3    FEROSUL 325 (65 Fe) MG tablet Take 1 tablet by mouth daily (with breakfast)      cyanocobalamin 1000 MCG/ML injection INJECT 1 ML INTRAMUSCULARLY ONCE EVERY MONTH 3 mL 3

## 2025-05-07 NOTE — PROGRESS NOTES
Tawana Nye is a 80 y.o. female    Chief Complaint   Patient presents with    Pre-op Exam     Vitals:    05/07/25 1509   BP: (!) 130/59   BP Site: Left Upper Arm   Patient Position: Sitting   Pulse: 89   Resp: 14   Temp: 98.2 °F (36.8 °C)   SpO2: 97%   Weight: 85.4 kg (188 lb 3.2 oz)   Height: 1.651 m (5' 5\")         Health Maintenance Due   Topic Date Due    Hepatitis A vaccine (1 of 2 - Risk 2-dose series) Never done    DTaP/Tdap/Td vaccine (1 - Tdap) Never done    Hepatitis B vaccine (1 of 3 - Risk 3-dose series) Never done    Respiratory Syncytial Virus (RSV) Pregnant or age 60 yrs+ (1 - 1-dose 75+ series) Never done    COVID-19 Vaccine (1 - 2024-25 season) Never done    Annual Wellness Visit (Medicare Advantage)  01/01/2025         \"Have you been to the ER, urgent care clinic since your last visit?  Hospitalized since your last visit?\"    NO    “Have you seen or consulted any other health care providers outside of [facility] since your last visit?”    NO

## 2025-05-12 ENCOUNTER — HOSPITAL ENCOUNTER (EMERGENCY)
Facility: HOSPITAL | Age: 80
Discharge: HOME | End: 2025-05-12
Attending: EMERGENCY MEDICINE
Payer: MEDICARE

## 2025-05-12 VITALS
SYSTOLIC BLOOD PRESSURE: 131 MMHG | RESPIRATION RATE: 16 BRPM | DIASTOLIC BLOOD PRESSURE: 64 MMHG | HEART RATE: 78 BPM | BODY MASS INDEX: 29.99 KG/M2 | WEIGHT: 180 LBS | TEMPERATURE: 98 F | HEIGHT: 65 IN | OXYGEN SATURATION: 99 %

## 2025-05-12 DIAGNOSIS — R31.0 GROSS HEMATURIA: Primary | ICD-10-CM

## 2025-05-12 LAB
ALBUMIN SERPL-MCNC: 3.8 G/DL (ref 3.5–5)
ALBUMIN/GLOB SERPL: 1 (ref 1.1–2.2)
ALP SERPL-CCNC: 79 U/L (ref 45–117)
ALT SERPL-CCNC: 23 U/L (ref 12–78)
ANION GAP SERPL CALC-SCNC: 3 MMOL/L (ref 2–12)
APPEARANCE UR: ABNORMAL
AST SERPL-CCNC: 25 U/L (ref 15–37)
BACTERIA URNS QL MICRO: NEGATIVE /HPF
BASOPHILS # BLD: 0.01 K/UL (ref 0–0.1)
BASOPHILS NFR BLD: 0.5 % (ref 0–1)
BILIRUB SERPL-MCNC: 0.6 MG/DL (ref 0.2–1)
BILIRUB UR QL CFM: NEGATIVE
BUN SERPL-MCNC: 34 MG/DL (ref 6–20)
BUN/CREAT SERPL: 23 (ref 12–20)
CALCIUM SERPL-MCNC: 9.7 MG/DL (ref 8.5–10.1)
CHLORIDE SERPL-SCNC: 110 MMOL/L (ref 97–108)
CO2 SERPL-SCNC: 26 MMOL/L (ref 21–32)
COLOR UR: ABNORMAL
COMMENT:: NORMAL
CREAT SERPL-MCNC: 1.5 MG/DL (ref 0.55–1.02)
DIFFERENTIAL METHOD BLD: ABNORMAL
EOSINOPHIL # BLD: 0.04 K/UL (ref 0–0.4)
EOSINOPHIL NFR BLD: 2.1 % (ref 0–7)
EPITH CASTS URNS QL MICRO: ABNORMAL /LPF
ERYTHROCYTE [DISTWIDTH] IN BLOOD BY AUTOMATED COUNT: 13.5 % (ref 11.5–14.5)
GLOBULIN SER CALC-MCNC: 4 G/DL (ref 2–4)
GLUCOSE SERPL-MCNC: 121 MG/DL (ref 65–100)
GLUCOSE UR STRIP.AUTO-MCNC: NEGATIVE MG/DL
HCT VFR BLD AUTO: 30.2 % (ref 35–47)
HGB BLD-MCNC: 10 G/DL (ref 11.5–16)
HGB UR QL STRIP: ABNORMAL
IMM GRANULOCYTES # BLD AUTO: 0.02 K/UL (ref 0–0.04)
IMM GRANULOCYTES NFR BLD AUTO: 1 % (ref 0–0.5)
KETONES UR QL STRIP.AUTO: NEGATIVE MG/DL
LEUKOCYTE ESTERASE UR QL STRIP.AUTO: ABNORMAL
LYMPHOCYTES # BLD: 0.58 K/UL (ref 0.8–3.5)
LYMPHOCYTES NFR BLD: 29.2 % (ref 12–49)
MCH RBC QN AUTO: 31.3 PG (ref 26–34)
MCHC RBC AUTO-ENTMCNC: 33.1 G/DL (ref 30–36.5)
MCV RBC AUTO: 94.4 FL (ref 80–99)
MONOCYTES # BLD: 0.19 K/UL (ref 0–1)
MONOCYTES NFR BLD: 9.7 % (ref 5–13)
NEUTS SEG # BLD: 1.15 K/UL (ref 1.8–8)
NEUTS SEG NFR BLD: 57.5 % (ref 32–75)
NITRITE UR QL STRIP.AUTO: NEGATIVE
NRBC # BLD: 0 K/UL (ref 0–0.01)
NRBC BLD-RTO: 0 PER 100 WBC
PH UR STRIP: 7.5 (ref 5–8)
PLATELET # BLD AUTO: 91 K/UL (ref 150–400)
PMV BLD AUTO: 10.5 FL (ref 8.9–12.9)
POTASSIUM SERPL-SCNC: 4.1 MMOL/L (ref 3.5–5.1)
PROT SERPL-MCNC: 7.8 G/DL (ref 6.4–8.2)
PROT UR STRIP-MCNC: 100 MG/DL
RBC # BLD AUTO: 3.2 M/UL (ref 3.8–5.2)
RBC #/AREA URNS HPF: >100 /HPF (ref 0–5)
RBC MORPH BLD: ABNORMAL
SODIUM SERPL-SCNC: 139 MMOL/L (ref 136–145)
SP GR UR REFRACTOMETRY: 1.01 (ref 1–1.03)
SPECIMEN HOLD: NORMAL
URINE CULTURE IF INDICATED: ABNORMAL
UROBILINOGEN UR QL STRIP.AUTO: 0.2 EU/DL (ref 0.2–1)
WBC # BLD AUTO: 2 K/UL (ref 3.6–11)
WBC URNS QL MICRO: ABNORMAL /HPF (ref 0–4)

## 2025-05-12 PROCEDURE — 81001 URINALYSIS AUTO W/SCOPE: CPT

## 2025-05-12 PROCEDURE — 36415 COLL VENOUS BLD VENIPUNCTURE: CPT

## 2025-05-12 PROCEDURE — 85025 COMPLETE CBC W/AUTO DIFF WBC: CPT

## 2025-05-12 PROCEDURE — 99283 EMERGENCY DEPT VISIT LOW MDM: CPT

## 2025-05-12 PROCEDURE — 80053 COMPREHEN METABOLIC PANEL: CPT

## 2025-05-12 PROCEDURE — 87086 URINE CULTURE/COLONY COUNT: CPT

## 2025-05-12 RX ORDER — LEVOFLOXACIN 250 MG/1
250 TABLET, FILM COATED ORAL DAILY
Qty: 7 TABLET | Refills: 0 | Status: SHIPPED | OUTPATIENT
Start: 2025-05-12 | End: 2025-05-19

## 2025-05-12 ASSESSMENT — PAIN - FUNCTIONAL ASSESSMENT: PAIN_FUNCTIONAL_ASSESSMENT: NONE - DENIES PAIN

## 2025-05-12 NOTE — CONSULTS
New Urology Consult Note    Patient: Tawana Nye MRN: [id number]  SSN: [id number]    YOB: 1945  Age: 80 y.o.  Sex: female            Plan:     Gross hematuria, concern for UTI  - Gross hematuria resolved upon my exam.  - Given generalized malaise/weakness x2 days and associated new hematuria, recommend starting broad-spectrum antibiotics.  Send urine for culture.    She is stable for discharge from the emergency department.  She is aware to follow-up in the office should her symptoms not improve    Thank you for this consult. Please contact [facility] with any further questions/concerns.    Labs, imaging, and other consultants' notes have been personally reviewed.     History of Present Illness:     Chief Complaint: Malaise, hematuria    Tawana Nye is seen in consultation for reasons noted above at the request of Dennys Acosta MD. Admitted to hospital for <principal problem not specified>    This is a 80 y.o. female with a history of arthritis, hypothyroidism, GERD, hypertension, bladder cancer s/p ileal conduit creation in 1999 presented to the emergency department with complaints of generalized fatigue x 2 days, hematuria that began yesterday.  She denies fevers, nausea, vomiting, acute abdominal or flank pain.  WBC 2, hemoglobin 10, creatinine 1.5 which appears to be baseline, urinalysis with >100 RBC, 5-10 WBCs, small leuk esterase.  No abdominal imaging for review.  Urology consulted for hematuria.     On exam, she is well-appearing  Denies abdominal or flank pain  Urine is clear yellow on exam with small amount of sediment-Per ER PA, urine was pink/red-tinged on arrival  She reports generalized fatigue x2 days, overall not feeling well    She is known to [facility], recently seen by Dr. Pritchett in office.  She had a CT in March of this year revealing bilateral Bosniak cysts     Subjective     Past Medical History  Past Medical History:   Diagnosis Date

## 2025-05-12 NOTE — ED TRIAGE NOTES
Patient arrives with the c.c. of blood in urine that started yesterday, pt has a urostomy bag and noticed blood.  Pt reports generalized weakness, denies fever.

## 2025-05-12 NOTE — DISCHARGE INSTRUCTIONS
Please finish the prescribed course of antibiotic and follow-up with your urologist to discuss today's visit.  If symptoms change or worsen please do not hesitate to return to the ED.

## 2025-05-12 NOTE — ED PROVIDER NOTES
Comments: Well-appearing female, no acute distress, ambulatory with steady gait.   HENT:      Head: Normocephalic and atraumatic.      Nose: Nose normal.      Mouth/Throat:      Mouth: Mucous membranes are moist.   Eyes:      Extraocular Movements: Extraocular movements intact.      Conjunctiva/sclera: Conjunctivae normal.      Pupils: Pupils are equal, round, and reactive to light.   Cardiovascular:      Rate and Rhythm: Normal rate and regular rhythm.   Pulmonary:      Effort: Pulmonary effort is normal.      Breath sounds: Normal breath sounds.   Abdominal:      General: There is no distension.      Palpations: Abdomen is soft.      Tenderness: There is no abdominal tenderness. There is no guarding.      Comments: Urostomy bag securely in place to right lower abdomen.  Urine is clear but pink-tinged secondary to large blood clot in the bag.   Musculoskeletal:      Cervical back: Neck supple.   Skin:     General: Skin is warm and dry.   Neurological:      General: No focal deficit present.      Mental Status: She is alert and oriented to person, place, and time.         DIAGNOSTIC RESULTS     EKG: All EKG's are interpreted by the Emergency Department Physician who either signs or Co-signs this chart in the absence of a cardiologist.        RADIOLOGY:   Non-plain film images such as CT, Ultrasound and MRI are read by the radiologist. Plain radiographic images are visualized and preliminarily interpreted by the emergency physician with the below findings:        Interpretation per the Radiologist below, if available at the time of this note:    No orders to display        LABS:  Labs Reviewed   CBC WITH AUTO DIFFERENTIAL - Abnormal; Notable for the following components:       Result Value    WBC 2.0 (*)     RBC 3.20 (*)     Hemoglobin 10.0 (*)     Hematocrit 30.2 (*)     Platelets 91 (*)     Immature Granulocytes % 1.0 (*)     Neutrophils Absolute 1.15 (*)     Lymphocytes Absolute 0.58 (*)     All other components

## 2025-05-13 LAB
BACTERIA SPEC CULT: NORMAL
CC UR VC: NORMAL
SERVICE CMNT-IMP: NORMAL

## 2025-05-23 ENCOUNTER — OFFICE VISIT (OUTPATIENT)
Age: 80
End: 2025-05-23
Payer: MEDICARE

## 2025-05-23 ENCOUNTER — ANCILLARY PROCEDURE (OUTPATIENT)
Age: 80
End: 2025-05-23
Payer: MEDICARE

## 2025-05-23 VITALS
SYSTOLIC BLOOD PRESSURE: 118 MMHG | DIASTOLIC BLOOD PRESSURE: 70 MMHG | HEART RATE: 92 BPM | WEIGHT: 185 LBS | HEIGHT: 65 IN | OXYGEN SATURATION: 98 % | BODY MASS INDEX: 30.82 KG/M2

## 2025-05-23 VITALS
DIASTOLIC BLOOD PRESSURE: 70 MMHG | HEART RATE: 90 BPM | WEIGHT: 185 LBS | BODY MASS INDEX: 30.82 KG/M2 | HEIGHT: 65 IN | SYSTOLIC BLOOD PRESSURE: 118 MMHG

## 2025-05-23 DIAGNOSIS — I10 PRIMARY HYPERTENSION: ICD-10-CM

## 2025-05-23 DIAGNOSIS — I35.0 NONRHEUMATIC AORTIC VALVE STENOSIS: Primary | ICD-10-CM

## 2025-05-23 DIAGNOSIS — R01.1 HEART MURMUR: ICD-10-CM

## 2025-05-23 DIAGNOSIS — I35.0 NONRHEUMATIC AORTIC VALVE STENOSIS: ICD-10-CM

## 2025-05-23 LAB
ECHO AO ROOT DIAM: 3.3 CM
ECHO AO ROOT INDEX: 1.73 CM/M2
ECHO AV AREA PEAK VELOCITY: 1.4 CM2
ECHO AV AREA VTI: 1.4 CM2
ECHO AV AREA/BSA PEAK VELOCITY: 0.7 CM2/M2
ECHO AV AREA/BSA VTI: 0.7 CM2/M2
ECHO AV MEAN GRADIENT: 15 MMHG
ECHO AV MEAN VELOCITY: 1.9 M/S
ECHO AV PEAK GRADIENT: 25 MMHG
ECHO AV PEAK VELOCITY: 2.5 M/S
ECHO AV VELOCITY RATIO: 0.4
ECHO AV VTI: 53.2 CM
ECHO BSA: 1.96 M2
ECHO EST RA PRESSURE: 3 MMHG
ECHO LA DIAMETER INDEX: 2.15 CM/M2
ECHO LA DIAMETER: 4.1 CM
ECHO LA TO AORTIC ROOT RATIO: 1.24
ECHO LA VOL A-L A2C: 54 ML (ref 22–52)
ECHO LA VOL A-L A4C: 74 ML (ref 22–52)
ECHO LA VOL BP: 67 ML (ref 22–52)
ECHO LA VOL MOD A2C: 52 ML (ref 22–52)
ECHO LA VOL MOD A4C: 71 ML (ref 22–52)
ECHO LA VOL/BSA BIPLANE: 35 ML/M2 (ref 16–34)
ECHO LA VOLUME AREA LENGTH: 70 ML
ECHO LA VOLUME INDEX A-L A2C: 28 ML/M2 (ref 16–34)
ECHO LA VOLUME INDEX A-L A4C: 39 ML/M2 (ref 16–34)
ECHO LA VOLUME INDEX AREA LENGTH: 37 ML/M2 (ref 16–34)
ECHO LA VOLUME INDEX MOD A2C: 27 ML/M2 (ref 16–34)
ECHO LA VOLUME INDEX MOD A4C: 37 ML/M2 (ref 16–34)
ECHO LV E' LATERAL VELOCITY: 6.28 CM/S
ECHO LV E' SEPTAL VELOCITY: 6.37 CM/S
ECHO LV EDV A2C: 58 ML
ECHO LV EDV A4C: 55 ML
ECHO LV EDV BP: 59 ML (ref 56–104)
ECHO LV EDV INDEX A4C: 29 ML/M2
ECHO LV EDV INDEX BP: 31 ML/M2
ECHO LV EDV NDEX A2C: 30 ML/M2
ECHO LV EF PHYSICIAN: 63 %
ECHO LV EJECTION FRACTION A2C: 61 %
ECHO LV EJECTION FRACTION A4C: 66 %
ECHO LV EJECTION FRACTION BIPLANE: 63 % (ref 55–100)
ECHO LV ESV A2C: 22 ML
ECHO LV ESV A4C: 19 ML
ECHO LV ESV BP: 22 ML (ref 19–49)
ECHO LV ESV INDEX A2C: 12 ML/M2
ECHO LV ESV INDEX A4C: 10 ML/M2
ECHO LV ESV INDEX BP: 12 ML/M2
ECHO LV FRACTIONAL SHORTENING: 31 % (ref 28–44)
ECHO LV INTERNAL DIMENSION DIASTOLE INDEX: 2.2 CM/M2
ECHO LV INTERNAL DIMENSION DIASTOLIC: 4.2 CM (ref 3.9–5.3)
ECHO LV INTERNAL DIMENSION SYSTOLIC INDEX: 1.52 CM/M2
ECHO LV INTERNAL DIMENSION SYSTOLIC: 2.9 CM
ECHO LV IVSD: 1.2 CM (ref 0.6–0.9)
ECHO LV MASS 2D: 137.2 G (ref 67–162)
ECHO LV MASS INDEX 2D: 71.9 G/M2 (ref 43–95)
ECHO LV POSTERIOR WALL DIASTOLIC: 0.8 CM (ref 0.6–0.9)
ECHO LV RELATIVE WALL THICKNESS RATIO: 0.38
ECHO LVOT AREA: 3.5 CM2
ECHO LVOT AV VTI INDEX: 0.4
ECHO LVOT DIAM: 2.1 CM
ECHO LVOT MEAN GRADIENT: 2 MMHG
ECHO LVOT PEAK GRADIENT: 4 MMHG
ECHO LVOT PEAK VELOCITY: 1 M/S
ECHO LVOT STROKE VOLUME INDEX: 38.8 ML/M2
ECHO LVOT SV: 74.1 ML
ECHO LVOT VTI: 21.4 CM
ECHO MV A VELOCITY: 1.6 M/S
ECHO MV AREA PHT: 2.6 CM2
ECHO MV E DECELERATION TIME (DT): 295.4 MS
ECHO MV E VELOCITY: 1.05 M/S
ECHO MV E/A RATIO: 0.66
ECHO MV E/E' LATERAL: 16.72
ECHO MV E/E' RATIO (AVERAGED): 16.6
ECHO MV E/E' SEPTAL: 16.48
ECHO MV PRESSURE HALF TIME (PHT): 85.7 MS
ECHO RV FREE WALL PEAK S': 11.6 CM/S
ECHO RV INTERNAL DIMENSION: 2.9 CM
ECHO RV TAPSE: 1.6 CM (ref 1.7–?)

## 2025-05-23 PROCEDURE — 99214 OFFICE O/P EST MOD 30 MIN: CPT | Performed by: INTERNAL MEDICINE

## 2025-05-23 PROCEDURE — 1159F MED LIST DOCD IN RCRD: CPT | Performed by: INTERNAL MEDICINE

## 2025-05-23 PROCEDURE — G8427 DOCREV CUR MEDS BY ELIG CLIN: HCPCS | Performed by: INTERNAL MEDICINE

## 2025-05-23 PROCEDURE — G8417 CALC BMI ABV UP PARAM F/U: HCPCS | Performed by: INTERNAL MEDICINE

## 2025-05-23 PROCEDURE — 93306 TTE W/DOPPLER COMPLETE: CPT | Performed by: INTERNAL MEDICINE

## 2025-05-23 PROCEDURE — 1123F ACP DISCUSS/DSCN MKR DOCD: CPT | Performed by: INTERNAL MEDICINE

## 2025-05-23 PROCEDURE — G8399 PT W/DXA RESULTS DOCUMENT: HCPCS | Performed by: INTERNAL MEDICINE

## 2025-05-23 PROCEDURE — 1090F PRES/ABSN URINE INCON ASSESS: CPT | Performed by: INTERNAL MEDICINE

## 2025-05-23 PROCEDURE — 3074F SYST BP LT 130 MM HG: CPT | Performed by: INTERNAL MEDICINE

## 2025-05-23 PROCEDURE — 3078F DIAST BP <80 MM HG: CPT | Performed by: INTERNAL MEDICINE

## 2025-05-23 PROCEDURE — 1036F TOBACCO NON-USER: CPT | Performed by: INTERNAL MEDICINE

## 2025-05-23 PROCEDURE — 1126F AMNT PAIN NOTED NONE PRSNT: CPT | Performed by: INTERNAL MEDICINE

## 2025-05-23 NOTE — PROGRESS NOTES
Office Follow-up    NAME: Tawana Nye   :  1945  MRM:  [id number]    Date:  2025         Assessment and Plan:     No chest pain, Sob, palpitations.       1.  Mild aortic valve stenosis: This is likely degenerative valve disease.  Mean gradient is 15 mmHg.  Will repeat an echocardiogram again in 1 year for surveillance.  Continue to control blood pressure.    2.  Hypertension: Blood pressure is controlled.  Continue losartan and furosemide.    3.  History of bladder cancer with bladder stoma: She is taking furosemide for decreased urine output in past.  Continue furosemide.    4.  Hypothyroidism: Continue Synthroid.    5. See Dr. Teague in 1 yr with same day Echo for AS                    ATTENTION:   This medical record was transcribed using an electronic medical records/speech recognition system.  Although proofread, it may and can contain electronic, spelling and other errors.  Corrections may be executed at a later time.  Please feel free to contact us for any clarifications as needed.      Subjective:     Tawana Nye, a 80 y.o. year-old who presents for followup.    -----      HPI: Tawana Nye is a 79 y.o. female with past medical history significant for hypertension, hypothyroidism, history of bladder cancer is here for evaluation.  In 2024 she was admitted to the hospital with UTI and sepsis and an echo done at that time demonstrated mild aortic stenosis with a mean gradient of 10 mmHg.  She has been referred for further evaluation.  From symptom standpoint she does not have any symptoms of chest pain, shortness of breath, palpitations, lightheadedness, dizziness, presyncope or syncope.    Exam:     /70 (BP Site: Left Upper Arm, Patient Position: Sitting)   Pulse 92   Ht 1.651 m (5' 5\")   Wt 83.9 kg (185 lb)   SpO2 98%   BMI 30.79 kg/m²      General appearance - alert, well appearing, and in no distress  Mental status - affect appropriate to mood  Eyes - sclera anicteric,

## 2025-05-23 NOTE — PROGRESS NOTES
Chief Complaint   Patient presents with    NAVS    Hypertension     Vitals:    05/23/25 1046   BP: 118/70   BP Site: Left Upper Arm   Patient Position: Sitting   Pulse: 92   SpO2: 98%   Weight: 83.9 kg (185 lb)   Height: 1.651 m (5' 5\")         Chest pain: DENIED     Recent hospital stays: DENIED     Refills: DENIED

## 2025-05-27 ENCOUNTER — RESULTS FOLLOW-UP (OUTPATIENT)
Age: 80
End: 2025-05-27

## 2025-06-27 DIAGNOSIS — E55.9 VITAMIN D DEFICIENCY, UNSPECIFIED: ICD-10-CM

## 2025-06-27 RX ORDER — ERGOCALCIFEROL 1.25 MG/1
50000 CAPSULE, LIQUID FILLED ORAL WEEKLY
Qty: 12 CAPSULE | Refills: 0 | Status: SHIPPED | OUTPATIENT
Start: 2025-06-27

## 2025-07-09 ENCOUNTER — OFFICE VISIT (OUTPATIENT)
Age: 80
End: 2025-07-09
Payer: MEDICARE

## 2025-07-09 ENCOUNTER — LAB (OUTPATIENT)
Age: 80
End: 2025-07-09
Payer: MEDICARE

## 2025-07-09 VITALS
DIASTOLIC BLOOD PRESSURE: 64 MMHG | OXYGEN SATURATION: 97 % | TEMPERATURE: 98.2 F | SYSTOLIC BLOOD PRESSURE: 130 MMHG | HEIGHT: 65 IN | HEART RATE: 89 BPM | WEIGHT: 187 LBS | BODY MASS INDEX: 31.16 KG/M2 | RESPIRATION RATE: 16 BRPM

## 2025-07-09 DIAGNOSIS — D64.9 ANEMIA, UNSPECIFIED TYPE: ICD-10-CM

## 2025-07-09 DIAGNOSIS — H91.93 BILATERAL HEARING LOSS, UNSPECIFIED HEARING LOSS TYPE: ICD-10-CM

## 2025-07-09 DIAGNOSIS — R79.0 LOW MAGNESIUM LEVEL: ICD-10-CM

## 2025-07-09 DIAGNOSIS — K21.9 GASTROESOPHAGEAL REFLUX DISEASE, UNSPECIFIED WHETHER ESOPHAGITIS PRESENT: ICD-10-CM

## 2025-07-09 DIAGNOSIS — Z43.4: ICD-10-CM

## 2025-07-09 DIAGNOSIS — R73.9 HYPERGLYCEMIA: ICD-10-CM

## 2025-07-09 DIAGNOSIS — Z00.00 MEDICARE ANNUAL WELLNESS VISIT, SUBSEQUENT: Primary | ICD-10-CM

## 2025-07-09 DIAGNOSIS — Z43.9: ICD-10-CM

## 2025-07-09 DIAGNOSIS — E03.9 HYPOTHYROIDISM, UNSPECIFIED TYPE: ICD-10-CM

## 2025-07-09 DIAGNOSIS — I10 ESSENTIAL (PRIMARY) HYPERTENSION: ICD-10-CM

## 2025-07-09 DIAGNOSIS — I50.9 HEART FAILURE, UNSPECIFIED HF CHRONICITY, UNSPECIFIED HEART FAILURE TYPE (HCC): ICD-10-CM

## 2025-07-09 DIAGNOSIS — D46.Z OTHER MYELODYSPLASTIC SYNDROMES (HCC): ICD-10-CM

## 2025-07-09 DIAGNOSIS — Z43.8: ICD-10-CM

## 2025-07-09 DIAGNOSIS — K74.69 OTHER CIRRHOSIS OF LIVER (HCC): ICD-10-CM

## 2025-07-09 DIAGNOSIS — N18.31 STAGE 3A CHRONIC KIDNEY DISEASE (HCC): ICD-10-CM

## 2025-07-09 DIAGNOSIS — M06.9 RHEUMATOID ARTHRITIS INVOLVING MULTIPLE SITES, UNSPECIFIED WHETHER RHEUMATOID FACTOR PRESENT (HCC): ICD-10-CM

## 2025-07-09 PROCEDURE — 99214 OFFICE O/P EST MOD 30 MIN: CPT | Performed by: FAMILY MEDICINE

## 2025-07-09 RX ORDER — OMEPRAZOLE 20 MG/1
20 CAPSULE, DELAYED RELEASE ORAL DAILY
Qty: 90 CAPSULE | Refills: 3 | Status: SHIPPED | OUTPATIENT
Start: 2025-07-09

## 2025-07-09 RX ORDER — MAGNESIUM OXIDE 400 MG/1
400 TABLET ORAL DAILY
Qty: 90 TABLET | Refills: 3 | Status: SHIPPED | OUTPATIENT
Start: 2025-07-09

## 2025-07-09 RX ORDER — RESPIRATORY SYNCYTIAL VISUS VACCINE RECOMBINANT, ADJUVANTED 120MCG/0.5
0.5 KIT INTRAMUSCULAR ONCE
Qty: 0.5 ML | Refills: 0 | Status: SHIPPED | OUTPATIENT
Start: 2025-07-09 | End: 2025-07-09

## 2025-07-09 RX ORDER — FUROSEMIDE 40 MG/1
40 TABLET ORAL DAILY
Qty: 90 TABLET | Refills: 3 | Status: SHIPPED | OUTPATIENT
Start: 2025-07-09

## 2025-07-09 RX ORDER — LEVOTHYROXINE SODIUM 75 UG/1
75 TABLET ORAL
Qty: 90 TABLET | Refills: 3 | Status: SHIPPED | OUTPATIENT
Start: 2025-07-09

## 2025-07-09 RX ORDER — LOSARTAN POTASSIUM 50 MG/1
50 TABLET ORAL DAILY
Qty: 90 TABLET | Refills: 3 | Status: SHIPPED | OUTPATIENT
Start: 2025-07-09

## 2025-07-09 ASSESSMENT — PATIENT HEALTH QUESTIONNAIRE - PHQ9
2. FEELING DOWN, DEPRESSED OR HOPELESS: NOT AT ALL
SUM OF ALL RESPONSES TO PHQ QUESTIONS 1-9: 0
1. LITTLE INTEREST OR PLEASURE IN DOING THINGS: NOT AT ALL

## 2025-07-09 ASSESSMENT — LIFESTYLE VARIABLES
HOW MANY STANDARD DRINKS CONTAINING ALCOHOL DO YOU HAVE ON A TYPICAL DAY: PATIENT DOES NOT DRINK
HOW OFTEN DO YOU HAVE A DRINK CONTAINING ALCOHOL: NEVER

## 2025-07-09 NOTE — PROGRESS NOTES
Identified pt with two pt identifiers(name and )    Chief Complaint   Patient presents with    Medicare AWV     Patient is here for AWV         Health Maintenance Due   Topic    Hepatitis A vaccine (1 of 2 - Risk 2-dose series)    DTaP/Tdap/Td vaccine (1 - Tdap)    Hepatitis B vaccine (1 of 3 - Risk 3-dose series)    Respiratory Syncytial Virus (RSV) Pregnant or age 60 yrs+ (1 - 1-dose 75+ series)    COVID-19 Vaccine ( season)       Wt Readings from Last 3 Encounters:   25 84.8 kg (187 lb)   25 83.9 kg (185 lb)   25 83.9 kg (185 lb)     Temp Readings from Last 3 Encounters:   25 98.2 °F (36.8 °C) (Temporal)   25 98.2 °F (36.8 °C)   25 97.7 °F (36.5 °C) (Temporal)     BP Readings from Last 3 Encounters:   25 130/64   25 118/70   25 118/70     Pulse Readings from Last 3 Encounters:   25 89   25 92   25 90           Depression Screening:  :         2025    10:15 AM 2025     3:09 PM 3/4/2025    10:43 AM 2/3/2025    11:13 AM 9/3/2024    10:45 AM 2024    10:10 AM 2024     9:39 AM   PHQ-9 Questionaire   Little interest or pleasure in doing things 0 0 0 0 0 0 0   Feeling down, depressed, or hopeless 0 0 0 0 0 0 0   Trouble falling or staying asleep, or sleeping too much    0  0    Feeling tired or having little energy    0  0    Poor appetite or overeating    0  0    Feeling bad about yourself - or that you are a failure or have let yourself or your family down    0  0    Trouble concentrating on things, such as reading the newspaper or watching television    0  0    Moving or speaking so slowly that other people could have noticed. Or the opposite - being so fidgety or restless that you have been moving around a lot more than usual    0  0    Thoughts that you would be better off dead, or of hurting yourself in some way    0  0    PHQ-9 Total Score 0 0 0 0 0 0 0   If you checked off any problems, how difficult have these 
To MA Pool  
Taylor Alexander, APRN - NP   Etanercept (ENBREL MINI) 50 MG/ML SOCT once a week Yes Automatic Reconciliation, Ar       CareTeam (Including outside providers/suppliers regularly involved in providing care):   Patient Care Team:  Leann Sweeney MD as PCP - General  Leann Sweeney MD as PCP - Empaneled Provider  Rimma Renteria MD as Physician  Misti Hartley MD as Physician     Recommendations for Preventive Services Due: see orders and patient instructions/AVS.  Recommended screening schedule for the next 5-10 years is provided to the patient in written form: see Patient Instructions/AVS.     Reviewed and updated this visit:  Tobacco  Allergies  Meds  Med Hx  Surg Hx  Fam Hx  Sexual Hx

## 2025-07-09 NOTE — PATIENT INSTRUCTIONS
attack. These may include:    Chest pain or pressure, or a strange feeling in the chest.     Sweating.     Shortness of breath.     Pain, pressure, or a strange feeling in the back, neck, jaw, or upper belly or in one or both shoulders or arms.     Lightheadedness or sudden weakness.     A fast or irregular heartbeat.   After you call 911, the  may tell you to chew 1 adult-strength or 2 to 4 low-dose aspirin. Wait for an ambulance. Do not try to drive yourself.  Watch closely for changes in your health, and be sure to contact your doctor if you have any problems.  Where can you learn more?  Go to https://www.[employer].net/patientEd and enter F075 to learn more about \"A Healthy Heart: Care Instructions.\"  Current as of: July 31, 2024  Content Version: 14.5  © 6095-0070 [employer].   Care instructions adapted under license by [employer]. If you have questions about a medical condition or this instruction, always ask your healthcare professional. [employer], disclaims any warranty or liability for your use of this information.    Personalized Preventive Plan for Tawana Nye - 7/9/2025  Medicare offers a range of preventive health benefits. Some of the tests and screenings are paid in full while other may be subject to a deductible, co-insurance, and/or copay.  Some of these benefits include a comprehensive review of your medical history including lifestyle, illnesses that may run in your family, and various assessments and screenings as appropriate.  After reviewing your medical record and screening and assessments performed today your provider may have ordered immunizations, labs, imaging, and/or referrals for you.  A list of these orders (if applicable) as well as your Preventive Care list are included within your After Visit Summary for your review.           Preventing Falls: Care Instructions  Injuries and health problems such as trouble walking or poor eyesight can increase

## 2025-07-11 LAB
T4 FREE SERPL-MCNC: 1.4 NG/DL (ref 0.8–1.5)
TSH SERPL DL<=0.05 MIU/L-ACNC: 0.48 UIU/ML (ref 0.36–3.74)

## 2025-07-12 LAB
ALBUMIN SERPL-MCNC: 4 G/DL (ref 3.5–5)
ALBUMIN/GLOB SERPL: 1 (ref 1.1–2.2)
ALP SERPL-CCNC: 73 U/L (ref 45–117)
ALT SERPL-CCNC: 21 U/L (ref 12–78)
ANION GAP SERPL CALC-SCNC: 6 MMOL/L (ref 2–12)
AST SERPL-CCNC: 28 U/L (ref 15–37)
BASOPHILS # BLD: 0.02 K/UL (ref 0–0.1)
BASOPHILS NFR BLD: 0.8 % (ref 0–1)
BILIRUB SERPL-MCNC: 0.9 MG/DL (ref 0.2–1)
BUN SERPL-MCNC: 38 MG/DL (ref 6–20)
BUN/CREAT SERPL: 21 (ref 12–20)
CALCIUM SERPL-MCNC: 9.8 MG/DL (ref 8.5–10.1)
CHLORIDE SERPL-SCNC: 107 MMOL/L (ref 97–108)
CHOLEST SERPL-MCNC: 184 MG/DL
CO2 SERPL-SCNC: 26 MMOL/L (ref 21–32)
CREAT SERPL-MCNC: 1.78 MG/DL (ref 0.55–1.02)
DIFFERENTIAL METHOD BLD: ABNORMAL
EOSINOPHIL # BLD: 0.03 K/UL (ref 0–0.4)
EOSINOPHIL NFR BLD: 1.2 % (ref 0–7)
ERYTHROCYTE [DISTWIDTH] IN BLOOD BY AUTOMATED COUNT: 12.9 % (ref 11.5–14.5)
EST. AVERAGE GLUCOSE BLD GHB EST-MCNC: 105 MG/DL
FERRITIN SERPL-MCNC: 100 NG/ML (ref 26–388)
GLOBULIN SER CALC-MCNC: 4 G/DL (ref 2–4)
GLUCOSE SERPL-MCNC: 104 MG/DL (ref 65–100)
HBA1C MFR BLD: 5.3 % (ref 4–5.6)
HCT VFR BLD AUTO: 32.1 % (ref 35–47)
HDLC SERPL-MCNC: 53 MG/DL
HDLC SERPL: 3.5 (ref 0–5)
HGB BLD-MCNC: 10.5 G/DL (ref 11.5–16)
IMM GRANULOCYTES # BLD AUTO: 0 K/UL (ref 0–0.04)
IMM GRANULOCYTES NFR BLD AUTO: 0 % (ref 0–0.5)
IRON SATN MFR SERPL: 32 % (ref 20–50)
IRON SERPL-MCNC: 98 UG/DL (ref 35–150)
LDLC SERPL CALC-MCNC: 80.4 MG/DL (ref 0–100)
LYMPHOCYTES # BLD: 0.67 K/UL (ref 0.8–3.5)
LYMPHOCYTES NFR BLD: 28.1 % (ref 12–49)
MCH RBC QN AUTO: 31.7 PG (ref 26–34)
MCHC RBC AUTO-ENTMCNC: 32.7 G/DL (ref 30–36.5)
MCV RBC AUTO: 97 FL (ref 80–99)
MONOCYTES # BLD: 0.2 K/UL (ref 0–1)
MONOCYTES NFR BLD: 8.3 % (ref 5–13)
NEUTS SEG # BLD: 1.48 K/UL (ref 1.8–8)
NEUTS SEG NFR BLD: 61.6 % (ref 32–75)
NRBC # BLD: 0 K/UL (ref 0–0.01)
NRBC BLD-RTO: 0 PER 100 WBC
PLATELET # BLD AUTO: 98 K/UL (ref 150–400)
PMV BLD AUTO: 12.7 FL (ref 8.9–12.9)
POTASSIUM SERPL-SCNC: 3.9 MMOL/L (ref 3.5–5.1)
PROT SERPL-MCNC: 8 G/DL (ref 6.4–8.2)
RBC # BLD AUTO: 3.31 M/UL (ref 3.8–5.2)
RBC MORPH BLD: ABNORMAL
SODIUM SERPL-SCNC: 139 MMOL/L (ref 136–145)
TIBC SERPL-MCNC: 308 UG/DL (ref 250–450)
TRIGL SERPL-MCNC: 253 MG/DL
VLDLC SERPL CALC-MCNC: 50.6 MG/DL
WBC # BLD AUTO: 2.4 K/UL (ref 3.6–11)
WBC MORPH BLD: ABNORMAL

## 2025-07-18 ENCOUNTER — TELEPHONE (OUTPATIENT)
Age: 80
End: 2025-07-18

## 2025-07-18 DIAGNOSIS — N18.4 STAGE 4 CHRONIC KIDNEY DISEASE (HCC): Primary | ICD-10-CM

## 2025-07-21 NOTE — TELEPHONE ENCOUNTER
Referral to specialist ordered.  Please give office number to pt to call for appt.  Fax any related office note and labs.  Thank you.  She last saw Dr Murphy 10/2024

## 2025-07-21 NOTE — TELEPHONE ENCOUNTER
I called Roseann. I had already spoken to her about patients lab results in previous encounter, she just needed to know whether patient needed to follow up with nephrology. I called and left VM letting her know that Dr Bonds recommended that she follow up with nephrology and to please let us know if patient needed a referral.